# Patient Record
Sex: FEMALE | Race: WHITE | URBAN - NONMETROPOLITAN AREA
[De-identification: names, ages, dates, MRNs, and addresses within clinical notes are randomized per-mention and may not be internally consistent; named-entity substitution may affect disease eponyms.]

---

## 2017-06-14 ENCOUNTER — DOCTOR'S OFFICE (OUTPATIENT)
Dept: URBAN - NONMETROPOLITAN AREA CLINIC 1 | Facility: CLINIC | Age: 62
Setting detail: OPHTHALMOLOGY
End: 2017-06-14
Payer: COMMERCIAL

## 2017-06-14 DIAGNOSIS — H25.13: ICD-10-CM

## 2017-06-14 DIAGNOSIS — H40.033: ICD-10-CM

## 2017-06-14 DIAGNOSIS — H40.053: ICD-10-CM

## 2017-06-14 PROCEDURE — 76514 ECHO EXAM OF EYE THICKNESS: CPT | Performed by: OPHTHALMOLOGY

## 2017-06-14 PROCEDURE — 92083 EXTENDED VISUAL FIELD XM: CPT | Performed by: OPHTHALMOLOGY

## 2017-06-14 PROCEDURE — 92012 INTRM OPH EXAM EST PATIENT: CPT | Performed by: OPHTHALMOLOGY

## 2017-06-14 ASSESSMENT — REFRACTION_AUTOREFRACTION
OD_AXIS: 178
OD_CYLINDER: -1.00
OS_CYLINDER: -0.50
OS_SPHERE: -5.75
OS_AXIS: 133
OD_SPHERE: -6.25

## 2017-06-14 ASSESSMENT — SPHEQUIV_DERIVED
OS_SPHEQUIV: -6
OD_SPHEQUIV: -6.75

## 2017-06-14 ASSESSMENT — REFRACTION_CURRENTRX
OD_AXIS: 40
OD_SPHERE: -5.75
OD_OVR_VA: 20/
OS_OVR_VA: 20/
OS_OVR_VA: 20/
OS_SPHERE: -6.25
OD_CYLINDER: -0.25
OD_OVR_VA: 20/
OS_CYLINDER: -0.50
OS_OVR_VA: 20/
OD_OVR_VA: 20/
OS_AXIS: 58

## 2017-06-14 ASSESSMENT — REFRACTION_MANIFEST
OU_VA: 20/
OD_VA3: 20/
OS_VA3: 20/
OU_VA: 20/
OS_VA2: 20/
OS_VA3: 20/
OS_VA1: 20/
OD_VA1: 20/
OD_VA3: 20/
OS_VA1: 20/
OS_VA2: 20/
OD_VA2: 20/
OS_VA3: 20/
OD_VA3: 20/
OD_VA1: 20/
OD_VA2: 20/
OD_VA1: 20/
OS_VA1: 20/
OU_VA: 20/
OS_VA2: 20/
OD_VA2: 20/

## 2017-06-14 ASSESSMENT — PACHYMETRY
OD_CT_CORRECTION: 0
OS_CT_UM: 555
OD_CT_UM: 542
OS_CT_CORRECTION: -1

## 2017-06-14 ASSESSMENT — VISUAL ACUITY
OD_BCVA: 20/25-1
OS_BCVA: 20/100

## 2017-09-13 ENCOUNTER — DOCTOR'S OFFICE (OUTPATIENT)
Dept: URBAN - NONMETROPOLITAN AREA CLINIC 1 | Facility: CLINIC | Age: 62
Setting detail: OPHTHALMOLOGY
End: 2017-09-13
Payer: COMMERCIAL

## 2017-09-13 DIAGNOSIS — H40.053: ICD-10-CM

## 2017-09-13 DIAGNOSIS — H40.033: ICD-10-CM

## 2017-09-13 DIAGNOSIS — H25.13: ICD-10-CM

## 2017-09-13 PROCEDURE — 92014 COMPRE OPH EXAM EST PT 1/>: CPT | Performed by: OPHTHALMOLOGY

## 2017-09-13 PROCEDURE — 92133 CPTRZD OPH DX IMG PST SGM ON: CPT | Performed by: OPHTHALMOLOGY

## 2017-09-13 ASSESSMENT — REFRACTION_MANIFEST
OD_VA1: 20/
OS_VA2: 20/
OD_VA2: 20/
OD_VA1: 20/
OU_VA: 20/
OD_VA3: 20/
OS_VA3: 20/
OS_VA1: 20/
OU_VA: 20/
OS_VA1: 20/
OS_VA1: 20/
OS_VA2: 20/
OD_VA3: 20/
OD_VA2: 20/
OS_VA3: 20/
OS_VA2: 20/
OU_VA: 20/
OD_VA1: 20/
OD_VA2: 20/
OS_VA3: 20/
OD_VA3: 20/

## 2017-09-13 ASSESSMENT — REFRACTION_AUTOREFRACTION
OD_CYLINDER: -1.00
OD_AXIS: 178
OS_AXIS: 133
OD_SPHERE: -6.25
OS_SPHERE: -5.75
OS_CYLINDER: -0.50

## 2017-09-13 ASSESSMENT — VISUAL ACUITY
OS_BCVA: 20/100
OD_BCVA: 20/20

## 2017-09-13 ASSESSMENT — REFRACTION_CURRENTRX
OD_CYLINDER: -0.25
OS_OVR_VA: 20/
OS_CYLINDER: -0.50
OS_SPHERE: -6.25
OD_AXIS: 40
OD_OVR_VA: 20/
OD_OVR_VA: 20/
OD_SPHERE: -5.75
OS_OVR_VA: 20/
OS_OVR_VA: 20/
OS_AXIS: 58
OD_OVR_VA: 20/

## 2017-09-13 ASSESSMENT — SPHEQUIV_DERIVED
OS_SPHEQUIV: -6
OD_SPHEQUIV: -6.75

## 2017-09-13 ASSESSMENT — CONFRONTATIONAL VISUAL FIELD TEST (CVF)
OD_FINDINGS: FULL
OS_FINDINGS: FULL

## 2017-09-26 ENCOUNTER — AMBUL SURGICAL CARE (OUTPATIENT)
Dept: URBAN - NONMETROPOLITAN AREA SURGERY 1 | Facility: SURGERY | Age: 62
Setting detail: OPHTHALMOLOGY
End: 2017-09-26
Payer: COMMERCIAL

## 2017-09-26 DIAGNOSIS — H40.031: ICD-10-CM

## 2017-09-26 PROCEDURE — 66761 REVISION OF IRIS: CPT | Performed by: OPHTHALMOLOGY

## 2017-09-26 PROCEDURE — G8918 PT W/O PREOP ORDER IV AB PRO: HCPCS | Performed by: OPHTHALMOLOGY

## 2017-09-26 PROCEDURE — G8907 PT DOC NO EVENTS ON DISCHARG: HCPCS | Performed by: OPHTHALMOLOGY

## 2017-10-31 ENCOUNTER — AMBUL SURGICAL CARE (OUTPATIENT)
Dept: URBAN - NONMETROPOLITAN AREA SURGERY 1 | Facility: SURGERY | Age: 62
Setting detail: OPHTHALMOLOGY
End: 2017-10-31
Payer: COMMERCIAL

## 2017-10-31 DIAGNOSIS — H40.032: ICD-10-CM

## 2017-10-31 PROCEDURE — G8907 PT DOC NO EVENTS ON DISCHARG: HCPCS | Performed by: OPHTHALMOLOGY

## 2017-10-31 PROCEDURE — G8918 PT W/O PREOP ORDER IV AB PRO: HCPCS | Performed by: OPHTHALMOLOGY

## 2017-10-31 PROCEDURE — 66761 REVISION OF IRIS: CPT | Performed by: OPHTHALMOLOGY

## 2018-01-09 ENCOUNTER — DOCTOR'S OFFICE (OUTPATIENT)
Dept: URBAN - NONMETROPOLITAN AREA CLINIC 1 | Facility: CLINIC | Age: 63
Setting detail: OPHTHALMOLOGY
End: 2018-01-09
Payer: COMMERCIAL

## 2018-01-09 DIAGNOSIS — H25.13: ICD-10-CM

## 2018-01-09 DIAGNOSIS — H40.033: ICD-10-CM

## 2018-01-09 DIAGNOSIS — H43.813: ICD-10-CM

## 2018-01-09 DIAGNOSIS — H35.363: ICD-10-CM

## 2018-01-09 DIAGNOSIS — H40.053: ICD-10-CM

## 2018-01-09 PROCEDURE — 92133 CPTRZD OPH DX IMG PST SGM ON: CPT | Performed by: OPHTHALMOLOGY

## 2018-01-09 PROCEDURE — 92014 COMPRE OPH EXAM EST PT 1/>: CPT | Performed by: OPHTHALMOLOGY

## 2018-01-09 PROCEDURE — 92250 FUNDUS PHOTOGRAPHY W/I&R: CPT | Performed by: OPHTHALMOLOGY

## 2018-01-09 ASSESSMENT — REFRACTION_AUTOREFRACTION
OD_SPHERE: -6.50
OS_AXIS: 125
OS_SPHERE: -5.50
OD_CYLINDER: -0.25
OS_CYLINDER: -0.50
OD_AXIS: 102

## 2018-01-09 ASSESSMENT — REFRACTION_CURRENTRX
OD_CYLINDER: -0.25
OD_SPHERE: -5.75
OD_AXIS: 40
OS_CYLINDER: -0.50
OS_OVR_VA: 20/
OD_OVR_VA: 20/
OS_OVR_VA: 20/
OD_OVR_VA: 20/
OS_OVR_VA: 20/
OS_SPHERE: -6.25
OD_OVR_VA: 20/
OS_AXIS: 58

## 2018-01-09 ASSESSMENT — REFRACTION_MANIFEST
OS_VA3: 20/
OS_VA1: 20/
OS_VA1: 20/
OD_VA2: 20/
OD_VA1: 20/
OU_VA: 20/
OS_VA2: 20/
OD_VA1: 20/
OD_VA3: 20/
OD_VA2: 20/
OS_VA2: 20/
OD_VA3: 20/
OU_VA: 20/
OS_VA1: 20/
OS_VA3: 20/
OD_VA2: 20/
OS_VA3: 20/
OD_VA3: 20/
OS_VA2: 20/
OU_VA: 20/
OD_VA1: 20/

## 2018-01-09 ASSESSMENT — VISUAL ACUITY
OS_BCVA: 20/200
OD_BCVA: 20/25+1

## 2018-01-09 ASSESSMENT — CONFRONTATIONAL VISUAL FIELD TEST (CVF)
OD_FINDINGS: FULL
OS_FINDINGS: FULL

## 2018-01-09 ASSESSMENT — SPHEQUIV_DERIVED
OD_SPHEQUIV: -6.625
OS_SPHEQUIV: -5.75

## 2018-01-23 ENCOUNTER — DOCTOR'S OFFICE (OUTPATIENT)
Dept: URBAN - NONMETROPOLITAN AREA CLINIC 1 | Facility: CLINIC | Age: 63
Setting detail: OPHTHALMOLOGY
End: 2018-01-23
Payer: COMMERCIAL

## 2018-01-23 DIAGNOSIS — H40.053: ICD-10-CM

## 2018-01-23 DIAGNOSIS — H40.033: ICD-10-CM

## 2018-01-23 DIAGNOSIS — H25.13: ICD-10-CM

## 2018-01-23 PROCEDURE — 92012 INTRM OPH EXAM EST PATIENT: CPT | Performed by: OPHTHALMOLOGY

## 2018-01-23 ASSESSMENT — REFRACTION_MANIFEST
OU_VA: 20/
OD_VA3: 20/
OU_VA: 20/
OS_VA2: 20/
OS_VA3: 20/
OD_VA1: 20/
OD_VA2: 20/
OS_VA2: 20/
OS_VA3: 20/
OS_VA2: 20/
OD_VA3: 20/
OS_VA1: 20/
OD_VA2: 20/
OD_VA1: 20/
OD_VA1: 20/
OD_VA3: 20/
OS_VA3: 20/
OD_VA2: 20/
OU_VA: 20/
OS_VA1: 20/
OS_VA1: 20/

## 2018-01-23 ASSESSMENT — REFRACTION_CURRENTRX
OD_OVR_VA: 20/
OD_CYLINDER: -0.25
OS_OVR_VA: 20/
OD_OVR_VA: 20/
OS_AXIS: 58
OD_OVR_VA: 20/
OS_CYLINDER: -0.50
OS_OVR_VA: 20/
OD_SPHERE: -5.75
OD_AXIS: 40
OS_SPHERE: -6.25
OS_OVR_VA: 20/

## 2018-01-23 ASSESSMENT — REFRACTION_AUTOREFRACTION
OS_SPHERE: -5.50
OS_AXIS: 125
OS_CYLINDER: -0.50
OD_CYLINDER: -0.25
OD_SPHERE: -6.50
OD_AXIS: 102

## 2018-01-23 ASSESSMENT — CONFRONTATIONAL VISUAL FIELD TEST (CVF)
OS_FINDINGS: FULL
OD_FINDINGS: FULL

## 2018-01-23 ASSESSMENT — VISUAL ACUITY
OD_BCVA: 20/30+2
OS_BCVA: 20/150-1

## 2018-01-23 ASSESSMENT — SPHEQUIV_DERIVED
OD_SPHEQUIV: -6.625
OS_SPHEQUIV: -5.75

## 2018-04-03 ENCOUNTER — RX ONLY (RX ONLY)
Age: 63
End: 2018-04-03

## 2018-04-03 ENCOUNTER — DOCTOR'S OFFICE (OUTPATIENT)
Dept: URBAN - NONMETROPOLITAN AREA CLINIC 1 | Facility: CLINIC | Age: 63
Setting detail: OPHTHALMOLOGY
End: 2018-04-03
Payer: COMMERCIAL

## 2018-04-03 DIAGNOSIS — H25.13: ICD-10-CM

## 2018-04-03 DIAGNOSIS — H40.033: ICD-10-CM

## 2018-04-03 DIAGNOSIS — H40.053: ICD-10-CM

## 2018-04-03 PROCEDURE — 92012 INTRM OPH EXAM EST PATIENT: CPT | Performed by: OPHTHALMOLOGY

## 2018-04-03 PROCEDURE — 92083 EXTENDED VISUAL FIELD XM: CPT | Performed by: OPHTHALMOLOGY

## 2018-04-03 ASSESSMENT — REFRACTION_MANIFEST
OS_VA3: 20/
OD_VA2: 20/
OD_VA3: 20/
OS_VA2: 20/
OD_VA1: 20/
OS_VA1: 20/
OD_VA2: 20/
OS_VA2: 20/
OD_VA1: 20/
OS_VA1: 20/
OD_VA2: 20/
OD_VA3: 20/
OU_VA: 20/
OU_VA: 20/
OD_VA3: 20/
OD_VA1: 20/
OS_VA1: 20/
OU_VA: 20/
OS_VA2: 20/

## 2018-04-03 ASSESSMENT — REFRACTION_AUTOREFRACTION
OS_SPHERE: -5.50
OS_CYLINDER: -0.50
OS_AXIS: 125
OD_AXIS: 102
OD_SPHERE: -6.50
OD_CYLINDER: -0.25

## 2018-04-03 ASSESSMENT — REFRACTION_CURRENTRX
OS_OVR_VA: 20/
OS_CYLINDER: -0.50
OS_AXIS: 58
OS_OVR_VA: 20/
OD_SPHERE: -5.75
OS_SPHERE: -6.25
OS_OVR_VA: 20/
OD_AXIS: 40
OD_OVR_VA: 20/
OD_OVR_VA: 20/
OD_CYLINDER: -0.25
OD_OVR_VA: 20/

## 2018-04-03 ASSESSMENT — VISUAL ACUITY
OS_BCVA: 20/200+1
OD_BCVA: 20/30

## 2018-04-03 ASSESSMENT — SPHEQUIV_DERIVED
OS_SPHEQUIV: -5.75
OD_SPHEQUIV: -6.625

## 2018-05-08 ENCOUNTER — DOCTOR'S OFFICE (OUTPATIENT)
Dept: URBAN - NONMETROPOLITAN AREA CLINIC 1 | Facility: CLINIC | Age: 63
Setting detail: OPHTHALMOLOGY
End: 2018-05-08
Payer: COMMERCIAL

## 2018-05-08 ENCOUNTER — RX ONLY (RX ONLY)
Age: 63
End: 2018-05-08

## 2018-05-08 DIAGNOSIS — H10.13: ICD-10-CM

## 2018-05-08 DIAGNOSIS — H40.033: ICD-10-CM

## 2018-05-08 DIAGNOSIS — H25.13: ICD-10-CM

## 2018-05-08 DIAGNOSIS — H40.053: ICD-10-CM

## 2018-05-08 PROCEDURE — 92012 INTRM OPH EXAM EST PATIENT: CPT | Performed by: OPHTHALMOLOGY

## 2018-05-08 ASSESSMENT — REFRACTION_CURRENTRX
OD_SPHERE: -5.75
OD_OVR_VA: 20/
OD_OVR_VA: 20/
OD_AXIS: 40
OS_OVR_VA: 20/
OS_OVR_VA: 20/
OS_SPHERE: -6.25
OS_OVR_VA: 20/
OS_AXIS: 58
OD_OVR_VA: 20/
OS_CYLINDER: -0.50
OD_CYLINDER: -0.25

## 2018-05-08 ASSESSMENT — REFRACTION_MANIFEST
OS_VA2: 20/
OS_VA2: 20/
OD_VA2: 20/
OD_VA2: 20/
OS_VA2: 20/
OU_VA: 20/
OS_VA1: 20/
OD_VA2: 20/
OS_VA1: 20/
OD_VA1: 20/
OD_VA1: 20/
OD_VA3: 20/
OS_VA3: 20/
OD_VA1: 20/
OU_VA: 20/
OU_VA: 20/
OD_VA3: 20/
OS_VA3: 20/
OS_VA1: 20/
OD_VA3: 20/
OS_VA3: 20/

## 2018-05-08 ASSESSMENT — REFRACTION_AUTOREFRACTION
OD_SPHERE: -6.50
OS_CYLINDER: -0.50
OD_AXIS: 102
OS_SPHERE: -5.50
OD_CYLINDER: -0.25
OS_AXIS: 125

## 2018-05-08 ASSESSMENT — VISUAL ACUITY
OS_BCVA: 20/400+1
OD_BCVA: 20/30

## 2018-05-08 ASSESSMENT — SPHEQUIV_DERIVED
OS_SPHEQUIV: -5.75
OD_SPHEQUIV: -6.625

## 2018-05-08 ASSESSMENT — CONFRONTATIONAL VISUAL FIELD TEST (CVF)
OS_FINDINGS: FULL
OD_FINDINGS: FULL

## 2018-09-19 ENCOUNTER — DOCTOR'S OFFICE (OUTPATIENT)
Dept: URBAN - NONMETROPOLITAN AREA CLINIC 1 | Facility: CLINIC | Age: 63
Setting detail: OPHTHALMOLOGY
End: 2018-09-19
Payer: COMMERCIAL

## 2018-09-19 DIAGNOSIS — H25.13: ICD-10-CM

## 2018-09-19 DIAGNOSIS — H40.033: ICD-10-CM

## 2018-09-19 DIAGNOSIS — Z83.511: ICD-10-CM

## 2018-09-19 DIAGNOSIS — H40.023: ICD-10-CM

## 2018-09-19 DIAGNOSIS — H10.13: ICD-10-CM

## 2018-09-19 PROCEDURE — 76514 ECHO EXAM OF EYE THICKNESS: CPT | Performed by: OPHTHALMOLOGY

## 2018-09-19 PROCEDURE — 92133 CPTRZD OPH DX IMG PST SGM ON: CPT | Performed by: OPHTHALMOLOGY

## 2018-09-19 PROCEDURE — 92012 INTRM OPH EXAM EST PATIENT: CPT | Performed by: OPHTHALMOLOGY

## 2018-09-19 ASSESSMENT — REFRACTION_CURRENTRX
OD_OVR_VA: 20/
OD_AXIS: 40
OS_OVR_VA: 20/
OD_OVR_VA: 20/
OS_AXIS: 58
OD_CYLINDER: -0.25
OS_CYLINDER: -0.50
OD_SPHERE: -5.75
OS_SPHERE: -6.25
OD_OVR_VA: 20/
OS_OVR_VA: 20/
OS_OVR_VA: 20/

## 2018-09-19 ASSESSMENT — PACHYMETRY
OD_CT_UM: 536
OS_CT_UM: 546
OD_CT_CORRECTION: 1
OS_CT_CORRECTION: 0

## 2018-09-19 ASSESSMENT — REFRACTION_AUTOREFRACTION
OS_SPHERE: -5.50
OD_SPHERE: -6.50
OD_AXIS: 102
OS_AXIS: 125
OS_CYLINDER: -0.50
OD_CYLINDER: -0.25

## 2018-09-19 ASSESSMENT — VISUAL ACUITY
OS_BCVA: 20/200+1
OD_BCVA: 20/30-1

## 2018-09-19 ASSESSMENT — CONFRONTATIONAL VISUAL FIELD TEST (CVF)
OS_FINDINGS: FULL
OD_FINDINGS: FULL

## 2018-09-19 ASSESSMENT — REFRACTION_MANIFEST
OS_VA1: 20/
OS_VA3: 20/
OD_VA3: 20/
OS_VA2: 20/
OU_VA: 20/
OD_VA2: 20/
OD_VA3: 20/
OD_VA1: 20/
OS_VA1: 20/
OS_VA2: 20/
OD_VA2: 20/
OS_VA3: 20/
OU_VA: 20/
OD_VA1: 20/

## 2018-09-19 ASSESSMENT — SPHEQUIV_DERIVED
OS_SPHEQUIV: -5.75
OD_SPHEQUIV: -6.625

## 2018-10-18 ENCOUNTER — DOCTOR'S OFFICE (OUTPATIENT)
Dept: URBAN - NONMETROPOLITAN AREA CLINIC 1 | Facility: CLINIC | Age: 63
Setting detail: OPHTHALMOLOGY
End: 2018-10-18
Payer: COMMERCIAL

## 2018-10-18 ENCOUNTER — AMBUL SURGICAL CARE (OUTPATIENT)
Dept: URBAN - NONMETROPOLITAN AREA SURGERY 1 | Facility: SURGERY | Age: 63
Setting detail: OPHTHALMOLOGY
End: 2018-10-18
Payer: COMMERCIAL

## 2018-10-18 DIAGNOSIS — H25.13: ICD-10-CM

## 2018-10-18 DIAGNOSIS — H25.11: ICD-10-CM

## 2018-10-18 PROCEDURE — S9986 NOT MEDICALLY NECESSARY SVC: HCPCS | Performed by: OPHTHALMOLOGY

## 2018-10-18 PROCEDURE — 92136 OPHTHALMIC BIOMETRY: CPT | Performed by: OPHTHALMOLOGY

## 2018-10-18 PROCEDURE — V2787 ASTIGMATISM-CORRECT FUNCTION: HCPCS | Performed by: OPHTHALMOLOGY

## 2018-11-01 ENCOUNTER — AMBUL SURGICAL CARE (OUTPATIENT)
Dept: URBAN - NONMETROPOLITAN AREA SURGERY 1 | Facility: SURGERY | Age: 63
Setting detail: OPHTHALMOLOGY
End: 2018-11-01
Payer: COMMERCIAL

## 2018-11-01 DIAGNOSIS — H25.011: ICD-10-CM

## 2018-11-01 DIAGNOSIS — H25.11: ICD-10-CM

## 2018-11-01 DIAGNOSIS — H25.041: ICD-10-CM

## 2018-11-01 PROCEDURE — G8907 PT DOC NO EVENTS ON DISCHARG: HCPCS | Performed by: OPHTHALMOLOGY

## 2018-11-01 PROCEDURE — MONO/WF PRECISION UPGRADE: Performed by: OPHTHALMOLOGY

## 2018-11-01 PROCEDURE — G8918 PT W/O PREOP ORDER IV AB PRO: HCPCS | Performed by: OPHTHALMOLOGY

## 2018-11-01 PROCEDURE — V2787 ASTIGMATISM-CORRECT FUNCTION: HCPCS | Performed by: OPHTHALMOLOGY

## 2018-11-01 PROCEDURE — 66984 XCAPSL CTRC RMVL W/O ECP: CPT | Performed by: OPHTHALMOLOGY

## 2018-11-02 ENCOUNTER — DOCTOR'S OFFICE (OUTPATIENT)
Dept: URBAN - NONMETROPOLITAN AREA CLINIC 1 | Facility: CLINIC | Age: 63
Setting detail: OPHTHALMOLOGY
End: 2018-11-02
Payer: COMMERCIAL

## 2018-11-02 ENCOUNTER — RX ONLY (RX ONLY)
Age: 63
End: 2018-11-02

## 2018-11-02 DIAGNOSIS — H25.11: ICD-10-CM

## 2018-11-02 DIAGNOSIS — H25.12: ICD-10-CM

## 2018-11-02 DIAGNOSIS — Z96.1: ICD-10-CM

## 2018-11-02 PROCEDURE — 99024 POSTOP FOLLOW-UP VISIT: CPT | Performed by: PHYSICIAN ASSISTANT

## 2018-11-02 PROCEDURE — 92136 OPHTHALMIC BIOMETRY: CPT | Performed by: OPHTHALMOLOGY

## 2018-11-02 ASSESSMENT — CONFRONTATIONAL VISUAL FIELD TEST (CVF)
OS_FINDINGS: FULL
OD_FINDINGS: FULL

## 2018-11-05 ASSESSMENT — REFRACTION_CURRENTRX
OD_OVR_VA: 20/
OD_SPHERE: -5.75
OD_OVR_VA: 20/
OS_OVR_VA: 20/
OD_AXIS: 40
OS_OVR_VA: 20/
OS_CYLINDER: -0.50
OD_CYLINDER: -0.25
OS_AXIS: 58
OS_OVR_VA: 20/
OS_SPHERE: -6.25
OD_OVR_VA: 20/

## 2018-11-05 ASSESSMENT — REFRACTION_AUTOREFRACTION
OD_AXIS: 180
OS_CYLINDER: -0.50
OD_SPHERE: -3.00
OS_AXIS: 125
OD_CYLINDER: 0.00
OS_SPHERE: -5.50

## 2018-11-05 ASSESSMENT — REFRACTION_MANIFEST
OS_VA2: 20/
OD_VA3: 20/
OS_VA3: 20/
OS_VA1: 20/
OS_VA1: 20/
OD_VA1: 20/
OU_VA: 20/
OD_VA2: 20/
OS_VA3: 20/
OU_VA: 20/
OD_VA1: 20/
OD_VA2: 20/
OS_VA2: 20/
OD_VA3: 20/

## 2018-11-05 ASSESSMENT — SPHEQUIV_DERIVED
OD_SPHEQUIV: -3
OS_SPHEQUIV: -5.75

## 2018-11-05 ASSESSMENT — VISUAL ACUITY
OD_BCVA: 20/30-1
OS_BCVA: 20/100

## 2018-11-08 ENCOUNTER — AMBUL SURGICAL CARE (OUTPATIENT)
Dept: URBAN - NONMETROPOLITAN AREA SURGERY 1 | Facility: SURGERY | Age: 63
Setting detail: OPHTHALMOLOGY
End: 2018-11-08
Payer: COMMERCIAL

## 2018-11-08 DIAGNOSIS — H25.012: ICD-10-CM

## 2018-11-08 DIAGNOSIS — H25.042: ICD-10-CM

## 2018-11-08 DIAGNOSIS — H25.12: ICD-10-CM

## 2018-11-08 PROCEDURE — V2787 ASTIGMATISM-CORRECT FUNCTION: HCPCS | Performed by: OPHTHALMOLOGY

## 2018-11-08 PROCEDURE — 66984 XCAPSL CTRC RMVL W/O ECP: CPT | Performed by: OPHTHALMOLOGY

## 2018-11-08 PROCEDURE — G8918 PT W/O PREOP ORDER IV AB PRO: HCPCS | Performed by: OPHTHALMOLOGY

## 2018-11-08 PROCEDURE — G8907 PT DOC NO EVENTS ON DISCHARG: HCPCS | Performed by: OPHTHALMOLOGY

## 2018-11-08 PROCEDURE — S9986 NOT MEDICALLY NECESSARY SVC: HCPCS | Performed by: OPHTHALMOLOGY

## 2018-11-09 ENCOUNTER — DOCTOR'S OFFICE (OUTPATIENT)
Dept: URBAN - NONMETROPOLITAN AREA CLINIC 1 | Facility: CLINIC | Age: 63
Setting detail: OPHTHALMOLOGY
End: 2018-11-09
Payer: COMMERCIAL

## 2018-11-09 DIAGNOSIS — Z96.1: ICD-10-CM

## 2018-11-09 PROBLEM — H25.12 NUCLEAR SCLEROSIS; LEFT EYE: Status: RESOLVED | Noted: 2018-11-02 | Resolved: 2018-11-09

## 2018-11-09 PROCEDURE — 99024 POSTOP FOLLOW-UP VISIT: CPT | Performed by: OPHTHALMOLOGY

## 2018-11-09 ASSESSMENT — REFRACTION_CURRENTRX
OD_SPHERE: -5.75
OD_CYLINDER: -0.25
OD_AXIS: 40
OS_CYLINDER: -0.50
OS_OVR_VA: 20/
OS_SPHERE: -6.25
OD_OVR_VA: 20/
OD_OVR_VA: 20/
OS_OVR_VA: 20/
OS_AXIS: 58
OS_OVR_VA: 20/
OD_OVR_VA: 20/

## 2018-11-09 ASSESSMENT — SPHEQUIV_DERIVED
OS_SPHEQUIV: -0.5
OD_SPHEQUIV: -2.125

## 2018-11-09 ASSESSMENT — REFRACTION_MANIFEST
OD_VA2: 20/
OD_VA3: 20/
OD_VA3: 20/
OS_VA2: 20/
OU_VA: 20/
OS_VA1: 20/
OU_VA: 20/
OD_VA1: 20/
OD_VA2: 20/
OS_VA3: 20/
OS_VA3: 20/
OD_VA1: 20/
OS_VA1: 20/
OS_VA2: 20/

## 2018-11-09 ASSESSMENT — REFRACTION_AUTOREFRACTION
OD_SPHERE: -2.00
OS_SPHERE: -0.50
OS_CYLINDER: 0.00
OD_CYLINDER: -0.25
OD_AXIS: 142
OS_AXIS: 180

## 2018-11-09 ASSESSMENT — VISUAL ACUITY
OD_BCVA: 20/30+2
OS_BCVA: 20/80+2

## 2018-11-20 ENCOUNTER — RX ONLY (RX ONLY)
Age: 63
End: 2018-11-20

## 2018-11-20 ENCOUNTER — DOCTOR'S OFFICE (OUTPATIENT)
Dept: URBAN - NONMETROPOLITAN AREA CLINIC 1 | Facility: CLINIC | Age: 63
Setting detail: OPHTHALMOLOGY
End: 2018-11-20
Payer: COMMERCIAL

## 2018-11-20 DIAGNOSIS — Z96.1: ICD-10-CM

## 2018-11-20 DIAGNOSIS — H40.023: ICD-10-CM

## 2018-11-20 PROCEDURE — 99024 POSTOP FOLLOW-UP VISIT: CPT | Performed by: OPHTHALMOLOGY

## 2018-11-20 PROCEDURE — 92083 EXTENDED VISUAL FIELD XM: CPT | Performed by: OPHTHALMOLOGY

## 2018-11-20 ASSESSMENT — CONFRONTATIONAL VISUAL FIELD TEST (CVF)
OD_FINDINGS: FULL
OS_FINDINGS: FULL

## 2018-11-20 ASSESSMENT — REFRACTION_MANIFEST
OS_VA2: 20/
OD_VA3: 20/
OD_VA1: 20/
OU_VA: 20/
OS_VA3: 20/
OS_VA3: 20/
OD_VA2: 20/
OS_VA1: 20/
OD_VA1: 20/
OS_VA2: 20/
OD_VA3: 20/
OD_VA2: 20/
OU_VA: 20/
OS_VA1: 20/

## 2018-11-20 ASSESSMENT — REFRACTION_CURRENTRX
OS_CYLINDER: -0.50
OS_SPHERE: -6.25
OD_OVR_VA: 20/
OS_AXIS: 58
OD_AXIS: 40
OD_SPHERE: -5.75
OD_CYLINDER: -0.25
OS_OVR_VA: 20/

## 2018-11-20 ASSESSMENT — REFRACTION_AUTOREFRACTION
OD_SPHERE: -2.25
OS_CYLINDER: -0.50
OD_AXIS: 122
OS_AXIS: 076
OS_SPHERE: 0.00
OD_CYLINDER: -0.50

## 2018-11-20 ASSESSMENT — VISUAL ACUITY
OD_BCVA: 20/20-2
OS_BCVA: 20/80-1

## 2018-11-20 ASSESSMENT — SPHEQUIV_DERIVED
OD_SPHEQUIV: -2.5
OS_SPHEQUIV: -0.25

## 2019-03-06 ENCOUNTER — DOCTOR'S OFFICE (OUTPATIENT)
Dept: URBAN - NONMETROPOLITAN AREA CLINIC 1 | Facility: CLINIC | Age: 64
Setting detail: OPHTHALMOLOGY
End: 2019-03-06
Payer: COMMERCIAL

## 2019-03-06 ENCOUNTER — RX ONLY (RX ONLY)
Age: 64
End: 2019-03-06

## 2019-03-06 DIAGNOSIS — H40.023: ICD-10-CM

## 2019-03-06 DIAGNOSIS — H43.813: ICD-10-CM

## 2019-03-06 DIAGNOSIS — H35.361: ICD-10-CM

## 2019-03-06 DIAGNOSIS — H40.033: ICD-10-CM

## 2019-03-06 DIAGNOSIS — H04.122: ICD-10-CM

## 2019-03-06 DIAGNOSIS — H04.121: ICD-10-CM

## 2019-03-06 DIAGNOSIS — Z96.1: ICD-10-CM

## 2019-03-06 DIAGNOSIS — H35.362: ICD-10-CM

## 2019-03-06 PROBLEM — H10.13 ALLERGIC CONJUNCTIVITIS; BOTH EYES: Status: RESOLVED | Noted: 2018-05-08 | Resolved: 2019-03-06

## 2019-03-06 PROCEDURE — 92014 COMPRE OPH EXAM EST PT 1/>: CPT | Performed by: OPHTHALMOLOGY

## 2019-03-06 PROCEDURE — 92250 FUNDUS PHOTOGRAPHY W/I&R: CPT | Performed by: OPHTHALMOLOGY

## 2019-03-06 ASSESSMENT — REFRACTION_CURRENTRX
OS_AXIS: 58
OD_OVR_VA: 20/
OD_CYLINDER: -0.25
OS_OVR_VA: 20/
OS_CYLINDER: -0.50
OD_OVR_VA: 20/
OD_SPHERE: -5.75
OS_OVR_VA: 20/
OD_OVR_VA: 20/
OD_AXIS: 40
OS_SPHERE: -6.25
OS_OVR_VA: 20/

## 2019-03-06 ASSESSMENT — VISUAL ACUITY
OS_BCVA: 20/150
OD_BCVA: 20/25-2

## 2019-03-06 ASSESSMENT — REFRACTION_MANIFEST
OD_VA2: 20/
OD_VA1: 20/
OS_VA3: 20/
OU_VA: 20/
OD_VA3: 20/
OS_VA1: 20/
OS_VA3: 20/
OD_VA3: 20/
OD_VA2: 20/
OS_VA2: 20/
OD_VA1: 20/
OS_VA2: 20/
OS_VA1: 20/
OU_VA: 20/

## 2019-03-06 ASSESSMENT — REFRACTION_AUTOREFRACTION
OD_AXIS: 106
OD_SPHERE: -2.00
OS_CYLINDER: -0.75
OS_SPHERE: 0.00
OS_AXIS: 090
OD_CYLINDER: -0.50

## 2019-03-06 ASSESSMENT — SPHEQUIV_DERIVED
OD_SPHEQUIV: -2.25
OS_SPHEQUIV: -0.375

## 2019-03-06 ASSESSMENT — CONFRONTATIONAL VISUAL FIELD TEST (CVF)
OD_FINDINGS: FULL
OS_FINDINGS: FULL

## 2019-03-11 ENCOUNTER — RX ONLY (RX ONLY)
Age: 64
End: 2019-03-11

## 2019-09-18 ENCOUNTER — DOCTOR'S OFFICE (OUTPATIENT)
Dept: URBAN - NONMETROPOLITAN AREA CLINIC 1 | Facility: CLINIC | Age: 64
Setting detail: OPHTHALMOLOGY
End: 2019-09-18
Payer: COMMERCIAL

## 2019-09-18 DIAGNOSIS — H43.813: ICD-10-CM

## 2019-09-18 DIAGNOSIS — Z96.1: ICD-10-CM

## 2019-09-18 DIAGNOSIS — H40.033: ICD-10-CM

## 2019-09-18 DIAGNOSIS — H35.363: ICD-10-CM

## 2019-09-18 DIAGNOSIS — H04.123: ICD-10-CM

## 2019-09-18 DIAGNOSIS — H40.023: ICD-10-CM

## 2019-09-18 PROCEDURE — 76514 ECHO EXAM OF EYE THICKNESS: CPT | Performed by: OPHTHALMOLOGY

## 2019-09-18 PROCEDURE — 92133 CPTRZD OPH DX IMG PST SGM ON: CPT | Performed by: OPHTHALMOLOGY

## 2019-09-18 PROCEDURE — 92014 COMPRE OPH EXAM EST PT 1/>: CPT | Performed by: OPHTHALMOLOGY

## 2019-09-18 ASSESSMENT — CONFRONTATIONAL VISUAL FIELD TEST (CVF)
OS_FINDINGS: FULL
OD_FINDINGS: FULL

## 2019-09-18 ASSESSMENT — VISUAL ACUITY
OD_BCVA: 20/25-1
OS_BCVA: 20/200+1

## 2019-09-18 ASSESSMENT — REFRACTION_MANIFEST
OD_VA1: 20/
OD_VA2: 20/
OD_VA3: 20/
OD_VA2: 20/
OU_VA: 20/
OS_VA3: 20/
OD_VA1: 20/
OS_VA1: 20/
OS_VA2: 20/
OU_VA: 20/
OD_VA3: 20/
OS_VA3: 20/
OS_VA1: 20/
OS_VA2: 20/

## 2019-09-18 ASSESSMENT — REFRACTION_CURRENTRX
OD_SPHERE: -2.00
OS_OVR_VA: 20/
OD_OVR_VA: 20/
OS_SPHERE: PLANO
OS_AXIS: 099
OD_AXIS: 101
OD_OVR_VA: 20/
OD_VPRISM_DIRECTION: SV
OD_CYLINDER: -0.50
OD_OVR_VA: 20/
OS_OVR_VA: 20/
OS_CYLINDER: -0.50
OS_VPRISM_DIRECTION: SV
OS_OVR_VA: 20/

## 2019-09-18 ASSESSMENT — SPHEQUIV_DERIVED
OS_SPHEQUIV: -0.75
OD_SPHEQUIV: -2.25

## 2019-09-18 ASSESSMENT — PACHYMETRY
OD_CT_UM: 536
OD_CT_CORRECTION: 1
OS_CT_UM: 546
OS_CT_CORRECTION: 0

## 2019-09-18 ASSESSMENT — REFRACTION_AUTOREFRACTION
OD_SPHERE: -1.75
OS_CYLINDER: -0.50
OS_AXIS: 054
OD_CYLINDER: -1.00
OD_AXIS: 107
OS_SPHERE: -0.50

## 2020-01-02 ENCOUNTER — HOSPITAL ENCOUNTER (OUTPATIENT)
Dept: RADIOLOGY | Facility: HOSPITAL | Age: 65
Discharge: HOME | End: 2020-01-02
Payer: MEDICARE

## 2020-01-02 ENCOUNTER — TRANSCRIBE ORDERS (OUTPATIENT)
Dept: ADMINISTRATIVE | Facility: HOSPITAL | Age: 65
End: 2020-01-02

## 2020-01-02 DIAGNOSIS — M79.672 LEFT FOOT PAIN: ICD-10-CM

## 2020-01-02 DIAGNOSIS — M79.672 LEFT FOOT PAIN: Primary | ICD-10-CM

## 2020-01-02 PROCEDURE — 73630 X-RAY EXAM OF FOOT: CPT

## 2020-01-08 ENCOUNTER — DOCTOR'S OFFICE (OUTPATIENT)
Dept: URBAN - NONMETROPOLITAN AREA CLINIC 1 | Facility: CLINIC | Age: 65
Setting detail: OPHTHALMOLOGY
End: 2020-01-08
Payer: COMMERCIAL

## 2020-01-08 ENCOUNTER — RX ONLY (RX ONLY)
Age: 65
End: 2020-01-08

## 2020-01-08 VITALS — HEIGHT: 55 IN

## 2020-01-08 DIAGNOSIS — H43.813: ICD-10-CM

## 2020-01-08 DIAGNOSIS — H35.363: ICD-10-CM

## 2020-01-08 DIAGNOSIS — H40.033: ICD-10-CM

## 2020-01-08 DIAGNOSIS — Z96.1: ICD-10-CM

## 2020-01-08 DIAGNOSIS — H04.123: ICD-10-CM

## 2020-01-08 DIAGNOSIS — H40.023: ICD-10-CM

## 2020-01-08 PROCEDURE — 92133 CPTRZD OPH DX IMG PST SGM ON: CPT | Performed by: OPHTHALMOLOGY

## 2020-01-08 PROCEDURE — 99214 OFFICE O/P EST MOD 30 MIN: CPT | Performed by: OPHTHALMOLOGY

## 2020-01-08 ASSESSMENT — REFRACTION_CURRENTRX
OS_SPHERE: PLANO
OD_VPRISM_DIRECTION: SV
OD_CYLINDER: -0.50
OS_OVR_VA: 20/
OS_CYLINDER: -0.50
OS_AXIS: 099
OD_AXIS: 101
OD_SPHERE: -2.00
OS_VPRISM_DIRECTION: SV
OD_OVR_VA: 20/

## 2020-01-08 ASSESSMENT — REFRACTION_AUTOREFRACTION
OD_AXIS: 107
OD_CYLINDER: -1.00
OS_SPHERE: -0.50
OS_AXIS: 054
OD_SPHERE: -1.75
OS_CYLINDER: -0.50

## 2020-01-08 ASSESSMENT — CONFRONTATIONAL VISUAL FIELD TEST (CVF)
OS_FINDINGS: FULL
OD_FINDINGS: FULL

## 2020-01-08 ASSESSMENT — VISUAL ACUITY
OD_BCVA: 20/25-2
OS_BCVA: 20/125

## 2020-01-08 ASSESSMENT — SPHEQUIV_DERIVED
OD_SPHEQUIV: -2.25
OS_SPHEQUIV: -0.75

## 2020-02-21 ENCOUNTER — RX ONLY (RX ONLY)
Age: 65
End: 2020-02-21

## 2020-03-23 ENCOUNTER — TRANSCRIBE ORDERS (OUTPATIENT)
Dept: ADMINISTRATIVE | Facility: HOSPITAL | Age: 65
End: 2020-03-23

## 2020-03-23 DIAGNOSIS — I48.91 ATRIAL FIBRILLATION, UNSPECIFIED TYPE (HCC): ICD-10-CM

## 2020-03-23 DIAGNOSIS — R07.89 OTHER CHEST PAIN: ICD-10-CM

## 2020-03-23 DIAGNOSIS — R06.00 DYSPNEA ON EXERTION: Primary | ICD-10-CM

## 2020-03-27 ENCOUNTER — TELEPHONE (OUTPATIENT)
Dept: NON INVASIVE DIAGNOSTICS | Facility: HOSPITAL | Age: 65
End: 2020-03-27

## 2020-03-27 DIAGNOSIS — R07.2 PRECORDIAL PAIN: Primary | ICD-10-CM

## 2020-04-01 ENCOUNTER — HOSPITAL ENCOUNTER (OUTPATIENT)
Dept: NON INVASIVE DIAGNOSTICS | Facility: HOSPITAL | Age: 65
Discharge: HOME | End: 2020-04-01
Attending: INTERNAL MEDICINE
Payer: MEDICARE

## 2020-04-01 ENCOUNTER — HOSPITAL ENCOUNTER (OUTPATIENT)
Dept: NUCLEAR MEDICINE | Facility: HOSPITAL | Age: 65
Discharge: HOME | End: 2020-04-01
Attending: INTERNAL MEDICINE
Payer: MEDICARE

## 2020-04-01 DIAGNOSIS — R07.2 PRECORDIAL PAIN: ICD-10-CM

## 2020-04-01 LAB
CHEST PAIN STATEMENT: NORMAL
MAX DIASTOLIC BP: 71 MMHG
MAX HEART RATE: 101 BPM
MAX PREDICTED HEART RATE: 154 BPM
MAX. SYSTOLIC BP: 146 MMHG
PROTOCOL NAME: NORMAL
REASON FOR TERMINATION: NORMAL
TARGET HR FORMULA: NORMAL
TEST INDICATION: NORMAL
TIME IN EXERCISE PHASE: NORMAL

## 2020-04-01 PROCEDURE — 93018 CV STRESS TEST I&R ONLY: CPT | Performed by: INTERNAL MEDICINE

## 2020-04-01 PROCEDURE — 78452 HT MUSCLE IMAGE SPECT MULT: CPT

## 2020-04-01 PROCEDURE — A9502 TC99M TETROFOSMIN: HCPCS

## 2020-04-01 PROCEDURE — 93306 TTE W/DOPPLER COMPLETE: CPT | Performed by: INTERNAL MEDICINE

## 2020-04-01 PROCEDURE — 93016 CV STRESS TEST SUPVJ ONLY: CPT | Performed by: INTERNAL MEDICINE

## 2020-04-01 PROCEDURE — 78452 HT MUSCLE IMAGE SPECT MULT: CPT | Performed by: INTERNAL MEDICINE

## 2020-04-01 PROCEDURE — 93017 CV STRESS TEST TRACING ONLY: CPT

## 2020-04-01 PROCEDURE — 93306 TTE W/DOPPLER COMPLETE: CPT

## 2020-04-01 RX ORDER — HYDROCHLOROTHIAZIDE 50 MG/1
TABLET ORAL
COMMUNITY
Start: 2020-01-22 | End: 2021-07-24 | Stop reason: HOSPADM

## 2020-04-01 RX ORDER — POTASSIUM CITRATE 10 MEQ/1
TABLET, EXTENDED RELEASE ORAL
COMMUNITY
Start: 2020-01-22 | End: 2021-08-07

## 2020-04-01 RX ORDER — HYDROCODONE/ACETAMINOPHEN 5 MG-500MG
TABLET ORAL
COMMUNITY
End: 2021-08-16 | Stop reason: HOSPADM

## 2020-04-01 RX ORDER — ALPRAZOLAM 0.25 MG
TABLET ORAL
COMMUNITY
End: 2021-08-16 | Stop reason: HOSPADM

## 2020-04-01 RX ORDER — PANTOPRAZOLE SODIUM 40 MG/1
TABLET, DELAYED RELEASE ORAL
COMMUNITY
Start: 2017-03-25 | End: 2021-07-24 | Stop reason: HOSPADM

## 2020-04-01 RX ORDER — DOCUSATE SODIUM 100 MG/1
CAPSULE, LIQUID FILLED ORAL EVERY 12 HOURS
COMMUNITY
End: 2021-08-16 | Stop reason: HOSPADM

## 2020-04-01 RX ORDER — CELECOXIB 200 MG/1
200 CAPSULE ORAL DAILY
COMMUNITY
Start: 2020-02-20 | End: 2021-07-24 | Stop reason: HOSPADM

## 2020-04-01 RX ORDER — AMOXICILLIN 500 MG/1
CAPSULE ORAL
COMMUNITY
End: 2021-07-24 | Stop reason: HOSPADM

## 2020-04-01 RX ORDER — BIMATOPROST 0.01 %
DROPS OPHTHALMIC (EYE)
COMMUNITY
Start: 2020-02-21

## 2020-04-01 RX ORDER — ALLOPURINOL 100 MG/1
TABLET ORAL
COMMUNITY
Start: 2020-01-09

## 2020-04-01 RX ORDER — BENZONATATE 100 MG/1
CAPSULE ORAL
COMMUNITY
Start: 2020-03-12 | End: 2021-07-24 | Stop reason: HOSPADM

## 2020-04-01 RX ORDER — ATORVASTATIN CALCIUM 10 MG/1
TABLET, FILM COATED ORAL
COMMUNITY
Start: 2020-01-22

## 2020-04-01 RX ORDER — ERGOCALCIFEROL (VITAMIN D2) 10 MCG
TABLET ORAL
COMMUNITY
End: 2021-07-24 | Stop reason: HOSPADM

## 2020-04-01 RX ADMIN — REGADENOSON 0.4 MG: 0.08 INJECTION, SOLUTION INTRAVENOUS at 09:06

## 2020-07-29 ENCOUNTER — DOCTOR'S OFFICE (OUTPATIENT)
Dept: URBAN - NONMETROPOLITAN AREA CLINIC 1 | Facility: CLINIC | Age: 65
Setting detail: OPHTHALMOLOGY
End: 2020-07-29
Payer: COMMERCIAL

## 2020-07-29 VITALS — HEIGHT: 55 IN

## 2020-07-29 DIAGNOSIS — H40.033: ICD-10-CM

## 2020-07-29 DIAGNOSIS — Z96.1: ICD-10-CM

## 2020-07-29 DIAGNOSIS — H04.122: ICD-10-CM

## 2020-07-29 DIAGNOSIS — H35.362: ICD-10-CM

## 2020-07-29 DIAGNOSIS — H04.121: ICD-10-CM

## 2020-07-29 DIAGNOSIS — H43.813: ICD-10-CM

## 2020-07-29 DIAGNOSIS — E11.9: ICD-10-CM

## 2020-07-29 DIAGNOSIS — H40.023: ICD-10-CM

## 2020-07-29 DIAGNOSIS — H35.361: ICD-10-CM

## 2020-07-29 PROCEDURE — 92014 COMPRE OPH EXAM EST PT 1/>: CPT | Performed by: OPHTHALMOLOGY

## 2020-07-29 PROCEDURE — 92134 CPTRZ OPH DX IMG PST SGM RTA: CPT | Performed by: OPHTHALMOLOGY

## 2020-07-29 PROCEDURE — 92083 EXTENDED VISUAL FIELD XM: CPT | Performed by: OPHTHALMOLOGY

## 2020-07-29 ASSESSMENT — REFRACTION_AUTOREFRACTION
OS_CYLINDER: -0.25
OS_AXIS: 052
OD_AXIS: 098
OS_SPHERE: -0.50
OD_SPHERE: -2.50
OD_CYLINDER: -0.50

## 2020-07-29 ASSESSMENT — CONFRONTATIONAL VISUAL FIELD TEST (CVF)
OD_FINDINGS: FULL
OS_FINDINGS: FULL

## 2020-07-29 ASSESSMENT — SPHEQUIV_DERIVED
OD_SPHEQUIV: -2.75
OS_SPHEQUIV: -0.625

## 2020-07-29 ASSESSMENT — REFRACTION_CURRENTRX
OS_CYLINDER: -0.50
OD_CYLINDER: -0.50
OD_VPRISM_DIRECTION: SV
OD_AXIS: 101
OD_OVR_VA: 20/
OS_OVR_VA: 20/
OS_VPRISM_DIRECTION: SV
OS_AXIS: 099
OD_SPHERE: -2.00
OS_SPHERE: PLANO

## 2020-07-29 ASSESSMENT — VISUAL ACUITY
OS_BCVA: 20/70
OD_BCVA: 20/30-1

## 2020-08-05 ENCOUNTER — TRANSCRIBE ORDERS (OUTPATIENT)
Dept: ADMINISTRATIVE | Facility: HOSPITAL | Age: 65
End: 2020-08-05

## 2020-08-05 DIAGNOSIS — R10.10 UPPER ABDOMINAL PAIN: Primary | ICD-10-CM

## 2020-08-06 ENCOUNTER — AMBUL SURGICAL CARE (OUTPATIENT)
Dept: URBAN - NONMETROPOLITAN AREA SURGERY 1 | Facility: SURGERY | Age: 65
Setting detail: OPHTHALMOLOGY
End: 2020-08-06
Payer: COMMERCIAL

## 2020-08-06 DIAGNOSIS — H40.021: ICD-10-CM

## 2020-08-06 PROCEDURE — 65855 TRABECULOPLASTY LASER SURG: CPT | Performed by: OPHTHALMOLOGY

## 2020-08-06 PROCEDURE — G8907 PT DOC NO EVENTS ON DISCHARG: HCPCS | Performed by: CLINIC/CENTER

## 2020-08-06 PROCEDURE — G8918 PT W/O PREOP ORDER IV AB PRO: HCPCS | Performed by: OPHTHALMOLOGY

## 2020-08-06 PROCEDURE — G8918 PT W/O PREOP ORDER IV AB PRO: HCPCS | Performed by: CLINIC/CENTER

## 2020-08-06 PROCEDURE — G8907 PT DOC NO EVENTS ON DISCHARG: HCPCS | Performed by: OPHTHALMOLOGY

## 2020-08-06 PROCEDURE — 65855 TRABECULOPLASTY LASER SURG: CPT | Performed by: CLINIC/CENTER

## 2020-08-07 ENCOUNTER — TRANSCRIBE ORDERS (OUTPATIENT)
Dept: ADMINISTRATIVE | Facility: HOSPITAL | Age: 65
End: 2020-08-07

## 2020-08-07 ENCOUNTER — HOSPITAL ENCOUNTER (OUTPATIENT)
Dept: RADIOLOGY | Facility: HOSPITAL | Age: 65
Discharge: HOME | End: 2020-08-07
Payer: MEDICARE

## 2020-08-07 ENCOUNTER — HOSPITAL ENCOUNTER (OUTPATIENT)
Dept: ULTRASOUND IMAGING | Facility: HOSPITAL | Age: 65
Discharge: HOME | End: 2020-08-07
Payer: MEDICARE

## 2020-08-07 DIAGNOSIS — R06.00 DYSPNEA, UNSPECIFIED TYPE: ICD-10-CM

## 2020-08-07 DIAGNOSIS — R10.10 UPPER ABDOMINAL PAIN: ICD-10-CM

## 2020-08-07 DIAGNOSIS — R06.00 DYSPNEA, UNSPECIFIED TYPE: Primary | ICD-10-CM

## 2020-08-07 PROCEDURE — 71046 X-RAY EXAM CHEST 2 VIEWS: CPT

## 2020-08-07 PROCEDURE — 76700 US EXAM ABDOM COMPLETE: CPT

## 2020-08-18 ENCOUNTER — AMBUL SURGICAL CARE (OUTPATIENT)
Dept: URBAN - NONMETROPOLITAN AREA SURGERY 1 | Facility: SURGERY | Age: 65
Setting detail: OPHTHALMOLOGY
End: 2020-08-18
Payer: COMMERCIAL

## 2020-08-18 DIAGNOSIS — H40.022: ICD-10-CM

## 2020-08-18 PROCEDURE — G8918 PT W/O PREOP ORDER IV AB PRO: HCPCS | Performed by: CLINIC/CENTER

## 2020-08-18 PROCEDURE — G8918 PT W/O PREOP ORDER IV AB PRO: HCPCS | Performed by: OPHTHALMOLOGY

## 2020-08-18 PROCEDURE — G8907 PT DOC NO EVENTS ON DISCHARG: HCPCS | Performed by: OPHTHALMOLOGY

## 2020-08-18 PROCEDURE — 65855 TRABECULOPLASTY LASER SURG: CPT | Performed by: CLINIC/CENTER

## 2020-08-18 PROCEDURE — 65855 TRABECULOPLASTY LASER SURG: CPT | Performed by: OPHTHALMOLOGY

## 2020-08-18 PROCEDURE — G8907 PT DOC NO EVENTS ON DISCHARG: HCPCS | Performed by: CLINIC/CENTER

## 2020-09-30 ENCOUNTER — DOCTOR'S OFFICE (OUTPATIENT)
Dept: URBAN - NONMETROPOLITAN AREA CLINIC 1 | Facility: CLINIC | Age: 65
Setting detail: OPHTHALMOLOGY
End: 2020-09-30
Payer: COMMERCIAL

## 2020-09-30 VITALS — HEIGHT: 55 IN

## 2020-09-30 DIAGNOSIS — H40.023: ICD-10-CM

## 2020-09-30 DIAGNOSIS — H26.491: ICD-10-CM

## 2020-09-30 DIAGNOSIS — Z96.1: ICD-10-CM

## 2020-09-30 DIAGNOSIS — H26.492: ICD-10-CM

## 2020-09-30 PROCEDURE — 99214 OFFICE O/P EST MOD 30 MIN: CPT | Performed by: OPHTHALMOLOGY

## 2020-09-30 ASSESSMENT — VISUAL ACUITY
OD_BCVA: 20/30-2
OS_BCVA: 20/100+1

## 2020-09-30 ASSESSMENT — REFRACTION_AUTOREFRACTION
OD_AXIS: 115
OS_CYLINDER: -1.00
OS_AXIS: 097
OS_SPHERE: 0.00
OD_SPHERE: -1.75
OD_CYLINDER: -1.00

## 2020-09-30 ASSESSMENT — REFRACTION_CURRENTRX
OD_AXIS: 101
OS_SPHERE: PLANO
OS_OVR_VA: 20/
OS_AXIS: 099
OS_CYLINDER: -0.50
OD_OVR_VA: 20/
OD_CYLINDER: -0.50
OD_SPHERE: -2.00
OS_VPRISM_DIRECTION: SV
OD_VPRISM_DIRECTION: SV

## 2020-09-30 ASSESSMENT — CONFRONTATIONAL VISUAL FIELD TEST (CVF)
OD_FINDINGS: FULL
OS_FINDINGS: FULL

## 2020-09-30 ASSESSMENT — SPHEQUIV_DERIVED
OD_SPHEQUIV: -2.25
OS_SPHEQUIV: -0.5

## 2020-12-29 ENCOUNTER — TRANSCRIBE ORDERS (OUTPATIENT)
Dept: ADMINISTRATIVE | Facility: HOSPITAL | Age: 65
End: 2020-12-29

## 2020-12-29 ENCOUNTER — APPOINTMENT (OUTPATIENT)
Dept: LAB | Facility: HOSPITAL | Age: 65
End: 2020-12-29
Payer: MEDICARE

## 2020-12-29 ENCOUNTER — HOSPITAL ENCOUNTER (OUTPATIENT)
Dept: CT IMAGING | Facility: HOSPITAL | Age: 65
Discharge: HOME | End: 2020-12-29
Payer: MEDICARE

## 2020-12-29 DIAGNOSIS — R31.0 GROSS HEMATURIA: ICD-10-CM

## 2020-12-29 DIAGNOSIS — R31.0 GROSS HEMATURIA: Primary | ICD-10-CM

## 2020-12-29 DIAGNOSIS — N20.0 URIC ACID NEPHROLITHIASIS: ICD-10-CM

## 2020-12-29 DIAGNOSIS — N20.0 CALCULUS OF KIDNEY: ICD-10-CM

## 2020-12-29 DIAGNOSIS — R31.9 HEMATURIA SYNDROME: ICD-10-CM

## 2020-12-29 DIAGNOSIS — R31.9 HEMATURIA SYNDROME: Primary | ICD-10-CM

## 2020-12-29 DIAGNOSIS — M79.10 MYALGIA: ICD-10-CM

## 2020-12-29 LAB
ALBUMIN SERPL BCP-MCNC: 3.4 G/DL (ref 3.5–5)
ALP SERPL-CCNC: 80 U/L (ref 46–116)
ALT SERPL W P-5'-P-CCNC: 50 U/L (ref 12–78)
ANION GAP SERPL CALCULATED.3IONS-SCNC: 10 MMOL/L (ref 4–13)
AST SERPL W P-5'-P-CCNC: 67 U/L (ref 5–45)
BASOPHILS # BLD AUTO: 0.12 THOUSANDS/ΜL (ref 0–0.1)
BASOPHILS NFR BLD AUTO: 1 % (ref 0–1)
BILIRUB SERPL-MCNC: 1.36 MG/DL (ref 0.2–1)
BUN SERPL-MCNC: 15 MG/DL (ref 5–25)
CALCIUM ALBUM COR SERPL-MCNC: 10 MG/DL (ref 8.3–10.1)
CALCIUM SERPL-MCNC: 9.5 MG/DL (ref 8.3–10.1)
CHLORIDE SERPL-SCNC: 102 MMOL/L (ref 100–108)
CK MB SERPL-MCNC: 1 % (ref 0–2.5)
CK MB SERPL-MCNC: 2.4 NG/ML (ref 0–5)
CK SERPL-CCNC: 248 U/L (ref 26–192)
CO2 SERPL-SCNC: 26 MMOL/L (ref 21–32)
CREAT SERPL-MCNC: 1 MG/DL (ref 0.6–1.3)
EOSINOPHIL # BLD AUTO: 0.24 THOUSAND/ΜL (ref 0–0.61)
EOSINOPHIL NFR BLD AUTO: 2 % (ref 0–6)
ERYTHROCYTE [DISTWIDTH] IN BLOOD BY AUTOMATED COUNT: 15.7 % (ref 11.6–15.1)
GFR SERPL CREATININE-BSD FRML MDRD: 58 ML/MIN/1.73SQ M
GLUCOSE P FAST SERPL-MCNC: 143 MG/DL (ref 65–99)
HCT VFR BLD AUTO: 40.2 % (ref 34.8–46.1)
HGB BLD-MCNC: 13.1 G/DL (ref 11.5–15.4)
IMM GRANULOCYTES # BLD AUTO: 0.03 THOUSAND/UL (ref 0–0.2)
IMM GRANULOCYTES NFR BLD AUTO: 0 % (ref 0–2)
LYMPHOCYTES # BLD AUTO: 4.06 THOUSANDS/ΜL (ref 0.6–4.47)
LYMPHOCYTES NFR BLD AUTO: 40 % (ref 14–44)
MCH RBC QN AUTO: 31 PG (ref 26.8–34.3)
MCHC RBC AUTO-ENTMCNC: 32.6 G/DL (ref 31.4–37.4)
MCV RBC AUTO: 95 FL (ref 82–98)
MONOCYTES # BLD AUTO: 1.03 THOUSAND/ΜL (ref 0.17–1.22)
MONOCYTES NFR BLD AUTO: 10 % (ref 4–12)
NEUTROPHILS # BLD AUTO: 4.7 THOUSANDS/ΜL (ref 1.85–7.62)
NEUTS SEG NFR BLD AUTO: 47 % (ref 43–75)
NRBC BLD AUTO-RTO: 0 /100 WBCS
PLATELET # BLD AUTO: 204 THOUSANDS/UL (ref 149–390)
PMV BLD AUTO: 11.2 FL (ref 8.9–12.7)
POTASSIUM SERPL-SCNC: 3.6 MMOL/L (ref 3.5–5.3)
PROT SERPL-MCNC: 8.4 G/DL (ref 6.4–8.2)
RBC # BLD AUTO: 4.23 MILLION/UL (ref 3.81–5.12)
SODIUM SERPL-SCNC: 138 MMOL/L (ref 136–145)
URATE SERPL-MCNC: 3.7 MG/DL (ref 2–6.8)
WBC # BLD AUTO: 10.18 THOUSAND/UL (ref 4.31–10.16)

## 2020-12-29 PROCEDURE — 84550 ASSAY OF BLOOD/URIC ACID: CPT

## 2020-12-29 PROCEDURE — 74178 CT ABD&PLV WO CNTR FLWD CNTR: CPT

## 2020-12-29 PROCEDURE — G1004 CDSM NDSC: HCPCS

## 2020-12-29 PROCEDURE — 36415 COLL VENOUS BLD VENIPUNCTURE: CPT

## 2020-12-29 PROCEDURE — 80053 COMPREHEN METABOLIC PANEL: CPT

## 2020-12-29 PROCEDURE — 82550 ASSAY OF CK (CPK): CPT

## 2020-12-29 PROCEDURE — 85025 COMPLETE CBC W/AUTO DIFF WBC: CPT

## 2020-12-29 PROCEDURE — 82553 CREATINE MB FRACTION: CPT

## 2020-12-29 RX ADMIN — IOHEXOL 100 ML: 350 INJECTION, SOLUTION INTRAVENOUS at 09:35

## 2021-01-29 ENCOUNTER — HOSPITAL ENCOUNTER (EMERGENCY)
Facility: HOSPITAL | Age: 66
Discharge: HOME | End: 2021-01-29
Attending: EMERGENCY MEDICINE
Payer: MEDICARE

## 2021-01-29 ENCOUNTER — APPOINTMENT (EMERGENCY)
Dept: CT IMAGING | Facility: HOSPITAL | Age: 66
End: 2021-01-29
Payer: MEDICARE

## 2021-01-29 VITALS
TEMPERATURE: 97.9 F | SYSTOLIC BLOOD PRESSURE: 191 MMHG | WEIGHT: 260 LBS | OXYGEN SATURATION: 98 % | HEIGHT: 62 IN | DIASTOLIC BLOOD PRESSURE: 90 MMHG | RESPIRATION RATE: 18 BRPM | BODY MASS INDEX: 47.84 KG/M2 | HEART RATE: 89 BPM

## 2021-01-29 DIAGNOSIS — M54.9 BACK PAIN: ICD-10-CM

## 2021-01-29 DIAGNOSIS — R10.9 LEFT FLANK PAIN: Primary | ICD-10-CM

## 2021-01-29 LAB
ALBUMIN SERPL BCP-MCNC: 3.2 G/DL (ref 3.5–5)
ALP SERPL-CCNC: 74 U/L (ref 46–116)
ALT SERPL W P-5'-P-CCNC: 40 U/L (ref 12–78)
ANION GAP SERPL CALCULATED.3IONS-SCNC: 12 MMOL/L (ref 4–13)
AST SERPL W P-5'-P-CCNC: 61 U/L (ref 5–45)
BACTERIA UR QL AUTO: ABNORMAL /HPF
BASOPHILS # BLD AUTO: 0.11 THOUSANDS/ΜL (ref 0–0.1)
BASOPHILS NFR BLD AUTO: 1 % (ref 0–1)
BILIRUB SERPL-MCNC: 1.32 MG/DL (ref 0.2–1)
BILIRUB UR QL STRIP: NEGATIVE
BUN SERPL-MCNC: 12 MG/DL (ref 5–25)
CALCIUM ALBUM COR SERPL-MCNC: 10.2 MG/DL (ref 8.3–10.1)
CALCIUM SERPL-MCNC: 9.6 MG/DL (ref 8.3–10.1)
CHLORIDE SERPL-SCNC: 102 MMOL/L (ref 100–108)
CLARITY UR: ABNORMAL
CO2 SERPL-SCNC: 24 MMOL/L (ref 21–32)
COLOR UR: YELLOW
CREAT SERPL-MCNC: 0.89 MG/DL (ref 0.6–1.3)
EOSINOPHIL # BLD AUTO: 0.13 THOUSAND/ΜL (ref 0–0.61)
EOSINOPHIL NFR BLD AUTO: 2 % (ref 0–6)
ERYTHROCYTE [DISTWIDTH] IN BLOOD BY AUTOMATED COUNT: 15.7 % (ref 11.6–15.1)
GFR SERPL CREATININE-BSD FRML MDRD: 67 ML/MIN/1.73SQ M
GLUCOSE SERPL-MCNC: 149 MG/DL (ref 65–140)
GLUCOSE UR STRIP-MCNC: NEGATIVE MG/DL
HCT VFR BLD AUTO: 36.2 % (ref 34.8–46.1)
HGB BLD-MCNC: 12.1 G/DL (ref 11.5–15.4)
HGB UR QL STRIP.AUTO: ABNORMAL
IMM GRANULOCYTES # BLD AUTO: 0.03 THOUSAND/UL (ref 0–0.2)
IMM GRANULOCYTES NFR BLD AUTO: 0 % (ref 0–2)
KETONES UR STRIP-MCNC: NEGATIVE MG/DL
LACTATE SERPL-SCNC: 3.9 MMOL/L (ref 0.5–2)
LEUKOCYTE ESTERASE UR QL STRIP: NEGATIVE
LYMPHOCYTES # BLD AUTO: 2.61 THOUSANDS/ΜL (ref 0.6–4.47)
LYMPHOCYTES NFR BLD AUTO: 30 % (ref 14–44)
MCH RBC QN AUTO: 31.4 PG (ref 26.8–34.3)
MCHC RBC AUTO-ENTMCNC: 33.4 G/DL (ref 31.4–37.4)
MCV RBC AUTO: 94 FL (ref 82–98)
MONOCYTES # BLD AUTO: 0.89 THOUSAND/ΜL (ref 0.17–1.22)
MONOCYTES NFR BLD AUTO: 10 % (ref 4–12)
NEUTROPHILS # BLD AUTO: 4.84 THOUSANDS/ΜL (ref 1.85–7.62)
NEUTS SEG NFR BLD AUTO: 57 % (ref 43–75)
NITRITE UR QL STRIP: NEGATIVE
NON-SQ EPI CELLS URNS QL MICRO: ABNORMAL /HPF
NRBC BLD AUTO-RTO: 0 /100 WBCS
PH UR STRIP.AUTO: 6 [PH]
PLATELET # BLD AUTO: 172 THOUSANDS/UL (ref 149–390)
PMV BLD AUTO: 10.8 FL (ref 8.9–12.7)
POTASSIUM SERPL-SCNC: 4.1 MMOL/L (ref 3.5–5.3)
PROT SERPL-MCNC: 7.7 G/DL (ref 6.4–8.2)
PROT UR STRIP-MCNC: NEGATIVE MG/DL
RBC # BLD AUTO: 3.85 MILLION/UL (ref 3.81–5.12)
RBC #/AREA URNS AUTO: ABNORMAL /HPF
SODIUM SERPL-SCNC: 138 MMOL/L (ref 136–145)
SP GR UR STRIP.AUTO: 1.02 (ref 1–1.03)
UROBILINOGEN UR QL STRIP.AUTO: 0.2 E.U./DL
WBC # BLD AUTO: 8.61 THOUSAND/UL (ref 4.31–10.16)
WBC #/AREA URNS AUTO: ABNORMAL /HPF

## 2021-01-29 PROCEDURE — 96361 HYDRATE IV INFUSION ADD-ON: CPT

## 2021-01-29 PROCEDURE — 96375 TX/PRO/DX INJ NEW DRUG ADDON: CPT

## 2021-01-29 PROCEDURE — 96374 THER/PROPH/DIAG INJ IV PUSH: CPT

## 2021-01-29 PROCEDURE — 85025 COMPLETE CBC W/AUTO DIFF WBC: CPT | Performed by: EMERGENCY MEDICINE

## 2021-01-29 PROCEDURE — 80053 COMPREHEN METABOLIC PANEL: CPT | Performed by: EMERGENCY MEDICINE

## 2021-01-29 PROCEDURE — 99284 EMERGENCY DEPT VISIT MOD MDM: CPT

## 2021-01-29 PROCEDURE — 74176 CT ABD & PELVIS W/O CONTRAST: CPT

## 2021-01-29 PROCEDURE — 36415 COLL VENOUS BLD VENIPUNCTURE: CPT | Performed by: EMERGENCY MEDICINE

## 2021-01-29 PROCEDURE — 83605 ASSAY OF LACTIC ACID: CPT | Performed by: EMERGENCY MEDICINE

## 2021-01-29 PROCEDURE — 87040 BLOOD CULTURE FOR BACTERIA: CPT | Performed by: EMERGENCY MEDICINE

## 2021-01-29 PROCEDURE — 81001 URINALYSIS AUTO W/SCOPE: CPT | Performed by: EMERGENCY MEDICINE

## 2021-01-29 PROCEDURE — 99284 EMERGENCY DEPT VISIT MOD MDM: CPT | Performed by: EMERGENCY MEDICINE

## 2021-01-29 RX ORDER — SODIUM CHLORIDE 9 MG/ML
125 INJECTION, SOLUTION INTRAVENOUS CONTINUOUS
Status: DISCONTINUED | OUTPATIENT
Start: 2021-01-29 | End: 2021-01-29 | Stop reason: HOSPADM

## 2021-01-29 RX ORDER — OXYCODONE HYDROCHLORIDE AND ACETAMINOPHEN 5; 325 MG/1; MG/1
1 TABLET ORAL EVERY 8 HOURS PRN
Qty: 15 TABLET | Refills: 0 | Status: SHIPPED | OUTPATIENT
Start: 2021-01-29 | End: 2021-02-08

## 2021-01-29 RX ORDER — KETOROLAC TROMETHAMINE 30 MG/ML
15 INJECTION, SOLUTION INTRAMUSCULAR; INTRAVENOUS ONCE
Status: COMPLETED | OUTPATIENT
Start: 2021-01-29 | End: 2021-01-29

## 2021-01-29 RX ORDER — ONDANSETRON 2 MG/ML
4 INJECTION INTRAMUSCULAR; INTRAVENOUS ONCE
Status: COMPLETED | OUTPATIENT
Start: 2021-01-29 | End: 2021-01-29

## 2021-01-29 RX ADMIN — ONDANSETRON 4 MG: 2 INJECTION INTRAMUSCULAR; INTRAVENOUS at 15:32

## 2021-01-29 RX ADMIN — SODIUM CHLORIDE 125 ML/HR: 0.9 INJECTION, SOLUTION INTRAVENOUS at 15:30

## 2021-01-29 RX ADMIN — KETOROLAC TROMETHAMINE 15 MG: 30 INJECTION, SOLUTION INTRAMUSCULAR at 15:37

## 2021-01-29 NOTE — DISCHARGE INSTRUCTIONS
Take medications as needed and return with any worsening  Remember that narcotics can be addicting and constipating, so use with caution and only for severe pain  Thank you for choosing the emergency department at [facility], [place]  We appreciated the opportunity and privilege to address your healthcare needs  We remain available to you should you require additional evaluation or assistance  We value your feedback and would appreciate the opportunity to address anything you identified as an opportunity to improve or where we excelled  If there are colleagues who deserve special recognition, please let us know! We hope you are feeling better soon! Vaccination is part of comprehensive strategy to prevent the spread of COVID-19  Your doctor today strongly recommends that if the vaccine becomes available to you and you have no contraindications, receiving it would be beneficial to you, your family and the community  Even with the vaccination, it is still important to wear a mask, physically distance and have good hand hygiene  Thank you for helping to prevent the spread!

## 2021-01-30 NOTE — ED PROVIDER NOTES
History  Chief Complaint   Patient presents with    Flank Pain     Patient c/o left flank pain, blood in urine  H/O kidney stones     Very pleasant 47-year-old female presents emergency room with chief complaint of left-sided patient also noted her urine to be dark which she believed to be blood  Patient has history kidney stones  Patient has some mild nausea  No vomiting  No diarrhea  No chest pain or shortness of breath  No reported fevers  Possibly some chills  Pain is essentially sharp and spasmodic  Does not seem to radiate  Appears somewhat uncomfortable in the emergency room at this time  History provided by:  Spouse and patient  Flank Pain  Pain location:  L flank  Pain quality: aching and sharp    Pain quality: not cramping and not stabbing    Pain radiates to:  Does not radiate  Pain severity:  Moderate  Onset quality:  Gradual  Timing:  Constant  Progression:  Worsening  Chronicity:  Recurrent  Relieved by:  Nothing  Worsened by:  Nothing  Ineffective treatments:  None tried  Associated symptoms: hematuria    Associated symptoms: no chest pain, no cough, no diarrhea, no dysuria, no fever, no nausea, no shortness of breath, no sore throat, no vaginal bleeding, no vaginal discharge and no vomiting        Prior to Admission Medications   Prescriptions Last Dose Informant Patient Reported? Taking?    ALPRAZolam (Xanax) 0 25 mg tablet   Yes No   Sig: Take by mouth   Acetaminophen (TYLENOL ARTHRITIS PAIN PO)   Yes No   Sig: Tylenol Arthritis Pain 650 mg tablet,extended release   Calcium Carb-Cholecalciferol 600-500 MG-UNIT CAPS   Yes No   Coenzyme Q10 (COQ-10) 10 MG CAPS   Yes No   Sig: CoQ-10   Cyclobenzaprine HCl (FLEXERIL PO)   Yes No   LUMIGAN 0 01 % ophthalmic drops   Yes No   Sig: INSTILL 1 DROP INTO BOTH EYES AT NIGHT   Polyethylene Glycol 3350 (MIRALAX PO)   Yes No   Sig: Miralax   daily   Vitamin D, Cholecalciferol, 10 MCG (400 UNIT) TABS   Yes No   allopurinol (ZYLOPRIM) 100 mg tablet Yes No   amoxicillin (AMOXIL) 500 mg capsule   Yes No   Sig: amoxicillin 500 mg capsule   TAKE 4 CAPSULES BY MOUTH 1 HOUR BEFORE DENTAL PROCEDURES   atorvastatin (LIPITOR) 10 mg tablet   Yes No   benzonatate (TESSALON PERLES) 100 mg capsule   Yes No   Sig: ONE CAPSULE THREE TIMES A DAY AS NEEDED COUGHING   budesonide (Rhinocort Aqua) 32 MCG/ACT nasal spray   Yes No   celecoxib (CeleBREX) 200 mg capsule   Yes No   Sig: Take 200 mg by mouth daily   denosumab (Prolia) 60 mg/mL   Yes No   Sig: Prolia 60 mg/mL subcutaneous syringe   docusate sodium (Colace) 100 mg capsule   Yes No   Sig: Every 12 hours   econazole nitrate 1 % cream   Yes No   Sig: USE TWICE DAILY TO AFFECTED AREA   hydrochlorothiazide (HYDRODIURIL) 50 mg tablet   Yes No   lutein 6 mg   Yes No   metFORMIN (GLUCOPHAGE) 1000 MG tablet   Yes No   Sig: Take 1,000 mg by mouth 2 (two) times a day   pantoprazole (PROTONIX) 40 mg tablet   Yes No   Sig: pantoprazole 40 mg tablet,delayed release   prn   potassium citrate (UROCIT-K) 10 mEq   Yes No      Facility-Administered Medications: None       Past Medical History:   Diagnosis Date    Kidney stone        Past Surgical History:   Procedure Laterality Date    LITHOTRIPSY         History reviewed  No pertinent family history  I have reviewed and agree with the history as documented  E-Cigarette/Vaping    E-Cigarette Use Never User      E-Cigarette/Vaping Substances     Social History     Tobacco Use    Smoking status: Never Smoker    Smokeless tobacco: Never Used   Substance Use Topics    Alcohol use: Not Currently    Drug use: Never       Review of Systems   Constitutional: Negative for activity change, diaphoresis and fever  HENT: Negative for congestion, ear pain, rhinorrhea, sinus pressure and sore throat  Eyes: Negative  Respiratory: Negative for cough, chest tightness, shortness of breath and wheezing  Cardiovascular: Negative for chest pain, palpitations and leg swelling  Gastrointestinal: Negative for abdominal pain, diarrhea, nausea and vomiting  Endocrine: Negative  Genitourinary: Positive for flank pain and hematuria  Negative for decreased urine volume, difficulty urinating, dysuria, enuresis, frequency, urgency, vaginal bleeding and vaginal discharge  Musculoskeletal: Negative for arthralgias, back pain and myalgias  Skin: Negative for pallor and rash  Allergic/Immunologic: Negative  Neurological: Negative for dizziness, weakness and headaches  Hematological: Negative  Psychiatric/Behavioral: Negative  All other systems reviewed and are negative  Physical Exam  Physical Exam  Vitals signs and nursing note reviewed  Constitutional:       General: She is in acute distress  Appearance: She is well-developed and normal weight  She is not toxic-appearing  HENT:      Head: Normocephalic and atraumatic  Right Ear: External ear normal       Left Ear: External ear normal    Eyes:      Pupils: Pupils are equal, round, and reactive to light  Neck:      Musculoskeletal: Normal range of motion and neck supple  Cardiovascular:      Rate and Rhythm: Normal rate and regular rhythm  Heart sounds: Normal heart sounds  No murmur  Pulmonary:      Effort: Pulmonary effort is normal  No respiratory distress  Breath sounds: Normal breath sounds  No stridor  No wheezing or rales  Chest:      Chest wall: No tenderness  Abdominal:      General: Abdomen is flat  There is no distension  Palpations: Abdomen is soft  There is no mass  Tenderness: There is no abdominal tenderness  There is left CVA tenderness  There is no right CVA tenderness, guarding or rebound  Hernia: No hernia is present  Musculoskeletal: Normal range of motion  General: No deformity  Skin:     General: Skin is warm and dry  Coloration: Skin is not pale  Findings: No rash     Neurological:      Mental Status: She is alert and oriented to person, place, and time  Cranial Nerves: No cranial nerve deficit  Psychiatric:         Mood and Affect: Mood normal          Vital Signs  ED Triage Vitals [01/29/21 1448]   Temperature Pulse Respirations Blood Pressure SpO2   97 9 °F (36 6 °C) 89 18 (!) 191/90 98 %      Temp Source Heart Rate Source Patient Position - Orthostatic VS BP Location FiO2 (%)   Temporal Monitor Sitting Right arm --      Pain Score       4           Vitals:    01/29/21 1448   BP: (!) 191/90   Pulse: 89   Patient Position - Orthostatic VS: Sitting         Visual Acuity      ED Medications  Medications   ondansetron (ZOFRAN) injection 4 mg (4 mg Intravenous Given 1/29/21 1532)   ketorolac (TORADOL) injection 15 mg (15 mg Intravenous Given 1/29/21 1537)       Diagnostic Studies  Results Reviewed     Procedure Component Value Units Date/Time    Lactic acid [[id number]]  (Abnormal) Collected: 01/29/21 1522    Lab Status: Final result Specimen: Blood from Arm, Left Updated: 01/29/21 1600     LACTIC ACID 3 9 mmol/L     Narrative:      Result may be elevated if tourniquet was used during collection      Comprehensive metabolic panel [[id number]]  (Abnormal) Collected: 01/29/21 1522    Lab Status: Final result Specimen: Blood from Arm, Left Updated: 01/29/21 1552     Sodium 138 mmol/L      Potassium 4 1 mmol/L      Chloride 102 mmol/L      CO2 24 mmol/L      ANION GAP 12 mmol/L      BUN 12 mg/dL      Creatinine 0 89 mg/dL      Glucose 149 mg/dL      Calcium 9 6 mg/dL      Corrected Calcium 10 2 mg/dL      AST 61 U/L      ALT 40 U/L      Alkaline Phosphatase 74 U/L      Total Protein 7 7 g/dL      Albumin 3 2 g/dL      Total Bilirubin 1 32 mg/dL      eGFR 67 ml/min/1 73sq m     Narrative:      [place] guidelines for Chronic Kidney Disease (CKD):     Stage 1 with normal or high GFR (GFR > 90 mL/min/1 73 square meters)    Stage 2 Mild CKD (GFR = 60-89 mL/min/1 73 square meters)    Stage 3A Moderate CKD (GFR = 45-59 mL/min/1 73 square meters)    Stage 3B Moderate CKD (GFR = 30-44 mL/min/1 73 square meters)    Stage 4 Severe CKD (GFR = 15-29 mL/min/1 73 square meters)    Stage 5 End Stage CKD (GFR <15 mL/min/1 73 square meters)  Note: GFR calculation is accurate only with a steady state creatinine    Urine Microscopic [[id number]]  (Abnormal) Collected: 01/29/21 1523    Lab Status: Final result Specimen: Urine, Clean Catch Updated: 01/29/21 1542     RBC, UA 20-30 /hpf      WBC, UA 4-10 /hpf      Epithelial Cells Occasional /hpf      Bacteria, UA Occasional /hpf     UA w Reflex to Microscopic w Reflex to Culture [[id number]]  (Abnormal) Collected: 01/29/21 1523    Lab Status: Final result Specimen: Urine, Clean Catch Updated: 01/29/21 1535     Color, UA Yellow     Clarity, UA Slightly Cloudy     Specific [place], UA 1 020     pH, UA 6 0     Leukocytes, UA Negative     Nitrite, UA Negative     Protein, UA Negative mg/dl      Glucose, UA Negative mg/dl      Ketones, UA Negative mg/dl      Urobilinogen, UA 0 2 E U /dl      Bilirubin, UA Negative     Blood, UA Moderate    Blood culture #1 [[id number]] Collected: 01/29/21 1530    Lab Status:  In process Specimen: Blood from Arm, Right Updated: 01/29/21 1532    CBC and differential [[id number]]  (Abnormal) Collected: 01/29/21 1522    Lab Status: Final result Specimen: Blood from Arm, Left Updated: 01/29/21 1529     WBC 8 61 Thousand/uL      RBC 3 85 Million/uL      Hemoglobin 12 1 g/dL      Hematocrit 36 2 %      MCV 94 fL      MCH 31 4 pg      MCHC 33 4 g/dL      RDW 15 7 %      MPV 10 8 fL      Platelets 665 Thousands/uL      nRBC 0 /100 WBCs      Neutrophils Relative 57 %      Immat GRANS % 0 %      Lymphocytes Relative 30 %      Monocytes Relative 10 %      Eosinophils Relative 2 %      Basophils Relative 1 %      Neutrophils Absolute 4 84 Thousands/µL      Immature Grans Absolute 0 03 Thousand/uL      Lymphocytes Absolute 2 61 Thousands/µL      Monocytes Absolute 0 89 Thousand/µL Eosinophils Absolute 0 13 Thousand/µL      Basophils Absolute 0 11 Thousands/µL     Blood culture #2 [[id number]] Collected: 01/29/21 1522    Lab Status: In process Specimen: Blood from Arm, Left Updated: 01/29/21 1527                 CT renal stone study abdomen pelvis wo contrast   Final Result by Pricila Oneill MD (01/29 1530)      1  Bilateral nonobstructing renal calculi, similar to prior   2  Cirrhotic morphology   3  Again noted is increased stranding surrounding the MILI, see most recent prior CT for further recommendations  Workstation performed: [id number]                    Procedures  Procedures         ED Course  ED Course as of Jan 29 1956 Fri Jan 29, 2021   1541 Blood, UA(!): Moderate   1541 WBC: 8 61   1557 Glucose, Random(!): 149                                           MDM  Number of Diagnoses or Management Options  Back pain: new and requires workup  Left flank pain: new and requires workup  Diagnosis management comments: Very pleasant 24-year-old female with a history of kidney stones presents emergency room with left-sided flank pain  Patient reports that the pain has been ongoing for 1 to 2 days and getting somewhat worse  Comes on as a crampy type of spasm  Does not radiate  Mild nausea  Patient was certain that it was her kidney stone  Evaluation the emergency room did reveal bilateral renal lithiasis that were intrarenal   There was no evidence of the inter urethral calculus  Patient did have microscopic hematuria no evidence of infection  Remainder of the CT imaging was negative for other acute process  Reviewed the patient's laboratory studies her urinalysis and her imaging studies with both the patient and her   Etiology of patient's discomfort is not completely clear but likely secondary to possibility of a recently passed stone versus back spasm  I do not see any clinical or historical evidence for an alternative diagnosis at this time    I also do not feel there is any pulmonary nature to this patient's pain as she is not having any symptomatology consistent with that problem  She did have elevated blood pressure which is likely secondary to pain and white coat  Patient comfortable with following up as an outpatient with her primary care doctor and understands all reasons for return to the emergency room should she get all worse  Amount and/or Complexity of Data Reviewed  Clinical lab tests: ordered and reviewed  Tests in the radiology section of CPT®: ordered and reviewed  Obtain history from someone other than the patient: yes        Disposition  Final diagnoses:   Left flank pain   Back pain     Time reflects when diagnosis was documented in both MDM as applicable and the Disposition within this note     Time User Action Codes Description Comment    1/29/2021  3:55 PM Winda Abed Add [R10 9] Left flank pain     1/29/2021  3:58 PM Winda Abed Add [M54 9] Back pain       ED Disposition     ED Disposition Condition Date/Time Comment    Discharge Stable Fri Jan 29, 2021  3:58 PM Efesaritha Hernandez discharge to home/self care              Follow-up Information     Follow up With Specialties Details Why Contact Tj Yun, DO Family Medicine In 3 days As needed [address] [facility] [address] [facility]  [phone]            Discharge Medication List as of 1/29/2021  4:01 PM      START taking these medications    Details   oxyCODONE-acetaminophen (PERCOCET) 5-325 mg per tablet Take 1 tablet by mouth every 8 (eight) hours as needed for moderate pain or severe pain for up to 10 daysMax Daily Amount: 3 tablets, Starting Fri 1/29/2021, Until Mon 2/8/2021, Normal         CONTINUE these medications which have NOT CHANGED    Details   Acetaminophen (TYLENOL ARTHRITIS PAIN PO) Tylenol Arthritis Pain 650 mg tablet,extended release, Historical Med      allopurinol (ZYLOPRIM) 100 mg tablet Starting Thu 1/9/2020, Historical Med      ALPRAZolam (Xanax) 0 25 mg tablet Take by mouth, Historical Med      amoxicillin (AMOXIL) 500 mg capsule amoxicillin 500 mg capsule   TAKE 4 CAPSULES BY MOUTH 1 HOUR BEFORE DENTAL PROCEDURES, Historical Med      atorvastatin (LIPITOR) 10 mg tablet Starting Wed 1/22/2020, Historical Med      benzonatate (TESSALON PERLES) 100 mg capsule ONE CAPSULE THREE TIMES A DAY AS NEEDED COUGHING, Historical Med      budesonide (Rhinocort Aqua) 32 MCG/ACT nasal spray Historical Med      Calcium Carb-Cholecalciferol 600-500 MG-UNIT CAPS Historical Med      celecoxib (CeleBREX) 200 mg capsule Take 200 mg by mouth daily, Starting Thu 2/20/2020, Historical Med      Coenzyme Q10 (COQ-10) 10 MG CAPS CoQ-10, Historical Med      Cyclobenzaprine HCl (FLEXERIL PO) Historical Med      denosumab (Prolia) 60 mg/mL Prolia 60 mg/mL subcutaneous syringe, Historical Med      docusate sodium (Colace) 100 mg capsule Every 12 hours, Historical Med      econazole nitrate 1 % cream USE TWICE DAILY TO AFFECTED AREA, Historical Med      hydrochlorothiazide (HYDRODIURIL) 50 mg tablet Starting Wed 1/22/2020, Historical Med      LUMIGAN 0 01 % ophthalmic drops INSTILL 1 DROP INTO BOTH EYES AT NIGHT, Historical Med      lutein 6 mg Historical Med      metFORMIN (GLUCOPHAGE) 1000 MG tablet Take 1,000 mg by mouth 2 (two) times a day, Starting Sun 3/22/2020, Historical Med      pantoprazole (PROTONIX) 40 mg tablet pantoprazole 40 mg tablet,delayed release   prn, Historical Med      Polyethylene Glycol 3350 (MIRALAX PO) Miralax   daily, Historical Med      potassium citrate (UROCIT-K) 10 mEq Starting Wed 1/22/2020, Historical Med      Vitamin D, Cholecalciferol, 10 MCG (400 UNIT) TABS Historical Med           No discharge procedures on file      PDMP Review       Value Time User    PDMP Reviewed  Yes 1/29/2021  3:55 PM Pierre Bundy DO          ED Provider  Electronically Signed by           Pierre Bundy DO  01/29/21 1956

## 2021-02-03 LAB
BACTERIA BLD CULT: NORMAL
BACTERIA BLD CULT: NORMAL

## 2021-02-16 ENCOUNTER — TRANSCRIBE ORDERS (OUTPATIENT)
Dept: ADMINISTRATIVE | Facility: HOSPITAL | Age: 66
End: 2021-02-16

## 2021-02-16 DIAGNOSIS — M43.17 SPONDYLOLISTHESIS, LUMBOSACRAL REGION: ICD-10-CM

## 2021-02-16 DIAGNOSIS — M54.16 RADICULOPATHY, LUMBAR REGION: Primary | ICD-10-CM

## 2021-02-17 ENCOUNTER — HOSPITAL ENCOUNTER (OUTPATIENT)
Dept: MRI IMAGING | Facility: HOSPITAL | Age: 66
Discharge: HOME | End: 2021-02-17
Payer: MEDICARE

## 2021-02-17 DIAGNOSIS — M54.16 RADICULOPATHY, LUMBAR REGION: ICD-10-CM

## 2021-02-17 DIAGNOSIS — M43.17 SPONDYLOLISTHESIS, LUMBOSACRAL REGION: ICD-10-CM

## 2021-02-17 PROCEDURE — 72148 MRI LUMBAR SPINE W/O DYE: CPT

## 2021-02-17 PROCEDURE — G1004 CDSM NDSC: HCPCS

## 2021-03-22 ENCOUNTER — TRANSCRIBE ORDERS (OUTPATIENT)
Dept: ADMINISTRATIVE | Facility: HOSPITAL | Age: 66
End: 2021-03-22

## 2021-03-22 ENCOUNTER — HOSPITAL ENCOUNTER (OUTPATIENT)
Dept: CT IMAGING | Facility: HOSPITAL | Age: 66
Discharge: HOME | End: 2021-03-22
Payer: MEDICARE

## 2021-03-22 DIAGNOSIS — R06.02 SOB (SHORTNESS OF BREATH): Primary | ICD-10-CM

## 2021-03-22 DIAGNOSIS — R06.02 SOB (SHORTNESS OF BREATH): ICD-10-CM

## 2021-03-22 PROCEDURE — 71275 CT ANGIOGRAPHY CHEST: CPT

## 2021-03-22 RX ADMIN — IOHEXOL 100 ML: 350 INJECTION, SOLUTION INTRAVENOUS at 09:19

## 2021-04-06 ENCOUNTER — DOCTOR'S OFFICE (OUTPATIENT)
Dept: URBAN - NONMETROPOLITAN AREA CLINIC 1 | Facility: CLINIC | Age: 66
Setting detail: OPHTHALMOLOGY
End: 2021-04-06
Payer: COMMERCIAL

## 2021-04-06 DIAGNOSIS — H26.492: ICD-10-CM

## 2021-04-06 DIAGNOSIS — H26.491: ICD-10-CM

## 2021-04-06 DIAGNOSIS — Z96.1: ICD-10-CM

## 2021-04-06 DIAGNOSIS — H40.023: ICD-10-CM

## 2021-04-06 DIAGNOSIS — H04.121: ICD-10-CM

## 2021-04-06 DIAGNOSIS — E11.9: ICD-10-CM

## 2021-04-06 DIAGNOSIS — H04.122: ICD-10-CM

## 2021-04-06 DIAGNOSIS — H40.033: ICD-10-CM

## 2021-04-06 DIAGNOSIS — H52.4: ICD-10-CM

## 2021-04-06 PROCEDURE — 92025 CPTRIZED CORNEAL TOPOGRAPHY: CPT | Performed by: OPHTHALMOLOGY

## 2021-04-06 PROCEDURE — 92133 CPTRZD OPH DX IMG PST SGM ON: CPT | Performed by: OPHTHALMOLOGY

## 2021-04-06 PROCEDURE — 92014 COMPRE OPH EXAM EST PT 1/>: CPT | Performed by: OPHTHALMOLOGY

## 2021-04-06 PROCEDURE — 92015 DETERMINE REFRACTIVE STATE: CPT | Performed by: OPHTHALMOLOGY

## 2021-04-06 ASSESSMENT — REFRACTION_MANIFEST
OD_ADD: +2.50
OD_VA2: 20/30+2
OD_AXIS: 090
OS_SPHERE: +0.25
OS_AXIS: 110
OS_CYLINDER: -0.75
OD_SPHERE: -2.25
OS_VA2: 20/25-2
OS_ADD: +2.50
OD_CYLINDER: -1.00
OS_VA1: 20/25-2
OD_VA1: 20/30+2

## 2021-04-06 ASSESSMENT — CONFRONTATIONAL VISUAL FIELD TEST (CVF)
OS_FINDINGS: FULL
OD_FINDINGS: FULL

## 2021-04-06 ASSESSMENT — REFRACTION_CURRENTRX
OD_SPHERE: -2.00
OD_VPRISM_DIRECTION: SV
OS_OVR_VA: 20/
OS_VPRISM_DIRECTION: SV
OD_CYLINDER: -0.50
OS_AXIS: 099
OS_CYLINDER: -0.50
OS_SPHERE: PLANO
OD_OVR_VA: 20/
OD_AXIS: 101

## 2021-04-06 ASSESSMENT — TONOMETRY
OS_IOP_MMHG: 16
OD_IOP_MMHG: 15

## 2021-04-06 ASSESSMENT — PACHYMETRY
OD_CT_CORRECTION: 1
OS_CT_CORRECTION: 0
OD_CT_UM: 536
OS_CT_UM: 546

## 2021-04-06 ASSESSMENT — REFRACTION_AUTOREFRACTION
OS_SPHERE: +0.50
OD_CYLINDER: -1.00
OD_SPHERE: -2.25
OS_CYLINDER: -0.75
OS_AXIS: 110
OD_AXIS: 092

## 2021-04-06 ASSESSMENT — SPHEQUIV_DERIVED
OD_SPHEQUIV: -2.75
OS_SPHEQUIV: 0.125
OD_SPHEQUIV: -2.75
OS_SPHEQUIV: -0.125

## 2021-04-06 ASSESSMENT — VISUAL ACUITY
OS_BCVA: 20/150-1
OD_BCVA: 20/30-2

## 2021-06-11 ENCOUNTER — TRANSCRIBE ORDERS (OUTPATIENT)
Dept: ADMINISTRATIVE | Facility: HOSPITAL | Age: 66
End: 2021-06-11

## 2021-06-11 DIAGNOSIS — K74.60 HEPATIC CIRRHOSIS, UNSPECIFIED HEPATIC CIRRHOSIS TYPE, UNSPECIFIED WHETHER ASCITES PRESENT (HCC): Primary | ICD-10-CM

## 2021-06-11 DIAGNOSIS — K76.0 FATTY LIVER: ICD-10-CM

## 2021-06-16 ENCOUNTER — HOSPITAL ENCOUNTER (OUTPATIENT)
Dept: ULTRASOUND IMAGING | Facility: HOSPITAL | Age: 66
Discharge: HOME | End: 2021-06-16
Payer: MEDICARE

## 2021-06-16 DIAGNOSIS — K76.0 FATTY LIVER: ICD-10-CM

## 2021-06-16 DIAGNOSIS — K74.60 HEPATIC CIRRHOSIS, UNSPECIFIED HEPATIC CIRRHOSIS TYPE, UNSPECIFIED WHETHER ASCITES PRESENT (HCC): ICD-10-CM

## 2021-06-16 PROCEDURE — 76700 US EXAM ABDOM COMPLETE: CPT

## 2021-07-13 ENCOUNTER — HOSPITAL ENCOUNTER (INPATIENT)
Facility: HOSPITAL | Age: 66
LOS: 4 days | DRG: 872 | End: 2021-07-18
Attending: EMERGENCY MEDICINE | Admitting: FAMILY MEDICINE
Payer: MEDICARE

## 2021-07-13 ENCOUNTER — APPOINTMENT (EMERGENCY)
Dept: CT IMAGING | Facility: HOSPITAL | Age: 66
DRG: 872 | End: 2021-07-13
Payer: MEDICARE

## 2021-07-13 DIAGNOSIS — K82.8 THICKENING OF WALL OF GALLBLADDER: ICD-10-CM

## 2021-07-13 DIAGNOSIS — A41.9 SEVERE SEPSIS (HCC): ICD-10-CM

## 2021-07-13 DIAGNOSIS — E87.2 LACTIC ACIDOSIS: ICD-10-CM

## 2021-07-13 DIAGNOSIS — A41.9 SEPSIS WITHOUT ACUTE ORGAN DYSFUNCTION, DUE TO UNSPECIFIED ORGANISM (HCC): ICD-10-CM

## 2021-07-13 DIAGNOSIS — R65.20 SEVERE SEPSIS (HCC): ICD-10-CM

## 2021-07-13 DIAGNOSIS — K80.20 CHOLELITHIASIS: ICD-10-CM

## 2021-07-13 DIAGNOSIS — R50.9 FEVER: Primary | ICD-10-CM

## 2021-07-13 LAB
ALBUMIN SERPL BCP-MCNC: 3.3 G/DL (ref 3.5–5)
ALP SERPL-CCNC: 65 U/L (ref 46–116)
ALT SERPL W P-5'-P-CCNC: 48 U/L (ref 12–78)
ANION GAP SERPL CALCULATED.3IONS-SCNC: 9 MMOL/L (ref 4–13)
AST SERPL W P-5'-P-CCNC: 75 U/L (ref 5–45)
BASOPHILS # BLD MANUAL: 0 THOUSAND/UL (ref 0–0.1)
BASOPHILS NFR MAR MANUAL: 0 % (ref 0–1)
BILIRUB DIRECT SERPL-MCNC: 0.64 MG/DL (ref 0–0.2)
BILIRUB SERPL-MCNC: 1.83 MG/DL (ref 0.2–1)
BUN SERPL-MCNC: 9 MG/DL (ref 5–25)
CALCIUM ALBUM COR SERPL-MCNC: 9.3 MG/DL (ref 8.3–10.1)
CALCIUM SERPL-MCNC: 8.7 MG/DL (ref 8.3–10.1)
CHLORIDE SERPL-SCNC: 99 MMOL/L (ref 100–108)
CO2 SERPL-SCNC: 24 MMOL/L (ref 21–32)
CREAT SERPL-MCNC: 0.91 MG/DL (ref 0.6–1.3)
EOSINOPHIL # BLD MANUAL: 0 THOUSAND/UL (ref 0–0.4)
EOSINOPHIL NFR BLD MANUAL: 0 % (ref 0–6)
ERYTHROCYTE [DISTWIDTH] IN BLOOD BY AUTOMATED COUNT: 16.4 % (ref 11.6–15.1)
FLUAV RNA NPH QL NAA+PROBE: NORMAL
FLUBV RNA NPH QL NAA+PROBE: NORMAL
GFR SERPL CREATININE-BSD FRML MDRD: 65 ML/MIN/1.73SQ M
GLUCOSE SERPL-MCNC: 135 MG/DL (ref 65–140)
HCT VFR BLD AUTO: 37.5 % (ref 34.8–46.1)
HGB BLD-MCNC: 12.6 G/DL (ref 11.5–15.4)
LACTATE SERPL-SCNC: 4.1 MMOL/L (ref 0.5–2)
LIPASE SERPL-CCNC: 127 U/L (ref 73–393)
LYMPHOCYTES # BLD AUTO: 0.89 THOUSAND/UL (ref 0.6–4.47)
LYMPHOCYTES # BLD AUTO: 14 % (ref 14–44)
MCH RBC QN AUTO: 32.1 PG (ref 26.8–34.3)
MCHC RBC AUTO-ENTMCNC: 33.6 G/DL (ref 31.4–37.4)
MCV RBC AUTO: 95 FL (ref 82–98)
MONOCYTES # BLD AUTO: 0.58 THOUSAND/UL (ref 0–1.22)
MONOCYTES NFR BLD: 9 % (ref 4–12)
NEUTROPHILS # BLD MANUAL: 4.92 THOUSAND/UL (ref 1.85–7.62)
NEUTS BAND NFR BLD MANUAL: 4 % (ref 0–8)
NEUTS SEG NFR BLD AUTO: 73 % (ref 43–75)
NRBC BLD AUTO-RTO: 0 /100 WBCS
PLATELET # BLD AUTO: 105 THOUSANDS/UL (ref 149–390)
PLATELET BLD QL SMEAR: ABNORMAL
PMV BLD AUTO: 10.6 FL (ref 8.9–12.7)
POTASSIUM SERPL-SCNC: 3.6 MMOL/L (ref 3.5–5.3)
PROT SERPL-MCNC: 7.6 G/DL (ref 6.4–8.2)
RBC # BLD AUTO: 3.93 MILLION/UL (ref 3.81–5.12)
RBC MORPH BLD: NORMAL
RSV RNA NPH QL NAA+PROBE: NORMAL
SARS-COV-2 RNA RESP QL NAA+PROBE: NEGATIVE
SODIUM SERPL-SCNC: 132 MMOL/L (ref 136–145)
TOTAL CELLS COUNTED SPEC: 100
TROPONIN I SERPL-MCNC: 0.02 NG/ML
WBC # BLD AUTO: 6.39 THOUSAND/UL (ref 4.31–10.16)

## 2021-07-13 PROCEDURE — 36415 COLL VENOUS BLD VENIPUNCTURE: CPT | Performed by: EMERGENCY MEDICINE

## 2021-07-13 PROCEDURE — 85007 BL SMEAR W/DIFF WBC COUNT: CPT | Performed by: EMERGENCY MEDICINE

## 2021-07-13 PROCEDURE — 83605 ASSAY OF LACTIC ACID: CPT | Performed by: EMERGENCY MEDICINE

## 2021-07-13 PROCEDURE — 84484 ASSAY OF TROPONIN QUANT: CPT | Performed by: EMERGENCY MEDICINE

## 2021-07-13 PROCEDURE — 96374 THER/PROPH/DIAG INJ IV PUSH: CPT

## 2021-07-13 PROCEDURE — 93005 ELECTROCARDIOGRAM TRACING: CPT

## 2021-07-13 PROCEDURE — 85025 COMPLETE CBC W/AUTO DIFF WBC: CPT | Performed by: EMERGENCY MEDICINE

## 2021-07-13 PROCEDURE — 74177 CT ABD & PELVIS W/CONTRAST: CPT

## 2021-07-13 PROCEDURE — 83690 ASSAY OF LIPASE: CPT | Performed by: EMERGENCY MEDICINE

## 2021-07-13 PROCEDURE — 99285 EMERGENCY DEPT VISIT HI MDM: CPT | Performed by: EMERGENCY MEDICINE

## 2021-07-13 PROCEDURE — 87040 BLOOD CULTURE FOR BACTERIA: CPT | Performed by: EMERGENCY MEDICINE

## 2021-07-13 PROCEDURE — 96361 HYDRATE IV INFUSION ADD-ON: CPT

## 2021-07-13 PROCEDURE — U0005 INFEC AGEN DETEC AMPLI PROBE: HCPCS | Performed by: EMERGENCY MEDICINE

## 2021-07-13 PROCEDURE — 85027 COMPLETE CBC AUTOMATED: CPT | Performed by: EMERGENCY MEDICINE

## 2021-07-13 PROCEDURE — 96375 TX/PRO/DX INJ NEW DRUG ADDON: CPT

## 2021-07-13 PROCEDURE — 80053 COMPREHEN METABOLIC PANEL: CPT | Performed by: EMERGENCY MEDICINE

## 2021-07-13 PROCEDURE — 99285 EMERGENCY DEPT VISIT HI MDM: CPT

## 2021-07-13 PROCEDURE — 82248 BILIRUBIN DIRECT: CPT | Performed by: EMERGENCY MEDICINE

## 2021-07-13 PROCEDURE — 71260 CT THORAX DX C+: CPT

## 2021-07-13 PROCEDURE — U0003 INFECTIOUS AGENT DETECTION BY NUCLEIC ACID (DNA OR RNA); SEVERE ACUTE RESPIRATORY SYNDROME CORONAVIRUS 2 (SARS-COV-2) (CORONAVIRUS DISEASE [COVID-19]), AMPLIFIED PROBE TECHNIQUE, MAKING USE OF HIGH THROUGHPUT TECHNOLOGIES AS DESCRIBED BY CMS-2020-01-R: HCPCS | Performed by: EMERGENCY MEDICINE

## 2021-07-13 PROCEDURE — 87631 RESP VIRUS 3-5 TARGETS: CPT | Performed by: EMERGENCY MEDICINE

## 2021-07-13 RX ORDER — ONDANSETRON 2 MG/ML
4 INJECTION INTRAMUSCULAR; INTRAVENOUS ONCE
Status: COMPLETED | OUTPATIENT
Start: 2021-07-13 | End: 2021-07-13

## 2021-07-13 RX ORDER — ACETAMINOPHEN 325 MG/1
650 TABLET ORAL ONCE
Status: COMPLETED | OUTPATIENT
Start: 2021-07-13 | End: 2021-07-13

## 2021-07-13 RX ORDER — KETOROLAC TROMETHAMINE 30 MG/ML
15 INJECTION, SOLUTION INTRAMUSCULAR; INTRAVENOUS ONCE
Status: COMPLETED | OUTPATIENT
Start: 2021-07-13 | End: 2021-07-13

## 2021-07-13 RX ADMIN — IOHEXOL 100 ML: 350 INJECTION, SOLUTION INTRAVENOUS at 23:53

## 2021-07-13 RX ADMIN — ONDANSETRON 4 MG: 2 INJECTION INTRAMUSCULAR; INTRAVENOUS at 22:57

## 2021-07-13 RX ADMIN — SODIUM CHLORIDE 2000 ML: 0.9 INJECTION, SOLUTION INTRAVENOUS at 22:54

## 2021-07-13 RX ADMIN — KETOROLAC TROMETHAMINE 15 MG: 30 INJECTION, SOLUTION INTRAMUSCULAR at 22:58

## 2021-07-13 RX ADMIN — ACETAMINOPHEN 650 MG: 325 TABLET ORAL at 22:57

## 2021-07-14 ENCOUNTER — APPOINTMENT (INPATIENT)
Dept: ULTRASOUND IMAGING | Facility: HOSPITAL | Age: 66
DRG: 872 | End: 2021-07-14
Payer: MEDICARE

## 2021-07-14 ENCOUNTER — APPOINTMENT (INPATIENT)
Dept: MRI IMAGING | Facility: HOSPITAL | Age: 66
DRG: 872 | End: 2021-07-14
Payer: MEDICARE

## 2021-07-14 PROBLEM — R18.8 CIRRHOSIS OF LIVER WITH ASCITES (HCC): Status: ACTIVE | Noted: 2021-07-14

## 2021-07-14 PROBLEM — N20.0 KIDNEY STONES: Status: ACTIVE | Noted: 2021-07-14

## 2021-07-14 PROBLEM — E66.813 CLASS 3 SEVERE OBESITY DUE TO EXCESS CALORIES WITH SERIOUS COMORBIDITY AND BODY MASS INDEX (BMI) OF 45.0 TO 49.9 IN ADULT (HCC): Status: ACTIVE | Noted: 2021-07-14

## 2021-07-14 PROBLEM — D69.6 THROMBOCYTOPENIA (HCC): Status: ACTIVE | Noted: 2021-07-14

## 2021-07-14 PROBLEM — E87.1 HYPONATREMIA: Status: ACTIVE | Noted: 2021-07-14

## 2021-07-14 PROBLEM — G47.33 OSA ON CPAP: Status: ACTIVE | Noted: 2021-07-14

## 2021-07-14 PROBLEM — K82.8 THICKENING OF WALL OF GALLBLADDER: Status: ACTIVE | Noted: 2021-07-14

## 2021-07-14 PROBLEM — K74.60 CIRRHOSIS OF LIVER WITH ASCITES (HCC): Status: ACTIVE | Noted: 2021-07-14

## 2021-07-14 PROBLEM — I49.9 ABNORMAL HEART RHYTHM: Status: ACTIVE | Noted: 2021-07-14

## 2021-07-14 PROBLEM — E66.01 CLASS 3 SEVERE OBESITY DUE TO EXCESS CALORIES WITH SERIOUS COMORBIDITY AND BODY MASS INDEX (BMI) OF 45.0 TO 49.9 IN ADULT (HCC): Status: ACTIVE | Noted: 2021-07-14

## 2021-07-14 PROBLEM — E11.65 TYPE 2 DIABETES MELLITUS WITH HYPERGLYCEMIA, WITHOUT LONG-TERM CURRENT USE OF INSULIN (HCC): Status: ACTIVE | Noted: 2021-07-14

## 2021-07-14 PROBLEM — Z99.89 OSA ON CPAP: Status: ACTIVE | Noted: 2021-07-14

## 2021-07-14 PROBLEM — E11.9 TYPE 2 DIABETES MELLITUS WITHOUT COMPLICATION, WITHOUT LONG-TERM CURRENT USE OF INSULIN (HCC): Status: ACTIVE | Noted: 2021-07-14

## 2021-07-14 PROBLEM — R82.71 BACTERIURIA: Status: ACTIVE | Noted: 2021-07-14

## 2021-07-14 PROBLEM — R65.21 SEPTIC SHOCK (HCC): Status: ACTIVE | Noted: 2021-07-14

## 2021-07-14 PROBLEM — R65.20 SEVERE SEPSIS (HCC): Status: ACTIVE | Noted: 2021-07-14

## 2021-07-14 PROBLEM — A41.9 SEVERE SEPSIS (HCC): Status: ACTIVE | Noted: 2021-07-14

## 2021-07-14 LAB
ALBUMIN SERPL BCP-MCNC: 2.7 G/DL (ref 3.5–5)
ALP SERPL-CCNC: 54 U/L (ref 46–116)
ALT SERPL W P-5'-P-CCNC: 43 U/L (ref 12–78)
AMMONIA PLAS-SCNC: 49 UMOL/L (ref 11–35)
ANION GAP SERPL CALCULATED.3IONS-SCNC: 8 MMOL/L (ref 4–13)
AST SERPL W P-5'-P-CCNC: 71 U/L (ref 5–45)
ATRIAL RATE: 111 BPM
BACTERIA UR QL AUTO: ABNORMAL /HPF
BASOPHILS # BLD AUTO: 0.04 THOUSANDS/ΜL (ref 0–0.1)
BASOPHILS NFR BLD AUTO: 1 % (ref 0–1)
BILIRUB SERPL-MCNC: 1.82 MG/DL (ref 0.2–1)
BILIRUB UR QL STRIP: ABNORMAL
BUN SERPL-MCNC: 9 MG/DL (ref 5–25)
CALCIUM ALBUM COR SERPL-MCNC: 8.9 MG/DL (ref 8.3–10.1)
CALCIUM SERPL-MCNC: 7.9 MG/DL (ref 8.3–10.1)
CHLORIDE SERPL-SCNC: 104 MMOL/L (ref 100–108)
CLARITY UR: CLEAR
CO2 SERPL-SCNC: 24 MMOL/L (ref 21–32)
COLOR UR: YELLOW
CREAT SERPL-MCNC: 0.81 MG/DL (ref 0.6–1.3)
EOSINOPHIL # BLD AUTO: 0.01 THOUSAND/ΜL (ref 0–0.61)
EOSINOPHIL NFR BLD AUTO: 0 % (ref 0–6)
ERYTHROCYTE [DISTWIDTH] IN BLOOD BY AUTOMATED COUNT: 16.7 % (ref 11.6–15.1)
GFR SERPL CREATININE-BSD FRML MDRD: 75 ML/MIN/1.73SQ M
GLUCOSE SERPL-MCNC: 101 MG/DL (ref 65–140)
GLUCOSE SERPL-MCNC: 126 MG/DL (ref 65–140)
GLUCOSE SERPL-MCNC: 135 MG/DL (ref 65–140)
GLUCOSE SERPL-MCNC: 136 MG/DL (ref 65–140)
GLUCOSE SERPL-MCNC: 142 MG/DL (ref 65–140)
GLUCOSE UR STRIP-MCNC: ABNORMAL MG/DL
HCT VFR BLD AUTO: 35.5 % (ref 34.8–46.1)
HGB BLD-MCNC: 11.7 G/DL (ref 11.5–15.4)
HGB UR QL STRIP.AUTO: ABNORMAL
IMM GRANULOCYTES # BLD AUTO: 0.05 THOUSAND/UL (ref 0–0.2)
IMM GRANULOCYTES NFR BLD AUTO: 1 % (ref 0–2)
KETONES UR STRIP-MCNC: NEGATIVE MG/DL
LACTATE SERPL-SCNC: 1.6 MMOL/L (ref 0.5–2)
LEUKOCYTE ESTERASE UR QL STRIP: NEGATIVE
LYMPHOCYTES # BLD AUTO: 0.91 THOUSANDS/ΜL (ref 0.6–4.47)
LYMPHOCYTES NFR BLD AUTO: 16 % (ref 14–44)
MCH RBC QN AUTO: 31.7 PG (ref 26.8–34.3)
MCHC RBC AUTO-ENTMCNC: 33 G/DL (ref 31.4–37.4)
MCV RBC AUTO: 96 FL (ref 82–98)
MONOCYTES # BLD AUTO: 0.55 THOUSAND/ΜL (ref 0.17–1.22)
MONOCYTES NFR BLD AUTO: 9 % (ref 4–12)
MUCOUS THREADS UR QL AUTO: ABNORMAL
NEUTROPHILS # BLD AUTO: 4.32 THOUSANDS/ΜL (ref 1.85–7.62)
NEUTS SEG NFR BLD AUTO: 73 % (ref 43–75)
NITRITE UR QL STRIP: NEGATIVE
NON-SQ EPI CELLS URNS QL MICRO: ABNORMAL /HPF
NRBC BLD AUTO-RTO: 0 /100 WBCS
P AXIS: 83 DEGREES
PH UR STRIP.AUTO: 5.5 [PH]
PLATELET # BLD AUTO: 97 THOUSANDS/UL (ref 149–390)
PMV BLD AUTO: 10.4 FL (ref 8.9–12.7)
POTASSIUM SERPL-SCNC: 3.7 MMOL/L (ref 3.5–5.3)
PR INTERVAL: 152 MS
PROCALCITONIN SERPL-MCNC: 0.44 NG/ML
PROT SERPL-MCNC: 6.5 G/DL (ref 6.4–8.2)
PROT UR STRIP-MCNC: NEGATIVE MG/DL
QRS AXIS: 92 DEGREES
QRSD INTERVAL: 72 MS
QT INTERVAL: 340 MS
QTC INTERVAL: 462 MS
RBC # BLD AUTO: 3.69 MILLION/UL (ref 3.81–5.12)
RBC #/AREA URNS AUTO: ABNORMAL /HPF
SODIUM SERPL-SCNC: 136 MMOL/L (ref 136–145)
SP GR UR STRIP.AUTO: 1.02 (ref 1–1.03)
T WAVE AXIS: 68 DEGREES
UROBILINOGEN UR QL STRIP.AUTO: 1 E.U./DL
VENTRICULAR RATE: 111 BPM
WBC # BLD AUTO: 5.88 THOUSAND/UL (ref 4.31–10.16)
WBC #/AREA URNS AUTO: ABNORMAL /HPF

## 2021-07-14 PROCEDURE — G1004 CDSM NDSC: HCPCS

## 2021-07-14 PROCEDURE — 81001 URINALYSIS AUTO W/SCOPE: CPT | Performed by: EMERGENCY MEDICINE

## 2021-07-14 PROCEDURE — 80053 COMPREHEN METABOLIC PANEL: CPT | Performed by: NURSE PRACTITIONER

## 2021-07-14 PROCEDURE — 82948 REAGENT STRIP/BLOOD GLUCOSE: CPT

## 2021-07-14 PROCEDURE — 76705 ECHO EXAM OF ABDOMEN: CPT

## 2021-07-14 PROCEDURE — 74181 MRI ABDOMEN W/O CONTRAST: CPT

## 2021-07-14 PROCEDURE — 82140 ASSAY OF AMMONIA: CPT | Performed by: EMERGENCY MEDICINE

## 2021-07-14 PROCEDURE — 85025 COMPLETE CBC W/AUTO DIFF WBC: CPT | Performed by: NURSE PRACTITIONER

## 2021-07-14 PROCEDURE — 84145 PROCALCITONIN (PCT): CPT | Performed by: NURSE PRACTITIONER

## 2021-07-14 PROCEDURE — 83605 ASSAY OF LACTIC ACID: CPT | Performed by: EMERGENCY MEDICINE

## 2021-07-14 PROCEDURE — 94760 N-INVAS EAR/PLS OXIMETRY 1: CPT

## 2021-07-14 PROCEDURE — 93010 ELECTROCARDIOGRAM REPORT: CPT | Performed by: INTERNAL MEDICINE

## 2021-07-14 PROCEDURE — 36415 COLL VENOUS BLD VENIPUNCTURE: CPT | Performed by: EMERGENCY MEDICINE

## 2021-07-14 PROCEDURE — 87181 SC STD AGAR DILUTION PER AGT: CPT | Performed by: NURSE PRACTITIONER

## 2021-07-14 PROCEDURE — 87077 CULTURE AEROBIC IDENTIFY: CPT | Performed by: NURSE PRACTITIONER

## 2021-07-14 PROCEDURE — 99223 1ST HOSP IP/OBS HIGH 75: CPT | Performed by: FAMILY MEDICINE

## 2021-07-14 PROCEDURE — 94660 CPAP INITIATION&MGMT: CPT

## 2021-07-14 PROCEDURE — 87186 SC STD MICRODIL/AGAR DIL: CPT | Performed by: NURSE PRACTITIONER

## 2021-07-14 PROCEDURE — 87086 URINE CULTURE/COLONY COUNT: CPT | Performed by: NURSE PRACTITIONER

## 2021-07-14 RX ORDER — DEXTROSE AND SODIUM CHLORIDE 5; .9 G/100ML; G/100ML
75 INJECTION, SOLUTION INTRAVENOUS CONTINUOUS
Status: DISCONTINUED | OUTPATIENT
Start: 2021-07-14 | End: 2021-07-15

## 2021-07-14 RX ORDER — CITALOPRAM 10 MG/1
10 TABLET ORAL
COMMUNITY

## 2021-07-14 RX ORDER — SODIUM CHLORIDE 9 MG/ML
125 INJECTION, SOLUTION INTRAVENOUS CONTINUOUS
Status: DISPENSED | OUTPATIENT
Start: 2021-07-14 | End: 2021-07-14

## 2021-07-14 RX ORDER — SODIUM CHLORIDE 9 MG/ML
125 INJECTION, SOLUTION INTRAVENOUS CONTINUOUS
Status: DISCONTINUED | OUTPATIENT
Start: 2021-07-14 | End: 2021-07-14

## 2021-07-14 RX ORDER — ONDANSETRON 2 MG/ML
4 INJECTION INTRAMUSCULAR; INTRAVENOUS EVERY 6 HOURS PRN
Status: DISCONTINUED | OUTPATIENT
Start: 2021-07-14 | End: 2021-07-18 | Stop reason: HOSPADM

## 2021-07-14 RX ORDER — BRIMONIDINE TARTRATE 0.15 %
1 DROPS OPHTHALMIC (EYE) 3 TIMES DAILY
Status: DISCONTINUED | OUTPATIENT
Start: 2021-07-14 | End: 2021-07-18 | Stop reason: HOSPADM

## 2021-07-14 RX ORDER — BRIMONIDINE TARTRATE 0.15 %
1 DROPS OPHTHALMIC (EYE) 3 TIMES DAILY
COMMUNITY

## 2021-07-14 RX ORDER — ACETAMINOPHEN 325 MG/1
650 TABLET ORAL EVERY 6 HOURS PRN
Status: DISCONTINUED | OUTPATIENT
Start: 2021-07-14 | End: 2021-07-18 | Stop reason: HOSPADM

## 2021-07-14 RX ORDER — ALPRAZOLAM 0.25 MG/1
0.25 TABLET ORAL
Status: DISCONTINUED | OUTPATIENT
Start: 2021-07-14 | End: 2021-07-15

## 2021-07-14 RX ADMIN — BRIMONIDINE TARTRATE 1 DROP: 1.5 SOLUTION OPHTHALMIC at 17:26

## 2021-07-14 RX ADMIN — PIPERACILLIN AND TAZOBACTAM 4.5 G: 4; .5 INJECTION, POWDER, LYOPHILIZED, FOR SOLUTION INTRAVENOUS at 19:36

## 2021-07-14 RX ADMIN — PIPERACILLIN AND TAZOBACTAM 4.5 G: 4; .5 INJECTION, POWDER, LYOPHILIZED, FOR SOLUTION INTRAVENOUS at 08:51

## 2021-07-14 RX ADMIN — PIPERACILLIN AND TAZOBACTAM 4.5 G: 4; .5 INJECTION, POWDER, LYOPHILIZED, FOR SOLUTION INTRAVENOUS at 03:00

## 2021-07-14 RX ADMIN — ALPRAZOLAM 0.25 MG: 0.25 TABLET ORAL at 22:12

## 2021-07-14 RX ADMIN — DEXTROSE AND SODIUM CHLORIDE 75 ML/HR: 5; .9 INJECTION, SOLUTION INTRAVENOUS at 13:34

## 2021-07-14 RX ADMIN — ACETAMINOPHEN 650 MG: 325 TABLET ORAL at 15:07

## 2021-07-14 RX ADMIN — SODIUM CHLORIDE 125 ML/HR: 0.9 INJECTION, SOLUTION INTRAVENOUS at 02:40

## 2021-07-14 RX ADMIN — SODIUM CHLORIDE 125 ML/HR: 0.9 INJECTION, SOLUTION INTRAVENOUS at 11:26

## 2021-07-14 RX ADMIN — BRIMONIDINE TARTRATE 1 DROP: 1.5 SOLUTION OPHTHALMIC at 08:49

## 2021-07-14 RX ADMIN — ACETAMINOPHEN 650 MG: 325 TABLET ORAL at 21:19

## 2021-07-14 RX ADMIN — PIPERACILLIN AND TAZOBACTAM 4.5 G: 4; .5 INJECTION, POWDER, LYOPHILIZED, FOR SOLUTION INTRAVENOUS at 13:37

## 2021-07-14 RX ADMIN — ACETAMINOPHEN 650 MG: 325 TABLET ORAL at 07:06

## 2021-07-14 RX ADMIN — BRIMONIDINE TARTRATE 1 DROP: 1.5 SOLUTION OPHTHALMIC at 21:19

## 2021-07-14 NOTE — ASSESSMENT & PLAN NOTE
· Presented for fever, generalized weakness, and nausea-granddaughter was sick last weekend  · Sepsis present on arrival-fever, tachycardia, suspected infection, lactic acid 4 1  · Flu negative, COVID negative  · WBC 6  · CT abdomen pelvis: Sludge and multiple tiny stones within the gallbladder lumen  The gallbladder is moderately distended  Gallbladder wall thickening noted which could be related to edema or inflammation/cholecystitis  Correlation with the patient's symptoms and laboratory values recommended    Right upper quadrant ultrasound may be of benefit for further evaluation at the discretion of the referring caregiver   · Zosyn started in the ER-continue  · Blood cultures are pending  · Check procalcitonin

## 2021-07-14 NOTE — ED PROVIDER NOTES
History  Chief Complaint   Patient presents with    Weakness - Generalized     Patient reports vomiting, fever, weakness, fatigue since yesterday  States her grand daughter was sick over the weekened also  Patient complains of not feeling well over the last 1 day  Has been running subjective fevers  Has nausea but no vomiting  Decreased appetite  Feels weak and tired  No cold symptoms  Slight cough  No abdominal pain  No diarrhea  No rash  No dysuria frequency  Had a COVID vaccine in February  Granddaughter was sick recently  History provided by:  Patient   used: No    Fatigue  Severity:  Mild  Onset quality:  Gradual  Duration:  1 day  Timing:  Constant  Progression:  Worsening  Chronicity:  New  Context: not allergies, not drug use and not stress    Relieved by:  Nothing  Worsened by:  Nothing  Ineffective treatments:  Medication  Associated symptoms: anorexia, cough, fever, lethargy and nausea    Associated symptoms: no abdominal pain, no arthralgias, no ataxia, no chest pain, no diarrhea, no dizziness, no dysuria, no foul-smelling urine, no frequency, no headaches, no loss of consciousness, no melena, no seizures, no shortness of breath, no syncope, no urgency and no vomiting        Prior to Admission Medications   Prescriptions Last Dose Informant Patient Reported? Taking?    ALPRAZolam (Xanax) 0 25 mg tablet   Yes No   Sig: Take by mouth   Acetaminophen (TYLENOL ARTHRITIS PAIN PO)   Yes No   Sig: Tylenol Arthritis Pain 650 mg tablet,extended release   Calcium Carb-Cholecalciferol 600-500 MG-UNIT CAPS   Yes No   Coenzyme Q10 (COQ-10) 10 MG CAPS   Yes No   Sig: CoQ-10   Cyclobenzaprine HCl (FLEXERIL PO)   Yes No   LUMIGAN 0 01 % ophthalmic drops   Yes No   Sig: INSTILL 1 DROP INTO BOTH EYES AT NIGHT   Polyethylene Glycol 3350 (MIRALAX PO)   Yes No   Sig: Miralax   daily   Vitamin D, Cholecalciferol, 10 MCG (400 UNIT) TABS Not Taking at Unknown time  Yes No   Patient not taking: Reported on 2021   allopurinol (ZYLOPRIM) 100 mg tablet   Yes No   amoxicillin (AMOXIL) 500 mg capsule Not Taking at Unknown time  Yes No   Sig: amoxicillin 500 mg capsule   TAKE 4 CAPSULES BY MOUTH 1 HOUR BEFORE DENTAL PROCEDURES   Patient not taking: Reported on 2021   atorvastatin (LIPITOR) 10 mg tablet   Yes No   benzonatate (TESSALON PERLES) 100 mg capsule Not Taking at Unknown time  Yes No   Sig: ONE CAPSULE THREE TIMES A DAY AS NEEDED COUGHING   Patient not taking: Reported on 2021   brimonidine (ALPHAGAN P) 0 15 % ophthalmic solution  Self Yes Yes   Si drop 3 (three) times a day   budesonide (Rhinocort Aqua) 32 MCG/ACT nasal spray   Yes No   celecoxib (CeleBREX) 200 mg capsule   Yes No   Sig: Take 200 mg by mouth daily   citalopram (CeleXA) 10 mg tablet  Self Yes Yes   Sig: Take 10 mg by mouth daily at bedtime   denosumab (Prolia) 60 mg/mL   Yes No   Sig: Prolia 60 mg/mL subcutaneous syringe   docusate sodium (Colace) 100 mg capsule   Yes No   Sig: Every 12 hours   econazole nitrate 1 % cream   Yes No   Sig: USE TWICE DAILY TO AFFECTED AREA   hydrochlorothiazide (HYDRODIURIL) 50 mg tablet   Yes No   lutein 6 mg   Yes No   metFORMIN (GLUCOPHAGE) 1000 MG tablet   Yes No   Sig: Take 1,000 mg by mouth 2 (two) times a day   pantoprazole (PROTONIX) 40 mg tablet   Yes No   Sig: pantoprazole 40 mg tablet,delayed release   prn   potassium citrate (UROCIT-K) 10 mEq   Yes No      Facility-Administered Medications: None       Past Medical History:   Diagnosis Date    Diabetes mellitus ([address] )     Kidney stone        Past Surgical History:   Procedure Laterality Date    EYE SURGERY      cataract removal    JOINT REPLACEMENT      LITHOTRIPSY         History reviewed  No pertinent family history  I have reviewed and agree with the history as documented      E-Cigarette/Vaping    E-Cigarette Use Never User      E-Cigarette/Vaping Substances     Social History     Tobacco Use    Smoking status: Never Smoker    Smokeless tobacco: Never Used   Vaping Use    Vaping Use: Never used   Substance Use Topics    Alcohol use: Not Currently    Drug use: Never       Review of Systems   Constitutional: Positive for fatigue and fever  Negative for chills  HENT: Negative for ear pain, hearing loss, sore throat, trouble swallowing and voice change  Eyes: Negative for pain and discharge  Respiratory: Positive for cough  Negative for shortness of breath and wheezing  Cardiovascular: Negative for chest pain, palpitations and syncope  Gastrointestinal: Positive for anorexia and nausea  Negative for abdominal pain, blood in stool, constipation, diarrhea, melena and vomiting  Genitourinary: Negative for dysuria, flank pain, frequency, hematuria and urgency  Musculoskeletal: Negative for arthralgias, joint swelling, neck pain and neck stiffness  Skin: Negative for rash and wound  Neurological: Negative for dizziness, seizures, loss of consciousness, syncope, facial asymmetry and headaches  Psychiatric/Behavioral: Negative for hallucinations, self-injury and suicidal ideas  All other systems reviewed and are negative  Physical Exam  Physical Exam  Vitals and nursing note reviewed  Constitutional:       General: She is not in acute distress  Appearance: She is well-developed  HENT:      Head: Normocephalic and atraumatic  Right Ear: External ear normal       Left Ear: External ear normal       Mouth/Throat:      Mouth: Mucous membranes are dry  Eyes:      General: No scleral icterus  Right eye: No discharge  Left eye: No discharge  Extraocular Movements: Extraocular movements intact  Conjunctiva/sclera: Conjunctivae normal    Cardiovascular:      Rate and Rhythm: Normal rate and regular rhythm  Heart sounds: Normal heart sounds  No murmur heard  Pulmonary:      Effort: Pulmonary effort is normal       Breath sounds: Normal breath sounds   No wheezing or rales  Abdominal:      General: Bowel sounds are normal  There is no distension  Palpations: Abdomen is soft  Tenderness: There is no abdominal tenderness  There is no guarding or rebound  Musculoskeletal:         General: No deformity  Normal range of motion  Cervical back: Normal range of motion and neck supple  Skin:     General: Skin is warm and dry  Findings: No rash  Neurological:      General: No focal deficit present  Mental Status: She is alert and oriented to person, place, and time  Cranial Nerves: No cranial nerve deficit  Psychiatric:         Mood and Affect: Mood normal          Behavior: Behavior normal          Thought Content:  Thought content normal          Judgment: Judgment normal          Vital Signs  ED Triage Vitals   Temperature Pulse Respirations Blood Pressure SpO2   07/13/21 2242 07/13/21 2242 07/13/21 2242 07/13/21 2242 07/13/21 2242   (!) 101 8 °F (38 8 °C) (!) 117 20 (!) 184/84 97 %      Temp Source Heart Rate Source Patient Position - Orthostatic VS BP Location FiO2 (%)   07/13/21 2242 07/13/21 2242 07/13/21 2242 07/13/21 2242 --   Temporal Monitor Lying Right arm       Pain Score       07/13/21 2257       Med Not Given for Pain - for MAR use only           Vitals:    07/13/21 2330 07/14/21 0000 07/14/21 0100 07/14/21 0117   BP: 159/72 145/86 156/72 121/70   Pulse: (!) 121 (!) 123 (!) 112 97   Patient Position - Orthostatic VS: Lying Lying Lying          Visual Acuity      ED Medications  Medications   sodium chloride 0 9 % infusion (125 mL/hr Intravenous New Bag 7/14/21 0240)   ondansetron (ZOFRAN) injection 4 mg (has no administration in time range)   insulin lispro (HumaLOG) 100 units/mL subcutaneous injection 2-12 Units (2 Units Subcutaneous Not Given 7/14/21 0240)   insulin lispro (HumaLOG) 100 units/mL subcutaneous injection 2-12 Units (2 Units Subcutaneous Not Given 7/14/21 0240)   piperacillin-tazobactam (ZOSYN) 4 5 g in sodium chloride 0 9 % 100 mL IVPB (4 5 g Intravenous New Bag 7/14/21 0300)   ALPRAZolam (XANAX) tablet 0 25 mg (has no administration in time range)   brimonidine (ALPHAGAN P) 0 15 % ophthalmic solution 1 drop (has no administration in time range)   acetaminophen (TYLENOL) tablet 650 mg (has no administration in time range)   sodium chloride 0 9 % bolus 2,000 mL (0 mL Intravenous Stopped 7/14/21 0002)   acetaminophen (TYLENOL) tablet 650 mg (650 mg Oral Given 7/13/21 2257)   ondansetron (ZOFRAN) injection 4 mg (4 mg Intravenous Given 7/13/21 2257)   ketorolac (TORADOL) injection 15 mg (15 mg Intravenous Given 7/13/21 2258)   iohexol (OMNIPAQUE) 350 MG/ML injection (SINGLE-DOSE) 100 mL (100 mL Intravenous Given 7/13/21 2353)       Diagnostic Studies  Results Reviewed     Procedure Component Value Units Date/Time    Lactic acid 2 Hours [[id number]]  (Normal) Collected: 07/14/21 0127    Lab Status: Final result Specimen: Blood from Arm, Right Updated: 07/14/21 0150     LACTIC ACID 1 6 mmol/L     Narrative:      Result may be elevated if tourniquet was used during collection      Ammonia [[id number]]  (Abnormal) Collected: 07/14/21 0013    Lab Status: Final result Specimen: Blood from Arm, Right Updated: 07/14/21 0034     Ammonia 49 umol/L     Urine Microscopic [[id number]]  (Abnormal) Collected: 07/14/21 0002    Lab Status: Final result Specimen: Urine, Straight Cath Updated: 07/14/21 0020     RBC, UA 2-4 /hpf      WBC, UA 2-4 /hpf      Epithelial Cells Occasional /hpf      Bacteria, UA Innumerable /hpf      MUCUS THREADS Occasional    UA w Reflex to Microscopic w Reflex to Culture [[id number]]  (Abnormal) Collected: 07/14/21 0002    Lab Status: Final result Specimen: Urine, Straight Cath Updated: 07/14/21 0010     Color, UA Yellow     Clarity, UA Clear     Specific Gravity, UA 1 025     pH, UA 5 5     Leukocytes, UA Negative     Nitrite, UA Negative     Protein, UA Negative mg/dl      Glucose,  (1/4%) mg/dl Ketones, UA Negative mg/dl      Urobilinogen, UA 1 0 E U /dl      Bilirubin, UA Small     Blood, UA Small    Influenza A/B and RSV PCR - 2 Hour Stat [[id number]]  (Normal) Collected: 07/13/21 2250    Lab Status: Final result Specimen: Nares from Nasopharyngeal Swab Updated: 07/13/21 2359     INFLUENZA A PCR None Detected     INFLUENZA B PCR None Detected     RSV PCR None Detected    Bilirubin, direct [[id number]]  (Abnormal) Collected: 07/13/21 2248    Lab Status: Final result Specimen: Blood from Arm, Left Updated: 07/13/21 2352     Bilirubin, Direct 0 64 mg/dL     Novel Coronavirus (Covid-19),PCR [facility] - 2 Hour Stat [[id number]]  (Normal) Collected: 07/13/21 2250    Lab Status: Final result Specimen: Nares from Nose Updated: 07/13/21 2349     SARS-CoV-2 Negative    Narrative: The specimen collection materials, transport medium, and/or testing methodology utilized in the production of these test results have been proven to be reliable in a limited validation with an abbreviated program under the Emergency Utilization Authorization provided by the FDA  Testing reported as "Presumptive positive" will be confirmed with secondary testing to ensure result accuracy  Clinical caution and judgement should be used with the interpretation of these results with consideration of the clinical impression and other laboratory testing  Testing reported as "Positive" or "Negative" has been proven to be accurate according to standard laboratory validation requirements  All testing is performed with control materials showing appropriate reactivity at standard intervals        Manual Differential(PHLEBS Do Not Order) [[id number]]  (Abnormal) Collected: 07/13/21 2248    Lab Status: Final result Specimen: Blood from Arm, Left Updated: 07/13/21 2342     Segmented % 73 %      Bands % 4 %      Lymphocytes % 14 %      Monocytes % 9 %      Eosinophils, % 0 %      Basophils % 0 %      Absolute Neutrophils 4 92 Thousand/uL      Lymphocytes Absolute 0 89 Thousand/uL      Monocytes Absolute 0 58 Thousand/uL      Eosinophils Absolute 0 00 Thousand/uL      Basophils Absolute 0 00 Thousand/uL      Total Counted 100     RBC Morphology Normal     Platelet Estimate Decreased    Lactic acid [[id number]]  (Abnormal) Collected: 07/13/21 2248    Lab Status: Final result Specimen: Blood from Arm, Left Updated: 07/13/21 2320     LACTIC ACID 4 1 mmol/L     Narrative:      Result may be elevated if tourniquet was used during collection  CBC and differential [[id number]]  (Abnormal) Collected: 07/13/21 2248    Lab Status: Final result Specimen: Blood from Arm, Left Updated: 07/13/21 2319     WBC 6 39 Thousand/uL      RBC 3 93 Million/uL      Hemoglobin 12 6 g/dL      Hematocrit 37 5 %      MCV 95 fL      MCH 32 1 pg      MCHC 33 6 g/dL      RDW 16 4 %      MPV 10 6 fL      Platelets 680 Thousands/uL      nRBC 0 /100 WBCs     Narrative: This is an appended report  These results have been appended to a previously verified report      Troponin I [[id number]]  (Normal) Collected: 07/13/21 2248    Lab Status: Final result Specimen: Blood from Arm, Left Updated: 07/13/21 2315     Troponin I 0 02 ng/mL     Lipase [[id number]]  (Normal) Collected: 07/13/21 2248    Lab Status: Final result Specimen: Blood from Arm, Left Updated: 07/13/21 2310     Lipase 127 u/L     Comprehensive metabolic panel [[id number]]  (Abnormal) Collected: 07/13/21 2248    Lab Status: Final result Specimen: Blood from Arm, Left Updated: 07/13/21 2310     Sodium 132 mmol/L      Potassium 3 6 mmol/L      Chloride 99 mmol/L      CO2 24 mmol/L      ANION GAP 9 mmol/L      BUN 9 mg/dL      Creatinine 0 91 mg/dL      Glucose 135 mg/dL      Calcium 8 7 mg/dL      Corrected Calcium 9 3 mg/dL      AST 75 U/L      ALT 48 U/L      Alkaline Phosphatase 65 U/L      Total Protein 7 6 g/dL      Albumin 3 3 g/dL      Total Bilirubin 1 83 mg/dL      eGFR 65 ml/min/1 73sq m     Narrative:      National Kidney Disease Foundation guidelines for Chronic Kidney Disease (CKD):     Stage 1 with normal or high GFR (GFR > 90 mL/min/1 73 square meters)    Stage 2 Mild CKD (GFR = 60-89 mL/min/1 73 square meters)    Stage 3A Moderate CKD (GFR = 45-59 mL/min/1 73 square meters)    Stage 3B Moderate CKD (GFR = 30-44 mL/min/1 73 square meters)    Stage 4 Severe CKD (GFR = 15-29 mL/min/1 73 square meters)    Stage 5 End Stage CKD (GFR <15 mL/min/1 73 square meters)  Note: GFR calculation is accurate only with a steady state creatinine    Blood culture #2 [[id number]] Collected: 07/13/21 2248    Lab Status: In process Specimen: Blood from Hand, Right Updated: 07/13/21 2254    Blood culture #1 [[id number]] Collected: 07/13/21 2248    Lab Status: In process Specimen: Blood from Arm, Left Updated: 07/13/21 2253                 CT chest abdomen pelvis w contrast   Final Result by Melissa Campos DO (07/14 0032)      Sludge and multiple tiny stones within the gallbladder lumen  The gallbladder is moderately distended  Gallbladder wall thickening noted which could be related to edema or inflammation/cholecystitis  Correlation with the patient's symptoms and    laboratory values recommended  Right upper quadrant ultrasound may be of benefit for further evaluation at the discretion of the referring caregiver  Hepatic cirrhosis and evidence of portal hypertension with prominent mesenteric, perisplenic, and perigastric collateral vessels/varices  Small-volume ascites  Bilateral nephrolithiasis, no hydronephrosis  Left renal cortical scarring, left renal cysts, and other findings as above              Workstation performed: [id number]         US right upper quadrant    (Results Pending)              Procedures  ECG 12 Lead Documentation Only    Date/Time: 7/13/2021 10:49 PM  Performed by: Benjamin Armendariz MD  Authorized by: Benjamin Armendariz MD     ECG reviewed by me, the ED Provider: yes    Patient location:  ED  Previous ECG: Previous ECG:  Unavailable  Rate:     ECG rate:  110  Rhythm:     Rhythm: sinus rhythm    Ectopy:     Ectopy: PAC    QRS:     QRS axis:  Normal             ED Course  ED Course as of Jul 14 0549   Tue Jul 13, 2021   2246 Patient is obese with BMI greater than 30  We used the ideal body weight for the IV fluid bolus  2335 Patient again denies any abdominal pain  On exam the abdomen is benign  No tenderness on right upper quadrant  Wed Jul 14, 2021   0041 Discussed with surgeon on-call, Dr Clare Braxton  Asks to admit the medicine service  Aware of CT scan results  0055 Ammonia level slightly elevated  However, the patient is awake alert and oriented  Answering questions appropriately  Ammonia(!): 49                             SBIRT 20yo+      Most Recent Value   SBIRT (23 yo +)   In order to provide better care to our patients, we are screening all of our patients for alcohol and drug use  Would it be okay to ask you these screening questions? Yes Filed at: 07/13/2021 2301   Initial Alcohol Screen: US AUDIT-C    1  How often do you have a drink containing alcohol?  0 Filed at: 07/13/2021 2301   2  How many drinks containing alcohol do you have on a typical day you are drinking? 0 Filed at: 07/13/2021 2301   3a  Male UNDER 65: How often do you have five or more drinks on one occasion? 0 Filed at: 07/13/2021 2301   3b  FEMALE Any Age, or MALE 65+: How often do you have 4 or more drinks on one occassion? 0 Filed at: 07/13/2021 2301   Audit-C Score  0 Filed at: 07/13/2021 2301   ERUM: How many times in the past year have you    Used an illegal drug or used a prescription medication for non-medical reasons?   Never Filed at: 07/13/2021 2301                    MDM  Number of Diagnoses or Management Options  Cholelithiasis  Fever  Lactic acidosis  Sepsis without acute organ dysfunction, due to unspecified organism [facility])  Diagnosis management comments: Patient with fever and increasing weakness over the last 1 day  Patient has no abdominal tenderness however her gallbladder does appear to be distended  There is sludge and stones  There is mild gallbladder wall thickening  The urine may be contaminant versus early infection  This case was discussed with General surgery who thought the patient could be managed under the hospitalist service  They will be on consult  Patient was given Zosyn to cover both any kind of gallbladder infection and UTI         Amount and/or Complexity of Data Reviewed  Clinical lab tests: reviewed  Review and summarize past medical records: yes        Disposition  Final diagnoses:   Fever   Lactic acidosis   Cholelithiasis   Sepsis without acute organ dysfunction, due to unspecified organism [facility])     Time reflects when diagnosis was documented in both MDM as applicable and the Disposition within this note     Time User Action Codes Description Comment    7/13/2021 11:21 PM Visperas, Karyle Jewel Add [M53,  R50 9] Cough with fever     7/13/2021 11:21 PM Visperas, Karyle Jewel Remove [J61,  R50 9] Cough with fever     7/13/2021 11:21 PM Visperas, Karyle Jewel Add [R50 9] Fever     7/13/2021 11:22 PM Visperas, Karyle Jewel Add [E87 2] Lactic acidosis     7/14/2021 12:38 AM Visperas, Karyle Jewel Add [K80 20] Cholelithiasis     7/14/2021 12:42 AM Visperas, Karyle Jewel Add [A41 9] Sepsis without acute organ dysfunction, due to unspecified organism [facility])       ED Disposition     ED Disposition Condition Date/Time Comment    Admit Stable Wed Jul 14, 2021 12:42 AM Case was discussed with Severiano Babinski and the patient's admission status was agreed to be to the service of Dr Alyssa Peters         Follow-up Information    None         Current Discharge Medication List      CONTINUE these medications which have NOT CHANGED    Details   brimonidine (ALPHAGAN P) 0 15 % ophthalmic solution 1 drop 3 (three) times a day      citalopram (CeleXA) 10 mg tablet Take 10 mg by mouth daily at bedtime      Acetaminophen (TYLENOL ARTHRITIS PAIN PO) Tylenol Arthritis Pain 650 mg tablet,extended release      allopurinol (ZYLOPRIM) 100 mg tablet       ALPRAZolam (Xanax) 0 25 mg tablet Take by mouth      amoxicillin (AMOXIL) 500 mg capsule amoxicillin 500 mg capsule   TAKE 4 CAPSULES BY MOUTH 1 HOUR BEFORE DENTAL PROCEDURES      atorvastatin (LIPITOR) 10 mg tablet       benzonatate (TESSALON PERLES) 100 mg capsule ONE CAPSULE THREE TIMES A DAY AS NEEDED COUGHING      budesonide (Rhinocort Aqua) 32 MCG/ACT nasal spray       Calcium Carb-Cholecalciferol 600-500 MG-UNIT CAPS       celecoxib (CeleBREX) 200 mg capsule Take 200 mg by mouth daily      Coenzyme Q10 (COQ-10) 10 MG CAPS CoQ-10      Cyclobenzaprine HCl (FLEXERIL PO)       denosumab (Prolia) 60 mg/mL Prolia 60 mg/mL subcutaneous syringe      docusate sodium (Colace) 100 mg capsule Every 12 hours      econazole nitrate 1 % cream USE TWICE DAILY TO AFFECTED AREA      hydrochlorothiazide (HYDRODIURIL) 50 mg tablet       LUMIGAN 0 01 % ophthalmic drops INSTILL 1 DROP INTO BOTH EYES AT NIGHT      lutein 6 mg       metFORMIN (GLUCOPHAGE) 1000 MG tablet Take 1,000 mg by mouth 2 (two) times a day      pantoprazole (PROTONIX) 40 mg tablet pantoprazole 40 mg tablet,delayed release   prn      Polyethylene Glycol 3350 (MIRALAX PO) Miralax   daily      potassium citrate (UROCIT-K) 10 mEq       Vitamin D, Cholecalciferol, 10 MCG (400 UNIT) TABS            No discharge procedures on file      PDMP Review       Value Time User    PDMP Reviewed  Yes 1/29/2021  3:55 PM Kari Stewart DO          ED Provider  Electronically Signed by           Reese Crowder MD  07/14/21 315 [facility] Royal Argueta MD  07/14/21 7710 [facility] Royal Argueta MD  07/14/21 8303

## 2021-07-14 NOTE — H&P
[address]  H&P- Caity Sous 1955, 74 y o  female MRN: [id number]  Unit/Bed#: -[facility] Encounter: [id number]  Primary Care Provider: Tati Dobson DO   Date and time admitted to hospital: 7/13/2021 10:43 PM    * Septic shock ([address] )  Assessment & Plan  · Presented for fever, generalized weakness, and nausea-granddaughter was sick last weekend  · Sepsis present on arrival-fever, tachycardia, suspected infection, lactic acid 4 1  · /72  · Flu negative, COVID negative  · WBC 6  · CT abdomen pelvis: Sludge and multiple tiny stones within the gallbladder lumen  The gallbladder is moderately distended  Gallbladder wall thickening noted which could be related to edema or inflammation/cholecystitis  Correlation with the patient's symptoms and laboratory values recommended  Right upper quadrant ultrasound may be of benefit for further evaluation at the discretion of the referring caregiver   · Zosyn started in the ER-continue  · Blood cultures are pending  · Check procalcitonin    Thickening of wall of gallbladder  Assessment & Plan  · CT abdomen pelvis: Sludge and multiple tiny stones within the gallbladder lumen  The gallbladder is moderately distended  Gallbladder wall thickening noted which could be related to edema or inflammation/cholecystitis  Correlation with the patient's symptoms and laboratory values recommended  Right upper quadrant ultrasound may be of benefit for further evaluation at the discretion of the referring caregiver   · Abdominal ultrasound June 2021: Partially layering gallbladder sludge  Anterior gallbladder wall adherent sludge versus 1 7 cm polyp  According to current literature guidelines (Claria Aliza 2013;10:953-956) for polyps this size surgical consultation recommended  · Will obtain RUQ ultrasound to assess for interval change  · NPO  · Surgery consult    Abnormal heart rhythm  Assessment & Plan  · Had Zio patch at [facility] April 2021: 1   Overall sinus rhythm with average rate of 82 bpm, minimum of 66 bpm and maximum of 122 bpm    2   Frequent supraventricular ectopy-93,993 beats- 5 7% of total beats  3   130 brief supraventricular tachycardia runs-longest 22 1 seconds with an average rate of 140 bpm    4   Rare ventricular ectopy with no evidence of ventricular tachycardia  5   No evidence of significant pauses or high degree AV block  6   Patient symptoms corresponded to sinus rhythm, ventricular trigeminy, supraventricular ectopy and ventricular ectopy  · Monitor heart rate  · Continue outpatient follow-up    Kidney stones  Assessment & Plan  · History of nephrolithiasis  · Continue Microzide, Urocit and allopurinol when no longer NPO  · Continue outpatient follow-up    MELVIN on CPAP  Assessment & Plan  · Continue CPAP at bedtime    Class 3 severe obesity due to excess calories with serious comorbidity and body mass index (BMI) of 45 0 to 49 9 in adult [facility])  Assessment & Plan  · BMI 48 36  · Recommend intensive therapeutic lifestyle modification    Type 2 diabetes mellitus without complication, without long-term current use of insulin (HCC)  Assessment & Plan  Lab Results   Component Value Date    HGBA1C 7 7 (H) 01/25/2021       No results for input(s): POCGLU in the last 72 hours      Blood Sugar Average: Last 72 hrs:     · Glucose 135  · Fingerstick blood sugar with sliding scale coverage  · Patient currently NPO  · Hold metformin while in hospital    Bacteriuria  Assessment & Plan  · Urinalysis:  Innumerable bacteria  · Zosyn started in the ER for sepsis      Thrombocytopenia (HCC)  Assessment & Plan  · Platelets 782-[id number] be due to liver disease  · Daily CBC and trend platelets  · Monitor for bleeding    Cirrhosis of liver with ascites [facility])  Assessment & Plan  · Diagnosed with cirrhosis in November of 2020-cell Dr Kuldip Espinal, gastroenterology at [facility] at that time  · CT abdomen pelvis: Hepatic cirrhosis and evidence of portal hypertension with prominent mesenteric, perisplenic, and perigastric collateral vessels/varices  Small-volume ascites   · Abdominal ultrasound 06/20/2021Hepatosplenomegaly  Hepatic cirrhosis and mild steatosis  · Total bilirubin:  1 63  · Ammonia:  45  · Continue outpatient GI follow-up    Hyponatremia  Assessment & Plan  · Sodium 132  · Received 2 L normal saline in the ER  · Recheck metabolic panel and trend sodium    VTE Prophylaxis: Pharmacologic VTE Prophylaxis contraindicated due to Will hold pending surgical eval  / sequential compression device   Code Status:  Full  POLST: POLST form is not discussed and not completed at this time  Discussion with family:  Patient    Anticipated Length of Stay:  Patient will be admitted on an Inpatient basis with an anticipated length of stay of  greater than 2 midnights  Justification for Hospital Stay:  Per plan above    Total Time for Visit, including Counseling / Coordination of Care: 30 minutes  Greater than 50% of this total time spent on direct patient counseling and coordination of care  Chief Complaint:   Fever, generalized weakness    History of Present Illness:    Gala Levine is a 74 y o  female with history of nephrolithiasis, liver disease, non-insulin-dependent type 2 diabetes, fibrocystic breast disease, lumbar radiculopathy, spinal stenosis of lumbar region, glaucoma, MELVIN on CPAP, and CAD who presents with fever and generalized weakness  She says her symptoms started gradually yesterday, she describes them as mild, constant, and worsening  She denies congestion, chest pain, shortness of breath, abdominal pain, vomiting, hematemesis, diarrhea, hematochezia, dysuria, dizziness, syncope, or focal weakness  Of note, her  is Dr Lorain Cooks  Her daughter and son-in-law, are also physicians, the Liza Canas, as is her son her daughter-in-law, also Ace Avendano  Her other children are a  and a teacher    The patient is a retired pharmacist     Review of Systems:    Review of Systems   Constitutional: Positive for fatigue and fever  Negative for chills  HENT: Negative for congestion  Eyes: Negative for visual disturbance  Respiratory: Negative for cough and shortness of breath  Cardiovascular: Negative for chest pain, palpitations and leg swelling  Gastrointestinal: Negative for abdominal distention, abdominal pain, constipation, diarrhea, nausea and vomiting  Genitourinary: Negative for dysuria and frequency  Musculoskeletal: Negative for arthralgias and myalgias  Skin: Negative for color change, pallor and rash  Neurological: Negative for dizziness, syncope, weakness and headaches  Psychiatric/Behavioral: Negative for dysphoric mood  All other systems reviewed and are negative  Past Medical and Surgical History:     Past Medical History:   Diagnosis Date    Diabetes mellitus ([facility] [address] )     Kidney stone        Past Surgical History:   Procedure Laterality Date    EYE SURGERY      cataract removal    JOINT REPLACEMENT      LITHOTRIPSY         Meds/Allergies:    Prior to Admission medications    Medication Sig Start Date End Date Taking?  Authorizing Provider   Acetaminophen (TYLENOL ARTHRITIS PAIN PO) Tylenol Arthritis Pain 650 mg tablet,extended release 2/5/15   Historical Provider, MD   allopurinol (ZYLOPRIM) 100 mg tablet  1/9/20   Historical Provider, MD   ALPRAZolam (Xanax) 0 25 mg tablet Take by mouth    Historical Provider, MD   amoxicillin (AMOXIL) 500 mg capsule amoxicillin 500 mg capsule   TAKE 4 CAPSULES BY MOUTH 1 HOUR BEFORE DENTAL PROCEDURES    Historical Provider, MD   atorvastatin (LIPITOR) 10 mg tablet  1/22/20   Historical Provider, MD   benzonatate (TESSALON PERLES) 100 mg capsule ONE CAPSULE THREE TIMES A DAY AS NEEDED COUGHING 3/12/20   Historical Provider, MD   budesonide (Rhinocort Aqua) 32 MCG/ACT nasal spray     Historical Provider, MD   Calcium Carb-Cholecalciferol 600-500 MG-UNIT CAPS Historical Provider, MD   celecoxib (CeleBREX) 200 mg capsule Take 200 mg by mouth daily 2/20/20   Historical Provider, MD   Coenzyme Q10 (COQ-10) 10 MG CAPS CoQ-10    Historical Provider, MD   Cyclobenzaprine HCl (FLEXERIL PO)     Historical Provider, MD   denosumab (Prolia) 60 mg/mL Prolia 60 mg/mL subcutaneous syringe 10/22/15   Historical Provider, MD   docusate sodium (Colace) 100 mg capsule Every 12 hours    Historical Provider, MD   econazole nitrate 1 % cream USE TWICE DAILY TO AFFECTED AREA 3/22/20   Historical Provider, MD   hydrochlorothiazide (HYDRODIURIL) 50 mg tablet  1/22/20   Historical Provider, MD   LUMIGAN 0 01 % ophthalmic drops INSTILL 1 DROP INTO BOTH EYES AT NIGHT 2/21/20   Historical Provider, MD   lutein 6 mg     Historical Provider, MD   metFORMIN (GLUCOPHAGE) 1000 MG tablet Take 1,000 mg by mouth 2 (two) times a day 3/22/20   Historical Provider, MD   pantoprazole (PROTONIX) 40 mg tablet pantoprazole 40 mg tablet,delayed release   prn 3/25/17   Historical Provider, MD   Polyethylene Glycol 3350 (MIRALAX PO) Miralax   daily    Historical Provider, MD   potassium citrate (UROCIT-K) 10 mEq  1/22/20   Historical Provider, MD   Vitamin D, Cholecalciferol, 10 MCG (400 UNIT) TABS     Historical Provider, MD     I have reviewed home medications with patient personally  Allergies:    Allergies   Allergen Reactions    Nitrofurantoin        Social History:     Marital Status: /Civil Union   Occupation:  Retired pharmacist  Patient Pre-hospital Living Situation:  Home  Patient Pre-hospital Level of Mobility:  Independent  Patient Pre-hospital Diet Restrictions:  Diabetic  Substance Use History:   Social History     Substance and Sexual Activity   Alcohol Use Not Currently     Social History     Tobacco Use   Smoking Status Never Smoker   Smokeless Tobacco Never Used     Social History     Substance and Sexual Activity   Drug Use Never       Family History:    non-contributory    Physical Exam:     Vitals:   Blood Pressure: 121/70 (07/14/21 0117)  Pulse: 97 (07/14/21 0117)  Temperature: 100 1 °F (37 8 °C) (07/14/21 0117)  Temp Source: Temporal (07/13/21 2242)  Respirations: 14 (07/14/21 0117)  Height: 5' 2" (157 5 cm) (07/14/21 0127)  Weight - Scale: 121 kg (265 lb 10 5 oz) (07/14/21 0127)  SpO2: 96 % (07/14/21 0232)    Physical Exam  Vitals and nursing note reviewed  Constitutional:       Appearance: She is obese  HENT:      Head: Normocephalic and atraumatic  Mouth/Throat:      Mouth: Mucous membranes are moist       Pharynx: Oropharynx is clear  Eyes:      Extraocular Movements: Extraocular movements intact  Pupils: Pupils are equal, round, and reactive to light  Cardiovascular:      Rate and Rhythm: Normal rate  Rhythm irregular  Pulses: Normal pulses  Heart sounds: Normal heart sounds  Pulmonary:      Effort: Pulmonary effort is normal       Breath sounds: Normal breath sounds  Abdominal:      General: Abdomen is flat  Bowel sounds are normal       Palpations: Abdomen is soft  Musculoskeletal:         General: Normal range of motion  Cervical back: Normal range of motion and neck supple  Right lower leg: No edema  Left lower leg: No edema  Skin:     General: Skin is warm and dry  Capillary Refill: Capillary refill takes less than 2 seconds  Neurological:      General: No focal deficit present  Mental Status: She is alert and oriented to person, place, and time  Additional Data:     Lab Results: I have personally reviewed pertinent reports        Results from last 7 days   Lab Units 07/13/21 2248   WBC Thousand/uL 6 39   HEMOGLOBIN g/dL 12 6   HEMATOCRIT % 37 5   PLATELETS Thousands/uL 105*   BANDS PCT % 4   LYMPHO PCT % 14   MONO PCT % 9   EOS PCT % 0     Results from last 7 days   Lab Units 07/13/21 2248   SODIUM mmol/L 132*   POTASSIUM mmol/L 3 6   CHLORIDE mmol/L 99*   CO2 mmol/L 24   BUN mg/dL 9   CREATININE mg/dL 0  91   ANION GAP mmol/L 9   CALCIUM mg/dL 8 7   ALBUMIN g/dL 3 3*   TOTAL BILIRUBIN mg/dL 1 83*   ALK PHOS U/L 65   ALT U/L 48   AST U/L 75*   GLUCOSE RANDOM mg/dL 135                 Results from last 7 days   Lab Units 07/14/21  0127 07/13/21  2248   LACTIC ACID mmol/L 1 6 4 1*       Imaging: I have personally reviewed pertinent reports  CT chest abdomen pelvis w contrast   Final Result by Angela Carroll DO (07/14 0032)      Sludge and multiple tiny stones within the gallbladder lumen  The gallbladder is moderately distended  Gallbladder wall thickening noted which could be related to edema or inflammation/cholecystitis  Correlation with the patient's symptoms and    laboratory values recommended  Right upper quadrant ultrasound may be of benefit for further evaluation at the discretion of the referring caregiver  Hepatic cirrhosis and evidence of portal hypertension with prominent mesenteric, perisplenic, and perigastric collateral vessels/varices  Small-volume ascites  Bilateral nephrolithiasis, no hydronephrosis  Left renal cortical scarring, left renal cysts, and other findings as above  Workstation performed: [id number]         US right upper quadrant    (Results Pending)       EKG, Pathology, and Other Studies Reviewed on Admission:   · EKG:  Sinus tachycardia rate of 110    Allscripts / Epic Records Reviewed: Yes     ** Please Note: This note has been constructed using a voice recognition system   **

## 2021-07-14 NOTE — PLAN OF CARE
Problem: Potential for Falls  Goal: Patient will remain free of falls  Description: INTERVENTIONS:  - Educate patient/family on patient safety including physical limitations  - Instruct patient to call for assistance with activity   - Consult OT/PT to assist with strengthening/mobility   - Keep Call bell within reach  - Keep bed low and locked with side rails adjusted as appropriate  - Keep care items and personal belongings within reach  - Initiate and maintain comfort rounds  - Make Fall Risk Sign visible to staff  - Offer Toileting every   Hours, in advance of need  - Initiate/Maintain   alarm  - Obtain necessary fall risk management equipment:   - Apply yellow socks and bracelet for high fall risk patients  - Consider moving patient to room near nurses station  Outcome: Progressing     Problem: Nutrition/Hydration-ADULT  Goal: Nutrient/Hydration intake appropriate for improving, restoring or maintaining nutritional needs  Description: Monitor and assess patient's nutrition/hydration status for malnutrition  Collaborate with interdisciplinary team and initiate plan and interventions as ordered  Monitor patient's weight and dietary intake as ordered or per policy  Utilize nutrition screening tool and intervene as necessary  Determine patient's food preferences and provide high-protein, high-caloric foods as appropriate       INTERVENTIONS:  - Monitor oral intake, urinary output, labs, and treatment plans  - Assess nutrition and hydration status and recommend course of action  - Evaluate amount of meals eaten  - Assist patient with eating if necessary   - Allow adequate time for meals  - Recommend/ encourage appropriate diets, oral nutritional supplements, and vitamin/mineral supplements  - Order, calculate, and assess calorie counts as needed  - Recommend, monitor, and adjust tube feedings and TPN/PPN based on assessed needs  - Assess need for intravenous fluids  - Provide specific nutrition/hydration education as appropriate  - Include patient/family/caregiver in decisions related to nutrition  Outcome: Progressing     Problem: CARDIOVASCULAR - ADULT  Goal: Maintains optimal cardiac output and hemodynamic stability  Description: INTERVENTIONS:  - Monitor I/O, vital signs and rhythm  - Monitor for S/S and trends of decreased cardiac output  - Administer and titrate ordered vasoactive medications to optimize hemodynamic stability  - Assess quality of pulses, skin color and temperature  - Assess for signs of decreased coronary artery perfusion  - Instruct patient to report change in severity of symptoms  Outcome: Progressing  Goal: Absence of cardiac dysrhythmias or at baseline rhythm  Description: INTERVENTIONS:  - Continuous cardiac monitoring, vital signs, obtain 12 lead EKG if ordered  - Administer antiarrhythmic and heart rate control medications as ordered  - Monitor electrolytes and administer replacement therapy as ordered  Outcome: Progressing     Problem: METABOLIC, FLUID AND ELECTROLYTES - ADULT  Goal: Electrolytes maintained within normal limits  Description: INTERVENTIONS:  - Monitor labs and assess patient for signs and symptoms of electrolyte imbalances  - Administer electrolyte replacement as ordered  - Monitor response to electrolyte replacements, including repeat lab results as appropriate  - Instruct patient on fluid and nutrition as appropriate  Outcome: Progressing  Goal: Fluid balance maintained  Description: INTERVENTIONS:  - Monitor labs   - Monitor I/O and WT  - Instruct patient on fluid and nutrition as appropriate  - Assess for signs & symptoms of volume excess or deficit  Outcome: Progressing  Goal: Glucose maintained within target range  Description: INTERVENTIONS:  - Monitor Blood Glucose as ordered  - Assess for signs and symptoms of hyperglycemia and hypoglycemia  - Administer ordered medications to maintain glucose within target range  - Assess nutritional intake and initiate nutrition service referral as needed  Outcome: Progressing     Problem: PAIN - ADULT  Goal: Verbalizes/displays adequate comfort level or baseline comfort level  Description: Interventions:  - Encourage patient to monitor pain and request assistance  - Assess pain using appropriate pain scale  - Administer analgesics based on type and severity of pain and evaluate response  - Implement non-pharmacological measures as appropriate and evaluate response  - Consider cultural and social influences on pain and pain management  - Notify physician/advanced practitioner if interventions unsuccessful or patient reports new pain  Outcome: Progressing     Problem: INFECTION - ADULT  Goal: Absence or prevention of progression during hospitalization  Description: INTERVENTIONS:  - Assess and monitor for signs and symptoms of infection  - Monitor lab/diagnostic results  - Monitor all insertion sites, i e  indwelling lines, tubes, and drains  - Monitor endotracheal if appropriate and nasal secretions for changes in amount and color  - [place] appropriate cooling/warming therapies per order  - Administer medications as ordered  - Instruct and encourage patient and family to use good hand hygiene technique  - Identify and instruct in appropriate isolation precautions for identified infection/condition  Outcome: Progressing  Goal: Absence of fever/infection during neutropenic period  Description: INTERVENTIONS:  - Monitor WBC    Outcome: Progressing     Problem: SAFETY ADULT  Goal: Patient will remain free of falls  Description: INTERVENTIONS:  - Educate patient/family on patient safety including physical limitations  - Instruct patient to call for assistance with activity   - Consult OT/PT to assist with strengthening/mobility   - Keep Call bell within reach  - Keep bed low and locked with side rails adjusted as appropriate  - Keep care items and personal belongings within reach  - Initiate and maintain comfort rounds  - Make Fall Risk Sign visible to staff  - Offer Toileting every   Hours, in advance of need  - Initiate/Maintain   alarm  - Obtain necessary fall risk management equipment:   - Apply yellow socks and bracelet for high fall risk patients  - Consider moving patient to room near nurses station  Outcome: Progressing  Goal: Maintain or return to baseline ADL function  Description: INTERVENTIONS:  -  Assess patient's ability to carry out ADLs; assess patient's baseline for ADL function and identify physical deficits which impact ability to perform ADLs (bathing, care of mouth/teeth, toileting, grooming, dressing, etc )  - Assess/evaluate cause of self-care deficits   - Assess range of motion  - Assess patient's mobility; develop plan if impaired  - Assess patient's need for assistive devices and provide as appropriate  - Encourage maximum independence but intervene and supervise when necessary  - Involve family in performance of ADLs  - Assess for home care needs following discharge   - Consider OT consult to assist with ADL evaluation and planning for discharge  - Provide patient education as appropriate  Outcome: Progressing  Goal: Maintains/Returns to pre admission functional level  Description: INTERVENTIONS:  - Perform BMAT or MOVE assessment daily    - Set and communicate daily mobility goal to care team and patient/family/caregiver  - Collaborate with rehabilitation services on mobility goals if consulted  - Perform Range of Motion   times a day  - Reposition patient every   hours  - Dangle patient   times a day  - Stand patient   times a day  - Ambulate patient   times a day  - Out of bed to chair   times a day   - Out of bed for meals    times a day  - Out of bed for toileting  - Record patient progress and toleration of activity level   Outcome: Progressing     Problem: DISCHARGE PLANNING  Goal: Discharge to home or other facility with appropriate resources  Description: INTERVENTIONS:  - Identify barriers to discharge w/patient and caregiver  - Arrange for needed discharge resources and transportation as appropriate  - Identify discharge learning needs (meds, wound care, etc )  - Arrange for interpretive services to assist at discharge as needed  - Refer to Case Management Department for coordinating discharge planning if the patient needs post-hospital services based on physician/advanced practitioner order or complex needs related to functional status, cognitive ability, or social support system  Outcome: Progressing     Problem: MOBILITY - ADULT  Goal: Maintain or return to baseline ADL function  Description: INTERVENTIONS:  -  Assess patient's ability to carry out ADLs; assess patient's baseline for ADL function and identify physical deficits which impact ability to perform ADLs (bathing, care of mouth/teeth, toileting, grooming, dressing, etc )  - Assess/evaluate cause of self-care deficits   - Assess range of motion  - Assess patient's mobility; develop plan if impaired  - Assess patient's need for assistive devices and provide as appropriate  - Encourage maximum independence but intervene and supervise when necessary  - Involve family in performance of ADLs  - Assess for home care needs following discharge   - Consider OT consult to assist with ADL evaluation and planning for discharge  - Provide patient education as appropriate  Outcome: Progressing  Goal: Maintains/Returns to pre admission functional level  Description: INTERVENTIONS:  - Perform BMAT or MOVE assessment daily    - Set and communicate daily mobility goal to care team and patient/family/caregiver  - Collaborate with rehabilitation services on mobility goals if consulted  - Perform Range of Motion   times a day  - Reposition patient every   hours  - Dangle patient   times a day  - Stand patient   times a day  - Ambulate patient   times a day  - Out of bed to chair   times a day   - Out of bed for meals     times a day  - Out of bed for toileting  - Record patient progress and toleration of activity level   Outcome: Progressing

## 2021-07-14 NOTE — ASSESSMENT & PLAN NOTE
· Had Zio patch at [facility] April 2021: 1   Overall sinus rhythm with average rate of 82 bpm, minimum of 66 bpm and maximum of 122 bpm    2   Frequent supraventricular ectopy-93,993 beats- 5 7% of total beats  3   130 brief supraventricular tachycardia runs-longest 22 1 seconds with an average rate of 140 bpm    4   Rare ventricular ectopy with no evidence of ventricular tachycardia  5   No evidence of significant pauses or high degree AV block  6   Patient symptoms corresponded to sinus rhythm, ventricular trigeminy, supraventricular ectopy and ventricular ectopy    · Monitor heart rate  · Continue outpatient follow-up

## 2021-07-14 NOTE — ASSESSMENT & PLAN NOTE
· Platelets [id number] be due to liver disease  · Daily CBC and trend platelets  · Monitor for bleeding

## 2021-07-14 NOTE — ASSESSMENT & PLAN NOTE
Lab Results   Component Value Date    HGBA1C 7 7 (H) 01/25/2021       No results for input(s): POCGLU in the last 72 hours      Blood Sugar Average: Last 72 hrs:     · Glucose 135  · Fingerstick blood sugar with sliding scale coverage  · Patient currently NPO  · Hold metformin while in hospital

## 2021-07-14 NOTE — ASSESSMENT & PLAN NOTE
· Presented for fever, generalized weakness, and nausea-granddaughter was sick last weekend  · Severe Sepsis present on arrival-fever, tachycardia, suspected infection, lactic acid 4 1  · Flu negative, COVID negative  · Unclear source of infection  May be acute cholecystitis versus acute cystitis without hematuria  · Urine culture positive for Enterococcus faecalis and E coli  · Blood culture pending  · CT abdomen pelvis: Sludge and multiple tiny stones within the gallbladder lumen  The gallbladder is moderately distended  Gallbladder wall thickening noted which could be related to edema or inflammation/cholecystitis  Correlation with the patient's symptoms and laboratory values recommended  Right upper quadrant ultrasound may be of benefit for further evaluation at the discretion of the referring caregiver   · Continue IV Zosyn and also added IV vancomycin for now

## 2021-07-14 NOTE — ASSESSMENT & PLAN NOTE
· History of nephrolithiasis  · Continue Microzide, Urocit and allopurinol when no longer NPO  · Continue outpatient follow-up

## 2021-07-14 NOTE — ASSESSMENT & PLAN NOTE
· CT abdomen pelvis: Sludge and multiple tiny stones within the gallbladder lumen  The gallbladder is moderately distended  Gallbladder wall thickening noted which could be related to edema or inflammation/cholecystitis  Correlation with the patient's symptoms and laboratory values recommended  Right upper quadrant ultrasound may be of benefit for further evaluation at the discretion of the referring caregiver   · Abdominal ultrasound June 2021: Partially layering gallbladder sludge  Anterior gallbladder wall adherent sludge versus 1 7 cm polyp  According to current literature guidelines (Fredrick Hernandez 2013;10:953-956) for polyps this size surgical consultation recommended    · Will obtain RUQ ultrasound to assess for interval change  · NPO  · Surgery consult

## 2021-07-14 NOTE — ASSESSMENT & PLAN NOTE
· Platelets 482-SGLAS be due to liver disease  However will need to rule out a rickettsial parasitic infection    Will order Anaplasma, Ehrlichia his panel and parasite smear  · Daily CBC and trend platelets  · Monitor for bleeding

## 2021-07-14 NOTE — CONSULTS
Consultation - General Surgery   Pedro Keane 74 y o  female MRN: [id number]  Unit/Bed#: -01 Encounter: [id number]    Assessment/Plan     Assessment:  - 78-year-old female with acute cholecystitis on imaging  - Cirrhosis  - Pt has fever, chills, fatigue, weakness, nausea, decreased appetite, and RUQ abdominal tenderness on exam  - U/S (7/14): Small stones/sludge in the gallbladder with associated findings suggestive of acute cholecystitis  - CT (7/14): Sludge and multiple tiny stones within the gallbladder lumen   The gallbladder is moderately distended   Gallbladder wall thickening noted which could be related to edema or inflammation/cholecystitis  Hepatic cirrhosis and evidence of portal hypertension  Small-volume ascites  Bilateral nephrolithiasis, no hydronephrosis   Left renal cortical scarring, left renal cysts  - Fever up to 102 9 this AM  - WBC 5 88  - Total bili 1 82, AST 71, ALT 43, Alk Phos 54  - UA shows bacteria present    Plan:  - NPO  - IV abx  - IVF hydration  - Pain/nausea control prn  - Urine culture pending  - Blood culture pending  - CPAP  - I/O  - Medical management as per hospitalst  - Pt likely to require laparoscopic cholecystectomy, will discuss with Dr Noel Riley    History of Present Illness   HPI:  Pedro Keane is a 74 y o  female who presents with fever, chills, nausea, decreased appetite and weakness  Pt is accompanied by her daughter, Dr Dontae Bello, who helps provide history  On Monday patient started to feel sick with symptoms worsening into Tuesday  Reports headache and fevers off and on  Last full meal was Monday night  Since then, appetite has been severely decreased  Yesterday she had 1 small meal and sips of water and Pepsi  She reports she did have constipation on Monday and took 2 Senokot, which precipitated several episodes of diarrhea  Last bowel movement was yesterday and was mushy  Has not passed flatus or stool since then    Patient had a fall yesterday evening around 9:00 p m with mental status change and feeling weak  Pt then came to [facility] ED last night  Denies bloating, vomiting, abdominal pain, pain with urination, frequency of urination difficulty urinating, acid reflux, chest pain, or shortness of breath at rest    History of chronic back pain  History of exertional dyspnea  Reports granddaughter was sick recently  Inpatient consult to Acute Care Surgery  Consult performed by: Jamal Nur PA-C  Consult ordered by: Hakan Martin MD        Review of Systems   Constitutional: Positive for appetite change, chills, fatigue and fever  HENT: Negative  Eyes: Negative  Respiratory: Negative  Cardiovascular: Negative  Gastrointestinal: Positive for abdominal distention, constipation, diarrhea and nausea  Negative for abdominal pain and vomiting  Endocrine: Negative  Genitourinary: Negative  Musculoskeletal: Positive for back pain  Skin: Negative  Allergic/Immunologic: Negative  Neurological: Positive for weakness and headaches  Negative for syncope  Psychiatric/Behavioral: Positive for confusion          Mental status change last night as per family       Historical Information   Past Medical History:   Diagnosis Date    Diabetes mellitus ([facility] [address] )     Kidney stone      Past Surgical History:   Procedure Laterality Date    EYE SURGERY      cataract removal    JOINT REPLACEMENT      LITHOTRIPSY       Social History   Social History     Substance and Sexual Activity   Alcohol Use Not Currently     Social History     Substance and Sexual Activity   Drug Use Never     E-Cigarette/Vaping    E-Cigarette Use Never User      E-Cigarette/Vaping Substances     Social History     Tobacco Use   Smoking Status Never Smoker   Smokeless Tobacco Never Used     Family History: non-contributory    Meds/Allergies   all current active meds have been reviewed, current meds:   Current Facility-Administered Medications   Medication Dose Route Frequency  acetaminophen (TYLENOL) tablet 650 mg  650 mg Oral Q6H PRN    ALPRAZolam (XANAX) tablet 0 25 mg  0 25 mg Oral HS PRN    brimonidine (ALPHAGAN P) 0 15 % ophthalmic solution 1 drop  1 drop Both Eyes TID    insulin lispro (HumaLOG) 100 units/mL subcutaneous injection 2-12 Units  2-12 Units Subcutaneous Q6H [address]    insulin lispro (HumaLOG) 100 units/mL subcutaneous injection 2-12 Units  2-12 Units Subcutaneous HS    ondansetron (ZOFRAN) injection 4 mg  4 mg Intravenous Q6H PRN    piperacillin-tazobactam (ZOSYN) 4 5 g in sodium chloride 0 9 % 100 mL IVPB  4 5 g Intravenous Q6H    and PTA meds:   Prior to Admission Medications   Prescriptions Last Dose Informant Patient Reported? Taking?    ALPRAZolam (Xanax) 0 25 mg tablet   Yes No   Sig: Take by mouth   Acetaminophen (TYLENOL ARTHRITIS PAIN PO)   Yes No   Sig: Tylenol Arthritis Pain 650 mg tablet,extended release   Calcium Carb-Cholecalciferol 600-500 MG-UNIT CAPS   Yes No   Coenzyme Q10 (COQ-10) 10 MG CAPS   Yes No   Sig: CoQ-10   Cyclobenzaprine HCl (FLEXERIL PO)   Yes No   LUMIGAN 0 01 % ophthalmic drops   Yes No   Sig: INSTILL 1 DROP INTO BOTH EYES AT NIGHT   Polyethylene Glycol 3350 (MIRALAX PO)   Yes No   Sig: Miralax   daily   Vitamin D, Cholecalciferol, 10 MCG (400 UNIT) TABS Not Taking at Unknown time  Yes No   Patient not taking: Reported on 2021   allopurinol (ZYLOPRIM) 100 mg tablet   Yes No   amoxicillin (AMOXIL) 500 mg capsule Not Taking at Unknown time  Yes No   Sig: amoxicillin 500 mg capsule   TAKE 4 CAPSULES BY MOUTH 1 HOUR BEFORE DENTAL PROCEDURES   Patient not taking: Reported on 2021   atorvastatin (LIPITOR) 10 mg tablet   Yes No   benzonatate (TESSALON PERLES) 100 mg capsule Not Taking at Unknown time  Yes No   Sig: ONE CAPSULE THREE TIMES A DAY AS NEEDED COUGHING   Patient not taking: Reported on 2021   brimonidine (ALPHAGAN P) 0 15 % ophthalmic solution  Self Yes Yes   Si drop 3 (three) times a day   budesonide (Rhinocort Aqua) 32 MCG/ACT nasal spray   Yes No   celecoxib (CeleBREX) 200 mg capsule   Yes No   Sig: Take 200 mg by mouth daily   citalopram (CeleXA) 10 mg tablet  Self Yes Yes   Sig: Take 10 mg by mouth daily at bedtime   denosumab (Prolia) 60 mg/mL   Yes No   Sig: Prolia 60 mg/mL subcutaneous syringe   docusate sodium (Colace) 100 mg capsule   Yes No   Sig: Every 12 hours   econazole nitrate 1 % cream   Yes No   Sig: USE TWICE DAILY TO AFFECTED AREA   hydrochlorothiazide (HYDRODIURIL) 50 mg tablet   Yes No   lutein 6 mg   Yes No   metFORMIN (GLUCOPHAGE) 1000 MG tablet   Yes No   Sig: Take 1,000 mg by mouth 2 (two) times a day   pantoprazole (PROTONIX) 40 mg tablet   Yes No   Sig: pantoprazole 40 mg tablet,delayed release   prn   potassium citrate (UROCIT-K) 10 mEq   Yes No      Facility-Administered Medications: None     Allergies   Allergen Reactions    Nitrofurantoin        Objective   First Vitals:   Blood Pressure: (!) 184/84 (07/13/21 2242)  Pulse: (!) 117 (07/13/21 2242)  Temperature: (!) 101 8 °F (38 8 °C) (07/13/21 2242)  Temp Source: Temporal (07/13/21 2242)  Respirations: 20 (07/13/21 2242)  Height: 5' 2" (157 5 cm) (07/13/21 2242)  Weight - Scale: 120 kg (264 lb 6 4 oz) (07/13/21 2242)  SpO2: 97 % (07/13/21 2242)    Current Vitals:   Blood Pressure: 134/74 (07/14/21 0746)  Pulse: 92 (07/14/21 0805)  Temperature: (!) 102 8 °F (39 3 °C) (07/14/21 0746)  Temp Source: Oral (07/14/21 0500)  Respirations: 16 (07/14/21 0805)  Height: 5' 2" (157 5 cm) (07/14/21 0127)  Weight - Scale: 121 kg (265 lb 10 5 oz) (07/14/21 0127)  SpO2: 95 % (07/14/21 0805)      Intake/Output Summary (Last 24 hours) at 7/14/2021 1029  Last data filed at 7/14/2021 0500  Gross per 24 hour   Intake 2100 ml   Output 300 ml   Net 1800 ml       Invasive Devices     Peripheral Intravenous Line            Peripheral IV 07/13/21 Left Antecubital <1 day                Physical Exam  Constitutional:       Appearance: She is obese   She is ill-appearing  Comments: Appears very fatigued   HENT:      Head: Normocephalic and atraumatic  Nose: Nose normal       Mouth/Throat:      Mouth: Mucous membranes are moist    Eyes:      General: No scleral icterus  Extraocular Movements: Extraocular movements intact  Conjunctiva/sclera: Conjunctivae normal       Pupils: Pupils are equal, round, and reactive to light  Cardiovascular:      Rate and Rhythm: Normal rate  Rhythm irregular  Heart sounds: No murmur heard  Pulmonary:      Effort: Pulmonary effort is normal  No respiratory distress  Breath sounds: Normal breath sounds  No stridor  No wheezing, rhonchi or rales  Abdominal:      General: Bowel sounds are normal  There is distension  Palpations: Abdomen is soft  Tenderness: There is abdominal tenderness in the right upper quadrant and epigastric area  There is no guarding or rebound  Positive signs include Sanon's sign  Musculoskeletal:         General: Normal range of motion  Cervical back: Normal range of motion and neck supple  Right lower leg: No edema  Left lower leg: No edema  Skin:     General: Skin is warm  Capillary Refill: Capillary refill takes less than 2 seconds  Coloration: Skin is not jaundiced  Neurological:      Mental Status: She is alert and oriented to person, place, and time  Motor: No weakness  Psychiatric:         Mood and Affect: Mood normal          Behavior: Behavior normal          Thought Content: Thought content normal          Judgment: Judgment normal       Comments: Slowed mentation at times         Lab Results:   I have personally reviewed pertinent lab results      CBC:   Lab Results   Component Value Date    WBC 5 88 07/14/2021    HGB 11 7 07/14/2021    HCT 35 5 07/14/2021    MCV 96 07/14/2021    PLT 97 (L) 07/14/2021    MCH 31 7 07/14/2021    MCHC 33 0 07/14/2021    RDW 16 7 (H) 07/14/2021    MPV 10 4 07/14/2021    NRBC 0 07/14/2021   CMP: Lab Results   Component Value Date    SODIUM 136 07/14/2021    K 3 7 07/14/2021     07/14/2021    CO2 24 07/14/2021    BUN 9 07/14/2021    CREATININE 0 81 07/14/2021    CALCIUM 7 9 (L) 07/14/2021    AST 71 (H) 07/14/2021    ALT 43 07/14/2021    ALKPHOS 54 07/14/2021    EGFR 75 07/14/2021   Total bilirubin 1 82  Urinalysis:   Lab Results   Component Value Date    COLORU Yellow 07/14/2021    CLARITYU Clear 07/14/2021    SPECGRAV 1 025 07/14/2021    PHUR 5 5 07/14/2021    LEUKOCYTESUR Negative 07/14/2021    NITRITE Negative 07/14/2021    GLUCOSEU 250 (1/4%) (A) 07/14/2021    KETONESU Negative 07/14/2021    BILIRUBINUR Small (A) 07/14/2021    BLOODU Small (A) 07/14/2021    Lipase:   Lab Results   Component Value Date    LIPASE 127 07/13/2021     Imaging: I have personally reviewed pertinent reports  US right upper quadrant [[id number]] Collected: 07/14/21 0839   Order Status: Completed Updated: 07/14/21 0847   Narrative:     RIGHT UPPER QUADRANT ULTRASOUND     INDICATION:     gb wall thickening, cholelithiasis  COMPARISON:  CT chest/abdomen/pelvis dated 7/13/2021   Abdominal ultrasound dated 6/16/2021  TECHNIQUE:   Real-time ultrasound of the right upper quadrant was performed with a curvilinear transducer with both volumetric sweeps and still imaging techniques  FINDINGS:     PANCREAS:  Portions of the pancreas are obscured by bowel gas  Visualized portions of the pancreas are unremarkable  AORTA AND IVC:  Visualized portions are normal for patient age  LIVER:   Size:  Within normal range   The liver measures 19 0 cm in the midclavicular line  Contour:  Surface contour is nodular  Parenchyma: [place] Lemhi is diffuse coarsened heterogeneous echotexture suggesting underlying cirrhotic changes  No evidence of suspicious mass     Limited imaging of the main portal vein shows it to be patent and hepatopetal        BILIARY:   Small stones/sludge seen in the gallbladder  Quentin Gresham is gallbladder wall thickening and pericholecystic fluid demonstrated   A positive sonographic Sanon's sign is elicited  No intrahepatic biliary dilatation  CBD measures 4 mm  No choledocholithiasis  KIDNEY:   Right kidney measures 14 1 cm  Within normal limits  ASCITES:   Small perihepatic ascites  Impression:       Small stones/sludge in the gallbladder with associated findings suggestive of acute cholecystitis  Cirrhosis   Small perihepatic ascites  The study was marked in [facility] for immediate notification  Workstation performed: [id number]      CT chest abdomen pelvis w contrast [[id number]] Collected: 07/14/21 0019   Order Status: Completed Updated: 07/14/21 0034   Narrative:     CT CHEST, ABDOMEN AND PELVIS WITH IV CONTRAST     INDICATION:   Fever  COMPARISON:  None  TECHNIQUE: CT examination of the chest, abdomen and pelvis was performed  Axial, sagittal, and coronal 2D reformatted images were created from the source data and submitted for interpretation  Radiation dose length product (DLP) for this visit:  3759 mGy-cm    This examination, like all CT scans performed in the [facility], was performed utilizing techniques to minimize radiation dose exposure, including the use of iterative   reconstruction and automated exposure control  IV Contrast:  100 mL of iohexol (OMNIPAQUE)   Enteric Contrast: Enteric contrast was administered  FINDINGS:     CHEST     LUNGS:  Lungs appear grossly clear  PLEURA:  Unremarkable  HEART/GREAT VESSELS:  Mild aortic and coronary atherosclerosis; no aortic aneurysm     MEDIASTINUM AND JODY:  Unremarkable  CHEST WALL AND LOWER NECK:   Unremarkable  ABDOMEN     LIVER/BILIARY TREE:  Nodular contour of the liver consistent with hepatic cirrhosis   Liver otherwise appears grossly unremarkable  GALLBLADDER:  Sludge and multiple tiny stones within the gallbladder lumen   The gallbladder is moderately distended   Gallbladder wall thickening noted which could be related to edema or inflammation   Correlation with the patient's symptoms and   laboratory values recommended  SPLEEN:  Unremarkable  PANCREAS:  Unremarkable  ADRENAL GLANDS:  Unremarkable  KIDNEYS/URETERS:  Subcentimeter nonobstructing stone in the lower pole of the right kidney   Several tiny low-density lesions in the right kidney, too small to definitively characterize but of unlikely clinical significance   Right kidney otherwise   appears grossly unremarkable; no hydronephrosis   Renal cortical scarring on the left   Subcentimeter nonobstructing stones in the lower pole of the left kidney   Several renal cysts are noted, largest is a 5 3 cm exophytic cyst in the upper pole of the   left kidney   Left kidney otherwise appears grossly unremarkable; no hydronephrosis  STOMACH AND BOWEL:  Grossly unremarkable; no evidence of bowel obstruction  APPENDIX:  No findings to suggest appendicitis  ABDOMINOPELVIC CAVITY:  Small-volume ascites, most prominent in the right upper quadrant   No intraperitoneal free air   No bulky adenopathy  VESSELS:  Mild atherosclerosis; no aortic aneurysm   Prominent mesenteric, perisplenic, and perigastric collateral vessels/varices suggest a component of portal hypertension  PELVIS     REPRODUCTIVE ORGANS:  Unremarkable for patient's age  URINARY BLADDER:  Unremarkable  ABDOMINAL WALL/INGUINAL REGIONS:  Midline incisional scarring   Bilateral edema  OSSEOUS STRUCTURES: Multilevel degenerative changes of the spine   No acute fracture or destructive osseous lesion        Impression:       Sludge and multiple tiny stones within the gallbladder lumen   The gallbladder is moderately distended   Gallbladder wall thickening noted which could be related to edema or inflammation/cholecystitis   Correlation with the patient's symptoms and   laboratory values recommended   Right upper quadrant ultrasound may be of benefit for further evaluation at the discretion of the referring caregiver  Hepatic cirrhosis and evidence of portal hypertension with prominent mesenteric, perisplenic, and perigastric collateral vessels/varices  Small-volume ascites  Bilateral nephrolithiasis, no hydronephrosis   Left renal cortical scarring, left renal cysts, and other findings as above  EKG, Pathology, and Other Studies: I have personally reviewed pertinent reports  Counseling / Coordination of Care  Total floor / unit time spent today 40 minutes  Greater than 50% of total time was spent with the patient and / or family counseling and / or coordination of care  A description of the counseling / coordination of care: obtain H&P, discussion of patient's health history with family member, reviewing imaging findings with patient and family      Unknown LISSA Gonzalse

## 2021-07-14 NOTE — ASSESSMENT & PLAN NOTE
· Urinalysis:  Innumerable bacteria  Prelim urine culture shows Enterococcus faecalis and E coli  · Zosyn started in the ER for sepsis  Will also add vancomycin in case it is  VRE  · Patient has prior history of urosepsis along with kidney stones and obstruction resulting in percutaneous nephrostomy tube to be placed few years ago

## 2021-07-14 NOTE — ASSESSMENT & PLAN NOTE
· Diagnosed with cirrhosis in November of 2020-cell Dr Arelis Bosch, gastroenterology at [facility] at that time  · CT abdomen pelvis: Hepatic cirrhosis and evidence of portal hypertension with prominent mesenteric, perisplenic, and perigastric collateral vessels/varices  Small-volume ascites   · Abdominal ultrasound 06/20/2021Hepatosplenomegaly  Hepatic cirrhosis and mild steatosis  · Total bilirubin:  1 63  · Ammonia:  45  · Continue outpatient GI follow-up

## 2021-07-14 NOTE — PLAN OF CARE
Problem: Potential for Falls  Goal: Patient will remain free of falls  Description: INTERVENTIONS:  - Educate patient/family on patient safety including physical limitations  - Instruct patient to call for assistance with activity   - Consult OT/PT to assist with strengthening/mobility   - Keep Call bell within reach  - Keep bed low and locked with side rails adjusted as appropriate  - Keep care items and personal belongings within reach  - Initiate and maintain comfort rounds  - Make Fall Risk Sign visible to staff  - Apply yellow socks and bracelet for high fall risk patients  - Consider moving patient to room near nurses station  Outcome: Progressing     Problem: Nutrition/Hydration-ADULT  Goal: Nutrient/Hydration intake appropriate for improving, restoring or maintaining nutritional needs  Description: Monitor and assess patient's nutrition/hydration status for malnutrition  Collaborate with interdisciplinary team and initiate plan and interventions as ordered  Monitor patient's weight and dietary intake as ordered or per policy  Utilize nutrition screening tool and intervene as necessary  Determine patient's food preferences and provide high-protein, high-caloric foods as appropriate       INTERVENTIONS:  - Monitor oral intake, urinary output, labs, and treatment plans  - Assess nutrition and hydration status and recommend course of action  - Evaluate amount of meals eaten  - Assist patient with eating if necessary   - Allow adequate time for meals  - Recommend/ encourage appropriate diets, oral nutritional supplements, and vitamin/mineral supplements  - Order, calculate, and assess calorie counts as needed  - Recommend, monitor, and adjust tube feedings and TPN/PPN based on assessed needs  - Assess need for intravenous fluids  - Provide specific nutrition/hydration education as appropriate  - Include patient/family/caregiver in decisions related to nutrition  Outcome: Progressing     Problem: CARDIOVASCULAR - ADULT  Goal: Maintains optimal cardiac output and hemodynamic stability  Description: INTERVENTIONS:  - Monitor I/O, vital signs and rhythm  - Monitor for S/S and trends of decreased cardiac output  - Administer and titrate ordered vasoactive medications to optimize hemodynamic stability  - Assess quality of pulses, skin color and temperature  - Assess for signs of decreased coronary artery perfusion  - Instruct patient to report change in severity of symptoms  Outcome: Progressing  Goal: Absence of cardiac dysrhythmias or at baseline rhythm  Description: INTERVENTIONS:  - Continuous cardiac monitoring, vital signs, obtain 12 lead EKG if ordered  - Administer antiarrhythmic and heart rate control medications as ordered  - Monitor electrolytes and administer replacement therapy as ordered  Outcome: Progressing     Problem: METABOLIC, FLUID AND ELECTROLYTES - ADULT  Goal: Electrolytes maintained within normal limits  Description: INTERVENTIONS:  - Monitor labs and assess patient for signs and symptoms of electrolyte imbalances  - Administer electrolyte replacement as ordered  - Monitor response to electrolyte replacements, including repeat lab results as appropriate  - Instruct patient on fluid and nutrition as appropriate  Outcome: Progressing  Goal: Fluid balance maintained  Description: INTERVENTIONS:  - Monitor labs   - Monitor I/O and WT  - Instruct patient on fluid and nutrition as appropriate  - Assess for signs & symptoms of volume excess or deficit  Outcome: Progressing  Goal: Glucose maintained within target range  Description: INTERVENTIONS:  - Monitor Blood Glucose as ordered  - Assess for signs and symptoms of hyperglycemia and hypoglycemia  - Administer ordered medications to maintain glucose within target range  - Assess nutritional intake and initiate nutrition service referral as needed  Outcome: Progressing     Problem: PAIN - ADULT  Goal: Verbalizes/displays adequate comfort level or baseline comfort level  Description: Interventions:  - Encourage patient to monitor pain and request assistance  - Assess pain using appropriate pain scale  - Administer analgesics based on type and severity of pain and evaluate response  - Implement non-pharmacological measures as appropriate and evaluate response  - Consider cultural and social influences on pain and pain management  - Notify physician/advanced practitioner if interventions unsuccessful or patient reports new pain  Outcome: Progressing     Problem: INFECTION - ADULT  Goal: Absence or prevention of progression during hospitalization  Description: INTERVENTIONS:  - Assess and monitor for signs and symptoms of infection  - Monitor lab/diagnostic results  - Monitor all insertion sites, i e  indwelling lines, tubes, and drains  - Monitor endotracheal if appropriate and nasal secretions for changes in amount and color  - [place] appropriate cooling/warming therapies per order  - Administer medications as ordered  - Instruct and encourage patient and family to use good hand hygiene technique  - Identify and instruct in appropriate isolation precautions for identified infection/condition  Outcome: Progressing  Goal: Absence of fever/infection during neutropenic period  Description: INTERVENTIONS:  - Monitor WBC    Outcome: Progressing     Problem: SAFETY ADULT  Goal: Patient will remain free of falls  Description: INTERVENTIONS:  - Educate patient/family on patient safety including physical limitations  - Instruct patient to call for assistance with activity   - Consult OT/PT to assist with strengthening/mobility   - Keep Call bell within reach  - Keep bed low and locked with side rails adjusted as appropriate  - Keep care items and personal belongings within reach  - Initiate and maintain comfort rounds  - Make Fall Risk Sign visible to staff  - Apply yellow socks and bracelet for high fall risk patients  - Consider moving patient to room near nurses station  Outcome: Progressing  Goal: Maintain or return to baseline ADL function  Description: INTERVENTIONS:  -  Assess patient's ability to carry out ADLs; assess patient's baseline for ADL function and identify physical deficits which impact ability to perform ADLs (bathing, care of mouth/teeth, toileting, grooming, dressing, etc )  - Assess/evaluate cause of self-care deficits   - Assess range of motion  - Assess patient's mobility; develop plan if impaired  - Assess patient's need for assistive devices and provide as appropriate  - Encourage maximum independence but intervene and supervise when necessary  - Involve family in performance of ADLs  - Assess for home care needs following discharge   - Consider OT consult to assist with ADL evaluation and planning for discharge  - Provide patient education as appropriate  Outcome: Progressing  Goal: Maintains/Returns to pre admission functional level  Description: INTERVENTIONS:  - Perform BMAT or MOVE assessment daily    - Set and communicate daily mobility goal to care team and patient/family/caregiver     - Collaborate with rehabilitation services on mobility goals if consulted  - Out of bed for toileting  - Record patient progress and toleration of activity level   Outcome: Progressing     Problem: DISCHARGE PLANNING  Goal: Discharge to home or other facility with appropriate resources  Description: INTERVENTIONS:  - Identify barriers to discharge w/patient and caregiver  - Arrange for needed discharge resources and transportation as appropriate  - Identify discharge learning needs (meds, wound care, etc )  - Arrange for interpretive services to assist at discharge as needed  - Refer to Case Management Department for coordinating discharge planning if the patient needs post-hospital services based on physician/advanced practitioner order or complex needs related to functional status, cognitive ability, or social support system  Outcome: Progressing     Problem: MOBILITY - ADULT  Goal: Maintain or return to baseline ADL function  Description: INTERVENTIONS:  -  Assess patient's ability to carry out ADLs; assess patient's baseline for ADL function and identify physical deficits which impact ability to perform ADLs (bathing, care of mouth/teeth, toileting, grooming, dressing, etc )  - Assess/evaluate cause of self-care deficits   - Assess range of motion  - Assess patient's mobility; develop plan if impaired  - Assess patient's need for assistive devices and provide as appropriate  - Encourage maximum independence but intervene and supervise when necessary  - Involve family in performance of ADLs  - Assess for home care needs following discharge   - Consider OT consult to assist with ADL evaluation and planning for discharge  - Provide patient education as appropriate  Outcome: Progressing  Goal: Maintains/Returns to pre admission functional level  Description: INTERVENTIONS:  - Perform BMAT or MOVE assessment daily    - Set and communicate daily mobility goal to care team and patient/family/caregiver     - Collaborate with rehabilitation services on mobility goals if consulted  - Out of bed for toileting  - Record patient progress and toleration of activity level   Outcome: Progressing

## 2021-07-14 NOTE — PLAN OF CARE
Problem: Potential for Falls  Goal: Patient will remain free of falls  Description: INTERVENTIONS:  - Educate patient/family on patient safety including physical limitations  - Instruct patient to call for assistance with activity   - Consult OT/PT to assist with strengthening/mobility   - Keep Call bell within reach  - Keep bed low and locked with side rails adjusted as appropriate  - Keep care items and personal belongings within reach  - Initiate and maintain comfort rounds  - Make Fall Risk Sign visible to staff  - Offer Toileting every 2 Hours, in advance of need  - Initiate/Maintain bed alarm  - Obtain necessary fall risk management equipment:   - Apply yellow socks and bracelet for high fall risk patients  - Consider moving patient to room near nurses station  Outcome: Progressing     Problem: Nutrition/Hydration-ADULT  Goal: Nutrient/Hydration intake appropriate for improving, restoring or maintaining nutritional needs  Description: Monitor and assess patient's nutrition/hydration status for malnutrition  Collaborate with interdisciplinary team and initiate plan and interventions as ordered  Monitor patient's weight and dietary intake as ordered or per policy  Utilize nutrition screening tool and intervene as necessary  Determine patient's food preferences and provide high-protein, high-caloric foods as appropriate       INTERVENTIONS:  - Monitor oral intake, urinary output, labs, and treatment plans  - Assess nutrition and hydration status and recommend course of action  - Evaluate amount of meals eaten  - Assist patient with eating if necessary   - Allow adequate time for meals  - Recommend/ encourage appropriate diets, oral nutritional supplements, and vitamin/mineral supplements  - Order, calculate, and assess calorie counts as needed  - Recommend, monitor, and adjust tube feedings and TPN/PPN based on assessed needs  - Assess need for intravenous fluids  - Provide specific nutrition/hydration education as appropriate  - Include patient/family/caregiver in decisions related to nutrition  Outcome: Progressing     Problem: CARDIOVASCULAR - ADULT  Goal: Maintains optimal cardiac output and hemodynamic stability  Description: INTERVENTIONS:  - Monitor I/O, vital signs and rhythm  - Monitor for S/S and trends of decreased cardiac output  - Administer and titrate ordered vasoactive medications to optimize hemodynamic stability  - Assess quality of pulses, skin color and temperature  - Assess for signs of decreased coronary artery perfusion  - Instruct patient to report change in severity of symptoms  Outcome: Progressing  Goal: Absence of cardiac dysrhythmias or at baseline rhythm  Description: INTERVENTIONS:  - Continuous cardiac monitoring, vital signs, obtain 12 lead EKG if ordered  - Administer antiarrhythmic and heart rate control medications as ordered  - Monitor electrolytes and administer replacement therapy as ordered  Outcome: Progressing     Problem: METABOLIC, FLUID AND ELECTROLYTES - ADULT  Goal: Electrolytes maintained within normal limits  Description: INTERVENTIONS:  - Monitor labs and assess patient for signs and symptoms of electrolyte imbalances  - Administer electrolyte replacement as ordered  - Monitor response to electrolyte replacements, including repeat lab results as appropriate  - Instruct patient on fluid and nutrition as appropriate  Outcome: Progressing  Goal: Fluid balance maintained  Description: INTERVENTIONS:  - Monitor labs   - Monitor I/O and WT  - Instruct patient on fluid and nutrition as appropriate  - Assess for signs & symptoms of volume excess or deficit  Outcome: Progressing  Goal: Glucose maintained within target range  Description: INTERVENTIONS:  - Monitor Blood Glucose as ordered  - Assess for signs and symptoms of hyperglycemia and hypoglycemia  - Administer ordered medications to maintain glucose within target range  - Assess nutritional intake and initiate nutrition service referral as needed  Outcome: Progressing     Problem: PAIN - ADULT  Goal: Verbalizes/displays adequate comfort level or baseline comfort level  Description: Interventions:  - Encourage patient to monitor pain and request assistance  - Assess pain using appropriate pain scale  - Administer analgesics based on type and severity of pain and evaluate response  - Implement non-pharmacological measures as appropriate and evaluate response  - Consider cultural and social influences on pain and pain management  - Notify physician/advanced practitioner if interventions unsuccessful or patient reports new pain  Outcome: Progressing     Problem: INFECTION - ADULT  Goal: Absence or prevention of progression during hospitalization  Description: INTERVENTIONS:  - Assess and monitor for signs and symptoms of infection  - Monitor lab/diagnostic results  - Monitor all insertion sites, i e  indwelling lines, tubes, and drains  - Monitor endotracheal if appropriate and nasal secretions for changes in amount and color  - [place] appropriate cooling/warming therapies per order  - Administer medications as ordered  - Instruct and encourage patient and family to use good hand hygiene technique  - Identify and instruct in appropriate isolation precautions for identified infection/condition  Outcome: Progressing  Goal: Absence of fever/infection during neutropenic period  Description: INTERVENTIONS:  - Monitor WBC    Outcome: Progressing     Problem: SAFETY ADULT  Goal: Patient will remain free of falls  Description: INTERVENTIONS:  - Educate patient/family on patient safety including physical limitations  - Instruct patient to call for assistance with activity   - Consult OT/PT to assist with strengthening/mobility   - Keep Call bell within reach  - Keep bed low and locked with side rails adjusted as appropriate  - Keep care items and personal belongings within reach  - Initiate and maintain comfort rounds  - Make Fall Risk Sign visible to staff  - Offer Toileting every 2 Hours, in advance of need  - Initiate/Maintain bed alarm  - Obtain necessary fall risk management equipment:    - Apply yellow socks and bracelet for high fall risk patients  - Consider moving patient to room near nurses station  Outcome: Progressing  Goal: Maintain or return to baseline ADL function  Description: INTERVENTIONS:  -  Assess patient's ability to carry out ADLs; assess patient's baseline for ADL function and identify physical deficits which impact ability to perform ADLs (bathing, care of mouth/teeth, toileting, grooming, dressing, etc )  - Assess/evaluate cause of self-care deficits   - Assess range of motion  - Assess patient's mobility; develop plan if impaired  - Assess patient's need for assistive devices and provide as appropriate  - Encourage maximum independence but intervene and supervise when necessary  - Involve family in performance of ADLs  - Assess for home care needs following discharge   - Consider OT consult to assist with ADL evaluation and planning for discharge  - Provide patient education as appropriate  Outcome: Progressing  Goal: Maintains/Returns to pre admission functional level  Description: INTERVENTIONS:  - Perform BMAT or MOVE assessment daily    - Set and communicate daily mobility goal to care team and patient/family/caregiver  - Collaborate with rehabilitation services on mobility goals if consulted  - Perform Range of Motion 3 times a day    - Reposition patient every 2 hours (Pt repositions self)  - Dangle patient 2 times a day  - Stand patient 2 times a day  - Ambulate patient 2 times a day  - Out of bed to chair 3 times a day   - Out of bed for meals 3 times a day  - Out of bed for toileting  - Record patient progress and toleration of activity level   Outcome: Progressing     Problem: DISCHARGE PLANNING  Goal: Discharge to home or other facility with appropriate resources  Description: INTERVENTIONS:  - Identify barriers to discharge w/patient and caregiver  - Arrange for needed discharge resources and transportation as appropriate  - Identify discharge learning needs (meds, wound care, etc )  - Arrange for interpretive services to assist at discharge as needed  - Refer to Case Management Department for coordinating discharge planning if the patient needs post-hospital services based on physician/advanced practitioner order or complex needs related to functional status, cognitive ability, or social support system  Outcome: Progressing

## 2021-07-14 NOTE — ASSESSMENT & PLAN NOTE
· Diagnosed with cirrhosis in November of 2020-cell Dr Arelis Bosch, gastroenterology at [facility] [place] at that time  · CT abdomen pelvis: Hepatic cirrhosis and evidence of portal hypertension with prominent mesenteric, perisplenic, and perigastric collateral vessels/varices  Small-volume ascites   · Abdominal ultrasound 06/20/2021Hepatosplenomegaly  Hepatic cirrhosis and mild steatosis  · Total bilirubin:  1 63  · Ammonia:  45   Repeat ammonia level ordered for tomorrow and will place on lactulose and Xifaxan  · Continue outpatient GI follow-up

## 2021-07-14 NOTE — ASSESSMENT & PLAN NOTE
· CT abdomen pelvis: Sludge and multiple tiny stones within the gallbladder lumen  The gallbladder is moderately distended  Gallbladder wall thickening noted which could be related to edema or inflammation/cholecystitis  Correlation with the patient's symptoms and laboratory values recommended  Right upper quadrant ultrasound may be of benefit for further evaluation at the discretion of the referring caregiver   · Abdominal ultrasound June 2021: Partially layering gallbladder sludge  Anterior gallbladder wall adherent sludge versus 1 7 cm polyp  According to current literature guidelines (Jearld Natalie 2013;[id number]) for polyps this size surgical consultation recommended  · Will obtain HIDA scan  · Discussed with surgery  Eventually the gallbladder will need to come out but will need to make sure that there is no other source of sepsis at this time before proceeding with any surgical intervention  Await blood culture results    · Currently on clear liquid diet

## 2021-07-14 NOTE — ASSESSMENT & PLAN NOTE
· Sodium 132 on admission  · Received 2 L normal saline in the ER  · Repeat sodium 138 and hence will discontinue fluids

## 2021-07-15 ENCOUNTER — APPOINTMENT (INPATIENT)
Dept: NUCLEAR MEDICINE | Facility: HOSPITAL | Age: 66
DRG: 872 | End: 2021-07-15
Payer: MEDICARE

## 2021-07-15 PROBLEM — N30.00 ACUTE CYSTITIS WITHOUT HEMATURIA: Status: ACTIVE | Noted: 2021-07-14

## 2021-07-15 PROBLEM — K81.0 ACUTE CHOLECYSTITIS: Status: ACTIVE | Noted: 2021-07-14

## 2021-07-15 LAB
ALBUMIN SERPL BCP-MCNC: 2.4 G/DL (ref 3.5–5)
ALP SERPL-CCNC: 53 U/L (ref 46–116)
ALT SERPL W P-5'-P-CCNC: 40 U/L (ref 12–78)
ANION GAP SERPL CALCULATED.3IONS-SCNC: 11 MMOL/L (ref 4–13)
AST SERPL W P-5'-P-CCNC: 66 U/L (ref 5–45)
BILIRUB SERPL-MCNC: 1.74 MG/DL (ref 0.2–1)
BUN SERPL-MCNC: 15 MG/DL (ref 5–25)
CALCIUM ALBUM COR SERPL-MCNC: 8.8 MG/DL (ref 8.3–10.1)
CALCIUM SERPL-MCNC: 7.5 MG/DL (ref 8.3–10.1)
CHLORIDE SERPL-SCNC: 105 MMOL/L (ref 100–108)
CO2 SERPL-SCNC: 22 MMOL/L (ref 21–32)
CREAT SERPL-MCNC: 0.76 MG/DL (ref 0.6–1.3)
ERYTHROCYTE [DISTWIDTH] IN BLOOD BY AUTOMATED COUNT: 17.2 % (ref 11.6–15.1)
GFR SERPL CREATININE-BSD FRML MDRD: 81 ML/MIN/1.73SQ M
GLUCOSE SERPL-MCNC: 128 MG/DL (ref 65–140)
GLUCOSE SERPL-MCNC: 129 MG/DL (ref 65–140)
GLUCOSE SERPL-MCNC: 141 MG/DL (ref 65–140)
GLUCOSE SERPL-MCNC: 143 MG/DL (ref 65–140)
GLUCOSE SERPL-MCNC: 153 MG/DL (ref 65–140)
GLUCOSE SERPL-MCNC: 172 MG/DL (ref 65–140)
HCT VFR BLD AUTO: 32.9 % (ref 34.8–46.1)
HGB BLD-MCNC: 11 G/DL (ref 11.5–15.4)
MCH RBC QN AUTO: 32.2 PG (ref 26.8–34.3)
MCHC RBC AUTO-ENTMCNC: 33.4 G/DL (ref 31.4–37.4)
MCV RBC AUTO: 96 FL (ref 82–98)
PLATELET # BLD AUTO: 90 THOUSANDS/UL (ref 149–390)
PMV BLD AUTO: 10.9 FL (ref 8.9–12.7)
POTASSIUM SERPL-SCNC: 3.3 MMOL/L (ref 3.5–5.3)
PROCALCITONIN SERPL-MCNC: 0.79 NG/ML
PROT SERPL-MCNC: 6.1 G/DL (ref 6.4–8.2)
RBC # BLD AUTO: 3.42 MILLION/UL (ref 3.81–5.12)
SODIUM SERPL-SCNC: 138 MMOL/L (ref 136–145)
WBC # BLD AUTO: 6.34 THOUSAND/UL (ref 4.31–10.16)

## 2021-07-15 PROCEDURE — 80053 COMPREHEN METABOLIC PANEL: CPT | Performed by: FAMILY MEDICINE

## 2021-07-15 PROCEDURE — 87207 SMEAR SPECIAL STAIN: CPT | Performed by: FAMILY MEDICINE

## 2021-07-15 PROCEDURE — A9537 TC99M MEBROFENIN: HCPCS

## 2021-07-15 PROCEDURE — 94760 N-INVAS EAR/PLS OXIMETRY 1: CPT

## 2021-07-15 PROCEDURE — 87798 DETECT AGENT NOS DNA AMP: CPT | Performed by: FAMILY MEDICINE

## 2021-07-15 PROCEDURE — 82948 REAGENT STRIP/BLOOD GLUCOSE: CPT

## 2021-07-15 PROCEDURE — G1004 CDSM NDSC: HCPCS

## 2021-07-15 PROCEDURE — 86666 EHRLICHIA ANTIBODY: CPT | Performed by: FAMILY MEDICINE

## 2021-07-15 PROCEDURE — 78226 HEPATOBILIARY SYSTEM IMAGING: CPT

## 2021-07-15 PROCEDURE — 86753 PROTOZOA ANTIBODY NOS: CPT | Performed by: FAMILY MEDICINE

## 2021-07-15 PROCEDURE — NC001 PR NO CHARGE: Performed by: INTERNAL MEDICINE

## 2021-07-15 PROCEDURE — 99233 SBSQ HOSP IP/OBS HIGH 50: CPT | Performed by: FAMILY MEDICINE

## 2021-07-15 PROCEDURE — 94660 CPAP INITIATION&MGMT: CPT

## 2021-07-15 PROCEDURE — 84145 PROCALCITONIN (PCT): CPT | Performed by: NURSE PRACTITIONER

## 2021-07-15 PROCEDURE — 85027 COMPLETE CBC AUTOMATED: CPT | Performed by: FAMILY MEDICINE

## 2021-07-15 RX ORDER — LACTULOSE 20 G/30ML
20 SOLUTION ORAL 2 TIMES DAILY
Status: DISCONTINUED | OUTPATIENT
Start: 2021-07-15 | End: 2021-07-16

## 2021-07-15 RX ORDER — CITALOPRAM 10 MG/1
10 TABLET ORAL
Status: DISCONTINUED | OUTPATIENT
Start: 2021-07-15 | End: 2021-07-18 | Stop reason: HOSPADM

## 2021-07-15 RX ADMIN — VANCOMYCIN HYDROCHLORIDE 1250 MG: 1 INJECTION, POWDER, LYOPHILIZED, FOR SOLUTION INTRAVENOUS at 15:23

## 2021-07-15 RX ADMIN — PIPERACILLIN AND TAZOBACTAM 4.5 G: 4; .5 INJECTION, POWDER, LYOPHILIZED, FOR SOLUTION INTRAVENOUS at 12:31

## 2021-07-15 RX ADMIN — PIPERACILLIN AND TAZOBACTAM 4.5 G: 4; .5 INJECTION, POWDER, LYOPHILIZED, FOR SOLUTION INTRAVENOUS at 19:45

## 2021-07-15 RX ADMIN — DEXTROSE AND SODIUM CHLORIDE 75 ML/HR: 5; .9 INJECTION, SOLUTION INTRAVENOUS at 05:49

## 2021-07-15 RX ADMIN — ACETAMINOPHEN 650 MG: 325 TABLET ORAL at 15:24

## 2021-07-15 RX ADMIN — PIPERACILLIN AND TAZOBACTAM 4.5 G: 4; .5 INJECTION, POWDER, LYOPHILIZED, FOR SOLUTION INTRAVENOUS at 08:03

## 2021-07-15 RX ADMIN — BRIMONIDINE TARTRATE 1 DROP: 1.5 SOLUTION OPHTHALMIC at 15:28

## 2021-07-15 RX ADMIN — ONDANSETRON 4 MG: 2 INJECTION INTRAMUSCULAR; INTRAVENOUS at 22:08

## 2021-07-15 RX ADMIN — ACETAMINOPHEN 650 MG: 325 TABLET ORAL at 22:27

## 2021-07-15 RX ADMIN — RIFAXIMIN 550 MG: 550 TABLET ORAL at 22:27

## 2021-07-15 RX ADMIN — LACTULOSE 20 G: 10 SOLUTION ORAL at 17:49

## 2021-07-15 RX ADMIN — LACTULOSE 20 G: 10 SOLUTION ORAL at 12:31

## 2021-07-15 RX ADMIN — SINCALIDE 2.4 MCG: 5 INJECTION, POWDER, LYOPHILIZED, FOR SOLUTION INTRAVENOUS at 12:15

## 2021-07-15 RX ADMIN — INSULIN LISPRO 2 UNITS: 100 INJECTION, SOLUTION INTRAVENOUS; SUBCUTANEOUS at 17:49

## 2021-07-15 RX ADMIN — PIPERACILLIN AND TAZOBACTAM 4.5 G: 4; .5 INJECTION, POWDER, LYOPHILIZED, FOR SOLUTION INTRAVENOUS at 01:23

## 2021-07-15 RX ADMIN — RIFAXIMIN 550 MG: 550 TABLET ORAL at 12:40

## 2021-07-15 RX ADMIN — BRIMONIDINE TARTRATE 1 DROP: 1.5 SOLUTION OPHTHALMIC at 20:31

## 2021-07-15 RX ADMIN — BRIMONIDINE TARTRATE 1 DROP: 1.5 SOLUTION OPHTHALMIC at 08:04

## 2021-07-15 RX ADMIN — ACETAMINOPHEN 650 MG: 325 TABLET ORAL at 08:03

## 2021-07-15 RX ADMIN — CITALOPRAM HYDROBROMIDE 10 MG: 10 TABLET ORAL at 20:49

## 2021-07-15 NOTE — CONSULTS
REQUIRED DOCUMENTATION:   1  This service was provided via Telemedicine  2  Provider located at [address] [place] [address]  3  TeleMed provider: Shen Harrell DO   4  Identify all parties in room with patient during tele consult: TSERING Snow  5  After connecting through [facility], patient was identified by name and date of birth and assistant checked wristband  Patient was then informed that this was a Telemedicine visit and that the exam was being conducted confidentially over secure lines  My office door was closed  No one else was in the room  Patient acknowledged consent and understanding of privacy and security of the Telemedicine visit, and gave us permission to have the assistant stay in the room in order to assist with the history and to conduct the exam   I informed the patient that I have reviewed their record in Epic and presented the opportunity for them to ask any questions regarding the visit today  The patient agreed to participate  TeleConsultation - Infectious Disease   Wiley Frazier 74 y o  female MRN: [id number]  Unit/Bed#: -01 Encounter: [id number]    IMPRESSION/RECOMMENDATIONS:  1  Sepsis, POA: with manifestations of fever, tachycardia, tachypnea, elevated lactate  Possible source of sepsis is acute cholecystitis  Anemia and thrombocytopenia are noted- r/o tick borne illness such as Babesiosis  Persistent fevers noted despite pip-tazo iv  Lactate has normalized  o Follow blood cultures  o Antibiotic therapy as below  o CBC in am  o Monitor clinical response, temperature curve  2  Consider acute cholecystitis:  Given fever, right upper quadrant abdominal pain, mild hyperbilirubinemia, and abnormal abdominal imaging  including MRCP  However,  patient has no biliary ductal dilatation and bilirubin level remains slightly elevated but stable    Patient currently does not appear septic or toxic   o Consider GI evaluation for ERCP  o Repeat LFTs in am  o continue Zosyn 4 5 grams IV q 6 hours  3  Fever, anemia, thrombocytopenia: Rule out the possibility of tick-borne infection  Patient reports that Lyme serology was negative within the past 2 weeks which was done by her  who is a physician   o Check parasite smear to assess for babesiosis  o Ehrlichia IgM/IgG serology, anaplasma PCR  4  Urine cx with growth of Enterococcus: Other than fever, pt has no sxs of UTI  Enterococcus could be representative of colonization rather than infection  o Discontinue vancomycin as I do not feel pt has UTI  Moreover, E fecalis is unlikely to be vancomycin resistant  5  Diabetes mellitus type 2 with hyperglycemia: noted HbA1C of 7 7 on 1/25/21  This is a risk factor for infection  o Glycemic control per primary team  6  Morbid obesity with BMI of 48    Extensive review of the medical records in Epic including review of the notes, radiographs, and laboratory results  My recommendations were discussed with the patient and her  in detail who verbalized understanding  Their questions were answered by me  My recommendations were discussed with Dr Marques Sloan, from the primary service  Thank you for allowing me to participate in the care of this patient  The ID service will follow  HISTORY OF PRESENT ILLNESS:  Reason for Consult: Fever  CC: Fever  HPI: Paula Borges is a 74y o  year old woman admitted on   Pt had multiple falls at home and could not get up  Her children and  helped her to get up  She has had fevers the past three days  She reports chills and sweats with her fevers  She also reports soreness of her back from being in bed  She reports right upper abdominal pain  She is unsure of how long her abd pain has been going on  She also reports left sided abdominal soreness, but thinks it may be due to constipation  Pt has been tolerating Zosyn IV  Pt is tolerating clear liquid diet       REVIEW OF SYSTEMS:  Constitutional: Positive for fever, chills, fatigue  HENT: Negative for congestion, runny nose, sore throat  Occasional seasonal allergies from pollen  Eyes: +Blurry vision which has been worsening, she is unsure if it's related to cataracts  Respiratory: Negative for cough, shortness of breath  Cardiovascular: Negative for chest pain, palpitations   Gastrointestinal: +nausea, abdominal pain, constipation  Negative for diarrhea, vomiting  Genitourinary: +dark urine which is darker than iced tea  Negative for dysuria, hematuria,   Musculoskeletal: +falls, LBP (chronic)  She received an epidural injection into her lumbar spine a few weeks ago  Negative for myalgias  Neurological: Negative for speech difficulty, seizures, syncope  Skin: Negative for rash, itching  Psychiatric/Behavioral: +confusion, mental fogging  Negative for hallucination    PAST MEDICAL HISTORY:  Past Medical History:   Diagnosis Date    Diabetes mellitus ([address] )     Kidney stone    COVID-19 (Nov 2020)  Morbid obesity  Fatty liver    Past Surgical History:   Procedure Laterality Date    EYE SURGERY      cataract removal    JOINT REPLACEMENT      LITHOTRIPSY       FAMILY HISTORY: Negative for recurrent infection    SOCIAL HISTORY:  Social History   Social History     Substance and Sexual Activity   Alcohol Use Not Currently     Social History     Substance and Sexual Activity   Drug Use Never     Social History     Tobacco Use   Smoking Status Never Smoker   Smokeless Tobacco Never Used   Retired pharmacist  Lives at home with her  who is a physician  Pets: 1 dog  Recent travel: [place] [facility] [place]  ALLERGIES:  Allergies   Allergen Reactions    Nitrofurantoin      MEDICATIONS:  All current active medications have been reviewed    Scheduled Meds:  Current Facility-Administered Medications   Medication Dose Route Frequency Provider Last Rate    acetaminophen  650 mg Oral Q6H PRN TANISHA Lo      ALPRAZolam  0 25 mg Oral HS PRN TANISHA Lo      brimonidine  1 drop Both Eyes TID TANISHA Murillo      dextrose 5 % and sodium chloride 0 9 %  75 mL/hr Intravenous Continuous Yu Molina MD 75 mL/hr (07/15/21 0549)    insulin lispro  2-12 Units Subcutaneous Q6H TAMIKO TANISHA Murillo      morphine injection  2 mg Intravenous Q4H PRN Yu Molina MD      ondansetron  4 mg Intravenous Q6H PRN TANISHA Murillo      piperacillin-tazobactam  4 5 g Intravenous Q6H TANISHA Murillo 4 5 g (07/15/21 0803)     Continuous Infusions:dextrose 5 % and sodium chloride 0 9 %, 75 mL/hr, Last Rate: 75 mL/hr (07/15/21 0549)      PRN Meds:   acetaminophen    ALPRAZolam    morphine injection    ondansetron    PHYSICAL EXAM:  Temp:  [98 3 °F (36 8 °C)-102 8 °F (39 3 °C)] 100 3 °F (37 9 °C)  HR:  [] 91  Resp:  [13-24] 18  BP: (133-142)/(72-75) 133/72  SpO2:  [90 %-96 %] 94 %  Temp (24hrs), Av 7 °F (38 2 °C), Min:98 3 °F (36 8 °C), Max:102 8 °F (39 3 °C)  Current: Temperature: 100 3 °F (37 9 °C)    Intake/Output Summary (Last 24 hours) at 7/15/2021 0905  Last data filed at 7/15/2021 0549  Gross per 24 hour   Intake 2579 16 ml   Output 925 ml   Net 1654 16 ml     Limited exam due to telehealth visit which was mostly done by the patient's nurse  General Appearance:  Nontoxic, in no acute distress   Head:  Normocephalic, without obvious abnormality, atraumatic   Eyes:  EOMI   Nose: Nares normal, mucosa normal   Throat: Oropharynx moist   Neck: Supple   Lungs:   Respirations unlabored, CTA b/l per RN   Heart:  S1, S2, regular rate/rhythm per RN   Abdomen:   Soft, RUQ abd pain, no rebound/guarding per RN   : No Ott catheter present per RN   Extremities: No distal leg edema bilateral per RN   Skin: No rash, no jaundice per RN   Neurologic: Alert and oriented times 3, conversant, fluent speech      LABS, IMAGING, & OTHER STUDIES:  Lab Results:  I have personally reviewed pertinent labs    Results from last 7 days   Lab Units 07/15/21  0506 21  4695 07/13/21  2248   WBC Thousand/uL 6 34 5 88 6 39   HEMOGLOBIN g/dL 11 0* 11 7 12 6   PLATELETS Thousands/uL 90* 97* 105*     Results from last 7 days   Lab Units 07/15/21  0506 07/14/21  0517 07/13/21  2248   POTASSIUM mmol/L 3 3* 3 7 3 6   CHLORIDE mmol/L 105 104 99*   CO2 mmol/L 22 24 24   BUN mg/dL 15 9 9   CREATININE mg/dL 0 76 0 81 0 91   EGFR ml/min/1 73sq m 81 75 65   CALCIUM mg/dL 7 5* 7 9* 8 7   AST U/L 66* 71* 75*   ALT U/L 40 43 48   ALK PHOS U/L 53 54 65     Results from last 7 days   Lab Units 07/14/21  0537 07/13/21 2248   BLOOD CULTURE   --  Received in Microbiology Lab  Culture in Progress  Received in Microbiology Lab  Culture in Progress  URINE CULTURE  Culture results to follow  --      Results from last 7 days   Lab Units 07/14/21  0517   PROCALCITONIN ng/ml 0 44*     Imaging Studies:   CT abd/pelv, Abd US reports reviewed    7/14/21 MRCP: Grossly, there is no intra or extrahepatic ductal dilatation  Distended gallbladder with stones and sludge along with wall thickening and pericholecystic fluid in keeping with acute cholecystitis  Cirrhosis with evidence of portal hypertension and splenomegaly  Abdominopelvic ascites  I have personally reviewed pertinent imaging study reports  VIRTUAL VISIT DISCLAIMER  The patient has consented to an online visit or consultation  The patient understands that the online visit is based solely on information provided by them, and that, in the absence of a face-to-face physical evaluation by the physician, the diagnosis they receive are both limited and provisional in terms of accuracy and completeness  This is not intended to replace a full medical face-to-face evaluation by the physician

## 2021-07-15 NOTE — PROGRESS NOTES
Progress Note - General Surgery   Glenn Estevez 74 y o  female MRN: [id number]  Unit/Bed#: -01 Encounter: [id number]    Assessment:  Change in mental status  Constipation  Gallbladder sludge  No evidence of choledocholithiasis  Evidence of cirrhosis  Ascites  Varices secondary to portal hypertension    Plan:  The MRCP was reviewed and I do not feel that any urgent cholecystectomy is needed at this time  The pericholecystic fluid is most likely secondary to ascites  The patient has no abdominal pain and no white blood cell count elevation  Will try to advance to clear liquid diet as tolerated and see how the patient does  Her slight bilirubin elevation is most likely secondary to primary liver disease  No choledocholithiasis is noted  Subjective/Objective       Subjective: The patient states that she feels like she has to have a bowel movement  She denies any nausea vomiting abdominal pain  She did have a slight elevated temperature of 100 3°  Objective:     Blood pressure 133/72, pulse 91, temperature 100 3 °F (37 9 °C), resp  rate 18, height 5' 2" (1 575 m), weight 121 kg (265 lb 10 5 oz), SpO2 94 %  ,Body mass index is 48 59 kg/m²  Intake/Output Summary (Last 24 hours) at 7/15/2021 0810  Last data filed at 7/15/2021 0549  Gross per 24 hour   Intake 2579 16 ml   Output 925 ml   Net 1654 16 ml       Invasive Devices     Peripheral Intravenous Line            Peripheral IV 07/13/21 Left Antecubital 1 day                Physical Exam:  Abdomen is soft with some mild tenderness in the lower mid abdomen and upper mid abdomen  No rigidity guarding or rebound is noted  The patient is slightly somnolent however I did wake her from sleep  She does appear oriented      Lab, Imaging and other studies:  CBC:   Lab Results   Component Value Date    WBC 6 34 07/15/2021    HGB 11 0 (L) 07/15/2021    HCT 32 9 (L) 07/15/2021    MCV 96 07/15/2021    PLT 90 (L) 07/15/2021    MCH 32 2 07/15/2021    MCHC 33 4 07/15/2021    RDW 17 2 (H) 07/15/2021    MPV 10 9 07/15/2021   , CMP:   Lab Results   Component Value Date    SODIUM 138 07/15/2021    K 3 3 (L) 07/15/2021     07/15/2021    CO2 22 07/15/2021    BUN 15 07/15/2021    CREATININE 0 76 07/15/2021    CALCIUM 7 5 (L) 07/15/2021    AST 66 (H) 07/15/2021    ALT 40 07/15/2021    ALKPHOS 53 07/15/2021    EGFR 81 07/15/2021

## 2021-07-15 NOTE — CASE MANAGEMENT
Case Management Assessment & Discharge Planning Note    Patient name Wiley Frazier  Location /-34 MRN [id number]  : 1955 Date 7/15/2021       Current Admission Date: 2021  Current Admission Diagnosis:  Patient Active Problem List   Diagnosis    Severe sepsis ([facility] )    Hyponatremia    Cirrhosis of liver with ascites ([facility] )    Acute cholecystitis    Thrombocytopenia ([place] [address] )    Acute cystitis without hematuria    Type 2 diabetes mellitus without complication, without long-term current use of insulin (HCC)    Class 3 severe obesity due to excess calories with serious comorbidity and body mass index (BMI) of 45 0 to 49 9 in adult ([facility] )    MELVIN on CPAP    Kidney stones    Abnormal heart rhythm    Previous Admission - Discharge Date:21   LOS (days): 1  Geometric Mean LOS (GMLOS) (days): 4 80  Days to GMLOS:3 2 Previous Discharge Diagnosis:  There are no discharge diagnoses documented for the most recent discharge       PATIENT READMITTED TO HOSPITAL  Risk of Unplanned Readmission Score  Predictive Model Details          11 (Low)  Factor Value    Calculated 7/15/2021 12:06 21% Number of active Rx orders 32    Risk of Unplanned Readmission Model 19% Active NSAID Rx order present     11% Number of ED visits in last six months 2     9% ECG/EKG order present in last 6 months     9% Latest calcium low (7 5 mg/dL)     7% Diagnosis of electrolyte disorder present     6% Imaging order present in last 6 months     6% Latest hemoglobin low (11 0 g/dL)     5% Age 74     3% Charlson Comorbidity Index 3     2% Future appointment scheduled     1% Current length of stay 1 475 days     1% Active ulcer medication Rx order present         BUNDLE:      OBJECTIVE:  Pt is a 74y o  year old /[employer], white or  [1], female with Evangel[employer] preference of [address] admitted on 4835 10:43 PM  Pt is admitted to [place] [address] at [address] with complaints of Severe sepsis ([facility] [address] )  Current admission status: Inpatient  Preferred Pharmacy:   [address] [place] [address]  Phone: [phone] Fax: [phone]    Primary Care Provider: Jorge Fung DO    ASSESSMENT:    Healthcare Proxy  Health Care Agent:   Name: Shyann Sood MD,  Lyndsay Alas  Relationship: Spouse [[id number]]  Home Phone:   Mobile Phone:   If more than one Healthcare Proxy exists, details will need to be added manually in this note  Readmission Root Cause  30 Day Readmission: No    Patient Information  Mental Status: Alert  During Assessment patient was accompanied by: Not accompanied during assessment  Assessment information provided by[de-identified] Patient  Primary Caregiver: Self  Support Systems: Spouse/significant other, Son, Daughter  Type of Current Residence: [address] [place] home  Home entry access options   Select all that apply : Stairs  Number of steps to enter home :  (1 to deck, 1 to enter)  Living Arrangements: Lives w/ Spouse/significant other    Activities of Daily Living Prior to Admission  Functional Status: Independent  Completes ADLs independently?: Yes  Ambulates independently?: Yes  Does patient use assisted devices?: Yes (Recently started using cane)  Assisted Devices (DME) used: Straight Cane  Does patient currently own DME?: Yes  What DME does the patient currently own?: Straight [employer]  Does the patient have a history of Short-Term Rehab?: No  [facility]: [facility] ([facility] ([facility] recommended))      Patient Information Continued  Income Source: Pension/custodial  Does patient receive dialysis treatments?: No  Does patient have a history of substance abuse?: No  Does patient have a history of Mental Health Diagnosis?: No (Pt w/ anxiety, Xanax and recent switch from Zoloft to [address] [employer] of Transport to Appts[de-identified] Drives Self    DISCHARGE DETAILS:    Discharge planning discussed with[de-identified] Pt      Pt will return home w/ sps at d/c, no needs identified at this time

## 2021-07-15 NOTE — PLAN OF CARE
Problem: Potential for Falls  Goal: Patient will remain free of falls  Description: INTERVENTIONS:  - Educate patient/family on patient safety including physical limitations  - Instruct patient to call for assistance with activity   - Consult OT/PT to assist with strengthening/mobility   - Keep Call bell within reach  - Keep bed low and locked with side rails adjusted as appropriate  - Keep care items and personal belongings within reach  - Initiate and maintain comfort rounds  - Make Fall Risk Sign visible to staff  - Offer Toileting every  Hours, in advance of need  - Initiate/Maintain   alarm  - Obtain necessary fall risk management equipment:   - Apply yellow socks and bracelet for high fall risk patients  - Consider moving patient to room near nurses station  Outcome: Progressing     Problem: Nutrition/Hydration-ADULT  Goal: Nutrient/Hydration intake appropriate for improving, restoring or maintaining nutritional needs  Description: Monitor and assess patient's nutrition/hydration status for malnutrition  Collaborate with interdisciplinary team and initiate plan and interventions as ordered  Monitor patient's weight and dietary intake as ordered or per policy  Utilize nutrition screening tool and intervene as necessary  Determine patient's food preferences and provide high-protein, high-caloric foods as appropriate       INTERVENTIONS:  - Monitor oral intake, urinary output, labs, and treatment plans  - Assess nutrition and hydration status and recommend course of action  - Evaluate amount of meals eaten  - Assist patient with eating if necessary   - Allow adequate time for meals  - Recommend/ encourage appropriate diets, oral nutritional supplements, and vitamin/mineral supplements  - Order, calculate, and assess calorie counts as needed  - Recommend, monitor, and adjust tube feedings and TPN/PPN based on assessed needs  - Assess need for intravenous fluids  - Provide specific nutrition/hydration education as appropriate  - Include patient/family/caregiver in decisions related to nutrition  Outcome: Progressing     Problem: CARDIOVASCULAR - ADULT  Goal: Maintains optimal cardiac output and hemodynamic stability  Description: INTERVENTIONS:  - Monitor I/O, vital signs and rhythm  - Monitor for S/S and trends of decreased cardiac output  - Administer and titrate ordered vasoactive medications to optimize hemodynamic stability  - Assess quality of pulses, skin color and temperature  - Assess for signs of decreased coronary artery perfusion  - Instruct patient to report change in severity of symptoms  Outcome: Progressing  Goal: Absence of cardiac dysrhythmias or at baseline rhythm  Description: INTERVENTIONS:  - Continuous cardiac monitoring, vital signs, obtain 12 lead EKG if ordered  - Administer antiarrhythmic and heart rate control medications as ordered  - Monitor electrolytes and administer replacement therapy as ordered  Outcome: Progressing     Problem: METABOLIC, FLUID AND ELECTROLYTES - ADULT  Goal: Electrolytes maintained within normal limits  Description: INTERVENTIONS:  - Monitor labs and assess patient for signs and symptoms of electrolyte imbalances  - Administer electrolyte replacement as ordered  - Monitor response to electrolyte replacements, including repeat lab results as appropriate  - Instruct patient on fluid and nutrition as appropriate  Outcome: Progressing  Goal: Fluid balance maintained  Description: INTERVENTIONS:  - Monitor labs   - Monitor I/O and WT  - Instruct patient on fluid and nutrition as appropriate  - Assess for signs & symptoms of volume excess or deficit  Outcome: Progressing  Goal: Glucose maintained within target range  Description: INTERVENTIONS:  - Monitor Blood Glucose as ordered  - Assess for signs and symptoms of hyperglycemia and hypoglycemia  - Administer ordered medications to maintain glucose within target range  - Assess nutritional intake and initiate nutrition service referral as needed  Outcome: Progressing     Problem: PAIN - ADULT  Goal: Verbalizes/displays adequate comfort level or baseline comfort level  Description: Interventions:  - Encourage patient to monitor pain and request assistance  - Assess pain using appropriate pain scale  - Administer analgesics based on type and severity of pain and evaluate response  - Implement non-pharmacological measures as appropriate and evaluate response  - Consider cultural and social influences on pain and pain management  - Notify physician/advanced practitioner if interventions unsuccessful or patient reports new pain  Outcome: Progressing     Problem: INFECTION - ADULT  Goal: Absence or prevention of progression during hospitalization  Description: INTERVENTIONS:  - Assess and monitor for signs and symptoms of infection  - Monitor lab/diagnostic results  - Monitor all insertion sites, i e  indwelling lines, tubes, and drains  - Monitor endotracheal if appropriate and nasal secretions for changes in amount and color  - [place] appropriate cooling/warming therapies per order  - Administer medications as ordered  - Instruct and encourage patient and family to use good hand hygiene technique  - Identify and instruct in appropriate isolation precautions for identified infection/condition  Outcome: Progressing  Goal: Absence of fever/infection during neutropenic period  Description: INTERVENTIONS:  - Monitor WBC    Outcome: Progressing     Problem: SAFETY ADULT  Goal: Patient will remain free of falls  Description: INTERVENTIONS:  - Educate patient/family on patient safety including physical limitations  - Instruct patient to call for assistance with activity   - Consult OT/PT to assist with strengthening/mobility   - Keep Call bell within reach  - Keep bed low and locked with side rails adjusted as appropriate  - Keep care items and personal belongings within reach  - Initiate and maintain comfort rounds  - Make Fall Risk Sign visible to staff  - Offer Toileting every   Hours, in advance of need  - Initiate/Maintain   alarm  - Obtain necessary fall risk management equipment:   - Apply yellow socks and bracelet for high fall risk patients  - Consider moving patient to room near nurses station  Outcome: Progressing  Goal: Maintain or return to baseline ADL function  Description: INTERVENTIONS:  -  Assess patient's ability to carry out ADLs; assess patient's baseline for ADL function and identify physical deficits which impact ability to perform ADLs (bathing, care of mouth/teeth, toileting, grooming, dressing, etc )  - Assess/evaluate cause of self-care deficits   - Assess range of motion  - Assess patient's mobility; develop plan if impaired  - Assess patient's need for assistive devices and provide as appropriate  - Encourage maximum independence but intervene and supervise when necessary  - Involve family in performance of ADLs  - Assess for home care needs following discharge   - Consider OT consult to assist with ADL evaluation and planning for discharge  - Provide patient education as appropriate  Outcome: Progressing  Goal: Maintains/Returns to pre admission functional level  Description: INTERVENTIONS:  - Perform BMAT or MOVE assessment daily    - Set and communicate daily mobility goal to care team and patient/family/caregiver  - Collaborate with rehabilitation services on mobility goals if consulted  - Perform Range of Motion   times a day  - Reposition patient every   hours  - Dangle patient   times a day  - Stand patient   times a day  - Ambulate patient   times a day  - Out of bed to chair   times a day   - Out of bed for meals    times a day  - Out of bed for toileting  - Record patient progress and toleration of activity level   Outcome: Progressing     Problem: DISCHARGE PLANNING  Goal: Discharge to home or other facility with appropriate resources  Description: INTERVENTIONS:  - Identify barriers to discharge w/patient and caregiver  - Arrange for needed discharge resources and transportation as appropriate  - Identify discharge learning needs (meds, wound care, etc )  - Arrange for interpretive services to assist at discharge as needed  - Refer to Case Management Department for coordinating discharge planning if the patient needs post-hospital services based on physician/advanced practitioner order or complex needs related to functional status, cognitive ability, or social support system  Outcome: Progressing     Problem: MOBILITY - ADULT  Goal: Maintain or return to baseline ADL function  Description: INTERVENTIONS:  -  Assess patient's ability to carry out ADLs; assess patient's baseline for ADL function and identify physical deficits which impact ability to perform ADLs (bathing, care of mouth/teeth, toileting, grooming, dressing, etc )  - Assess/evaluate cause of self-care deficits   - Assess range of motion  - Assess patient's mobility; develop plan if impaired  - Assess patient's need for assistive devices and provide as appropriate  - Encourage maximum independence but intervene and supervise when necessary  - Involve family in performance of ADLs  - Assess for home care needs following discharge   - Consider OT consult to assist with ADL evaluation and planning for discharge  - Provide patient education as appropriate  Outcome: Progressing  Goal: Maintains/Returns to pre admission functional level  Description: INTERVENTIONS:  - Perform BMAT or MOVE assessment daily    - Set and communicate daily mobility goal to care team and patient/family/caregiver  - Collaborate with rehabilitation services on mobility goals if consulted  - Perform Range of Motion   times a day  - Reposition patient every   hours  - Dangle patient   times a day  - Stand patient     times a day  - Ambulate patient   times a day  - Out of bed to chair   times a day   - Out of bed for meals    times a day  - Out of bed for toileting  - Record patient progress and toleration of activity level   Outcome: Progressing

## 2021-07-15 NOTE — PROGRESS NOTES
[address]  Progress Note - Sulema Chacon 1955, 74 y o  female MRN: [id number]  Unit/Bed#: -01 Encounter: [id number]  Primary Care Provider: Angie Bowers DO   Date and time admitted to hospital: 7/13/2021 10:43 PM    * Severe sepsis [facility])  Assessment & Plan  · Presented for fever, generalized weakness, and nausea-granddaughter was sick last weekend  · Severe Sepsis present on arrival-fever, tachycardia, suspected infection, lactic acid 4 1  · Flu negative, COVID negative  · Unclear source of infection  May be acute cholecystitis versus acute cystitis without hematuria  · Urine culture positive for Enterococcus faecalis and E coli  · Blood culture pending  · CT abdomen pelvis: Sludge and multiple tiny stones within the gallbladder lumen  The gallbladder is moderately distended  Gallbladder wall thickening noted which could be related to edema or inflammation/cholecystitis  Correlation with the patient's symptoms and laboratory values recommended  Right upper quadrant ultrasound may be of benefit for further evaluation at the discretion of the referring caregiver   · Continue IV Zosyn and also added IV vancomycin for now  Acute cholecystitis  Assessment & Plan  · CT abdomen pelvis: Sludge and multiple tiny stones within the gallbladder lumen  The gallbladder is moderately distended  Gallbladder wall thickening noted which could be related to edema or inflammation/cholecystitis  Correlation with the patient's symptoms and laboratory values recommended  Right upper quadrant ultrasound may be of benefit for further evaluation at the discretion of the referring caregiver   · Abdominal ultrasound June 2021: Partially layering gallbladder sludge  Anterior gallbladder wall adherent sludge versus 1 7 cm polyp  According to current literature guidelines (Cherry Corrigan 2013;10:[id number]) for polyps this size surgical consultation recommended  · Will obtain HIDA scan    · Discussed with surgery  Eventually the gallbladder will need to come out but will need to make sure that there is no other source of sepsis at this time before proceeding with any surgical intervention  Await blood culture results  · Currently on clear liquid diet    Acute cystitis without hematuria  Assessment & Plan  · Urinalysis:  Innumerable bacteria  Prelim urine culture shows Enterococcus faecalis and E coli  · Zosyn started in the ER for sepsis  Will also add vancomycin in case it is  VRE  · Patient has prior history of urosepsis along with kidney stones and obstruction resulting in percutaneous nephrostomy tube to be placed few years ago  Abnormal heart rhythm  Assessment & Plan  · Had Zio patch at [facility] April 2021: 1   Overall sinus rhythm with average rate of 82 bpm, minimum of 66 bpm and maximum of 122 bpm    2   Frequent supraventricular ectopy-93,993 beats- 5 7% of total beats  3   130 brief supraventricular tachycardia runs-longest 22 1 seconds with an average rate of 140 bpm    4   Rare ventricular ectopy with no evidence of ventricular tachycardia  5   No evidence of significant pauses or high degree AV block  6   Patient symptoms corresponded to sinus rhythm, ventricular trigeminy, supraventricular ectopy and ventricular ectopy    · Monitor heart rate  · Continue outpatient follow-up    Kidney stones  Assessment & Plan  · History of nephrolithiasis  · Continue Microzide, Urocit and allopurinol when no longer NPO  · Continue outpatient follow-up    MELVIN on CPAP  Assessment & Plan  · Continue CPAP at bedtime    Class 3 severe obesity due to excess calories with serious comorbidity and body mass index (BMI) of 45 0 to 49 9 in adult [facility])  Assessment & Plan  · BMI 48 36  · Recommend intensive therapeutic lifestyle modification    Type 2 diabetes mellitus without complication, without long-term current use of insulin (HCC)  Assessment & Plan  Lab Results   Component Value Date    HGBA1C 7 7 (H) 01/25/2021       No results for input(s): POCGLU in the last 72 hours  Blood Sugar Average: Last 72 hrs:     · Glucose 135  · Fingerstick blood sugar with sliding scale coverage  · Patient currently NPO  · Hold metformin while in hospital    Thrombocytopenia ([facility] [address] )  Assessment & Plan  · Platelets 293-XKTFG be due to liver disease  However will need to rule out a rickettsial parasitic infection  Will order Anaplasma, Ehrlichia his panel and parasite smear  · Daily CBC and trend platelets  · Monitor for bleeding    Cirrhosis of liver with ascites [facility])  Assessment & Plan  · Diagnosed with cirrhosis in November of 2020-cell Dr Chuckie Man, gastroenterology [place] [facility] [place]  · CT abdomen pelvis: Hepatic cirrhosis and evidence of portal hypertension with prominent mesenteric, perisplenic, and perigastric collateral vessels/varices  Small-volume ascites   · Abdominal ultrasound 06/20/2021Hepatosplenomegaly  Hepatic cirrhosis and mild steatosis  · Total bilirubin:  1 63  · Ammonia:  45  Repeat ammonia level ordered for tomorrow and will place on lactulose and Xifaxan  · Continue outpatient GI follow-up    Hyponatremia  Assessment & Plan  · Sodium 132 on admission  · Received 2 L normal saline in the ER  · Repeat sodium 138 and hence will discontinue fluids      VTE Pharmacologic Prophylaxis:   Pharmacologic: Pharmacologic VTE Prophylaxis contraindicated due to Thrombocytopenia  Mechanical VTE Prophylaxis in Place: Yes    Patient Centered Rounds: I have performed bedside rounds with nursing staff today  Discussions with Specialists or Other Care Team Provider:  Discussed with Infectious Disease and GI and General surgery    Education and Discussions with Family / Patient:  Discussed with patient at bedside and updated daughter    Time Spent for Care: 45 minutes  More than 50% of total time spent on counseling and coordination of care as described above      Current Length of Stay: 1 day(s)    Current Patient Status: Inpatient   Certification Statement: The patient will continue to require additional inpatient hospital stay due to Severe sepsis    Discharge Plan:  Pending progress    Code Status: Level 1 - Full Code      Subjective:   Patient denies any chest pain or shortness of breath  No fevers so far this morning  Did have recurrent fevers all day yesterday  Still has mild right upper quadrant abdominal pain and back pain  No headaches reported today  Objective:     Vitals:   Temp (24hrs), Av 7 °F (38 2 °C), Min:98 1 °F (36 7 °C), Max:102 8 °F (39 3 °C)    Temp:  [98 1 °F (36 7 °C)-102 8 °F (39 3 °C)] 98 1 °F (36 7 °C)  HR:  [] 83  Resp:  [13-24] 18  BP: (133-142)/(72-75) 133/72  SpO2:  [92 %-96 %] 95 %  Body mass index is 48 59 kg/m²  Input and Output Summary (last 24 hours): Intake/Output Summary (Last 24 hours) at 7/15/2021 1226  Last data filed at 7/15/2021 0549  Gross per 24 hour   Intake 1483 33 ml   Output 575 ml   Net 908 33 ml       Physical Exam:     Physical Exam  Vitals and nursing note reviewed  Constitutional:       Appearance: She is ill-appearing  HENT:      Head: Normocephalic and atraumatic  Right Ear: External ear normal       Left Ear: External ear normal       Nose: Nose normal       Mouth/Throat:      Pharynx: Oropharynx is clear  Eyes:      Pupils: Pupils are equal, round, and reactive to light  Cardiovascular:      Rate and Rhythm: Normal rate and regular rhythm  Heart sounds: Normal heart sounds  Pulmonary:      Effort: Pulmonary effort is normal       Breath sounds: Normal breath sounds  Abdominal:      General: Bowel sounds are normal  There is distension  Palpations: Abdomen is soft  Tenderness: There is abdominal tenderness  Comments: Mild tenderness noted in the right upper quadrant with no guarding or rebound tenderness   Musculoskeletal:         General: Normal range of motion        Cervical back: Normal range of motion and neck supple  Skin:     General: Skin is warm and dry  Capillary Refill: Capillary refill takes less than 2 seconds  Neurological:      General: No focal deficit present  Mental Status: She is alert and oriented to person, place, and time  Psychiatric:         Mood and Affect: Mood normal            Additional Data:     Labs:    Results from last 7 days   Lab Units 07/15/21  0506 07/14/21  0517 07/13/21  2248   WBC Thousand/uL 6 34 5 88 6 39   HEMOGLOBIN g/dL 11 0* 11 7 12 6   HEMATOCRIT % 32 9* 35 5 37 5   PLATELETS Thousands/uL 90* 97* 105*   BANDS PCT %  --   --  4   NEUTROS PCT %  --  73  --    LYMPHS PCT %  --  16  --    LYMPHO PCT %  --   --  14   MONOS PCT %  --  9  --    MONO PCT %  --   --  9   EOS PCT %  --  0 0     Results from last 7 days   Lab Units 07/15/21  0506   SODIUM mmol/L 138   POTASSIUM mmol/L 3 3*   CHLORIDE mmol/L 105   CO2 mmol/L 22   BUN mg/dL 15   CREATININE mg/dL 0 76   ANION GAP mmol/L 11   CALCIUM mg/dL 7 5*   ALBUMIN g/dL 2 4*   TOTAL BILIRUBIN mg/dL 1 74*   ALK PHOS U/L 53   ALT U/L 40   AST U/L 66*   GLUCOSE RANDOM mg/dL 141*         Results from last 7 days   Lab Units 07/15/21  1152 07/15/21  0556 07/15/21  0029 07/14/21  1825 07/14/21  1215 07/14/21  0617 07/14/21  0239   POC GLUCOSE mg/dl 172* 128 143* 142* 101 126 136         Results from last 7 days   Lab Units 07/15/21  0506 07/14/21  0517 07/14/21  0127 07/13/21  2248   LACTIC ACID mmol/L  --   --  1 6 4 1*   PROCALCITONIN ng/ml 0 79* 0 44*  --   --            * I Have Reviewed All Lab Data Listed Above  * Additional Pertinent Lab Tests Reviewed: Jeff 64 Admission Reviewed    Imaging:    Imaging Reports Reviewed Today Include:  MRI abdomen  Imaging Personally Reviewed by Myself Includes:  MRI abdomen and CT abdomen pelvis    Recent Cultures (last 7 days):     Results from last 7 days   Lab Units 07/14/21  0537 07/13/21  2248   BLOOD CULTURE   --  No Growth at 24 hrs    No Growth at 24 hrs    URINE CULTURE  >100,000 cfu/ml Enterococcus faecalis*  10,000-19,000 cfu/ml Escherichia coli*  --        Last 24 Hours Medication List:   Current Facility-Administered Medications   Medication Dose Route Frequency Provider Last Rate    acetaminophen  650 mg Oral Q6H PRN TANISHA Bruce      brimonidine  1 drop Both Eyes TID TANISHA Bruce      insulin lispro  2-12 Units Subcutaneous [facility] TANISHA Bruce      lactulose  20 g Oral BID Bo Olvera MD      morphine injection  2 mg Intravenous Q4H PRN Bo Olvera MD      ondansetron  4 mg Intravenous Q6H PRN TANISHA Bruce      piperacillin-tazobactam  4 5 g Intravenous Q6H TANISHA Bruce 4 5 g (07/15/21 0803)    rifaximin  550 mg Oral Q12H [facility] Bo Olvera MD      vancomycin  15 mg/kg Intravenous Q12H Bo Olvera MD          Today, Patient Was Seen By: Bo Olvera MD    ** Please Note: Dictation voice to text software may have been used in the creation of this document   **

## 2021-07-15 NOTE — PROGRESS NOTES
Vancomycin Assessment    Brian Loaiza is a 74 y o  female who was currently prescribed vancomycin 1750 mg iv q 12 hrs for urinary tract infection     Relevant clinical data and objective history reviewed:  Creatinine   Date Value Ref Range Status   07/15/2021 0 76 0 60 - 1 30 mg/dL Final     Comment:     Standardized to IDMS reference method   07/14/2021 0 81 0 60 - 1 30 mg/dL Final     Comment:     Standardized to IDMS reference method   07/13/2021 0 91 0 60 - 1 30 mg/dL Final     Comment:     Standardized to IDMS reference method     /72   Pulse 83   Temp 98 1 °F (36 7 °C)   Resp 18   Ht 5' 2" (1 575 m)   Wt 121 kg (265 lb 10 5 oz)   SpO2 95%   BMI 48 59 kg/m²   I/O last 3 completed shifts: In: 4679 2 [I V :2579 2; IV [id number]:1182]  Out: 0017 [Urine:1225]  Lab Results   Component Value Date/Time    BUN 15 07/15/2021 05:06 AM    WBC 6 34 07/15/2021 05:06 AM    HGB 11 0 (L) 07/15/2021 05:06 AM    HCT 32 9 (L) 07/15/2021 05:06 AM    MCV 96 07/15/2021 05:06 AM    PLT 90 (L) 07/15/2021 05:06 AM     Temp Readings from Last 3 Encounters:   07/15/21 98 1 °F (36 7 °C)   01/29/21 97 9 °F (36 6 °C) (Temporal)     Vancomycin Days of Therapy: 1    Assessment/Plan  The patient is currently on vancomycin utilizing scheduled dosing based on adjusted body weight (due to obesity)  Baseline risks associated with therapy include: concomitant nephrotoxic medications, advanced age, and dehydration  The patient was currently prescribed 1750 mg iv q 12 hrs and after clinical evaluation will be changed to 1250 mg iv q 12 hrs  Pharmacy will also follow closely for s/sx of nephrotoxicity, infusion reactions, and appropriateness of therapy  BMP and CBC will be ordered per protocol  Plan for trough as patient approaches steady state, prior to the 5th  dose at approximately 1230 on 7/17/21  Due to infection severity, will target a trough of 15-20 (appropriate for most indications)     Pharmacy will continue to follow the patients culture results and clinical progress daily      Niels Kathleen, Pharmacist

## 2021-07-16 ENCOUNTER — APPOINTMENT (INPATIENT)
Dept: CT IMAGING | Facility: HOSPITAL | Age: 66
DRG: 872 | End: 2021-07-16
Payer: MEDICARE

## 2021-07-16 ENCOUNTER — APPOINTMENT (INPATIENT)
Dept: NON INVASIVE DIAGNOSTICS | Facility: HOSPITAL | Age: 66
DRG: 872 | End: 2021-07-16
Payer: MEDICARE

## 2021-07-16 PROBLEM — E87.6 HYPOKALEMIA: Status: ACTIVE | Noted: 2021-07-16

## 2021-07-16 LAB
% PARASITEMIA: 0
ALBUMIN SERPL BCP-MCNC: 2.8 G/DL (ref 3.5–5)
ALP SERPL-CCNC: 62 U/L (ref 46–116)
ALT SERPL W P-5'-P-CCNC: 41 U/L (ref 12–78)
AMMONIA PLAS-SCNC: 24 UMOL/L (ref 11–35)
ANION GAP SERPL CALCULATED.3IONS-SCNC: 11 MMOL/L (ref 4–13)
ANION GAP SERPL CALCULATED.3IONS-SCNC: 12 MMOL/L (ref 4–13)
AST SERPL W P-5'-P-CCNC: 61 U/L (ref 5–45)
BILIRUB SERPL-MCNC: 1.98 MG/DL (ref 0.2–1)
BUN SERPL-MCNC: 14 MG/DL (ref 5–25)
BUN SERPL-MCNC: 17 MG/DL (ref 5–25)
CALCIUM ALBUM COR SERPL-MCNC: 9.1 MG/DL (ref 8.3–10.1)
CALCIUM SERPL-MCNC: 7.8 MG/DL (ref 8.3–10.1)
CALCIUM SERPL-MCNC: 8.1 MG/DL (ref 8.3–10.1)
CHLORIDE SERPL-SCNC: 104 MMOL/L (ref 100–108)
CHLORIDE SERPL-SCNC: 104 MMOL/L (ref 100–108)
CO2 SERPL-SCNC: 21 MMOL/L (ref 21–32)
CO2 SERPL-SCNC: 21 MMOL/L (ref 21–32)
CREAT SERPL-MCNC: 0.81 MG/DL (ref 0.6–1.3)
CREAT SERPL-MCNC: 0.83 MG/DL (ref 0.6–1.3)
CRP SERPL QL: 90.1 MG/L
D DIMER PPP FEU-MCNC: 7.96 UG/ML FEU
ERYTHROCYTE [DISTWIDTH] IN BLOOD BY AUTOMATED COUNT: 17.3 % (ref 11.6–15.1)
ERYTHROCYTE [SEDIMENTATION RATE] IN BLOOD: 35 MM/HOUR (ref 0–29)
FERRITIN SERPL-MCNC: 149 NG/ML (ref 8–388)
GFR SERPL CREATININE-BSD FRML MDRD: 73 ML/MIN/1.73SQ M
GFR SERPL CREATININE-BSD FRML MDRD: 75 ML/MIN/1.73SQ M
GLUCOSE SERPL-MCNC: 112 MG/DL (ref 65–140)
GLUCOSE SERPL-MCNC: 114 MG/DL (ref 65–140)
GLUCOSE SERPL-MCNC: 116 MG/DL (ref 65–140)
GLUCOSE SERPL-MCNC: 146 MG/DL (ref 65–140)
GLUCOSE SERPL-MCNC: 153 MG/DL (ref 65–140)
GLUCOSE SERPL-MCNC: 192 MG/DL (ref 65–140)
HCT VFR BLD AUTO: 35.3 % (ref 34.8–46.1)
HGB BLD-MCNC: 11.9 G/DL (ref 11.5–15.4)
MCH RBC QN AUTO: 32.2 PG (ref 26.8–34.3)
MCHC RBC AUTO-ENTMCNC: 33.7 G/DL (ref 31.4–37.4)
MCV RBC AUTO: 95 FL (ref 82–98)
PARASITE BLD: NO
PATHOLOGIST INTERPRETATION: NORMAL
PLATELET # BLD AUTO: 103 THOUSANDS/UL (ref 149–390)
PMV BLD AUTO: 11 FL (ref 8.9–12.7)
POTASSIUM SERPL-SCNC: 2.9 MMOL/L (ref 3.5–5.3)
POTASSIUM SERPL-SCNC: 3.3 MMOL/L (ref 3.5–5.3)
PROCALCITONIN SERPL-MCNC: 0.74 NG/ML
PROT SERPL-MCNC: 6.8 G/DL (ref 6.4–8.2)
RBC # BLD AUTO: 3.7 MILLION/UL (ref 3.81–5.12)
SODIUM SERPL-SCNC: 136 MMOL/L (ref 136–145)
SODIUM SERPL-SCNC: 137 MMOL/L (ref 136–145)
TSH SERPL DL<=0.05 MIU/L-ACNC: 2.46 UIU/ML (ref 0.36–3.74)
WBC # BLD AUTO: 7.67 THOUSAND/UL (ref 4.31–10.16)

## 2021-07-16 PROCEDURE — 86663 EPSTEIN-BARR ANTIBODY: CPT | Performed by: INTERNAL MEDICINE

## 2021-07-16 PROCEDURE — 85652 RBC SED RATE AUTOMATED: CPT | Performed by: FAMILY MEDICINE

## 2021-07-16 PROCEDURE — 86038 ANTINUCLEAR ANTIBODIES: CPT | Performed by: INTERNAL MEDICINE

## 2021-07-16 PROCEDURE — 93306 TTE W/DOPPLER COMPLETE: CPT | Performed by: INTERNAL MEDICINE

## 2021-07-16 PROCEDURE — 86665 EPSTEIN-BARR CAPSID VCA: CPT | Performed by: INTERNAL MEDICINE

## 2021-07-16 PROCEDURE — 86664 EPSTEIN-BARR NUCLEAR ANTIGEN: CPT | Performed by: INTERNAL MEDICINE

## 2021-07-16 PROCEDURE — G1004 CDSM NDSC: HCPCS

## 2021-07-16 PROCEDURE — 94660 CPAP INITIATION&MGMT: CPT

## 2021-07-16 PROCEDURE — 86645 CMV ANTIBODY IGM: CPT | Performed by: INTERNAL MEDICINE

## 2021-07-16 PROCEDURE — 84443 ASSAY THYROID STIM HORMONE: CPT | Performed by: INTERNAL MEDICINE

## 2021-07-16 PROCEDURE — 85027 COMPLETE CBC AUTOMATED: CPT | Performed by: FAMILY MEDICINE

## 2021-07-16 PROCEDURE — 93306 TTE W/DOPPLER COMPLETE: CPT

## 2021-07-16 PROCEDURE — 71275 CT ANGIOGRAPHY CHEST: CPT

## 2021-07-16 PROCEDURE — 84145 PROCALCITONIN (PCT): CPT | Performed by: FAMILY MEDICINE

## 2021-07-16 PROCEDURE — 70450 CT HEAD/BRAIN W/O DYE: CPT

## 2021-07-16 PROCEDURE — 86480 TB TEST CELL IMMUN MEASURE: CPT | Performed by: INTERNAL MEDICINE

## 2021-07-16 PROCEDURE — 86140 C-REACTIVE PROTEIN: CPT | Performed by: FAMILY MEDICINE

## 2021-07-16 PROCEDURE — 80053 COMPREHEN METABOLIC PANEL: CPT | Performed by: FAMILY MEDICINE

## 2021-07-16 PROCEDURE — 99233 SBSQ HOSP IP/OBS HIGH 50: CPT | Performed by: FAMILY MEDICINE

## 2021-07-16 PROCEDURE — 86644 CMV ANTIBODY: CPT | Performed by: INTERNAL MEDICINE

## 2021-07-16 PROCEDURE — 94760 N-INVAS EAR/PLS OXIMETRY 1: CPT

## 2021-07-16 PROCEDURE — 85379 FIBRIN DEGRADATION QUANT: CPT | Performed by: INTERNAL MEDICINE

## 2021-07-16 PROCEDURE — 82140 ASSAY OF AMMONIA: CPT | Performed by: FAMILY MEDICINE

## 2021-07-16 PROCEDURE — 82728 ASSAY OF FERRITIN: CPT | Performed by: INTERNAL MEDICINE

## 2021-07-16 PROCEDURE — 80048 BASIC METABOLIC PNL TOTAL CA: CPT | Performed by: FAMILY MEDICINE

## 2021-07-16 PROCEDURE — 82948 REAGENT STRIP/BLOOD GLUCOSE: CPT

## 2021-07-16 RX ORDER — BUTALBITAL, ACETAMINOPHEN AND CAFFEINE 50; 325; 40 MG/1; MG/1; MG/1
1 TABLET ORAL ONCE
Status: COMPLETED | OUTPATIENT
Start: 2021-07-16 | End: 2021-07-16

## 2021-07-16 RX ORDER — BUTALBITAL, ACETAMINOPHEN AND CAFFEINE 50; 325; 40 MG/1; MG/1; MG/1
1 TABLET ORAL EVERY 6 HOURS PRN
Status: DISCONTINUED | OUTPATIENT
Start: 2021-07-16 | End: 2021-07-18 | Stop reason: HOSPADM

## 2021-07-16 RX ORDER — POTASSIUM CHLORIDE 20 MEQ/1
40 TABLET, EXTENDED RELEASE ORAL ONCE
Status: COMPLETED | OUTPATIENT
Start: 2021-07-16 | End: 2021-07-16

## 2021-07-16 RX ADMIN — INSULIN LISPRO 2 UNITS: 100 INJECTION, SOLUTION INTRAVENOUS; SUBCUTANEOUS at 11:46

## 2021-07-16 RX ADMIN — INSULIN LISPRO 2 UNITS: 100 INJECTION, SOLUTION INTRAVENOUS; SUBCUTANEOUS at 21:05

## 2021-07-16 RX ADMIN — CITALOPRAM HYDROBROMIDE 10 MG: 10 TABLET ORAL at 21:04

## 2021-07-16 RX ADMIN — PIPERACILLIN AND TAZOBACTAM 4.5 G: 4; .5 INJECTION, POWDER, LYOPHILIZED, FOR SOLUTION INTRAVENOUS at 19:57

## 2021-07-16 RX ADMIN — PIPERACILLIN AND TAZOBACTAM 4.5 G: 4; .5 INJECTION, POWDER, LYOPHILIZED, FOR SOLUTION INTRAVENOUS at 01:41

## 2021-07-16 RX ADMIN — POTASSIUM CHLORIDE 40 MEQ: 1500 TABLET, EXTENDED RELEASE ORAL at 10:35

## 2021-07-16 RX ADMIN — RIFAXIMIN 550 MG: 550 TABLET ORAL at 20:29

## 2021-07-16 RX ADMIN — BUTALBITAL, ACETAMINOPHEN AND CAFFEINE 1 TABLET: 50; 325; 40 TABLET ORAL at 18:10

## 2021-07-16 RX ADMIN — ACETAMINOPHEN 650 MG: 325 TABLET ORAL at 07:33

## 2021-07-16 RX ADMIN — ONDANSETRON 4 MG: 2 INJECTION INTRAMUSCULAR; INTRAVENOUS at 09:09

## 2021-07-16 RX ADMIN — RIFAXIMIN 550 MG: 550 TABLET ORAL at 08:50

## 2021-07-16 RX ADMIN — BUTALBITAL, ACETAMINOPHEN AND CAFFEINE 1 TABLET: 50; 325; 40 TABLET ORAL at 10:35

## 2021-07-16 RX ADMIN — BRIMONIDINE TARTRATE 1 DROP: 1.5 SOLUTION OPHTHALMIC at 20:29

## 2021-07-16 RX ADMIN — BRIMONIDINE TARTRATE 1 DROP: 1.5 SOLUTION OPHTHALMIC at 17:13

## 2021-07-16 RX ADMIN — IOHEXOL 100 ML: 350 INJECTION, SOLUTION INTRAVENOUS at 17:45

## 2021-07-16 RX ADMIN — BRIMONIDINE TARTRATE 1 DROP: 1.5 SOLUTION OPHTHALMIC at 08:51

## 2021-07-16 RX ADMIN — PIPERACILLIN AND TAZOBACTAM 4.5 G: 4; .5 INJECTION, POWDER, LYOPHILIZED, FOR SOLUTION INTRAVENOUS at 14:08

## 2021-07-16 RX ADMIN — PIPERACILLIN AND TAZOBACTAM 4.5 G: 4; .5 INJECTION, POWDER, LYOPHILIZED, FOR SOLUTION INTRAVENOUS at 07:33

## 2021-07-16 RX ADMIN — POTASSIUM CHLORIDE 40 MEQ: 1500 TABLET, EXTENDED RELEASE ORAL at 11:46

## 2021-07-16 NOTE — PLAN OF CARE
Problem: Potential for Falls  Goal: Patient will remain free of falls  Description: INTERVENTIONS:  - Educate patient/family on patient safety including physical limitations  - Instruct patient to call for assistance with activity   - Consult OT/PT to assist with strengthening/mobility   - Keep Call bell within reach  - Keep bed low and locked with side rails adjusted as appropriate  - Keep care items and personal belongings within reach  - Initiate and maintain comfort rounds  - Make Fall Risk Sign visible to staff  - Offer Toileting every 2 Hours, in advance of need  - Initiate/Maintain bed alarm  - Obtain necessary fall risk management equipment: yellow socks and bracelet  - Apply yellow socks and bracelet for high fall risk patients  - Consider moving patient to room near nurses station  Outcome: Progressing     Problem: Nutrition/Hydration-ADULT  Goal: Nutrient/Hydration intake appropriate for improving, restoring or maintaining nutritional needs  Description: Monitor and assess patient's nutrition/hydration status for malnutrition  Collaborate with interdisciplinary team and initiate plan and interventions as ordered  Monitor patient's weight and dietary intake as ordered or per policy  Utilize nutrition screening tool and intervene as necessary  Determine patient's food preferences and provide high-protein, high-caloric foods as appropriate       INTERVENTIONS:  - Monitor oral intake, urinary output, labs, and treatment plans  - Assess nutrition and hydration status and recommend course of action  - Evaluate amount of meals eaten  - Assist patient with eating if necessary   - Allow adequate time for meals  - Recommend/ encourage appropriate diets, oral nutritional supplements, and vitamin/mineral supplements  - Order, calculate, and assess calorie counts as needed  - Recommend, monitor, and adjust tube feedings and TPN/PPN based on assessed needs  - Assess need for intravenous fluids  - Provide specific nutrition/hydration education as appropriate  - Include patient/family/caregiver in decisions related to nutrition  Outcome: Progressing     Problem: CARDIOVASCULAR - ADULT  Goal: Maintains optimal cardiac output and hemodynamic stability  Description: INTERVENTIONS:  - Monitor I/O, vital signs and rhythm  - Monitor for S/S and trends of decreased cardiac output  - Administer and titrate ordered vasoactive medications to optimize hemodynamic stability  - Assess quality of pulses, skin color and temperature  - Assess for signs of decreased coronary artery perfusion  - Instruct patient to report change in severity of symptoms  Outcome: Progressing  Goal: Absence of cardiac dysrhythmias or at baseline rhythm  Description: INTERVENTIONS:  - Continuous cardiac monitoring, vital signs, obtain 12 lead EKG if ordered  - Administer antiarrhythmic and heart rate control medications as ordered  - Monitor electrolytes and administer replacement therapy as ordered  Outcome: Progressing     Problem: METABOLIC, FLUID AND ELECTROLYTES - ADULT  Goal: Electrolytes maintained within normal limits  Description: INTERVENTIONS:  - Monitor labs and assess patient for signs and symptoms of electrolyte imbalances  - Administer electrolyte replacement as ordered  - Monitor response to electrolyte replacements, including repeat lab results as appropriate  - Instruct patient on fluid and nutrition as appropriate  Outcome: Progressing  Goal: Fluid balance maintained  Description: INTERVENTIONS:  - Monitor labs   - Monitor I/O and WT  - Instruct patient on fluid and nutrition as appropriate  - Assess for signs & symptoms of volume excess or deficit  Outcome: Progressing  Goal: Glucose maintained within target range  Description: INTERVENTIONS:  - Monitor Blood Glucose as ordered  - Assess for signs and symptoms of hyperglycemia and hypoglycemia  - Administer ordered medications to maintain glucose within target range  - Assess nutritional intake and initiate nutrition service referral as needed  Outcome: Progressing     Problem: PAIN - ADULT  Goal: Verbalizes/displays adequate comfort level or baseline comfort level  Description: Interventions:  - Encourage patient to monitor pain and request assistance  - Assess pain using appropriate pain scale  - Administer analgesics based on type and severity of pain and evaluate response  - Implement non-pharmacological measures as appropriate and evaluate response  - Consider cultural and social influences on pain and pain management  - Notify physician/advanced practitioner if interventions unsuccessful or patient reports new pain  Outcome: Progressing     Problem: INFECTION - ADULT  Goal: Absence or prevention of progression during hospitalization  Description: INTERVENTIONS:  - Assess and monitor for signs and symptoms of infection  - Monitor lab/diagnostic results  - Monitor all insertion sites, i e  indwelling lines, tubes, and drains  - Monitor endotracheal if appropriate and nasal secretions for changes in amount and color  - [place] appropriate cooling/warming therapies per order  - Administer medications as ordered  - Instruct and encourage patient and family to use good hand hygiene technique  - Identify and instruct in appropriate isolation precautions for identified infection/condition  Outcome: Progressing     Problem: SAFETY ADULT  Goal: Patient will remain free of falls  Description: INTERVENTIONS:  - Educate patient/family on patient safety including physical limitations  - Instruct patient to call for assistance with activity   - Consult OT/PT to assist with strengthening/mobility   - Keep Call bell within reach  - Keep bed low and locked with side rails adjusted as appropriate  - Keep care items and personal belongings within reach  - Initiate and maintain comfort rounds  - Make Fall Risk Sign visible to staff  - Offer Toileting every 2 Hours, in advance of need  - Initiate/Maintain bed alarm  - Obtain necessary fall risk management equipment: non skid socks  - Apply yellow socks and bracelet for high fall risk patients  - Consider moving patient to room near nurses station  Outcome: Progressing  Goal: Maintain or return to baseline ADL function  Description: INTERVENTIONS:  -  Assess patient's ability to carry out ADLs; assess patient's baseline for ADL function and identify physical deficits which impact ability to perform ADLs (bathing, care of mouth/teeth, toileting, grooming, dressing, etc )  - Assess/evaluate cause of self-care deficits   - Assess range of motion  - Assess patient's mobility; develop plan if impaired  - Assess patient's need for assistive devices and provide as appropriate  - Encourage maximum independence but intervene and supervise when necessary  - Involve family in performance of ADLs  - Assess for home care needs following discharge   - Consider OT consult to assist with ADL evaluation and planning for discharge  - Provide patient education as appropriate  Outcome: Progressing  Goal: Maintains/Returns to pre admission functional level  Description: INTERVENTIONS:  - Perform BMAT or MOVE assessment daily    - Set and communicate daily mobility goal to care team and patient/family/caregiver  - Collaborate with rehabilitation services on mobility goals if consulted  - Perform Range of Motion 4 times a day  - Reposition patient every 2 hours    - Dangle patient 4 times a day  - Stand patient 4 times a day  - Ambulate patient 4 times a day  - Out of bed to chair 3 times a day   - Out of bed for meals 3 times a day  - Out of bed for toileting  - Record patient progress and toleration of activity level   Outcome: Progressing     Problem: DISCHARGE PLANNING  Goal: Discharge to home or other facility with appropriate resources  Description: INTERVENTIONS:  - Identify barriers to discharge w/patient and caregiver  - Arrange for needed discharge resources and transportation as appropriate  - Identify discharge learning needs (meds, wound care, etc )  - Arrange for interpretive services to assist at discharge as needed  - Refer to Case Management Department for coordinating discharge planning if the patient needs post-hospital services based on physician/advanced practitioner order or complex needs related to functional status, cognitive ability, or social support system  Outcome: Progressing     Problem: MOBILITY - ADULT  Goal: Maintain or return to baseline ADL function  Description: INTERVENTIONS:  -  Assess patient's ability to carry out ADLs; assess patient's baseline for ADL function and identify physical deficits which impact ability to perform ADLs (bathing, care of mouth/teeth, toileting, grooming, dressing, etc )  - Assess/evaluate cause of self-care deficits   - Assess range of motion  - Assess patient's mobility; develop plan if impaired  - Assess patient's need for assistive devices and provide as appropriate  - Encourage maximum independence but intervene and supervise when necessary  - Involve family in performance of ADLs  - Assess for home care needs following discharge   - Consider OT consult to assist with ADL evaluation and planning for discharge  - Provide patient education as appropriate  Outcome: Progressing  Goal: Maintains/Returns to pre admission functional level  Description: INTERVENTIONS:  - Perform BMAT or MOVE assessment daily    - Set and communicate daily mobility goal to care team and patient/family/caregiver  - Collaborate with rehabilitation services on mobility goals if consulted  - Perform Range of Motion 3 times a day  - Reposition patient every 2 hours    - Dangle patient 4 times a day  - Stand patient 4 times a day  - Ambulate patient 4 times a day  - Out of bed to chair 4 times a day   - Out of bed for meals 3 times a day  - Out of bed for toileting  - Record patient progress and toleration of activity level   Outcome: Progressing

## 2021-07-16 NOTE — CASE MANAGEMENT
Case Management Progress Note    Patient name Eli Guerrero  Location /-94 MRN [id number]  : 1955 Date 2021       LOS (days): 2  Geometric Mean LOS (GMLOS) (days): 4 80  Days to GMLOS:2 1        BUNDLE:      OBJECTIVE:  Pt is a 74y o  year old /[employer], white or  [1], female with Sh[employer] preference of [address] admitted on  10:43 PM  Pt is admitted to [id number] at [address] with complaints of Severe sepsis ([facility] [address] )  Current admission status: Inpatient  [facility], [address] [address]  Phone: [phone] Fax: [phone]    Primary Care Provider: Carmelo Manzanares DO    PROGRESS NOTE:  - not medically ready for dc  - per rounds FUO  - IV antibiotics    DC plan is home, CM will continue to follow

## 2021-07-16 NOTE — ASSESSMENT & PLAN NOTE
Lab Results   Component Value Date    HGBA1C 7 7 (H) 01/25/2021       Recent Labs     07/15/21  1712 07/15/21  2100 07/16/21  0711 07/16/21  1051   POCGLU 153* 129 112 192*       Blood Sugar Average: Last 72 hrs:     · Glucose 135  · Fingerstick blood sugar with sliding scale coverage  · Hold metformin while in hospital

## 2021-07-16 NOTE — ASSESSMENT & PLAN NOTE
· Platelets 240-[facility] be due to liver disease  However will need to rule out a rickettsial parasitic infection  Will order Anaplasma, Ehrlichia     Parasitic smear is negative  · Daily CBC and trend platelets  · Monitor for bleeding

## 2021-07-16 NOTE — PLAN OF CARE
Problem: Potential for Falls  Goal: Patient will remain free of falls  Description: INTERVENTIONS:  - Educate patient/family on patient safety including physical limitations  - Instruct patient to call for assistance with activity   - Consult OT/PT to assist with strengthening/mobility   - Keep Call bell within reach  - Keep bed low and locked with side rails adjusted as appropriate  - Keep care items and personal belongings within reach  - Initiate and maintain comfort rounds  - Make Fall Risk Sign visible to staff  - Offer Toileting every   Hours, in advance of need  - Initiate/Maintain   alarm  - Obtain necessary fall risk management equipment:   - Apply yellow socks and bracelet for high fall risk patients  - Consider moving patient to room near nurses station  Outcome: Progressing     Problem: Nutrition/Hydration-ADULT  Goal: Nutrient/Hydration intake appropriate for improving, restoring or maintaining nutritional needs  Description: Monitor and assess patient's nutrition/hydration status for malnutrition  Collaborate with interdisciplinary team and initiate plan and interventions as ordered  Monitor patient's weight and dietary intake as ordered or per policy  Utilize nutrition screening tool and intervene as necessary  Determine patient's food preferences and provide high-protein, high-caloric foods as appropriate       INTERVENTIONS:  - Monitor oral intake, urinary output, labs, and treatment plans  - Assess nutrition and hydration status and recommend course of action  - Evaluate amount of meals eaten  - Assist patient with eating if necessary   - Allow adequate time for meals  - Recommend/ encourage appropriate diets, oral nutritional supplements, and vitamin/mineral supplements  - Order, calculate, and assess calorie counts as needed  - Recommend, monitor, and adjust tube feedings and TPN/PPN based on assessed needs  - Assess need for intravenous fluids  - Provide specific nutrition/hydration education as appropriate  - Include patient/family/caregiver in decisions related to nutrition  Outcome: Progressing     Problem: CARDIOVASCULAR - ADULT  Goal: Maintains optimal cardiac output and hemodynamic stability  Description: INTERVENTIONS:  - Monitor I/O, vital signs and rhythm  - Monitor for S/S and trends of decreased cardiac output  - Administer and titrate ordered vasoactive medications to optimize hemodynamic stability  - Assess quality of pulses, skin color and temperature  - Assess for signs of decreased coronary artery perfusion  - Instruct patient to report change in severity of symptoms  Outcome: Progressing  Goal: Absence of cardiac dysrhythmias or at baseline rhythm  Description: INTERVENTIONS:  - Continuous cardiac monitoring, vital signs, obtain 12 lead EKG if ordered  - Administer antiarrhythmic and heart rate control medications as ordered  - Monitor electrolytes and administer replacement therapy as ordered  Outcome: Progressing     Problem: METABOLIC, FLUID AND ELECTROLYTES - ADULT  Goal: Electrolytes maintained within normal limits  Description: INTERVENTIONS:  - Monitor labs and assess patient for signs and symptoms of electrolyte imbalances  - Administer electrolyte replacement as ordered  - Monitor response to electrolyte replacements, including repeat lab results as appropriate  - Instruct patient on fluid and nutrition as appropriate  Outcome: Progressing  Goal: Fluid balance maintained  Description: INTERVENTIONS:  - Monitor labs   - Monitor I/O and WT  - Instruct patient on fluid and nutrition as appropriate  - Assess for signs & symptoms of volume excess or deficit  Outcome: Progressing  Goal: Glucose maintained within target range  Description: INTERVENTIONS:  - Monitor Blood Glucose as ordered  - Assess for signs and symptoms of hyperglycemia and hypoglycemia  - Administer ordered medications to maintain glucose within target range  - Assess nutritional intake and initiate nutrition service referral as needed  Outcome: Progressing     Problem: PAIN - ADULT  Goal: Verbalizes/displays adequate comfort level or baseline comfort level  Description: Interventions:  - Encourage patient to monitor pain and request assistance  - Assess pain using appropriate pain scale  - Administer analgesics based on type and severity of pain and evaluate response  - Implement non-pharmacological measures as appropriate and evaluate response  - Consider cultural and social influences on pain and pain management  - Notify physician/advanced practitioner if interventions unsuccessful or patient reports new pain  Outcome: Progressing     Problem: INFECTION - ADULT  Goal: Absence or prevention of progression during hospitalization  Description: INTERVENTIONS:  - Assess and monitor for signs and symptoms of infection  - Monitor lab/diagnostic results  - Monitor all insertion sites, i e  indwelling lines, tubes, and drains  - Monitor endotracheal if appropriate and nasal secretions for changes in amount and color  - [place] appropriate cooling/warming therapies per order  - Administer medications as ordered  - Instruct and encourage patient and family to use good hand hygiene technique  - Identify and instruct in appropriate isolation precautions for identified infection/condition  Outcome: Progressing     Problem: SAFETY ADULT  Goal: Patient will remain free of falls  Description: INTERVENTIONS:  - Educate patient/family on patient safety including physical limitations  - Instruct patient to call for assistance with activity   - Consult OT/PT to assist with strengthening/mobility   - Keep Call bell within reach  - Keep bed low and locked with side rails adjusted as appropriate  - Keep care items and personal belongings within reach  - Initiate and maintain comfort rounds  - Make Fall Risk Sign visible to staff  - Offer Toileting every   Hours, in advance of need  - Initiate/Maintain   alarm  - Obtain necessary fall risk management equipment:   - Apply yellow socks and bracelet for high fall risk patients  - Consider moving patient to room near nurses station  Outcome: Progressing  Goal: Maintain or return to baseline ADL function  Description: INTERVENTIONS:  -  Assess patient's ability to carry out ADLs; assess patient's baseline for ADL function and identify physical deficits which impact ability to perform ADLs (bathing, care of mouth/teeth, toileting, grooming, dressing, etc )  - Assess/evaluate cause of self-care deficits   - Assess range of motion  - Assess patient's mobility; develop plan if impaired  - Assess patient's need for assistive devices and provide as appropriate  - Encourage maximum independence but intervene and supervise when necessary  - Involve family in performance of ADLs  - Assess for home care needs following discharge   - Consider OT consult to assist with ADL evaluation and planning for discharge  - Provide patient education as appropriate  Outcome: Progressing  Goal: Maintains/Returns to pre admission functional level  Description: INTERVENTIONS:  - Perform BMAT or MOVE assessment daily    - Set and communicate daily mobility goal to care team and patient/family/caregiver  - Collaborate with rehabilitation services on mobility goals if consulted  - Perform Range of Motion   times a day  - Reposition patient every   hours  - Dangle patient   times a day  - Stand patient   times a day  - Ambulate patient   times a day  - Out of bed to chair    times a day   - Out of bed for meals  times a day  - Out of bed for toileting  - Record patient progress and toleration of activity level   Outcome: Progressing     Problem: DISCHARGE PLANNING  Goal: Discharge to home or other facility with appropriate resources  Description: INTERVENTIONS:  - Identify barriers to discharge w/patient and caregiver  - Arrange for needed discharge resources and transportation as appropriate  - Identify discharge learning needs (meds, wound care, etc )  - Arrange for interpretive services to assist at discharge as needed  - Refer to Case Management Department for coordinating discharge planning if the patient needs post-hospital services based on physician/advanced practitioner order or complex needs related to functional status, cognitive ability, or social support system  Outcome: Progressing     Problem: MOBILITY - ADULT  Goal: Maintain or return to baseline ADL function  Description: INTERVENTIONS:  -  Assess patient's ability to carry out ADLs; assess patient's baseline for ADL function and identify physical deficits which impact ability to perform ADLs (bathing, care of mouth/teeth, toileting, grooming, dressing, etc )  - Assess/evaluate cause of self-care deficits   - Assess range of motion  - Assess patient's mobility; develop plan if impaired  - Assess patient's need for assistive devices and provide as appropriate  - Encourage maximum independence but intervene and supervise when necessary  - Involve family in performance of ADLs  - Assess for home care needs following discharge   - Consider OT consult to assist with ADL evaluation and planning for discharge  - Provide patient education as appropriate  Outcome: Progressing  Goal: Maintains/Returns to pre admission functional level  Description: INTERVENTIONS:  - Perform BMAT or MOVE assessment daily    - Set and communicate daily mobility goal to care team and patient/family/caregiver  - Collaborate with rehabilitation services on mobility goals if consulted  - Perform Range of Motion   times a day  - Reposition patient every   hours  - Dangle patient   times a day  - Stand patient   times a day  - Ambulate patient   times a day  - Out of bed to chair   times a day   - Out of bed for meals    times a day  - Out of bed for toileting  - Record patient progress and toleration of activity level   Outcome: Progressing

## 2021-07-16 NOTE — ASSESSMENT & PLAN NOTE
· Diagnosed with cirrhosis in November of 2020-cell Dr Brandy Rodriguez, gastroenterology at [facility] at that time  · CT abdomen pelvis: Hepatic cirrhosis and evidence of portal hypertension with prominent mesenteric, perisplenic, and perigastric collateral vessels/varices  Small-volume ascites   · Abdominal ultrasound 06/20/2021Hepatosplenomegaly  Hepatic cirrhosis and mild steatosis  · Total bilirubin:  1 63-1 98  · Ammonia:  49  placed on lactulose and Xifaxan    Patient had several bowel movements following which ammonia is now down to 29 with improvement in mentation noted  · Continue outpatient GI follow-up

## 2021-07-16 NOTE — QUICK NOTE
After reviewing the patient's MRI Abdomen I do not see enough fluid to remove for a diagnostic paracentesis  In this patient I believe that the risks outweigh the benefit of the procedure  There is concern for infection due to persistent fever  The patient is being treated for a UTI but conts to have fevers  Surgery has been consulted regarding her gallbladder, however they do not believe that she needs any surgical intervention  She also blanco snot have signs consistent with SBP  The above discussed with hospitalist who agrees with above  May need lumbar puncture in the future which I will due if no contraindications

## 2021-07-16 NOTE — PROGRESS NOTES
Diet advanced to clear liquids, currently with nausea, was provided zofran  NPO/clear liquids since 7/14 (x 2 days)  Recommend addition of ensure clear TID, relayed to MD over tiger text  Advance diet as medically appropriate to low fat diet

## 2021-07-16 NOTE — ASSESSMENT & PLAN NOTE
· CT abdomen pelvis: Sludge and multiple tiny stones within the gallbladder lumen  The gallbladder is moderately distended  Gallbladder wall thickening noted which could be related to edema or inflammation/cholecystitis  · Abdominal ultrasound June 2021: Partially layering gallbladder sludge  Anterior gallbladder wall adherent sludge versus 1 7 cm polyp  · Negative HIDA scan  · Discussed with surgery  Eventually the gallbladder will need to come out electively  · Currently on clear liquid diet    Will advance to full liquid diet and observe  · Continue IV Zosyn

## 2021-07-16 NOTE — ASSESSMENT & PLAN NOTE
· Urinalysis:  Innumerable bacteria  Prelim urine culture shows Enterococcus faecalis and E coli  · Zosyn started in the ER for sepsis  · Patient has prior history of urosepsis along with kidney stones and obstruction resulting in percutaneous nephrostomy tube to be placed few years ago

## 2021-07-16 NOTE — ASSESSMENT & PLAN NOTE
· Presented for fever, generalized weakness, and nausea-  · Severe Sepsis present on arrival-fever, tachycardia, suspected infection, lactic acid 4 1  · Flu negative, COVID negative  · Unclear source of infection  May be acute cholecystitis versus acute cystitis without hematuria  · Urine culture positive for Enterococcus faecalis and E coli  · Blood culture negative  · CT abdomen pelvis: Sludge and multiple tiny stones within the gallbladder lumen  The gallbladder is moderately distended  Gallbladder wall thickening noted which could be related to edema or inflammation/cholecystitis  · HIDA scan negative for acute cholecystitis  · CRP elevated at 90   Rickettsial panel pending   · Continue IV Zosyn

## 2021-07-16 NOTE — PROGRESS NOTES
[address]  Progress Note - Cesar Chiang 1955, 74 y o  female MRN: [id number]  Unit/Bed#: -01 Encounter: [id number]  Primary Care Provider: lAfred Ordoñez DO   Date and time admitted to hospital: 7/13/2021 10:43 PM    * Severe sepsis [facility])  Assessment & Plan  · Presented for fever, generalized weakness, and nausea-  · Severe Sepsis present on arrival-fever, tachycardia, suspected infection, lactic acid 4 1  · Flu negative, COVID negative  · Unclear source of infection  May be acute cholecystitis versus acute cystitis without hematuria  · Urine culture positive for Enterococcus faecalis and E coli  · Blood culture negative  · CT abdomen pelvis: Sludge and multiple tiny stones within the gallbladder lumen  The gallbladder is moderately distended  Gallbladder wall thickening noted which could be related to edema or inflammation/cholecystitis  · HIDA scan negative for acute cholecystitis  · CRP elevated at 90  Rickettsial panel pending   · Continue IV Zosyn     Acute cholecystitis  Assessment & Plan  · CT abdomen pelvis: Sludge and multiple tiny stones within the gallbladder lumen  The gallbladder is moderately distended  Gallbladder wall thickening noted which could be related to edema or inflammation/cholecystitis  · Abdominal ultrasound June 2021: Partially layering gallbladder sludge  Anterior gallbladder wall adherent sludge versus 1 7 cm polyp  · Negative HIDA scan  · Discussed with surgery  Eventually the gallbladder will need to come out electively  · Currently on clear liquid diet  Will advance to full liquid diet and observe  · Continue IV Zosyn    Acute cystitis without hematuria  Assessment & Plan  · Urinalysis:  Innumerable bacteria  Prelim urine culture shows Enterococcus faecalis and E coli  · Zosyn started in the ER for sepsis     · Patient has prior history of urosepsis along with kidney stones and obstruction resulting in percutaneous nephrostomy tube to be placed few years ago  Hypokalemia  Assessment & Plan  Secondary to lactulose induced diarrhea  Replace potassium and recheck BMP tomorrow    MELVIN on CPAP  Assessment & Plan  · Continue CPAP at bedtime    Class 3 severe obesity due to excess calories with serious comorbidity and body mass index (BMI) of 45 0 to 49 9 in adult [facility])  Assessment & Plan  · BMI 48 36  · Recommend intensive therapeutic lifestyle modification    Type 2 diabetes mellitus without complication, without long-term current use of insulin [facility])  Assessment & Plan  Lab Results   Component Value Date    HGBA1C 7 7 (H) 01/25/2021       Recent Labs     07/15/21  1712 07/15/21  2100 07/16/21  0711 07/16/21  1051   POCGLU 153* 129 112 192*       Blood Sugar Average: Last 72 hrs:     · Glucose 135  · Fingerstick blood sugar with sliding scale coverage  · Hold metformin while in hospital    Thrombocytopenia (HCC)  Assessment & Plan  · Platelets 430-PJWDT be due to liver disease  However will need to rule out a rickettsial parasitic infection  Will order Anaplasma, Ehrlichia   Parasitic smear is negative  · Daily CBC and trend platelets  · Monitor for bleeding    Cirrhosis of liver with ascites [facility])  Assessment & Plan  · Diagnosed with cirrhosis in November of 2020-cell Dr Farida Loera, gastroenterology at [facility] [place] at that time  · CT abdomen pelvis: Hepatic cirrhosis and evidence of portal hypertension with prominent mesenteric, perisplenic, and perigastric collateral vessels/varices  Small-volume ascites   · Abdominal ultrasound 06/20/2021Hepatosplenomegaly  Hepatic cirrhosis and mild steatosis  · Total bilirubin:  1 63-1 98  · Ammonia:  49  placed on lactulose and Xifaxan    Patient had several bowel movements following which ammonia is now down to 29 with improvement in mentation noted  · Continue outpatient GI follow-up    Hyponatremia  Assessment & Plan  · Sodium 132 on admission  · Received 2 L normal saline in the ER  · Repeat sodium 138 and hence will discontinue fluids      VTE Pharmacologic Prophylaxis:   Pharmacologic:  No anticoagulation due to thrombocytopenia  Mechanical VTE Prophylaxis in Place: Yes    Patient Centered Rounds: I have performed bedside rounds with nursing staff today  Discussions with Specialists or Other Care Team Provider:  Discussed with Infectious Disease General surgery and GI    Education and Discussions with Family / Patient:  Discussed with patient at bedside will update family    Time Spent for Care: 45 minutes  More than 50% of total time spent on counseling and coordination of care as described above  Current Length of Stay: 2 day(s)    Current Patient Status: Inpatient   Certification Statement: The patient will continue to require additional inpatient hospital stay due to Severe sepsis    Discharge Plan:  Unclear source of fever  Continue hospital stay    Code Status: Level 1 - Full Code      Subjective:   Patient denies any chest pain or shortness of breath today  Mild abdominal discomfort noted  Mild headache reported  Mild back pain reported  Spiked fever of 102 2 again today    Objective:     Vitals:   Temp (24hrs), Av 2 °F (37 9 °C), Min:99 1 °F (37 3 °C), Max:102 2 °F (39 °C)    Temp:  [99 1 °F (37 3 °C)-102 2 °F (39 °C)] 99 1 °F (37 3 °C)  HR:  [79-82] 79  Resp:  [12-18] 16  BP: (103-131)/(48-72) 119/63  SpO2:  [94 %-95 %] 94 %  Body mass index is 48 59 kg/m²  Input and Output Summary (last 24 hours): Intake/Output Summary (Last 24 hours) at 2021 1227  Last data filed at 2021 0733  Gross per 24 hour   Intake 1163 75 ml   Output 1400 ml   Net -236 25 ml       Physical Exam:     Physical Exam  Vitals and nursing note reviewed  Constitutional:       Appearance: Normal appearance  HENT:      Head: Normocephalic and atraumatic        Right Ear: External ear normal       Left Ear: External ear normal       Nose: Nose normal       Mouth/Throat:      Pharynx: Oropharynx is clear    Eyes:      Pupils: Pupils are equal, round, and reactive to light  Cardiovascular:      Rate and Rhythm: Normal rate and regular rhythm  Heart sounds: Normal heart sounds  Pulmonary:      Effort: Pulmonary effort is normal       Breath sounds: Normal breath sounds  Abdominal:      General: Bowel sounds are normal       Palpations: Abdomen is soft  Tenderness: There is abdominal tenderness  Musculoskeletal:         General: Normal range of motion  Cervical back: Normal range of motion and neck supple  Skin:     General: Skin is warm and dry  Capillary Refill: Capillary refill takes less than 2 seconds  Neurological:      General: No focal deficit present  Mental Status: She is alert and oriented to person, place, and time     Psychiatric:         Mood and Affect: Mood normal            Additional Data:     Labs:    Results from last 7 days   Lab Units 07/16/21  0541 07/14/21  0517 07/13/21  2248   WBC Thousand/uL 7 67 5 88 6 39   HEMOGLOBIN g/dL 11 9 11 7 12 6   HEMATOCRIT % 35 3 35 5 37 5   PLATELETS Thousands/uL 103* 97* 105*   BANDS PCT %  --   --  4   NEUTROS PCT %  --  73  --    LYMPHS PCT %  --  16  --    LYMPHO PCT %  --   --  14   MONOS PCT %  --  9  --    MONO PCT %  --   --  9   EOS PCT %  --  0 0     Results from last 7 days   Lab Units 07/16/21  0541   SODIUM mmol/L 137   POTASSIUM mmol/L 2 9*   CHLORIDE mmol/L 104   CO2 mmol/L 21   BUN mg/dL 14   CREATININE mg/dL 0 81   ANION GAP mmol/L 12   CALCIUM mg/dL 8 1*   ALBUMIN g/dL 2 8*   TOTAL BILIRUBIN mg/dL 1 98*   ALK PHOS U/L 62   ALT U/L 41   AST U/L 61*   GLUCOSE RANDOM mg/dL 116         Results from last 7 days   Lab Units 07/16/21  1051 07/16/21  0711 07/15/21  2100 07/15/21  1712 07/15/21  1152 07/15/21  0556 07/15/21  0029 07/14/21  1825 07/14/21  1215 07/14/21  0617 07/14/21  0239   POC GLUCOSE mg/dl 192* 112 129 153* 172* 128 143* 142* 101 126 136         Results from last 7 days   Lab Units 07/16/21  0541 07/15/21  0506 07/14/21  0517 07/14/21  0127 07/13/21  2248   LACTIC ACID mmol/L  --   --   --  1 6 4 1*   PROCALCITONIN ng/ml 0 74* 0 79* 0 44*  --   --            * I Have Reviewed All Lab Data Listed Above  * Additional Pertinent Lab Tests Reviewed: Jeff 64 Admission Reviewed    Imaging:    Imaging Reports Reviewed Today Include:  None  Imaging Personally Reviewed by Myself Includes:  None    Recent Cultures (last 7 days):     Results from last 7 days   Lab Units 07/14/21  0537 07/13/21  2248   BLOOD CULTURE   --  No Growth at 48 hrs  No Growth at 48 hrs  URINE CULTURE  >100,000 cfu/ml Enterococcus faecalis*  10,000-19,000 cfu/ml Escherichia coli*  --        Last 24 Hours Medication List:   Current Facility-Administered Medications   Medication Dose Route Frequency Provider Last Rate    acetaminophen  650 mg Oral Q6H PRN TANISHA Boucher      brimonidine  1 drop Both Eyes TID TANISHA Boucher      citalopram  10 mg Oral HS Charli Higgins MD      insulin lispro  2-12 Units Subcutaneous 4x Daily (AC & HS) Vale Alicia MD      morphine injection  2 mg Intravenous Q4H PRN Brian Cody MD      ondansetron  4 mg Intravenous Q6H PRN TANISHA Boucher      piperacillin-tazobactam  4 5 g Intravenous Q6H TANISHA Boucher 4 5 g (07/16/21 0733)    rifaximin  550 mg Oral Q12H [address] Brian Cody MD          Today, Patient Was Seen By: Brian Cody MD    ** Please Note: Dictation voice to text software may have been used in the creation of this document   **

## 2021-07-16 NOTE — CONSULTS
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  -at this time I feel that she may have crossed over to decompensated cirrhosis and she may have had underlying subclinical hepatic encephalopathy for the past few months given her increased fatigue, disruption in her day/night cycles and memory issues; there is also some evidence of small perihepatic ascites  -would recommend lactulose now and upon discharge and titrate to 2-3 soft bowel movements per day  -recommend low-sodium diet  -MELD labs not available, check MLED labs  -mildly elevated bilirubin may be secondary to her underlying cirrhosis versus sepsis-MRCP negative for choledocholithiasis  -chronic cirrhosis management deferred to Dr Brandi Mcguire ie-variceal screening, [facility] [address]  screening  -no indication for any endoscopic intervention  -will continue to monitor peripherally    2  Sepsis  -does not meet criteria for cholangitis, unlikely cholecystitis given negative HIDA scan  -while SBP is a possibility patient is on Zosyn so should have adequate coverage, attempted evaluation for paracentesis by Critical Care however there was no fluid accessible  -possibly UTI  -appreciate surgery input  -antibiotics as per Infectious Disease    3  Cholelithiasis  -management as per surgery          Reason for Consult / Principal Problem:  Cirrhosis, cholelithiasis, sepsis, abdominal pain    HPI: Yamil Teixeira is a 74y o  year old female seen in consultation for comanagement of cirrhosis in the setting of his sepsis initially thought to be gallbladder origin however now likely secondary to urinary tract infection  Patient presents after fall with associated confusion without any prodromal symptoms such as lightheadedness, dizziness, chest pain, shortness of breath    She denies excessive fluid losses or source of dehydration such as diarrhea or excessive urination or vomiting  She claims that her legs just buckled from under her, however this was her 3rd fall in the previous 2 weeks  She has had COVID in November 2020 and states that since then she has not been 100%  She has chronic fatigue and increasing memory issues  She was diagnosed with MOORE cirrhosis from EUS guided liver biopsy however she does not report any history of decompensation such as variceal bleeding, ascites or hepatic encephalopathy  She is followed by Dr Colbert(gastroenterology)  She has undergone CT imaging which was concerning for acute cholecystitis, however HIDA scan was negative and MRCP did not reveal any choledocholithiasis  Her urine cultures are growing Enterococcus however patient denies any urinary symptoms and this could demonstrate colonization  She was evaluated by surgery and at this they do not feel like gallbladder is the source of her sepsis  She has also been evaluated by Infectious Disease and antibiotics are being managed by them  She was evaluated for possible ascites drainage by Critical Care however there was no pockets to evaluate however her current antibiotic choices should cover most tammy  She had a mildly elevated ammonia level for which she was started on lactulose and she states she has had 15 bowel movements per day  Past Medical History:   Diagnosis Date    Diabetes mellitus ([address] )     Kidney stone      Past Surgical History:   Procedure Laterality Date    EYE SURGERY      cataract removal    JOINT REPLACEMENT      LITHOTRIPSY       Social History   Social History     Substance and Sexual Activity   Alcohol Use Not Currently     Social History     Substance and Sexual Activity   Drug Use Never     Social History     Tobacco Use   Smoking Status Never Smoker   Smokeless Tobacco Never Used       History reviewed  No pertinent family history      Medications Prior to Admission   Medication    brimonidine (ALPHAGAN P) 0 15 % ophthalmic solution    citalopram (CeleXA) 10 mg tablet    Acetaminophen (TYLENOL ARTHRITIS PAIN PO)    allopurinol (ZYLOPRIM) 100 mg tablet    ALPRAZolam (Xanax) 0 25 mg tablet    amoxicillin (AMOXIL) 500 mg capsule    atorvastatin (LIPITOR) 10 mg tablet    benzonatate (TESSALON PERLES) 100 mg capsule    budesonide (Rhinocort Aqua) 32 MCG/ACT nasal spray    Calcium Carb-Cholecalciferol 600-500 MG-UNIT CAPS    celecoxib (CeleBREX) 200 mg capsule    Coenzyme Q10 (COQ-10) 10 MG CAPS    Cyclobenzaprine HCl (FLEXERIL PO)    denosumab (Prolia) 60 mg/mL    docusate sodium (Colace) 100 mg capsule    econazole nitrate 1 % cream    hydrochlorothiazide (HYDRODIURIL) 50 mg tablet    LUMIGAN 0 01 % ophthalmic drops    lutein 6 mg    metFORMIN (GLUCOPHAGE) 1000 MG tablet    pantoprazole (PROTONIX) 40 mg tablet    Polyethylene Glycol 3350 (MIRALAX PO)    potassium citrate (UROCIT-K) 10 mEq    Vitamin D, Cholecalciferol, 10 MCG (400 UNIT) TABS     Current Facility-Administered Medications   Medication Dose Route Frequency    acetaminophen (TYLENOL) tablet 650 mg  650 mg Oral Q6H PRN    brimonidine (ALPHAGAN P) 0 15 % ophthalmic solution 1 drop  1 drop Both Eyes TID    citalopram (CeleXA) tablet 10 mg  10 mg Oral HS    insulin lispro (HumaLOG) 100 units/mL subcutaneous injection 2-12 Units  2-12 Units Subcutaneous 4x Daily (AC & HS)    morphine injection 2 mg  2 mg Intravenous Q4H PRN    ondansetron (ZOFRAN) injection 4 mg  4 mg Intravenous Q6H PRN    piperacillin-tazobactam (ZOSYN) 4 5 g in sodium chloride 0 9 % 100 mL IVPB  4 5 g Intravenous Q6H    rifaximin (XIFAXAN) tablet 550 mg  550 mg Oral Q12H [facility] long term     Allergies   Allergen Reactions    Nitrofurantoin Other (See Comments)     Paresthesias in hands         REVIEW OF SYSTEMS  General ROS: positive for  - fever  Ophthalmic ROS: negative for - decreased vision  ENT ROS: negative for - sore throat  Hematological and Lymphatic ROS: negative for - bleeding problems  Endocrine ROS: negative for - unexpected weight changes  Respiratory ROS: no cough, shortness of breath, or wheezing  Cardiovascular ROS: no chest pain or dyspnea on exertion  Gastrointestinal ROS: positive for - abdominal pain and gas/bloating  Genito-Urinary ROS: no dysuria, trouble voiding, or hematuria  Musculoskeletal ROS: negative  Neurological ROS: positive for - confusion, memory loss and weakness  Dermatological ROS: negative for rash      PHYSICAL EXAM   Most Recent Vital Signs:  Vitals:    07/15/21 2239 07/16/21 0021 07/16/21 0710 07/16/21 1035   BP:  (!) 103/48 119/63    BP Location:       Pulse:    79   Resp:  12 16    Temp:  100 1 °F (37 8 °C) (!) 102 2 °F (39 °C) 99 1 °F (37 3 °C)   TempSrc:       SpO2: 94%      Weight:       Height:           Intake/Output Summary (Last 24 hours) at 7/16/2021 1153  Last data filed at 7/16/2021 2974  Gross per 24 hour   Intake 1163 75 ml   Output 1400 ml   Net -236 25 ml     /63   Pulse 79   Temp 99 1 °F (37 3 °C)   Resp 16   Ht 5' 2" (1 575 m)   Wt 121 kg (265 lb 10 5 oz)   SpO2 94%   BMI 48 59 kg/m²   General appearance: alert and morbidly obese  Eyes: negative findings: conjunctivae and sclerae normal  Neck: supple, symmetrical, trachea midline  Lungs: normal respiratory effort  Abdomen: normal findings: absence of hernia and abnormal findings:  obese and moderate tenderness in the entire abdomen  Extremities: no edema, redness or tenderness in the calves or thighs  Skin: Skin color, texture, turgor normal  No rashes or lesions  Neurologic: Alert and oriented X 3, normal strength and tone  Normal symmetric reflexes   Normal coordination and gait, no asterixis      LABS/IMAGING  Lab Results:   Admission on 07/13/2021   Component Date Value    WBC 07/13/2021 6 39     RBC 07/13/2021 3 93     Hemoglobin 07/13/2021 12 6     Hematocrit 07/13/2021 37 5     MCV 07/13/2021 95     MCH 07/13/2021 32 1     MCHC 07/13/2021 33 6     RDW 07/13/2021 16 4*    MPV 07/13/2021 10 6     Platelets 71/19/6724 105*    nRBC 07/13/2021 0     SARS-CoV-2 07/13/2021 Negative     INFLUENZA A PCR 07/13/2021 None Detected     INFLUENZA B PCR 07/13/2021 None Detected     RSV PCR 07/13/2021 None Detected     Blood Culture 07/13/2021 No Growth at 48 hrs   Blood Culture 07/13/2021 No Growth at 48 hrs       Color, UA 07/14/2021 Yellow     Clarity, UA 07/14/2021 Clear     Specific Gravity, UA 07/14/2021 1 025     pH, UA 07/14/2021 5 5     Leukocytes, UA 07/14/2021 Negative     Nitrite, UA 07/14/2021 Negative     Protein, UA 07/14/2021 Negative     Glucose, UA 07/14/2021 250 (1/4%)*    Ketones, UA 07/14/2021 Negative     Urobilinogen, UA 07/14/2021 1 0     Bilirubin, UA 07/14/2021 Small*    Blood, UA 07/14/2021 Small*    Sodium 07/13/2021 132*    Potassium 07/13/2021 3 6     Chloride 07/13/2021 99*    CO2 07/13/2021 24     ANION GAP 07/13/2021 9     BUN 07/13/2021 9     Creatinine 07/13/2021 0 91     Glucose 07/13/2021 135     Calcium 07/13/2021 8 7     Corrected Calcium 07/13/2021 9 3     AST 07/13/2021 75*    ALT 07/13/2021 48     Alkaline Phosphatase 07/13/2021 65     Total Protein 07/13/2021 7 6     Albumin 07/13/2021 3 3*    Total Bilirubin 07/13/2021 1 83*    eGFR 07/13/2021 65     Lipase 07/13/2021 127     LACTIC ACID 07/13/2021 4 1*    Troponin I 07/13/2021 0 02     Segmented % 07/13/2021 73     Bands % 07/13/2021 4     Lymphocytes % 07/13/2021 14     Monocytes % 07/13/2021 9     Eosinophils, % 07/13/2021 0     Basophils % 07/13/2021 0     Absolute Neutrophils 07/13/2021 4 92     Lymphocytes Absolute 07/13/2021 0 89     Monocytes Absolute 07/13/2021 0 58     Eosinophils Absolute 07/13/2021 0 00     Basophils Absolute 07/13/2021 0 00     Total Counted 07/13/2021 100     RBC Morphology 07/13/2021 Normal     Platelet Estimate 17/59/9969 Decreased*    LACTIC ACID 07/14/2021 1 6     Bilirubin, Direct 07/13/2021 0 64*    Ammonia 07/14/2021 49*    RBC, UA 07/14/2021 2-4     WBC, UA 07/14/2021 2-4     Epithelial Cells 07/14/2021 Occasional     Bacteria, UA 07/14/2021 Innumerable*    MUCUS THREADS 07/14/2021 Occasional*    Urine Culture 07/14/2021 >100,000 cfu/ml Enterococcus faecalis*    Urine Culture 07/14/2021 10,000-19,000 cfu/ml Escherichia coli*    Sodium 07/14/2021 136     Potassium 07/14/2021 3 7     Chloride 07/14/2021 104     CO2 07/14/2021 24     ANION GAP 07/14/2021 8     BUN 07/14/2021 9     Creatinine 07/14/2021 0 81     Glucose 07/14/2021 135     Calcium 07/14/2021 7 9*    Corrected Calcium 07/14/2021 8 9     AST 07/14/2021 71*    ALT 07/14/2021 43     Alkaline Phosphatase 07/14/2021 54     Total Protein 07/14/2021 6 5     Albumin 07/14/2021 2 7*    Total Bilirubin 07/14/2021 1 82*    eGFR 07/14/2021 75     WBC 07/14/2021 5 88     RBC 07/14/2021 3 69*    Hemoglobin 07/14/2021 11 7     Hematocrit 07/14/2021 35 5     MCV 07/14/2021 96     MCH 07/14/2021 31 7     MCHC 07/14/2021 33 0     RDW 07/14/2021 16 7*    MPV 07/14/2021 10 4     Platelets 65/88/3343 97*    nRBC 07/14/2021 0     Neutrophils Relative 07/14/2021 73     Immat GRANS % 07/14/2021 1     Lymphocytes Relative 07/14/2021 16     Monocytes Relative 07/14/2021 9     Eosinophils Relative 07/14/2021 0     Basophils Relative 07/14/2021 1     Neutrophils Absolute 07/14/2021 4 32     Immature Grans Absolute 07/14/2021 0 05     Lymphocytes Absolute 07/14/2021 0 91     Monocytes Absolute 07/14/2021 0 55     Eosinophils Absolute 07/14/2021 0 01     Basophils Absolute 07/14/2021 0 04     Procalcitonin 07/14/2021 0 44*    POC Glucose 07/14/2021 136     POC Glucose 07/14/2021 126     Ventricular Rate 07/13/2021 111     Atrial Rate 07/13/2021 111     NV Interval 07/13/2021 152     QRSD Interval 07/13/2021 72     QT Interval 07/13/2021 340     QTC Interval 07/13/2021 462     P Axis 07/13/2021 83     QRS Axis 07/13/2021 92     T Wave Axis 07/13/2021 68     Procalcitonin 07/15/2021 0 79*    POC Glucose 07/14/2021 101     POC Glucose 07/14/2021 142*    WBC 07/15/2021 6 34     RBC 07/15/2021 3 42*    Hemoglobin 07/15/2021 11 0*    Hematocrit 07/15/2021 32 9*    MCV 07/15/2021 96     MCH 07/15/2021 32 2     MCHC 07/15/2021 33 4     RDW 07/15/2021 17 2*    Platelets 07/39/2178 90*    MPV 07/15/2021 10 9     Sodium 07/15/2021 138     Potassium 07/15/2021 3 3*    Chloride 07/15/2021 105     CO2 07/15/2021 22     ANION GAP 07/15/2021 11     BUN 07/15/2021 15     Creatinine 07/15/2021 0 76     Glucose 07/15/2021 141*    Calcium 07/15/2021 7 5*    Corrected Calcium 07/15/2021 8 8     AST 07/15/2021 66*    ALT 07/15/2021 40     Alkaline Phosphatase 07/15/2021 53     Total Protein 07/15/2021 6 1*    Albumin 07/15/2021 2 4*    Total Bilirubin 07/15/2021 1 74*    eGFR 07/15/2021 81     POC Glucose 07/15/2021 143*    POC Glucose 07/15/2021 128     % Parasitemia 07/15/2021 0     Is Blood Parasite Present 07/15/2021 No     POC Glucose 07/15/2021 172*    POC Glucose 07/15/2021 153*    POC Glucose 07/15/2021 129     Procalcitonin 07/16/2021 0 74*    Ammonia 07/16/2021 24     WBC 07/16/2021 7 67     RBC 07/16/2021 3 70*    Hemoglobin 07/16/2021 11 9     Hematocrit 07/16/2021 35 3     MCV 07/16/2021 95     MCH 07/16/2021 32 2     MCHC 07/16/2021 33 7     RDW 07/16/2021 17 3*    Platelets 36/92/6064 103*    MPV 07/16/2021 11 0     Sodium 07/16/2021 137     Potassium 07/16/2021 2 9*    Chloride 07/16/2021 104     CO2 07/16/2021 21     ANION GAP 07/16/2021 12     BUN 07/16/2021 14     Creatinine 07/16/2021 0 81     Glucose 07/16/2021 116     Calcium 07/16/2021 8 1*    Corrected Calcium 07/16/2021 9 1     AST 07/16/2021 61*    ALT 07/16/2021 41     Alkaline Phosphatase 07/16/2021 62     Total Protein 07/16/2021 6 8     Albumin 07/16/2021 2 8*    Total Bilirubin 07/16/2021 1 98*  eGFR 07/16/2021 75     Path Review 07/15/2021 No Blood Parasites Seen  Reviewed by Renee GOMEZ     [employer] POC Glucose 07/16/2021 112     Sed Rate 07/16/2021 35*    CRP 07/16/2021 90 1*    POC Glucose 07/16/2021 192*       Imaging Studies: I have personally reviewed pertinent reports  Counseling / Coordination of Care  Total time spent today 45 minutes  Greater than 50% of total time was spent with the patient and / or family counseling and / or coordination of care        Dean Thompson MD

## 2021-07-16 NOTE — PROGRESS NOTES
Progress Note - General Surgery   [facility] 74 y o  female MRN: [id number]  Unit/Bed#: -01 Encounter: [id number]    Assessment:  Cholelithiasis     I do not feel that this is the source of the patient's symptomatology  She had some mental status changes and weakness  I feel that this is most likely secondary to worsening liver disease  Discussed with GI  Will continue the current course of treatment for her liver disease and continue with lactulose  Hypokalemia  Mental status change    Plan:  Continue with current therapy  Advance diet  No surgical intervention needed at this time  Subjective/Objective     Subjective: The patient states that she is feeling somewhat better  She has less pain and is not quite as week  Objective:     Blood pressure 119/63, pulse 79, temperature 99 1 °F (37 3 °C), resp  rate 16, height 5' 2" (1 575 m), weight 121 kg (265 lb 10 5 oz), SpO2 94 %  ,Body mass index is 48 59 kg/m²  Intake/Output Summary (Last 24 hours) at 7/16/2021 1224  Last data filed at 7/16/2021 0733  Gross per 24 hour   Intake 1163 75 ml   Output 1400 ml   Net -236 25 ml       Invasive Devices     Peripheral Intravenous Line            Peripheral IV 07/16/21 Dorsal (posterior); Left Hand <1 day                Physical Exam:  Patient appears jaundice  Abdomen is soft with some tenderness across the entire upper abdomen but decreasing  She has no rigidity guarding rebound or masses noted      Lab, Imaging and other studies:  CBC:   Lab Results   Component Value Date    WBC 7 67 07/16/2021    HGB 11 9 07/16/2021    HCT 35 3 07/16/2021    MCV 95 07/16/2021     (L) 07/16/2021    MCH 32 2 07/16/2021    MCHC 33 7 07/16/2021    RDW 17 3 (H) 07/16/2021    MPV 11 0 07/16/2021   , CMP:   Lab Results   Component Value Date    SODIUM 137 07/16/2021    K 2 9 (L) 07/16/2021     07/16/2021    CO2 21 07/16/2021    BUN 14 07/16/2021    CREATININE 0 81 07/16/2021    CALCIUM 8 1 (L) 07/16/2021 AST 61 (H) 07/16/2021    ALT 41 07/16/2021    ALKPHOS 62 07/16/2021    EGFR 75 07/16/2021

## 2021-07-17 PROBLEM — K92.2 ACUTE GI BLEEDING: Status: ACTIVE | Noted: 2021-07-17

## 2021-07-17 LAB
ALBUMIN SERPL BCP-MCNC: 2.3 G/DL (ref 3.5–5)
ALP SERPL-CCNC: 59 U/L (ref 46–116)
ALT SERPL W P-5'-P-CCNC: 36 U/L (ref 12–78)
AMMONIA PLAS-SCNC: 39 UMOL/L (ref 11–35)
ANION GAP SERPL CALCULATED.3IONS-SCNC: 8 MMOL/L (ref 4–13)
AST SERPL W P-5'-P-CCNC: 45 U/L (ref 5–45)
BACTERIA UR CULT: ABNORMAL
BACTERIA UR CULT: ABNORMAL
BILIRUB SERPL-MCNC: 1.9 MG/DL (ref 0.2–1)
BUN SERPL-MCNC: 16 MG/DL (ref 5–25)
CALCIUM ALBUM COR SERPL-MCNC: 9.6 MG/DL (ref 8.3–10.1)
CALCIUM SERPL-MCNC: 8.2 MG/DL (ref 8.3–10.1)
CHLORIDE SERPL-SCNC: 104 MMOL/L (ref 100–108)
CMV IGG SERPL IA-ACNC: >10 U/ML (ref 0–0.59)
CMV IGM SERPL IA-ACNC: <30 AU/ML (ref 0–29.9)
CO2 SERPL-SCNC: 22 MMOL/L (ref 21–32)
CREAT SERPL-MCNC: 0.72 MG/DL (ref 0.6–1.3)
EBV NA IGG SER IA-ACNC: 283 U/ML (ref 0–17.9)
EBV VCA IGG SER IA-ACNC: >600 U/ML (ref 0–17.9)
EBV VCA IGM SER IA-ACNC: <36 U/ML (ref 0–35.9)
ERYTHROCYTE [DISTWIDTH] IN BLOOD BY AUTOMATED COUNT: 17.2 % (ref 11.6–15.1)
GFR SERPL CREATININE-BSD FRML MDRD: 86 ML/MIN/1.73SQ M
GLUCOSE SERPL-MCNC: 122 MG/DL (ref 65–140)
GLUCOSE SERPL-MCNC: 122 MG/DL (ref 65–140)
GLUCOSE SERPL-MCNC: 137 MG/DL (ref 65–140)
GLUCOSE SERPL-MCNC: 137 MG/DL (ref 65–140)
GLUCOSE SERPL-MCNC: 153 MG/DL (ref 65–140)
HCT VFR BLD AUTO: 28.1 % (ref 34.8–46.1)
HCT VFR BLD AUTO: 31.4 % (ref 34.8–46.1)
HGB BLD-MCNC: 10.6 G/DL (ref 11.5–15.4)
HGB BLD-MCNC: 9.6 G/DL (ref 11.5–15.4)
INTERPRETATION: ABNORMAL
MCH RBC QN AUTO: 32 PG (ref 26.8–34.3)
MCHC RBC AUTO-ENTMCNC: 33.8 G/DL (ref 31.4–37.4)
MCV RBC AUTO: 95 FL (ref 82–98)
PLATELET # BLD AUTO: 128 THOUSANDS/UL (ref 149–390)
PMV BLD AUTO: 11 FL (ref 8.9–12.7)
POTASSIUM SERPL-SCNC: 3.6 MMOL/L (ref 3.5–5.3)
PROCALCITONIN SERPL-MCNC: 0.49 NG/ML
PROT SERPL-MCNC: 6 G/DL (ref 6.4–8.2)
RBC # BLD AUTO: 3.31 MILLION/UL (ref 3.81–5.12)
SODIUM SERPL-SCNC: 134 MMOL/L (ref 136–145)
WBC # BLD AUTO: 9.16 THOUSAND/UL (ref 4.31–10.16)

## 2021-07-17 PROCEDURE — 85018 HEMOGLOBIN: CPT | Performed by: FAMILY MEDICINE

## 2021-07-17 PROCEDURE — 99233 SBSQ HOSP IP/OBS HIGH 50: CPT | Performed by: FAMILY MEDICINE

## 2021-07-17 PROCEDURE — 87493 C DIFF AMPLIFIED PROBE: CPT | Performed by: FAMILY MEDICINE

## 2021-07-17 PROCEDURE — 94660 CPAP INITIATION&MGMT: CPT

## 2021-07-17 PROCEDURE — 94760 N-INVAS EAR/PLS OXIMETRY 1: CPT

## 2021-07-17 PROCEDURE — C9113 INJ PANTOPRAZOLE SODIUM, VIA: HCPCS | Performed by: FAMILY MEDICINE

## 2021-07-17 PROCEDURE — 82948 REAGENT STRIP/BLOOD GLUCOSE: CPT

## 2021-07-17 PROCEDURE — 85027 COMPLETE CBC AUTOMATED: CPT | Performed by: FAMILY MEDICINE

## 2021-07-17 PROCEDURE — 82140 ASSAY OF AMMONIA: CPT | Performed by: FAMILY MEDICINE

## 2021-07-17 PROCEDURE — 80053 COMPREHEN METABOLIC PANEL: CPT | Performed by: FAMILY MEDICINE

## 2021-07-17 PROCEDURE — 84145 PROCALCITONIN (PCT): CPT | Performed by: FAMILY MEDICINE

## 2021-07-17 PROCEDURE — 85014 HEMATOCRIT: CPT | Performed by: FAMILY MEDICINE

## 2021-07-17 RX ORDER — SODIUM CHLORIDE 9 MG/ML
50 INJECTION, SOLUTION INTRAVENOUS CONTINUOUS
Status: DISCONTINUED | OUTPATIENT
Start: 2021-07-17 | End: 2021-07-18 | Stop reason: HOSPADM

## 2021-07-17 RX ORDER — PANTOPRAZOLE SODIUM 40 MG/1
40 INJECTION, POWDER, FOR SOLUTION INTRAVENOUS EVERY 12 HOURS SCHEDULED
Status: DISCONTINUED | OUTPATIENT
Start: 2021-07-17 | End: 2021-07-18

## 2021-07-17 RX ADMIN — ACETAMINOPHEN 650 MG: 325 TABLET ORAL at 18:40

## 2021-07-17 RX ADMIN — CITALOPRAM HYDROBROMIDE 10 MG: 10 TABLET ORAL at 21:09

## 2021-07-17 RX ADMIN — PANTOPRAZOLE SODIUM 40 MG: 40 INJECTION, POWDER, FOR SOLUTION INTRAVENOUS at 14:24

## 2021-07-17 RX ADMIN — PIPERACILLIN AND TAZOBACTAM 3.38 G: 36; 4.5 INJECTION, POWDER, FOR SOLUTION INTRAVENOUS at 18:41

## 2021-07-17 RX ADMIN — RIFAXIMIN 550 MG: 550 TABLET ORAL at 08:52

## 2021-07-17 RX ADMIN — PIPERACILLIN AND TAZOBACTAM 4.5 G: 4; .5 INJECTION, POWDER, LYOPHILIZED, FOR SOLUTION INTRAVENOUS at 06:30

## 2021-07-17 RX ADMIN — BRIMONIDINE TARTRATE 1 DROP: 1.5 SOLUTION OPHTHALMIC at 08:53

## 2021-07-17 RX ADMIN — SODIUM CHLORIDE 50 ML/HR: 0.9 INJECTION, SOLUTION INTRAVENOUS at 14:24

## 2021-07-17 RX ADMIN — Medication 125 MG: at 14:23

## 2021-07-17 RX ADMIN — BRIMONIDINE TARTRATE 1 DROP: 1.5 SOLUTION OPHTHALMIC at 21:10

## 2021-07-17 RX ADMIN — PIPERACILLIN AND TAZOBACTAM 4.5 G: 4; .5 INJECTION, POWDER, LYOPHILIZED, FOR SOLUTION INTRAVENOUS at 00:41

## 2021-07-17 RX ADMIN — Medication 125 MG: at 21:10

## 2021-07-17 RX ADMIN — BRIMONIDINE TARTRATE 1 DROP: 1.5 SOLUTION OPHTHALMIC at 16:13

## 2021-07-17 RX ADMIN — PIPERACILLIN AND TAZOBACTAM 4.5 G: 4; .5 INJECTION, POWDER, LYOPHILIZED, FOR SOLUTION INTRAVENOUS at 12:44

## 2021-07-17 RX ADMIN — PANTOPRAZOLE SODIUM 40 MG: 40 INJECTION, POWDER, FOR SOLUTION INTRAVENOUS at 21:10

## 2021-07-17 RX ADMIN — INSULIN LISPRO 2 UNITS: 100 INJECTION, SOLUTION INTRAVENOUS; SUBCUTANEOUS at 12:12

## 2021-07-17 RX ADMIN — RIFAXIMIN 550 MG: 550 TABLET ORAL at 21:09

## 2021-07-17 NOTE — ASSESSMENT & PLAN NOTE
· Diagnosed with cirrhosis in November of 2020-cell Dr Brian Prieto, gastroenterology at [facility] at that time  · CT abdomen pelvis: Hepatic cirrhosis and evidence of portal hypertension with prominent mesenteric, perisplenic, and perigastric collateral vessels/varices  Small-volume ascites   · Abdominal ultrasound 06/20/2021Hepatosplenomegaly  Hepatic cirrhosis and mild steatosis  · Total bilirubin:  1 63-1 98  · Ammonia:  49  placed on lactulose and Xifaxan    Patient had several bowel movements following which ammonia is now down to 29 with improvement in mentation noted  · Continue outpatient GI follow-up

## 2021-07-17 NOTE — PLAN OF CARE
Problem: Potential for Falls  Goal: Patient will remain free of falls  Description: INTERVENTIONS:  - Educate patient/family on patient safety including physical limitations  - Instruct patient to call for assistance with activity   - Consult OT/PT to assist with strengthening/mobility   - Keep Call bell within reach  - Keep bed low and locked with side rails adjusted as appropriate  - Keep care items and personal belongings within reach  - Initiate and maintain comfort rounds  - Make Fall Risk Sign visible to staff  - Offer Toileting every 2 Hours, in advance of need  - Initiate/Maintain bed alarm  - Obtain necessary fall risk management equipment: non skid socks  - Apply yellow socks and bracelet for high fall risk patients  - Consider moving patient to room near nurses station  Outcome: Progressing

## 2021-07-17 NOTE — PROGRESS NOTES
Progress Note - General Surgery   Caity Bedolla 74 y o  female MRN: [id number]  Unit/Bed#: -[place] Encounter: [id number]    Assessment:  Cholelithiasis  Cirrhosis with elevated ammonia level  Varices  Portal hypertension   CMV  IGG elevated   Plan:  Continue with current therapy  No surgery at this time  Pt may ultimately require cholecystectomy however may require tertiary care facility  Subjective/Objective     Subjective: the pt seen and has less abdominal pain and poor appetite and no vomiting   Positive bowel movements  She gets some cramping in lower abdomen area  Objective:     Blood pressure 117/71, pulse 99, temperature 99 7 °F (37 6 °C), resp  rate 20, height 5' 2" (1 575 m), weight 121 kg (265 lb 10 5 oz), SpO2 91 %  ,Body mass index is 48 59 kg/m²  Intake/Output Summary (Last 24 hours) at 7/17/2021 0811  Last data filed at 7/17/2021 0800  Gross per 24 hour   Intake --   Output 1300 ml   Net -1300 ml       Invasive Devices     Peripheral Intravenous Line            Peripheral IV 07/16/21 Dorsal (posterior); Left Hand <1 day    Peripheral IV 07/16/21 Right Antecubital <1 day                Physical Exam: pt is awake an more alert today  Abd:  Soft and minimally tender in the upper and lower abdomen areas  No guarding noted  No masses  Lab, Imaging and other studies:  I have personally reviewed pertinent lab results    , CBC:   Lab Results   Component Value Date    WBC 9 16 07/17/2021    HGB 10 6 (L) 07/17/2021    HCT 31 4 (L) 07/17/2021    MCV 95 07/17/2021     (L) 07/17/2021    MCH 32 0 07/17/2021    MCHC 33 8 07/17/2021    RDW 17 2 (H) 07/17/2021    MPV 11 0 07/17/2021   , CMP:   Lab Results   Component Value Date    SODIUM 134 (L) 07/17/2021    K 3 6 07/17/2021     07/17/2021    CO2 22 07/17/2021    BUN 16 07/17/2021    CREATININE 0 72 07/17/2021    CALCIUM 8 2 (L) 07/17/2021    AST 45 07/17/2021    ALT 36 07/17/2021    ALKPHOS 59 07/17/2021    EGFR 86 07/17/2021

## 2021-07-17 NOTE — ASSESSMENT & PLAN NOTE
Lab Results   Component Value Date    HGBA1C 7 7 (H) 01/25/2021       Recent Labs     07/16/21  1607 07/16/21 2055 07/17/21  0702 07/17/21  1108   POCGLU 146* 153* 122 153*       Blood Sugar Average: Last 72 hrs:     · Glucose 135  · Fingerstick blood sugar with sliding scale coverage  · Hold metformin while in hospital

## 2021-07-17 NOTE — ASSESSMENT & PLAN NOTE
Patient noted to have dark/melanotic stools that started on 07/16/2021 in the evening  So far noted to have 3-4 episodes  Also noted to have some loose stools  Will check stool culture for C diff  Stool Hemoccult is positive  Check hemoglobin q 12 transfuse if hemoglobin drops to less than 8  Currently hemoglobin is stable at 10 6  Placed on Protonix 40 mg IV b i d  And keep NPO except for meds for now  Patient most likely is having a variceal bleed and will require endoscopy to be done on Monday    If brisk bleeding occurs may need to have it done sooner

## 2021-07-17 NOTE — ASSESSMENT & PLAN NOTE
· CT abdomen pelvis: Sludge and multiple tiny stones within the gallbladder lumen  The gallbladder is moderately distended  Gallbladder wall thickening noted which could be related to edema or inflammation/cholecystitis  · Abdominal ultrasound June 2021: Partially layering gallbladder sludge  Anterior gallbladder wall adherent sludge versus 1 7 cm polyp  · Negative HIDA scan  · Discussed with surgery    Eventually the gallbladder will need to come out electively  · Keep NPO due to GI bleed  · Continue IV Zosyn

## 2021-07-17 NOTE — PROGRESS NOTES
[address]  Progress Note - Ruby Damico 1955, 74 y o  female MRN: [id number]  Unit/Bed#: -01 Encounter: [id number]  Primary Care Provider: Regine Servin DO   Date and time admitted to hospital: 7/13/2021 10:43 PM    * Severe sepsis [facility])  Assessment & Plan  · Presented for fever, generalized weakness, and nausea-  · Severe Sepsis present on arrival-fever, tachycardia, suspected infection, lactic acid 4 1  · Flu negative, COVID negative  · Unclear source of infection  May be acute cholecystitis versus acute cystitis without hematuria  · Urine culture positive for Enterococcus faecalis and E coli  · Blood culture negative  · CT abdomen pelvis: Sludge and multiple tiny stones within the gallbladder lumen  The gallbladder is moderately distended  Gallbladder wall thickening noted which could be related to edema or inflammation/cholecystitis  · HIDA scan negative for acute cholecystitis  · CRP elevated at 90  Rickettsial panel pending   · Continue IV Zosyn     Acute cholecystitis  Assessment & Plan  · CT abdomen pelvis: Sludge and multiple tiny stones within the gallbladder lumen  The gallbladder is moderately distended  Gallbladder wall thickening noted which could be related to edema or inflammation/cholecystitis  · Abdominal ultrasound June 2021: Partially layering gallbladder sludge  Anterior gallbladder wall adherent sludge versus 1 7 cm polyp  · Negative HIDA scan  · Discussed with surgery  Eventually the gallbladder will need to come out electively  · Keep NPO due to GI bleed  · Continue IV Zosyn    Acute cystitis without hematuria  Assessment & Plan  · Urinalysis:  Innumerable bacteria  Prelim urine culture shows Enterococcus faecalis and E coli ESBL  · Continue on Zosyn  · Patient has prior history of urosepsis along with kidney stones and obstruction resulting in percutaneous nephrostomy tube to be placed few years ago        Acute GI bleeding  Assessment & Plan  Patient noted to have dark/melanotic stools that started on 07/16/2021 in the evening  So far noted to have 3-4 episodes  Also noted to have some loose stools  Will check stool culture for C diff  Stool Hemoccult is positive  Check hemoglobin q 12 transfuse if hemoglobin drops to less than 8  Currently hemoglobin is stable at 10 6  Placed on Protonix 40 mg IV b i d  And keep NPO except for meds for now  Patient most likely is having a variceal bleed and will require endoscopy to be done on Monday  If brisk bleeding occurs may need to have it done sooner     Type 2 diabetes mellitus without complication, without long-term current use of insulin [facility])  Assessment & Plan  Lab Results   Component Value Date    HGBA1C 7 7 (H) 01/25/2021       Recent Labs     07/16/21  1607 07/16/21  2055 07/17/21  0702 07/17/21  1108   POCGLU 146* 153* 122 153*       Blood Sugar Average: Last 72 hrs:     · Glucose 135  · Fingerstick blood sugar with sliding scale coverage  · Hold metformin while in hospital    Cirrhosis of liver with ascites ([facility] [address] )  Assessment & Plan  · Diagnosed with cirrhosis in November of 2020-cell Dr Brandy Rodriguez, gastroenterology at [facility] at that time  · CT abdomen pelvis: Hepatic cirrhosis and evidence of portal hypertension with prominent mesenteric, perisplenic, and perigastric collateral vessels/varices  Small-volume ascites   · Abdominal ultrasound 06/20/2021Hepatosplenomegaly  Hepatic cirrhosis and mild steatosis  · Total bilirubin:  1 63-1 98  · Ammonia:  49  placed on lactulose and Xifaxan    Patient had several bowel movements following which ammonia is now down to 29 with improvement in mentation noted  · Continue outpatient GI follow-up    Hyponatremia  Assessment & Plan  · Sodium 132 on admission  · Received 2 L normal saline in the ER  · Repeat sodium 138 and hence will discontinue fluids      VTE Pharmacologic Prophylaxis:   Pharmacologic: Pharmacologic VTE Prophylaxis contraindicated due to Acute GI bleed  Mechanical VTE Prophylaxis in Place: Yes    Patient Centered Rounds: I have performed bedside rounds with nursing staff today  Discussions with Specialists or Other Care Team Provider:  None    Education and Discussions with Family / Patient:  Discussed with patient bedside    Time Spent for Care: 45 minutes  More than 50% of total time spent on counseling and coordination of care as described above  Current Length of Stay: 3 day(s)    Current Patient Status: Inpatient   Certification Statement: The patient will continue to require additional inpatient hospital stay due to Acute GI bleed    Discharge Plan:  Pending progress  Code Status: Level 1 - Full Code      Subjective:   No fever noted today however overnight patient started having some dark melanotic stools at least 3-4 episodes have occurred since last night  Denies any headache    Objective:     Vitals:   Temp (24hrs), Av 9 °F (37 7 °C), Min:99 1 °F (37 3 °C), Max:100 9 °F (38 3 °C)    Temp:  [99 1 °F (37 3 °C)-100 9 °F (38 3 °C)] 99 7 °F (37 6 °C)  HR:  [] 99  Resp:  [17-20] 20  BP: (117-134)/(71-78) 117/71  SpO2:  [91 %-97 %] 94 %  Body mass index is 48 59 kg/m²  Input and Output Summary (last 24 hours): Intake/Output Summary (Last 24 hours) at 2021 1343  Last data filed at 2021 1201  Gross per 24 hour   Intake 120 ml   Output 1450 ml   Net -1330 ml       Physical Exam:     Physical Exam  Vitals and nursing note reviewed  Constitutional:       Appearance: Normal appearance  HENT:      Head: Normocephalic and atraumatic  Right Ear: External ear normal       Left Ear: External ear normal       Nose: Nose normal       Mouth/Throat:      Pharynx: Oropharynx is clear  Eyes:      Pupils: Pupils are equal, round, and reactive to light  Cardiovascular:      Rate and Rhythm: Normal rate and regular rhythm  Heart sounds: Normal heart sounds     Pulmonary:      Effort: Pulmonary effort is normal       Breath sounds: Normal breath sounds  Abdominal:      General: Bowel sounds are normal  There is distension  Palpations: Abdomen is soft  Tenderness: There is no abdominal tenderness  Genitourinary:     Rectum: Guaiac result positive  Musculoskeletal:         General: Normal range of motion  Cervical back: Normal range of motion and neck supple  Skin:     General: Skin is warm and dry  Capillary Refill: Capillary refill takes less than 2 seconds  Neurological:      General: No focal deficit present  Mental Status: She is alert and oriented to person, place, and time     Psychiatric:         Mood and Affect: Mood normal          Additional Data:     Labs:    Results from last 7 days   Lab Units 07/17/21  0443 07/14/21  0517 07/13/21  2248   WBC Thousand/uL 9 16 5 88 6 39   HEMOGLOBIN g/dL 10 6* 11 7 12 6   HEMATOCRIT % 31 4* 35 5 37 5   PLATELETS Thousands/uL 128* 97* 105*   BANDS PCT %  --   --  4   NEUTROS PCT %  --  73  --    LYMPHS PCT %  --  16  --    LYMPHO PCT %  --   --  14   MONOS PCT %  --  9  --    MONO PCT %  --   --  9   EOS PCT %  --  0 0     Results from last 7 days   Lab Units 07/17/21  0443   SODIUM mmol/L 134*   POTASSIUM mmol/L 3 6   CHLORIDE mmol/L 104   CO2 mmol/L 22   BUN mg/dL 16   CREATININE mg/dL 0 72   ANION GAP mmol/L 8   CALCIUM mg/dL 8 2*   ALBUMIN g/dL 2 3*   TOTAL BILIRUBIN mg/dL 1 90*   ALK PHOS U/L 59   ALT U/L 36   AST U/L 45   GLUCOSE RANDOM mg/dL 122         Results from last 7 days   Lab Units 07/17/21  1108 07/17/21  0702 07/16/21  2055 07/16/21  1607 07/16/21  1051 07/16/21  0711 07/15/21  2100 07/15/21  1712 07/15/21  1152 07/15/21  0556 07/15/21  0029 07/14/21  1825   POC GLUCOSE mg/dl 153* 122 153* 146* 192* 112 129 153* 172* 128 143* 142*         Results from last 7 days   Lab Units 07/16/21  0541 07/15/21  0506 07/14/21  0517 07/14/21  0127 07/13/21  2248   LACTIC ACID mmol/L  --   --   --  1 6 4 1*   PROCALCITONIN ng/ml 0 74* 0 79* 0 44*  --   --            * I Have Reviewed All Lab Data Listed Above  * Additional Pertinent Lab Tests Reviewed: Jeff 66 Admission Reviewed    Imaging:    Imaging Reports Reviewed Today Include:  CT a chest PE study and CT brain  Imaging Personally Reviewed by Myself Includes:  None    Recent Cultures (last 7 days):     Results from last 7 days   Lab Units 07/14/21  0537 07/13/21  2248   BLOOD CULTURE   --  No Growth at 72 hrs  No Growth at 72 hrs  URINE CULTURE  >100,000 cfu/ml Enterococcus faecalis*  10,000-19,000 cfu/ml Escherichia coli ESBL*  --        Last 24 Hours Medication List:   Current Facility-Administered Medications   Medication Dose Route Frequency Provider Last Rate    acetaminophen  650 mg Oral Q6H PRN TANISHA Bruce      brimonidine  1 drop Both Eyes TID TAINSHA Bruce      butalbital-acetaminophen-caffeine  1 tablet Oral Q6H PRN Bo Olvera MD      citalopram  10 mg Oral HS Mary Torres MD      insulin lispro  2-12 Units Subcutaneous 4x Daily (AC & HS) Orma Phoenix, MD      morphine injection  2 mg Intravenous Q4H PRN Bo Olvera MD      ondansetron  4 mg Intravenous Q6H PRN TANISHA Burce      pantoprazole  40 mg Intravenous Q12H [facility] Bo Olvera MD      piperacillin-tazobactam  3 375 g Intravenous Q6H Bo Olvera MD      rifaximin  550 mg Oral Q12H [facility] Bo Olvera MD      sodium chloride  50 mL/hr Intravenous Continuous Bo Olvera MD      vancomycin  125 mg Oral Q12H [facility] Bo Olvera MD          Today, Patient Was Seen By: Bo Olvera MD    ** Please Note: Dictation voice to text software may have been used in the creation of this document   **

## 2021-07-17 NOTE — PLAN OF CARE
Problem: Potential for Falls  Goal: Patient will remain free of falls  Description: INTERVENTIONS:  - Educate patient/family on patient safety including physical limitations  - Instruct patient to call for assistance with activity   - Consult OT/PT to assist with strengthening/mobility   - Keep Call bell within reach  - Keep bed low and locked with side rails adjusted as appropriate  - Keep care items and personal belongings within reach  - Initiate and maintain comfort rounds  - Make Fall Risk Sign visible to staff  - Offer Toileting every 2 Hours, in advance of need  - Initiate/Maintain bed/chair alarm  - Apply yellow socks and bracelet for high fall risk patients  - Consider moving patient to room near nurses station  Outcome: Progressing    Problem: Nutrition/Hydration-ADULT  Goal: Nutrient/Hydration intake appropriate for improving, restoring or maintaining nutritional needs  Description: Monitor and assess patient's nutrition/hydration status for malnutrition  Collaborate with interdisciplinary team and initiate plan and interventions as ordered  Monitor patient's weight and dietary intake as ordered or per policy  Utilize nutrition screening tool and intervene as necessary  Determine patient's food preferences and provide high-protein, high-caloric foods as appropriate       INTERVENTIONS:  - Monitor oral intake, urinary output, labs, and treatment plans  - Assess nutrition and hydration status and recommend course of action  - Evaluate amount of meals eaten  - Assist patient with eating if necessary   - Allow adequate time for meals  - Recommend/ encourage appropriate diets, oral nutritional supplements, and vitamin/mineral supplements  - Order, calculate, and assess calorie counts as needed  - Recommend, monitor, and adjust tube feedings and TPN/PPN based on assessed needs  - Assess need for intravenous fluids  - Provide specific nutrition/hydration education as appropriate  - Include patient/family/caregiver in decisions related to nutrition  Outcome: Progressing     Problem: METABOLIC, FLUID AND ELECTROLYTES - ADULT  Goal: Electrolytes maintained within normal limits  Description: INTERVENTIONS:  - Monitor labs and assess patient for signs and symptoms of electrolyte imbalances  - Administer electrolyte replacement as ordered  - Monitor response to electrolyte replacements, including repeat lab results as appropriate  - Instruct patient on fluid and nutrition as appropriate  Outcome: Progressing  Goal: Fluid balance maintained  Description: INTERVENTIONS:  - Monitor labs   - Monitor I/O and WT  - Instruct patient on fluid and nutrition as appropriate  - Assess for signs & symptoms of volume excess or deficit  Outcome: Progressing  Goal: Glucose maintained within target range  Description: INTERVENTIONS:  - Monitor Blood Glucose as ordered  - Assess for signs and symptoms of hyperglycemia and hypoglycemia  - Administer ordered medications to maintain glucose within target range  - Assess nutritional intake and initiate nutrition service referral as needed  Outcome: Progressing     Problem: PAIN - ADULT  Goal: Verbalizes/displays adequate comfort level or baseline comfort level  Description: Interventions:  - Encourage patient to monitor pain and request assistance  - Assess pain using appropriate pain scale  - Administer analgesics based on type and severity of pain and evaluate response  - Implement non-pharmacological measures as appropriate and evaluate response  - Consider cultural and social influences on pain and pain management  - Notify physician/advanced practitioner if interventions unsuccessful or patient reports new pain  Outcome: Progressing     Problem: INFECTION - ADULT  Goal: Absence or prevention of progression during hospitalization  Description: INTERVENTIONS:  - Assess and monitor for signs and symptoms of infection  - Monitor lab/diagnostic results  - Monitor all insertion sites, i e  indwelling lines, tubes, and drains  - Monitor endotracheal if appropriate and nasal secretions for changes in amount and color  - [place] appropriate cooling/warming therapies per order  - Administer medications as ordered  - Instruct and encourage patient and family to use good hand hygiene technique  - Identify and instruct in appropriate isolation precautions for identified infection/condition  Outcome: Progressing     Problem: DISCHARGE PLANNING  Goal: Discharge to home or other facility with appropriate resources  Description: INTERVENTIONS:  - Identify barriers to discharge w/patient and caregiver  - Arrange for needed discharge resources and transportation as appropriate  - Identify discharge learning needs (meds, wound care, etc )  - Arrange for interpretive services to assist at discharge as needed  - Refer to Case Management Department for coordinating discharge planning if the patient needs post-hospital services based on physician/advanced practitioner order or complex needs related to functional status, cognitive ability, or social support system  Outcome: Progressing

## 2021-07-17 NOTE — ASSESSMENT & PLAN NOTE
· Urinalysis:  Innumerable bacteria  Prelim urine culture shows Enterococcus faecalis and E coli ESBL  · Continue on Zosyn  · Patient has prior history of urosepsis along with kidney stones and obstruction resulting in percutaneous nephrostomy tube to be placed few years ago

## 2021-07-18 ENCOUNTER — HOSPITAL ENCOUNTER (INPATIENT)
Facility: HOSPITAL | Age: 66
LOS: 6 days | Discharge: NURSING, REHAB OR LONG-TERM CARE | DRG: 377 | End: 2021-07-24
Attending: HOSPITALIST | Admitting: GENERAL PRACTICE
Payer: MEDICARE

## 2021-07-18 ENCOUNTER — TELEPHONE (OUTPATIENT)
Dept: GASTROENTEROLOGY | Facility: CLINIC | Age: 66
End: 2021-07-18

## 2021-07-18 VITALS
RESPIRATION RATE: 20 BRPM | BODY MASS INDEX: 41.61 KG/M2 | HEART RATE: 100 BPM | SYSTOLIC BLOOD PRESSURE: 112 MMHG | WEIGHT: 220.4 LBS | TEMPERATURE: 98.2 F | DIASTOLIC BLOOD PRESSURE: 61 MMHG | HEIGHT: 61 IN | OXYGEN SATURATION: 100 %

## 2021-07-18 DIAGNOSIS — A41.9 SEVERE SEPSIS (HCC): ICD-10-CM

## 2021-07-18 DIAGNOSIS — R18.8 CIRRHOSIS OF LIVER WITH ASCITES, UNSPECIFIED HEPATIC CIRRHOSIS TYPE (HCC): ICD-10-CM

## 2021-07-18 DIAGNOSIS — K74.60 CIRRHOSIS OF LIVER WITH ASCITES, UNSPECIFIED HEPATIC CIRRHOSIS TYPE (HCC): ICD-10-CM

## 2021-07-18 DIAGNOSIS — I47.9 PAROXYSMAL TACHYCARDIA (HCC): ICD-10-CM

## 2021-07-18 DIAGNOSIS — K92.2 ACUTE GI BLEEDING: Primary | ICD-10-CM

## 2021-07-18 DIAGNOSIS — R65.20 SEVERE SEPSIS (HCC): ICD-10-CM

## 2021-07-18 DIAGNOSIS — D62 ACUTE BLOOD LOSS ANEMIA: ICD-10-CM

## 2021-07-18 DIAGNOSIS — R00.0 TACHYCARDIA: ICD-10-CM

## 2021-07-18 PROBLEM — E66.01 MORBID OBESITY (HCC): Status: ACTIVE | Noted: 2021-07-18

## 2021-07-18 LAB
ABO GROUP BLD BPU: NORMAL
ABO GROUP BLD: NORMAL
ALBUMIN SERPL BCP-MCNC: 2 G/DL (ref 3.5–5)
ALP SERPL-CCNC: 54 U/L (ref 46–116)
ALT SERPL W P-5'-P-CCNC: 29 U/L (ref 12–78)
ANION GAP SERPL CALCULATED.3IONS-SCNC: 9 MMOL/L (ref 4–13)
ANION GAP SERPL CALCULATED.3IONS-SCNC: 9 MMOL/L (ref 4–13)
AST SERPL W P-5'-P-CCNC: 32 U/L (ref 5–45)
ATRIAL RATE: 103 BPM
BASOPHILS # BLD AUTO: 0.07 THOUSANDS/ΜL (ref 0–0.1)
BASOPHILS NFR BLD AUTO: 1 % (ref 0–1)
BILIRUB SERPL-MCNC: 1.81 MG/DL (ref 0.2–1)
BLD GP AB SCN SERPL QL: NEGATIVE
BLD GP AB SCN SERPL QL: NEGATIVE
BPU ID: NORMAL
BUN SERPL-MCNC: 19 MG/DL (ref 5–25)
BUN SERPL-MCNC: 21 MG/DL (ref 5–25)
C DIFF TOX GENS STL QL NAA+PROBE: NEGATIVE
CALCIUM ALBUM COR SERPL-MCNC: 9.2 MG/DL (ref 8.3–10.1)
CALCIUM SERPL-MCNC: 7.6 MG/DL (ref 8.3–10.1)
CALCIUM SERPL-MCNC: 7.8 MG/DL (ref 8.3–10.1)
CHLORIDE SERPL-SCNC: 104 MMOL/L (ref 100–108)
CHLORIDE SERPL-SCNC: 107 MMOL/L (ref 100–108)
CO2 SERPL-SCNC: 21 MMOL/L (ref 21–32)
CO2 SERPL-SCNC: 22 MMOL/L (ref 21–32)
CREAT SERPL-MCNC: 0.71 MG/DL (ref 0.6–1.3)
CREAT SERPL-MCNC: 0.9 MG/DL (ref 0.6–1.3)
CROSSMATCH: NORMAL
EOSINOPHIL # BLD AUTO: 0.76 THOUSAND/ΜL (ref 0–0.61)
EOSINOPHIL NFR BLD AUTO: 6 % (ref 0–6)
ERYTHROCYTE [DISTWIDTH] IN BLOOD BY AUTOMATED COUNT: 17.4 % (ref 11.6–15.1)
GFR SERPL CREATININE-BSD FRML MDRD: 66 ML/MIN/1.73SQ M
GFR SERPL CREATININE-BSD FRML MDRD: 88 ML/MIN/1.73SQ M
GLUCOSE SERPL-MCNC: 129 MG/DL (ref 65–140)
GLUCOSE SERPL-MCNC: 131 MG/DL (ref 65–140)
GLUCOSE SERPL-MCNC: 138 MG/DL (ref 65–140)
GLUCOSE SERPL-MCNC: 139 MG/DL (ref 65–140)
GLUCOSE SERPL-MCNC: 150 MG/DL (ref 65–140)
GLUCOSE SERPL-MCNC: 165 MG/DL (ref 65–140)
HCT VFR BLD AUTO: 22.3 % (ref 34.8–46.1)
HCT VFR BLD AUTO: 23.4 % (ref 34.8–46.1)
HCT VFR BLD AUTO: 23.6 % (ref 34.8–46.1)
HCT VFR BLD AUTO: 24.1 % (ref 34.8–46.1)
HGB BLD-MCNC: 7.4 G/DL (ref 11.5–15.4)
HGB BLD-MCNC: 7.9 G/DL (ref 11.5–15.4)
HGB BLD-MCNC: 8.1 G/DL (ref 11.5–15.4)
HGB BLD-MCNC: 8.2 G/DL (ref 11.5–15.4)
IMM GRANULOCYTES # BLD AUTO: 0.26 THOUSAND/UL (ref 0–0.2)
IMM GRANULOCYTES NFR BLD AUTO: 2 % (ref 0–2)
INR PPP: 1.53 (ref 0.84–1.19)
LYMPHOCYTES # BLD AUTO: 2.85 THOUSANDS/ΜL (ref 0.6–4.47)
LYMPHOCYTES NFR BLD AUTO: 21 % (ref 14–44)
MAGNESIUM SERPL-MCNC: 1.8 MG/DL (ref 1.6–2.6)
MCH RBC QN AUTO: 32.2 PG (ref 26.8–34.3)
MCHC RBC AUTO-ENTMCNC: 33.8 G/DL (ref 31.4–37.4)
MCV RBC AUTO: 96 FL (ref 82–98)
MONOCYTES # BLD AUTO: 1.11 THOUSAND/ΜL (ref 0.17–1.22)
MONOCYTES NFR BLD AUTO: 8 % (ref 4–12)
NEUTROPHILS # BLD AUTO: 8.48 THOUSANDS/ΜL (ref 1.85–7.62)
NEUTS SEG NFR BLD AUTO: 62 % (ref 43–75)
NRBC BLD AUTO-RTO: 0 /100 WBCS
P AXIS: 61 DEGREES
PLATELET # BLD AUTO: 175 THOUSANDS/UL (ref 149–390)
PMV BLD AUTO: 11.6 FL (ref 8.9–12.7)
POTASSIUM SERPL-SCNC: 3.6 MMOL/L (ref 3.5–5.3)
POTASSIUM SERPL-SCNC: 3.8 MMOL/L (ref 3.5–5.3)
PR INTERVAL: 150 MS
PROT SERPL-MCNC: 5.2 G/DL (ref 6.4–8.2)
PROTHROMBIN TIME: 18.1 SECONDS (ref 11.6–14.5)
QRS AXIS: 43 DEGREES
QRSD INTERVAL: 70 MS
QT INTERVAL: 374 MS
QTC INTERVAL: 489 MS
RBC # BLD AUTO: 2.45 MILLION/UL (ref 3.81–5.12)
RH BLD: NEGATIVE
SODIUM SERPL-SCNC: 135 MMOL/L (ref 136–145)
SODIUM SERPL-SCNC: 137 MMOL/L (ref 136–145)
SPECIMEN EXPIRATION DATE: NORMAL
SPECIMEN EXPIRATION DATE: NORMAL
T WAVE AXIS: 14 DEGREES
UNIT DISPENSE STATUS: NORMAL
UNIT PRODUCT CODE: NORMAL
UNIT RH: NORMAL
VENTRICULAR RATE: 103 BPM
WBC # BLD AUTO: 13.53 THOUSAND/UL (ref 4.31–10.16)

## 2021-07-18 PROCEDURE — 86850 RBC ANTIBODY SCREEN: CPT | Performed by: NURSE PRACTITIONER

## 2021-07-18 PROCEDURE — 85018 HEMOGLOBIN: CPT | Performed by: GENERAL PRACTICE

## 2021-07-18 PROCEDURE — 99239 HOSP IP/OBS DSCHRG MGMT >30: CPT | Performed by: NURSE PRACTITIONER

## 2021-07-18 PROCEDURE — 85014 HEMATOCRIT: CPT | Performed by: FAMILY MEDICINE

## 2021-07-18 PROCEDURE — 99223 1ST HOSP IP/OBS HIGH 75: CPT | Performed by: GENERAL PRACTICE

## 2021-07-18 PROCEDURE — 99223 1ST HOSP IP/OBS HIGH 75: CPT | Performed by: ANESTHESIOLOGY

## 2021-07-18 PROCEDURE — 1124F ACP DISCUSS-NO DSCNMKR DOCD: CPT | Performed by: INTERNAL MEDICINE

## 2021-07-18 PROCEDURE — 85025 COMPLETE CBC W/AUTO DIFF WBC: CPT | Performed by: FAMILY MEDICINE

## 2021-07-18 PROCEDURE — 85014 HEMATOCRIT: CPT | Performed by: NURSE PRACTITIONER

## 2021-07-18 PROCEDURE — 86900 BLOOD TYPING SEROLOGIC ABO: CPT | Performed by: GENERAL PRACTICE

## 2021-07-18 PROCEDURE — P9016 RBC LEUKOCYTES REDUCED: HCPCS

## 2021-07-18 PROCEDURE — C9113 INJ PANTOPRAZOLE SODIUM, VIA: HCPCS | Performed by: GENERAL PRACTICE

## 2021-07-18 PROCEDURE — 86618 LYME DISEASE ANTIBODY: CPT | Performed by: NURSE PRACTITIONER

## 2021-07-18 PROCEDURE — 86920 COMPATIBILITY TEST SPIN: CPT

## 2021-07-18 PROCEDURE — 80053 COMPREHEN METABOLIC PANEL: CPT | Performed by: NURSE PRACTITIONER

## 2021-07-18 PROCEDURE — 86901 BLOOD TYPING SEROLOGIC RH(D): CPT | Performed by: GENERAL PRACTICE

## 2021-07-18 PROCEDURE — 85610 PROTHROMBIN TIME: CPT | Performed by: NURSE PRACTITIONER

## 2021-07-18 PROCEDURE — 86900 BLOOD TYPING SEROLOGIC ABO: CPT | Performed by: NURSE PRACTITIONER

## 2021-07-18 PROCEDURE — 85018 HEMOGLOBIN: CPT | Performed by: NURSE PRACTITIONER

## 2021-07-18 PROCEDURE — 82948 REAGENT STRIP/BLOOD GLUCOSE: CPT

## 2021-07-18 PROCEDURE — 85014 HEMATOCRIT: CPT | Performed by: GENERAL PRACTICE

## 2021-07-18 PROCEDURE — 93010 ELECTROCARDIOGRAM REPORT: CPT | Performed by: INTERNAL MEDICINE

## 2021-07-18 PROCEDURE — 86880 COOMBS TEST DIRECT: CPT | Performed by: NURSE PRACTITIONER

## 2021-07-18 PROCEDURE — 99223 1ST HOSP IP/OBS HIGH 75: CPT | Performed by: INTERNAL MEDICINE

## 2021-07-18 PROCEDURE — 30233N1 TRANSFUSION OF NONAUTOLOGOUS RED BLOOD CELLS INTO PERIPHERAL VEIN, PERCUTANEOUS APPROACH: ICD-10-PCS | Performed by: FAMILY MEDICINE

## 2021-07-18 PROCEDURE — C9113 INJ PANTOPRAZOLE SODIUM, VIA: HCPCS | Performed by: NURSE PRACTITIONER

## 2021-07-18 PROCEDURE — 83735 ASSAY OF MAGNESIUM: CPT | Performed by: PHYSICIAN ASSISTANT

## 2021-07-18 PROCEDURE — 93005 ELECTROCARDIOGRAM TRACING: CPT

## 2021-07-18 PROCEDURE — 99222 1ST HOSP IP/OBS MODERATE 55: CPT | Performed by: INTERNAL MEDICINE

## 2021-07-18 PROCEDURE — 86901 BLOOD TYPING SEROLOGIC RH(D): CPT | Performed by: NURSE PRACTITIONER

## 2021-07-18 PROCEDURE — 86850 RBC ANTIBODY SCREEN: CPT | Performed by: GENERAL PRACTICE

## 2021-07-18 PROCEDURE — 85018 HEMOGLOBIN: CPT | Performed by: FAMILY MEDICINE

## 2021-07-18 PROCEDURE — 80048 BASIC METABOLIC PNL TOTAL CA: CPT | Performed by: PHYSICIAN ASSISTANT

## 2021-07-18 RX ORDER — ACETAMINOPHEN 325 MG/1
650 TABLET ORAL EVERY 8 HOURS PRN
Status: DISCONTINUED | OUTPATIENT
Start: 2021-07-18 | End: 2021-07-24 | Stop reason: HOSPADM

## 2021-07-18 RX ORDER — BRIMONIDINE TARTRATE 0.15 %
1 DROPS OPHTHALMIC (EYE) 3 TIMES DAILY
Status: DISCONTINUED | OUTPATIENT
Start: 2021-07-18 | End: 2021-07-24 | Stop reason: HOSPADM

## 2021-07-18 RX ORDER — SODIUM CHLORIDE 9 MG/ML
50 INJECTION, SOLUTION INTRAVENOUS CONTINUOUS
Status: DISCONTINUED | OUTPATIENT
Start: 2021-07-18 | End: 2021-07-19

## 2021-07-18 RX ORDER — OCTREOTIDE ACETATE 100 UG/ML
50 INJECTION, SOLUTION INTRAVENOUS; SUBCUTANEOUS ONCE
Status: COMPLETED | OUTPATIENT
Start: 2021-07-18 | End: 2021-07-18

## 2021-07-18 RX ORDER — OCTREOTIDE ACETATE 50 UG/ML
50 INJECTION, SOLUTION INTRAVENOUS; SUBCUTANEOUS ONCE
Status: COMPLETED | OUTPATIENT
Start: 2021-07-18 | End: 2021-07-18

## 2021-07-18 RX ORDER — METOPROLOL TARTRATE 5 MG/5ML
2.5 INJECTION INTRAVENOUS ONCE
Status: COMPLETED | OUTPATIENT
Start: 2021-07-18 | End: 2021-07-18

## 2021-07-18 RX ORDER — POTASSIUM CHLORIDE 20 MEQ/1
40 TABLET, EXTENDED RELEASE ORAL ONCE
Status: COMPLETED | OUTPATIENT
Start: 2021-07-18 | End: 2021-07-18

## 2021-07-18 RX ORDER — ONDANSETRON 2 MG/ML
4 INJECTION INTRAMUSCULAR; INTRAVENOUS EVERY 6 HOURS PRN
Status: CANCELLED | OUTPATIENT
Start: 2021-07-18

## 2021-07-18 RX ORDER — CITALOPRAM 10 MG/1
10 TABLET ORAL
Status: CANCELLED | OUTPATIENT
Start: 2021-07-18

## 2021-07-18 RX ORDER — ACETAMINOPHEN 325 MG/1
650 TABLET ORAL ONCE
Status: COMPLETED | OUTPATIENT
Start: 2021-07-18 | End: 2021-07-18

## 2021-07-18 RX ORDER — OCTREOTIDE ACETATE 100 UG/ML
50 INJECTION, SOLUTION INTRAVENOUS; SUBCUTANEOUS ONCE
Status: CANCELLED | OUTPATIENT
Start: 2021-07-18 | End: 2021-07-18

## 2021-07-18 RX ORDER — BRIMONIDINE TARTRATE 0.15 %
1 DROPS OPHTHALMIC (EYE) 3 TIMES DAILY
Status: CANCELLED | OUTPATIENT
Start: 2021-07-18

## 2021-07-18 RX ORDER — CITALOPRAM 10 MG/1
10 TABLET ORAL
Status: DISCONTINUED | OUTPATIENT
Start: 2021-07-18 | End: 2021-07-24 | Stop reason: HOSPADM

## 2021-07-18 RX ORDER — BUTALBITAL, ACETAMINOPHEN AND CAFFEINE 50; 325; 40 MG/1; MG/1; MG/1
1 TABLET ORAL EVERY 6 HOURS PRN
Status: CANCELLED | OUTPATIENT
Start: 2021-07-18

## 2021-07-18 RX ORDER — BUTALBITAL, ACETAMINOPHEN AND CAFFEINE 50; 325; 40 MG/1; MG/1; MG/1
1 TABLET ORAL EVERY 6 HOURS PRN
Status: DISCONTINUED | OUTPATIENT
Start: 2021-07-18 | End: 2021-07-24 | Stop reason: HOSPADM

## 2021-07-18 RX ORDER — ACETAMINOPHEN 325 MG/1
650 TABLET ORAL EVERY 6 HOURS PRN
Status: CANCELLED | OUTPATIENT
Start: 2021-07-18

## 2021-07-18 RX ORDER — MAGNESIUM SULFATE HEPTAHYDRATE 40 MG/ML
2 INJECTION, SOLUTION INTRAVENOUS ONCE
Status: COMPLETED | OUTPATIENT
Start: 2021-07-18 | End: 2021-07-19

## 2021-07-18 RX ORDER — ONDANSETRON 2 MG/ML
4 INJECTION INTRAMUSCULAR; INTRAVENOUS EVERY 6 HOURS PRN
Status: DISCONTINUED | OUTPATIENT
Start: 2021-07-18 | End: 2021-07-24 | Stop reason: HOSPADM

## 2021-07-18 RX ORDER — SODIUM CHLORIDE 9 MG/ML
50 INJECTION, SOLUTION INTRAVENOUS CONTINUOUS
Status: CANCELLED | OUTPATIENT
Start: 2021-07-18

## 2021-07-18 RX ADMIN — PIPERACILLIN AND TAZOBACTAM 3.38 G: 36; 4.5 INJECTION, POWDER, FOR SOLUTION INTRAVENOUS at 00:33

## 2021-07-18 RX ADMIN — SODIUM CHLORIDE 50 ML/HR: 0.9 INJECTION, SOLUTION INTRAVENOUS at 06:16

## 2021-07-18 RX ADMIN — MAGNESIUM SULFATE HEPTAHYDRATE 2 G: 40 INJECTION, SOLUTION INTRAVENOUS at 22:49

## 2021-07-18 RX ADMIN — ACETAMINOPHEN 650 MG: 325 TABLET ORAL at 04:45

## 2021-07-18 RX ADMIN — PIPERACILLIN SODIUM, TAZOBACTAM SODIUM 3.38 G: 36; 4.5 INJECTION, POWDER, LYOPHILIZED, FOR SOLUTION INTRAVENOUS at 15:21

## 2021-07-18 RX ADMIN — METOROPROLOL TARTRATE 2.5 MG: 5 INJECTION, SOLUTION INTRAVENOUS at 21:25

## 2021-07-18 RX ADMIN — SODIUM CHLORIDE 8 MG/HR: 9 INJECTION, SOLUTION INTRAVENOUS at 06:19

## 2021-07-18 RX ADMIN — BRIMONIDINE TARTRATE 1 DROP: 1.5 SOLUTION OPHTHALMIC at 21:24

## 2021-07-18 RX ADMIN — OCTREOTIDE ACETATE 50 MCG/HR: 500 INJECTION, SOLUTION INTRAVENOUS; SUBCUTANEOUS at 06:14

## 2021-07-18 RX ADMIN — POTASSIUM CHLORIDE 40 MEQ: 1500 TABLET, EXTENDED RELEASE ORAL at 22:49

## 2021-07-18 RX ADMIN — OCTREOTIDE ACETATE 50 MCG/HR: 500 INJECTION, SOLUTION INTRAVENOUS; SUBCUTANEOUS at 17:20

## 2021-07-18 RX ADMIN — PIPERACILLIN SODIUM, TAZOBACTAM SODIUM 3.38 G: 36; 4.5 INJECTION, POWDER, LYOPHILIZED, FOR SOLUTION INTRAVENOUS at 21:25

## 2021-07-18 RX ADMIN — PIPERACILLIN AND TAZOBACTAM 3.38 G: 36; 4.5 INJECTION, POWDER, FOR SOLUTION INTRAVENOUS at 08:13

## 2021-07-18 RX ADMIN — SODIUM CHLORIDE 50 ML/HR: 0.9 INJECTION, SOLUTION INTRAVENOUS at 14:44

## 2021-07-18 RX ADMIN — SODIUM CHLORIDE 8 MG/HR: 9 INJECTION, SOLUTION INTRAVENOUS at 17:19

## 2021-07-18 RX ADMIN — BRIMONIDINE TARTRATE 1 DROP: 1.5 SOLUTION OPHTHALMIC at 09:36

## 2021-07-18 RX ADMIN — RIFAXIMIN 550 MG: 550 TABLET ORAL at 21:25

## 2021-07-18 RX ADMIN — ACETAMINOPHEN 650 MG: 325 TABLET, FILM COATED ORAL at 23:47

## 2021-07-18 RX ADMIN — METOROPROLOL TARTRATE 2.5 MG: 5 INJECTION, SOLUTION INTRAVENOUS at 23:20

## 2021-07-18 RX ADMIN — INSULIN LISPRO 2 UNITS: 100 INJECTION, SOLUTION INTRAVENOUS; SUBCUTANEOUS at 21:24

## 2021-07-18 RX ADMIN — OCTREOTIDE ACETATE 50 MCG: 100 INJECTION, SOLUTION INTRAVENOUS; SUBCUTANEOUS at 06:11

## 2021-07-18 RX ADMIN — CITALOPRAM HYDROBROMIDE 10 MG: 10 TABLET ORAL at 21:25

## 2021-07-18 RX ADMIN — OCTREOTIDE ACETATE 50 MCG: 50 INJECTION, SOLUTION INTRAVENOUS; SUBCUTANEOUS at 18:43

## 2021-07-18 NOTE — EMTALA/ACUTE CARE TRANSFER
[address] [facility] [address]  [address] [facility]  [place] [address]  Dept: [phone]      ACUTE CARE TRANSFER CONSENT    NAME Eri Soni                                         1955                              MRN [id number]    I have been informed of my rights regarding examination, treatment, and transfer   by Dr Km Sorensen MD/TANISHA Cutler    Benefits:   transfer to higher level of care, availability of endoscopy    Risks:   deterioration of condition, traffic accident, weather delay      Consent for Transfer:  I acknowledge that my medical condition has been evaluated and explained to me by the treating physician or other qualified medical person and/or my attending physician, who has recommended that I be transferred to the service of   [address] [place] [address]  at  [facility] AND [facility]  The above potential benefits of such transfer, the potential risks associated with such transfer, and the probable risks of not being transferred have been explained to me, and I fully understand them  The doctor has explained that, in my case, the benefits of transfer outweigh the risks  I agree to be transferred  I authorize the performance of emergency medical procedures and treatments upon me in both transit and upon arrival at the receiving facility  Additionally, I authorize the release of any and all medical records to the receiving facility and request they be transported with me, if possible  I understand that the safest mode of transportation during a medical emergency is an ambulance and that the Hospital advocates the use of this mode of transport  Risks of traveling to the receiving facility by car, including absence of medical control, life sustaining equipment, such as oxygen, and medical personnel has been explained to me and I fully understand them  (MARLENE CORRECT BOX BELOW)  [ x ]  I consent to the stated transfer and to be transported by ambulance/helicopter    [  ]  I consent to the stated transfer, but refuse transportation by ambulance and accept full responsibility for my transportation by car  I understand the risks of non-ambulance transfers and I exonerate the Hospital and its staff from any deterioration in my condition that results from this refusal     X___________________________________________    DATE  21  TIME________  Signature of patient or legally responsible individual signing on patient behalf           RELATIONSHIP TO PATIENT_________________________          Provider Certification    NAME Shraddha Tenorio                                         1955                              MRN [id number]    A medical screening exam was performed on the above named patient  Based on the examination:    Condition Necessitating Transfer acute GI bleeding    Patient Condition:   serious    Reason for Transfer:   availability of endoscopy    Transfer Requirements: Facility   SL  · Space available and qualified personnel available for treatment as acknowledged by  GIANCARLO Craig  · Agreed to accept transfer and to provide appropriate medical treatment as acknowledged by        Simona Mcgrath  · Appropriate medical records of the examination and treatment of the patient are provided at the time of transfer   [address], [address] _______  · Transfer will be performed by qualified personnel from  [facility]  and appropriate transfer equipment as required, including the use of necessary and appropriate life support measures      Provider Certification: I have examined the patient and explained the following risks and benefits of being transferred/refusing transfer to the patient/family:         Based on these reasonable risks and benefits to the patient and/or the unborn child(ignacio), and based upon the information available at the time of the patients examination, I certify that the medical benefits reasonably to be expected from the provision of appropriate medical treatments at another [facility] facility outweigh the increasing risks, if any, to the individuals medical condition, and in the case of labor to the unborn child, from effecting the transfer      X____________________________________________ DATE 07/18/21        TIME_______      ORIGINAL - SEND TO MEDICAL RECORDS   COPY - SEND WITH PATIENT DURING TRANSFER

## 2021-07-18 NOTE — DISCHARGE SUMMARY
[address]  Discharge- Leighton Holiday 1955, 74 y o  female MRN: [id number]  Unit/Bed#: -01 Encounter: [id number]  Primary Care Provider: Berna Howard DO   Date and time admitted to hospital: 7/13/2021 10:43 PM    * Acute GI bleeding  Assessment & Plan  Patient noted to have dark/melanotic stools that started on 07/16/2021 in the evening  So far noted to have 3-4 episodes  Also noted to have some loose stools  Will check stool culture for C diff  Stool Hemoccult is positive  Check hemoglobin q 12 transfuse if hemoglobin drops to less than 8  Currently hemoglobin is stable at 10 6  Placed on Protonix 40 mg IV b i d  And keep NPO except for meds for now  Patient most likely is having a variceal bleed and will require endoscopy to be done on Monday  If brisk bleeding occurs may need to have it done sooner   At 3:00 a m  on 07/18/2021 patient had large maroon bowel movement estimated at over 1000 mL  Hemoglobin has dropped 2 g in less than 24 hours  An hour later she had another equally large bowel movement  Patient with dizziness, increasing heart rate  Consulted GI-started on octreotide and Protonix infusions, transfer initiated for higher level of care/possible endoscopy    Severe sepsis ([facility] [address] )  Assessment & Plan  · Presented for fever, generalized weakness, and nausea-  · Severe Sepsis present on arrival-fever, tachycardia, suspected infection, lactic acid 4 1  · Flu negative, COVID negative  · Unclear source of infection  May be acute cholecystitis versus acute cystitis without hematuria  · Urine culture positive for Enterococcus faecalis and E coli  · Blood culture negative  · CT abdomen pelvis: Sludge and multiple tiny stones within the gallbladder lumen  The gallbladder is moderately distended  Gallbladder wall thickening noted which could be related to edema or inflammation/cholecystitis  · HIDA scan negative for acute cholecystitis  · CRP elevated at 90  Rickettsial panel pending   · Continue IV Zosyn     Acute cholecystitis  Assessment & Plan  · CT abdomen pelvis: Sludge and multiple tiny stones within the gallbladder lumen  The gallbladder is moderately distended  Gallbladder wall thickening noted which could be related to edema or inflammation/cholecystitis  · Abdominal ultrasound June 2021: Partially layering gallbladder sludge  Anterior gallbladder wall adherent sludge versus 1 7 cm polyp  · Negative HIDA scan  · Discussed with surgery  Eventually the gallbladder will need to come out electively  · Keep NPO due to GI bleed  · Continue IV Zosyn    Cirrhosis of liver with ascites ([address] )  Assessment & Plan  · Diagnosed with cirrhosis in November of 2020-cell Dr Mary Kay Boston, gastroenterology at [facility] at that time  · CT abdomen pelvis: Hepatic cirrhosis and evidence of portal hypertension with prominent mesenteric, perisplenic, and perigastric collateral vessels/varices  Small-volume ascites   · Abdominal ultrasound 06/20/2021Hepatosplenomegaly  Hepatic cirrhosis and mild steatosis  · Total bilirubin:  1 63-1 98  · Ammonia:  49  placed on lactulose and Xifaxan  Patient had several bowel movements following which ammonia is now down to 29 with improvement in mentation noted  · Continue outpatient GI follow-up    Hypokalemia  Assessment & Plan  Secondary to lactulose induced diarrhea  Replace potassium and recheck BMP tomorrow    Abnormal heart rhythm  Assessment & Plan  · Had Zio patch at [facility] April 2021: 1   Overall sinus rhythm with average rate of 82 bpm, minimum of 66 bpm and maximum of 122 bpm    2   Frequent supraventricular ectopy-93,993 beats- 5 7% of total beats  3   130 brief supraventricular tachycardia runs-longest 22 1 seconds with an average rate of 140 bpm    4   Rare ventricular ectopy with no evidence of ventricular tachycardia  5   No evidence of significant pauses or high degree AV block     6   Patient symptoms corresponded to sinus rhythm, ventricular trigeminy, supraventricular ectopy and ventricular ectopy  · Monitor heart rate  · Continue outpatient follow-up    Kidney stones  Assessment & Plan  · History of nephrolithiasis  · Continue Microzide, Urocit and allopurinol when no longer NPO  · Continue outpatient follow-up    MELVIN on CPAP  Assessment & Plan  · Continue CPAP at bedtime    Class 3 severe obesity due to excess calories with serious comorbidity and body mass index (BMI) of 45 0 to 49 9 in adult [facility])  Assessment & Plan  · BMI 48 36  · Recommend intensive therapeutic lifestyle modification    Type 2 diabetes mellitus without complication, without long-term current use of insulin [facility])  Assessment & Plan  Lab Results   Component Value Date    HGBA1C 7 7 (H) 01/25/2021       Recent Labs     07/17/21  0702 07/17/21  1108 07/17/21  1602 07/17/21  2101   POCGLU 122 153* 137 137       Blood Sugar Average: Last 72 hrs:     · Glucose 135  · Fingerstick blood sugar with sliding scale coverage  · Hold metformin while in hospital    Acute cystitis without hematuria  Assessment & Plan  · Urinalysis:  Innumerable bacteria  Prelim urine culture shows Enterococcus faecalis and E coli ESBL  · Continue on Zosyn  · Patient has prior history of urosepsis along with kidney stones and obstruction resulting in percutaneous nephrostomy tube to be placed few years ago  Thrombocytopenia (HCC)  Assessment & Plan  · Platelets [id number] be due to liver disease  However will need to rule out a rickettsial parasitic infection  Will order Anaplasma, Ehrlichia     Parasitic smear is negative  · Daily CBC and trend platelets  · Monitor for bleeding    Hyponatremia  Assessment & Plan  · Sodium 132 on admission  · Received 2 L normal saline in the ER  · Repeat sodium 138 and hence will discontinue fluids            Medical Problems     Resolved Problems  Date Reviewed: 7/17/2021    None                Admission Date:   Admission Orders (From admission, onward)     Ordered        07/14/21 0042  INPATIENT ADMISSION  Once                     Admitting Diagnosis: Cholelithiasis [K80 20]  Lactic acidosis [E87 2]  Fever [R50 9]  Sepsis without acute organ dysfunction, due to unspecified organism [facility]) [A41 9]    HPI:  Patient presented to ED at [address] [place] [address] for generalized weakness on 07/13/2021  She reported fevers and decreased appetite  She was found to have gallbladder wall thickening and acute cystitis  She was admitted for severe sepsis  Procedures Performed:   Orders Placed This Encounter   Procedures    ED ECG Documentation Only       Summary of Hospital Course: The patient had an MRCP was seen by  Dr Jaylene Crigler, surgery  Per consultant note, she did not feel that this was acute cholecystitis or the source of the patient's symptomatology  She was seen by Dr Ferreira, gastroenterology, per consult note he feels that patient may have crossed over into decompensated cirrhosis  She received lactulose  Critical care medicine saw her for possible paracentesis-however there was no fluid accessible  Patient continued to spike fevers  On the evening of 07/16/2021, patient started with dark melanotic stools  Hemoccult stool was positive  Stool for C diff was sent, patient on contact precautions  Patient was kept NPO anticipating endoscopy on Monday  At 3:00 a m  07/18/2021, patient had extremely large melanotic stool in bed while sleeping  Hemoglobin has dropped from 10 6 7/17 0443 2 7 9 7/18 0308  Patient dizzy with minimal activity  BP stable at this time  Patient tachycardic in 110's from baseline around 80-90  She is on Protonix and octreotide infusion, and has received 1 unit of PRBCs thus far      Significant Findings, Care, Treatment and Services Provided:  Fever workup ongoing    Complications:  Gastrointestinal bleeding    Condition at Discharge: serious         Discharge instructions/Information to patient and family:   See after visit summary for information provided to patient and family  Provisions for Follow-Up Care:  See after visit summary for information related to follow-up care and any pertinent home health orders  PCP: David Matt DO    Disposition: [facility]    Planned Readmission: yes    Discharge Statement   I spent 45 minutes discharging the patient  This time was spent on the day of discharge  I had direct contact with the patient on the day of discharge  Additional documentation is required if more than 30 minutes were spent on discharge  Discharge Medications:  See after visit summary for reconciled discharge medications provided to patient and family

## 2021-07-18 NOTE — NURSING NOTE
Verbal report called to [facility] of receiving unit at [address]  All questions answered  Patient and family awaiting [employer] transport at [phone]

## 2021-07-18 NOTE — ASSESSMENT & PLAN NOTE
· Had Zio patch at [facility] April 2021: 1   Overall sinus rhythm with average rate of 82 bpm, minimum of 66 bpm and maximum of 122 bpm    2   Frequent supraventricular ectopy-93,993 beats- 5 7% of total beats  3   130 brief supraventricular tachycardia runs-longest 22 1 seconds with an average rate of 140 bpm    4   Rare ventricular ectopy with no evidence of ventricular tachycardia  5   No evidence of significant pauses or high degree AV block  6   Patient symptoms corresponded to sinus rhythm, ventricular trigeminy, supraventricular ectopy and ventricular ectopy    · Monitor heart rate  · Continue outpatient follow-up

## 2021-07-18 NOTE — ASSESSMENT & PLAN NOTE
· Likely 2/2 below  · S/p 1u PRBC today, inappropriate response to blood but hgb has remained stable since  · Trend HH Q6hrs, maintain active type and cross, 2 large bore PIV in place

## 2021-07-18 NOTE — H&P
[address] [facility]  H&P- Ruby Damico 1955, 74 y o  female MRN: [id number]  Unit/Bed#: [facility] 531-01 Encounter: [id number]  Primary Care Provider: Regine Sevrin DO   Date and time admitted to hospital: 7/18/2021  1:46 PM    * Acute GI bleeding  Assessment & Plan  · PPI gtt  · Octreotide gtt  · NPO  · EGD today    Severe sepsis ([facility] )  Assessment & Plan  · POA to [facility]  · C/w Zosyn and PO Vanco  · ID consult  · Exam not c/f cholecystitis and HIDA neg  · ?UTI  · Asked ID to follow here    Acute blood loss anemia  Assessment & Plan  · 2/2 GIB  · H/H q6h  · Blood consent signed  · Transfused prior to transfer    Morbid obesity ([facility] )  Assessment & Plan  · RD consult    Type 2 diabetes mellitus without complication, without long-term current use of insulin [facility])  Assessment & Plan  Lab Results   Component Value Date    HGBA1C 7 7 (H) 01/25/2021       Recent Labs     07/17/21  1602 07/17/21  2101 07/18/21  0719 07/18/21  1047   POCGLU 137 137 138 129       Blood Sugar Average: Last 72 hrs:    ISS      Cirrhosis of liver with ascites ([place] [address] )  Assessment & Plan  · GI consult      VTE Pharmacologic Prophylaxis: VTE Score: 5 High Risk (Score >/= 5) - Pharmacological DVT Prophylaxis Contraindicated  Sequential Compression Devices Ordered  Code Status: Level 1 - Full Code   Discussion with family: not yet  Anticipated Length of Stay: Patient will be admitted on an inpatient basis with an anticipated length of stay of greater than 2 midnights secondary to need for EGD and IV abx  Total Time for Visit, including Counseling / Coordination of Care: 60 minutes Greater than 50% of this total time spent on direct patient counseling and coordination of care  Chief Complaint: bleeding    History of Present Illness:  Ruby Damico is a 74 y o  female with a PMH of cirrhosis who presents with melena  Admitted to [address] [place] [address] for sevedre sepsis of unknown origin  Depiste Zosyn, still spiking fevers    UA ESBL sens to Zosyn  C/f cholecystitis but HIDA negative  Pt hungry  Denies CP ro SOB or n/v     Review of Systems:  Review of Systems   Constitutional: Negative  HENT: Negative  Eyes: Negative  Respiratory: Negative  Cardiovascular: Negative  Gastrointestinal: Positive for blood in stool  Endocrine: Negative  Genitourinary: Negative  Musculoskeletal: Negative  Skin: Negative  Allergic/Immunologic: Negative  Neurological: Negative  Hematological: Negative  Psychiatric/Behavioral: Negative  Past Medical and Surgical History:   Past Medical History:   Diagnosis Date    Diabetes mellitus ([facility] [address] )     Kidney stone        Past Surgical History:   Procedure Laterality Date    EYE SURGERY      cataract removal    JOINT REPLACEMENT      LITHOTRIPSY         Meds/Allergies:  Prior to Admission medications    Medication Sig Start Date End Date Taking?  Authorizing Provider   Acetaminophen (TYLENOL ARTHRITIS PAIN PO) Tylenol Arthritis Pain 650 mg tablet,extended release 2/5/15   Historical Provider, MD   allopurinol (ZYLOPRIM) 100 mg tablet  1/9/20   Historical Provider, MD   ALPRAZolam (Xanax) 0 25 mg tablet Take by mouth    Historical Provider, MD   amoxicillin (AMOXIL) 500 mg capsule amoxicillin 500 mg capsule   TAKE 4 CAPSULES BY MOUTH 1 HOUR BEFORE DENTAL PROCEDURES  Patient not taking: Reported on 7/14/2021    Historical Provider, MD   atorvastatin (LIPITOR) 10 mg tablet  1/22/20   Historical Provider, MD   benzonatate (TESSALON PERLES) 100 mg capsule ONE CAPSULE THREE TIMES A DAY AS NEEDED COUGHING  Patient not taking: Reported on 7/14/2021 3/12/20   Historical Provider, MD   brimonidine (ALPHAGAN P) 0 15 % ophthalmic solution 1 drop 3 (three) times a day    Historical Provider, MD   budesonide (Rhinocort Aqua) 32 MCG/ACT nasal spray     Historical Provider, MD   Calcium Carb-Cholecalciferol 600-500 MG-UNIT CAPS     Historical Provider, MD   celecoxib (CeleBREX) 200 mg capsule Take 200 mg by mouth daily 2/20/20   Historical Provider, MD   citalopram (CeleXA) 10 mg tablet Take 10 mg by mouth daily at bedtime    Historical Provider, MD   Coenzyme Q10 (COQ-10) 10 MG CAPS CoQ-10    Historical Provider, MD   Cyclobenzaprine HCl (FLEXERIL PO)     Historical Provider, MD   denosumab (Prolia) 60 mg/mL Prolia 60 mg/mL subcutaneous syringe 10/22/15   Historical Provider, MD   docusate sodium (Colace) 100 mg capsule Every 12 hours    Historical Provider, MD   econazole nitrate 1 % cream USE TWICE DAILY TO AFFECTED AREA 3/22/20   Historical Provider, MD   hydrochlorothiazide (HYDRODIURIL) 50 mg tablet  1/22/20   Historical Provider, MD   LUMIGAN 0 01 % ophthalmic drops INSTILL 1 DROP INTO BOTH EYES AT NIGHT 2/21/20   Historical Provider, MD   lutein 6 mg     Historical Provider, MD   metFORMIN (GLUCOPHAGE) 1000 MG tablet Take 1,000 mg by mouth 2 (two) times a day 3/22/20   Historical Provider, MD   pantoprazole (PROTONIX) 40 mg tablet pantoprazole 40 mg tablet,delayed release   prn 3/25/17   Historical Provider, MD   Polyethylene Glycol 3350 (MIRALAX PO) Miralax   daily    Historical Provider, MD   potassium citrate (UROCIT-K) 10 mEq  1/22/20   Historical Provider, MD   Vitamin D, Cholecalciferol, 10 MCG (400 UNIT) TABS     Historical Provider, MD     I have reviewed home medications using recent Epic encounter  Allergies:    Allergies   Allergen Reactions    Nitrofurantoin Other (See Comments)     Paresthesias in hands       Social History:  Marital Status: /Civil Union     Patient Pre-hospital Living Situation: Home  Patient Pre-hospital Level of Mobility: unable to be assessed at time of evaluation    Substance Use History:   Social History     Substance and Sexual Activity   Alcohol Use Not Currently     Social History     Tobacco Use   Smoking Status Never Smoker   Smokeless Tobacco Never Used     Social History     Substance and Sexual Activity   Drug Use Never       Family History:  No family history on file  Physical Exam:     Vitals:   Blood Pressure: 114/63 (07/18/21 1415)  Pulse: 92 (07/18/21 1415)  Respirations: 18 (07/18/21 1415)  SpO2: 100 % (07/18/21 1415)    Physical Exam  HENT:      Head: Normocephalic and atraumatic  Nose: Nose normal       Mouth/Throat:      Mouth: Mucous membranes are moist    Eyes:      Extraocular Movements: Extraocular movements intact  Conjunctiva/sclera: Conjunctivae normal    Cardiovascular:      Rate and Rhythm: Normal rate and regular rhythm  Pulmonary:      Effort: Pulmonary effort is normal       Breath sounds: Normal breath sounds  No wheezing or rales  Abdominal:      General: Bowel sounds are normal  There is distension  Palpations: Abdomen is soft  Tenderness: There is no abdominal tenderness  Musculoskeletal:         General: Normal range of motion  Cervical back: Normal range of motion and neck supple  Right lower leg: No edema  Left lower leg: No edema  Skin:     General: Skin is warm and dry  Neurological:      Mental Status: She is alert and oriented to person, place, and time           Additional Data:     Lab Results:  Results from last 7 days   Lab Units 07/18/21  1152 07/18/21  0308 07/13/21  2248   WBC Thousand/uL  --  13 53* 6 39   HEMOGLOBIN g/dL 8 1* 7 9* 12 6   HEMATOCRIT % 23 6* 23 4* 37 5   PLATELETS Thousands/uL  --  175 105*   BANDS PCT %  --   --  4   NEUTROS PCT %  --  62  --    LYMPHS PCT %  --  21  --    LYMPHO PCT %  --   --  14   MONOS PCT %  --  8  --    MONO PCT %  --   --  9   EOS PCT %  --  6 0     Results from last 7 days   Lab Units 07/18/21  0308   SODIUM mmol/L 135*   POTASSIUM mmol/L 3 6   CHLORIDE mmol/L 104   CO2 mmol/L 22   BUN mg/dL 19   CREATININE mg/dL 0 90   ANION GAP mmol/L 9   CALCIUM mg/dL 7 6*   ALBUMIN g/dL 2 0*   TOTAL BILIRUBIN mg/dL 1 81*   ALK PHOS U/L 54   ALT U/L 29   AST U/L 32   GLUCOSE RANDOM mg/dL 131     Results from last 7 days   Lab Units 07/18/21  0623   INR  1 53*     Results from last 7 days   Lab Units 07/18/21  1047 07/18/21  0719 07/17/21  2101 07/17/21  1602 07/17/21  1108 07/17/21  0702 07/16/21  2055 07/16/21  1607 07/16/21  1051 07/16/21  0711 07/15/21  2100 07/15/21  1712   POC GLUCOSE mg/dl 129 138 137 137 153* 122 153* 146* 192* 112 129 153*         Results from last 7 days   Lab Units 07/17/21  0443 07/16/21  0541 07/15/21  0506 07/14/21  0517 07/14/21  0127 07/13/21  2248   LACTIC ACID mmol/L  --   --   --   --  1 6 4 1*   PROCALCITONIN ng/ml 0 49* 0 74* 0 79* 0 44*  --   --        Imaging: No pertinent imaging reviewed  No orders to display       EKG and Other Studies Reviewed on Admission:   · EKG: No EKG obtained  ** Please Note: This note has been constructed using a voice recognition system   **

## 2021-07-18 NOTE — CONSULTS
[address] [facility]  1955, 74 y o  female MRN: [id number]  Unit/Bed#: [facility] 531-01 Encounter: [id number]  Primary Care Provider: Thalia Etienne DO   Date and time admitted to hospital: 7/18/2021  1:46 PM    Inpatient consult to Zojeremiahmikael Quachlien Parkinson performed by: Caroline Siu PA-C  Consult ordered by: Shelley Ta DO          Acute blood loss anemia  Assessment & Plan  · Likely 2/2 below  · S/p 1u PRBC today, inappropriate response to blood but hgb has remained stable since  · Trend HH Q6hrs, maintain active type and cross, 2 large bore PIV in place    Cirrhosis of liver with ascites ([facility] )  Assessment & Plan  · MOORE cirrhosis  · Trend LFT  · Appreciate GI    Severe sepsis ([facility] [address] )  Assessment & Plan  · Unclear source, UTI vs cholecystitis  · Continue broad spectrum abx per ID, follow culture data  * Acute GI bleeding  Assessment & Plan  · Unclear etiology  Per report patient had 2 large red bowel movements, last one at 1000 AM today  · Has hx MOORE cirrhosis but no varices on recent screening EGD  · Continue octreotide/protonix infusion   · Clear liquid diet, NPO @ midnight for EGD in AM      -------------------------------------------------------------------------------------------------------------  Chief Complaint:  Malaise, GI bleeding    History of Present Illness   HX and PE limited by:  Emma Bravo is a 74 y o  female with past medical history of MOORE cirrhosis, diabetes, and MELVIN on CPAP who now presents with likely lower GI bleeding and acute blood loss anemia  Patient initially presented to [address] [place] [address] with complaints of generalized malaise  Was found to have severe sepsis, source concerning for UTI versus cholecystitis  UA did and growing out ESBL for which patient is being treated with Zosyn  HIDA scan with questionable cholecystitis    Last night patient suffered a maroon-colored bowel movement, was hemodynamically stable but had drop in hemoglobin  As a result patient has been transferred to [facility] for GI evaluation  Patient states that she has not had a bloody colored bowel movement since 10:00 a m  Today  She denies nausea, abdominal pain, lightheaded or dizziness  History obtained from chart review and the patient   -------------------------------------------------------------------------------------------------------------  Dispo: Admit to Stepdown Level 1    Code Status: Level 1 - Full Code  --------------------------------------------------------------------------------------------------------------  Review of Systems   Constitutional: Positive for fatigue  Respiratory: Negative  Cardiovascular: Negative  Genitourinary: Positive for difficulty urinating  A 12-point, complete review of systems was reviewed and negative except as stated above     Physical Exam  Constitutional:       Appearance: She is obese  HENT:      Head: Normocephalic  Mouth/Throat:      Mouth: Mucous membranes are moist    Eyes:      Pupils: Pupils are equal, round, and reactive to light  Cardiovascular:      Rate and Rhythm: Normal rate and regular rhythm  Pulmonary:      Effort: Pulmonary effort is normal       Breath sounds: Normal breath sounds  Abdominal:      General: Abdomen is flat  Palpations: Abdomen is soft  Musculoskeletal:      Cervical back: Normal range of motion and neck supple  Skin:     General: Skin is warm  Capillary Refill: Capillary refill takes less than 2 seconds  Neurological:      General: No focal deficit present        Mental Status: She is alert          --------------------------------------------------------------------------------------------------------------  Vitals:   Vitals:    07/18/21 1415 07/18/21 1558   BP: 114/63 116/64   BP Location:  Left arm   Pulse: 92 91   Resp: 18 18   Temp:  98 8 °F (37 1 °C)   TempSrc:  Oral   SpO2: 100% 99% Temp  Min: 98 1 °F (36 7 °C)  Max: 102 9 °F (39 4 °C)        There is no height or weight on file to calculate BMI      Laboratory and Diagnostics:  Results from last 7 days   Lab Units 07/18/21  1152 07/18/21  3386 07/18/21  0308 07/17/21  1611 07/17/21  0443 07/16/21  0541 07/15/21  0506 07/14/21  0517 07/13/21  2248   WBC Thousand/uL  --   --  13 53*  --  9 16 7 67 6 34 5 88 6 39   HEMOGLOBIN g/dL 8 1* 8 2* 7 9* 9 6* 10 6* 11 9 11 0* 11 7 12 6   HEMATOCRIT % 23 6* 24 1* 23 4* 28 1* 31 4* 35 3 32 9* 35 5 37 5   PLATELETS Thousands/uL  --   --  175  --  128* 103* 90* 97* 105*   NEUTROS PCT %  --   --  62  --   --   --   --  73  --    BANDS PCT %  --   --   --   --   --   --   --   --  4   MONOS PCT %  --   --  8  --   --   --   --  9  --    MONO PCT %  --   --   --   --   --   --   --   --  9     Results from last 7 days   Lab Units 07/18/21  0308 07/17/21  0443 07/16/21  1335 07/16/21  0541 07/15/21  0506 07/14/21  0517 07/13/21  2248   SODIUM mmol/L 135* 134* 136 137 138 136 132*   POTASSIUM mmol/L 3 6 3 6 3 3* 2 9* 3 3* 3 7 3 6   CHLORIDE mmol/L 104 104 104 104 105 104 99*   CO2 mmol/L 22 22 21 21 22 24 24   ANION GAP mmol/L 9 8 11 12 11 8 9   BUN mg/dL 19 16 17 14 15 9 9   CREATININE mg/dL 0 90 0 72 0 83 0 81 0 76 0 81 0 91   CALCIUM mg/dL 7 6* 8 2* 7 8* 8 1* 7 5* 7 9* 8 7   GLUCOSE RANDOM mg/dL 131 122 114 116 141* 135 135   ALT U/L 29 36  --  41 40 43 48   AST U/L 32 45  --  61* 66* 71* 75*   ALK PHOS U/L 54 59  --  62 53 54 65   ALBUMIN g/dL 2 0* 2 3*  --  2 8* 2 4* 2 7* 3 3*   TOTAL BILIRUBIN mg/dL 1 81* 1 90*  --  1 98* 1 74* 1 82* 1 83*          Results from last 7 days   Lab Units 07/18/21  0623   INR  1 53*      Results from last 7 days   Lab Units 07/13/21  2248   TROPONIN I ng/mL 0 02     Results from last 7 days   Lab Units 07/14/21  0127 07/13/21  2248   LACTIC ACID mmol/L 1 6 4 1*     ABG:    VBG:    Results from last 7 days   Lab Units 07/17/21  0443 07/16/21  0541 07/15/21  0506 07/14/21  3633 PROCALCITONIN ng/ml 0 49* 0 74* 0 79* 0 44*       Micro:  Results from last 7 days   Lab Units 07/17/21  1437 07/14/21  0537 07/13/21  2248   BLOOD CULTURE   --   --  No Growth After 4 Days  No Growth After 4 Days  URINE CULTURE   --  >100,000 cfu/ml Enterococcus faecalis*  10,000-19,000 cfu/ml Escherichia coli ESBL*  --    C DIFF TOXIN B BY PCR  Negative  --   --        EKG:  Normal sinus rhythm        Historical Information   Past Medical History:   Diagnosis Date    Diabetes mellitus ([address] )     Kidney stone      Past Surgical History:   Procedure Laterality Date    EYE SURGERY      cataract removal    JOINT REPLACEMENT      LITHOTRIPSY       Social History   Social History     Substance and Sexual Activity   Alcohol Use Not Currently     Social History     Substance and Sexual Activity   Drug Use Never     Social History     Tobacco Use   Smoking Status Never Smoker   Smokeless Tobacco Never Used     Exercise History:  Unknown  Family History:   No family history on file    I have reviewed this patient's family history and commented on sigificant items within the HPI      Medications:  Current Facility-Administered Medications   Medication Dose Route Frequency    acetaminophen (TYLENOL) tablet 650 mg  650 mg Oral Q8H PRN    bisacodyl (DULCOLAX) EC tablet 20 mg  20 mg Oral Once    brimonidine (ALPHAGAN P) 0 15 % ophthalmic solution 1 drop  1 drop Both Eyes TID    butalbital-acetaminophen-caffeine (FIORICET,ESGIC) -40 mg per tablet 1 tablet  1 tablet Oral Q6H PRN    citalopram (CeleXA) tablet 10 mg  10 mg Oral HS    insulin lispro (HumaLOG) 100 units/mL subcutaneous injection 2-12 Units  2-12 Units Subcutaneous 4x Daily (AC & HS)    morphine injection 2 mg  2 mg Intravenous Q4H PRN    octreotide (SandoSTATIN) 500 mcg in sodium chloride 0 9 % 250 mL infusion  50 mcg/hr Intravenous Continuous    octreotide (SandoSTATIN) injection 50 mcg  50 mcg Intravenous Once    ondansetron (ZOFRAN) injection 4 mg  4 mg Intravenous Q6H PRN    pantoprazole (PROTONIX) 80 mg in sodium chloride 0 9 % 100 mL infusion  8 mg/hr Intravenous Continuous    piperacillin-tazobactam (ZOSYN) 3 375 g in sodium chloride 0 9 % 100 mL IVPB  3 375 g Intravenous Q6H    rifaximin (XIFAXAN) tablet 550 mg  550 mg Oral Q12H [address]    sodium chloride 0 9 % infusion  50 mL/hr Intravenous Continuous     Home medications:  Prior to Admission Medications   Prescriptions Last Dose Informant Patient Reported? Taking?    ALPRAZolam (Xanax) 0 25 mg tablet   Yes No   Sig: Take by mouth   Acetaminophen (TYLENOL ARTHRITIS PAIN PO)   Yes No   Sig: Tylenol Arthritis Pain 650 mg tablet,extended release   Calcium Carb-Cholecalciferol 600-500 MG-UNIT CAPS   Yes No   Coenzyme Q10 (COQ-10) 10 MG CAPS   Yes No   Sig: CoQ-10   Cyclobenzaprine HCl (FLEXERIL PO)   Yes No   LUMIGAN 0 01 % ophthalmic drops   Yes No   Sig: INSTILL 1 DROP INTO BOTH EYES AT NIGHT   Polyethylene Glycol 3350 (MIRALAX PO)   Yes No   Sig: Miralax   daily   Vitamin D, Cholecalciferol, 10 MCG (400 UNIT) TABS   Yes No   Patient not taking: Reported on 2021   allopurinol (ZYLOPRIM) 100 mg tablet   Yes No   amoxicillin (AMOXIL) 500 mg capsule   Yes No   Sig: amoxicillin 500 mg capsule   TAKE 4 CAPSULES BY MOUTH 1 HOUR BEFORE DENTAL PROCEDURES   Patient not taking: Reported on 2021   atorvastatin (LIPITOR) 10 mg tablet   Yes No   benzonatate (TESSALON PERLES) 100 mg capsule   Yes No   Sig: ONE CAPSULE THREE TIMES A DAY AS NEEDED COUGHING   Patient not taking: Reported on 2021   brimonidine (ALPHAGAN P) 0 15 % ophthalmic solution  Self Yes No   Si drop 3 (three) times a day   budesonide (Rhinocort Aqua) 32 MCG/ACT nasal spray   Yes No   celecoxib (CeleBREX) 200 mg capsule   Yes No   Sig: Take 200 mg by mouth daily   citalopram (CeleXA) 10 mg tablet  Self Yes No   Sig: Take 10 mg by mouth daily at bedtime   denosumab (Prolia) 60 mg/mL   Yes No   Sig: Prolia 60 mg/mL subcutaneous syringe   docusate sodium (Colace) 100 mg capsule   Yes No   Sig: Every 12 hours   econazole nitrate 1 % cream   Yes No   Sig: USE TWICE DAILY TO AFFECTED AREA   hydrochlorothiazide (HYDRODIURIL) 50 mg tablet   Yes No   lutein 6 mg   Yes No   metFORMIN (GLUCOPHAGE) 1000 MG tablet   Yes No   Sig: Take 1,000 mg by mouth 2 (two) times a day   pantoprazole (PROTONIX) 40 mg tablet   Yes No   Sig: pantoprazole 40 mg tablet,delayed release   prn   potassium citrate (UROCIT-K) 10 mEq   Yes No      Facility-Administered Medications: None     Allergies: Allergies   Allergen Reactions    Nitrofurantoin Other (See Comments)     Paresthesias in hands     ------------------------------------------------------------------------------------------------------------  Advance Directive and Living Will:      Power of :    POLST:    ------------------------------------------------------------------------------------------------------------  Care Time Delivered:   No Critical Care time spent       Jennifer OfficerLISSA        Portions of the record may have been created with voice recognition software  Occasional wrong word or "sound a like" substitutions may have occurred due to the inherent limitations of voice recognition software    Read the chart carefully and recognize, using context, where substitutions have occurred

## 2021-07-18 NOTE — ASSESSMENT & PLAN NOTE
· Diagnosed with cirrhosis in November of 2020-cell Dr Brandy Rodriguez, gastroenterology at [facility] at that time  · CT abdomen pelvis: Hepatic cirrhosis and evidence of portal hypertension with prominent mesenteric, perisplenic, and perigastric collateral vessels/varices  Small-volume ascites   · Abdominal ultrasound 06/20/2021Hepatosplenomegaly  Hepatic cirrhosis and mild steatosis  · Total bilirubin:  1 63-1 98  · Ammonia:  49  placed on lactulose and Xifaxan    Patient had several bowel movements following which ammonia is now down to 29 with improvement in mentation noted  · Continue outpatient GI follow-up

## 2021-07-18 NOTE — ASSESSMENT & PLAN NOTE
Lab Results   Component Value Date    HGBA1C 7 7 (H) 01/25/2021       Recent Labs     07/17/21  1602 07/17/21  2101 07/18/21  0719 07/18/21  1047   POCGLU 137 137 138 129       Blood Sugar Average: Last 72 hrs:    ISS

## 2021-07-18 NOTE — NURSING NOTE
Patient transported [address] [facility] via ambulance to [place]  All IV's intact  Family taking all belongings with them    Updated report called to facility

## 2021-07-18 NOTE — ASSESSMENT & PLAN NOTE
· Unclear etiology   Per report patient had 2 large red bowel movements, last one at 1000 AM today  · Has hx MOORE cirrhosis but no varices on recent screening EGD  · Continue octreotide/protonix infusion   · Clear liquid diet, NPO @ midnight for EGD in AM

## 2021-07-18 NOTE — PLAN OF CARE
Problem: CARDIOVASCULAR - ADULT  Goal: Maintains optimal cardiac output and hemodynamic stability  Description: INTERVENTIONS:  - Monitor I/O, vital signs and rhythm  - Monitor for S/S and trends of decreased cardiac output  - Administer and titrate ordered vasoactive medications to optimize hemodynamic stability  - Assess quality of pulses, skin color and temperature  - Assess for signs of decreased coronary artery perfusion  - Instruct patient to report change in severity of symptoms  7/18/2021 1255 by Sue Bland RN  Outcome: Completed  7/18/2021 1255 by Sue Bland RN  Outcome: Not Progressing     Problem: METABOLIC, FLUID AND ELECTROLYTES - ADULT  Goal: Electrolytes maintained within normal limits  Description: INTERVENTIONS:  - Monitor labs and assess patient for signs and symptoms of electrolyte imbalances  - Administer electrolyte replacement as ordered  - Monitor response to electrolyte replacements, including repeat lab results as appropriate  - Instruct patient on fluid and nutrition as appropriate  7/18/2021 1255 by Sue Bland RN  Outcome: Completed  7/18/2021 1255 by Sue Bland RN  Outcome: Not Progressing

## 2021-07-18 NOTE — TELEPHONE ENCOUNTER
Paged about patient  She had melena/hematochezia since yesterday  Last bowel movement was maroon about 7 hours ago  Hemoglobin yesterday was 11 7 decreased to 7 9  Patient received 1 unit blood transfusion repeat hemoglobin was 8 2 at 6:00 a m  This morning  No further overt GI bleed  Blood pressure is normal   I was connected to physician at [facility]  There was plan to do 2nd blood transfusion  Requested Dr Shanae Rodriguez to get hemoglobin checked stat and let us know the results and hold off on blood transfusion right now as the goal of hemoglobin is between 7-8 given suspicion for variceal bleed and currently BP normal as well as no further overt GI bleed for the last 7 hours  If repeat hemoglobin below 7 then can give 1 unit blood transfusion  Continue PPI and octreotide drips and antibiotic Zosyn which is also providing SBP prophylaxis  Expedite transfer to [facility] as soon as possible  I will be communicating with Dr Shanae Rodriguez, Dr Chloe Graham through [place] text so that we are all aware and on board with plan of care after hemoglobin result comes back      Justin Waldron MD  Gastroenterology Fellow  [address]  Date: July 18, 2021

## 2021-07-18 NOTE — NURSING NOTE
Pt's bed was alarming; pt in bed but hanging on the side rail  She was upset and crying that she "messed the bed"  Pt had had her CPAP on and was sleeping soundly and didn't realize that she had a massive, very loose, congealed-looking,  brown/reddish BM in the bed  Approximately 6544-4871 mLs  /61, 92, 18, 98% room air  Charge nurse, nursing supervisor, and Sergio Ott made aware  TANISHA Pierson here to assess the pt

## 2021-07-18 NOTE — ASSESSMENT & PLAN NOTE
· POA to [facility]  · C/w Zosyn and PO Vanco  · ID consult  · Exam not c/f cholecystitis and HIDA neg  · ?UTI  · Asked ID to follow here

## 2021-07-18 NOTE — ASSESSMENT & PLAN NOTE
Patient noted to have dark/melanotic stools that started on 07/16/2021 in the evening  So far noted to have 3-4 episodes  Also noted to have some loose stools  Will check stool culture for C diff  Stool Hemoccult is positive  Check hemoglobin q 12 transfuse if hemoglobin drops to less than 8  Currently hemoglobin is stable at 10 6  Placed on Protonix 40 mg IV b i d  And keep NPO except for meds for now  Patient most likely is having a variceal bleed and will require endoscopy to be done on Monday  If brisk bleeding occurs may need to have it done sooner   At 3:00 a m  on 07/18/2021 patient had large maroon bowel movement estimated at over 1000 mL  Hemoglobin has dropped 2 g in less than 24 hours  An hour later she had another equally large bowel movement   Patient with dizziness, increasing heart rate  Consulted GI-started on octreotide and Protonix infusions, transfer initiated for higher level of care/possible endoscopy

## 2021-07-18 NOTE — QUICK NOTE
Prolonged Care Note    This note summarizes time spent with Gamaliel Cope and time spent on the unit/floor in direct care of Gamaliel Cope which is in addition to the original billed E/M service for this date  Additional care provided: E/M increasing GI bleed    Start Time: 0300      Stop Time: 1114  Start Time: 0444       Stop Time: 0610  Total Cumulative Time: 120  minutes    TANISHA Ponce    0300 Was contacted by RN that patient had a large brownish red bowel movement in bed, estimating that it was over 1000 mL and had a somewhat "congealed" appearance  Assessed patient  Vital signs stable  Skin pink warm and dry  Respirations unlabored  Alert and oriented x 4  Will obtain morning labs now including H&H and type and screen  Blood consent obtained and given to RN      0333 hemoglobin 7 9  Will order 1 unit PRBCs    0416 transfusion initiated by RN    3947 patient with asymptomatic temperature elevation-will give Tylenol and continue transfusion due to drop in hemoglobin and 2nd large maroon stool per RN  No chills, rigors, rash  Patient has been spiking temps daily since admission    0450 call placed to patient's daughter, Ariela Holloway, after no answer to  Danna Holcomb phone  Updated on situation    2406 call placed to PACS-spoke with Dr Justice Aguirre, GI on-call for [facility], recommends transfer, start octreotide and Protonix infusion, DC Protonix IV BID  Spoke with Dr Reginald Valentin at [facility], await connection with critical care medicine  0510 no beds at [facility]  Spoke with Dr Janette Jessica  He requests checking in to [facility], but will accept if no beds there    0523 per Dr Rosalina Corona, [facility], patient can be step-down 1  Await call back from [facility] spoke with Dr Huber Estrada, [facility] [place]  Attempted contact to GI by PACS went to voicemail  Dr Huber Estrada accepts patient    Await notification of transport arrangements, will be ground due to weather    9491 patient resting in bed, skin pink warm and dry  Heart rate 96  /62   and daughter are at bedside and were updated    0701 received call from PACS, [facility], he is arranging ground transport, will call with pickup time  LIZY paperwork completed and given to RN    1538 received call from Jeancarlos Wen, PACS, [address] [facility] [address] will arrive between 9 and 930 to  patient  RN notified  Repeat hemoglobin 8 2

## 2021-07-18 NOTE — ASSESSMENT & PLAN NOTE
· Platelets 836-VNYLY be due to liver disease  However will need to rule out a rickettsial parasitic infection  Will order Anaplasma, Ehrlichia     Parasitic smear is negative  · Daily CBC and trend platelets  · Monitor for bleeding

## 2021-07-18 NOTE — PROGRESS NOTES
Progress Note - General Surgery   Katherine Bethea 74 y o  female MRN: [id number]  Unit/Bed#: -01 Encounter: [id number]    Assessment:  Acute GI bleed, history of varices  Acute blood loss anemia, 8 2 (7 9, 9 6, 10 6, 11 9)  S/P transfusion of 1u PRBC  Cholelithiasis  Cirrhosis with elevated ammonia level  Varices  Portal hypertension  CMV  IGG elevated    Plan:  NPO  Transfer to [facility] this AM, possible colonoscopy later today  Gallbladder can be addressed as an outpatient    Subjective/Objective     Subjective: Noted to have two large dark red/moise BMs overnight, Hgb found to be 7 9, received 1u PRBC  GI consulted and patient will be transferred to [facility] this morning  Objective:     Blood pressure 111/62, pulse 101, temperature 99 4 °F (37 4 °C), resp  rate 20, height 5' 2" (1 575 m), weight 121 kg (265 lb 10 5 oz), SpO2 97 %  ,Body mass index is 48 59 kg/m²  Intake/Output Summary (Last 24 hours) at 7/18/2021 0801  Last data filed at 7/18/2021 2019  Gross per 24 hour   Intake 1144 17 ml   Output 752 ml   Net 392 17 ml       Invasive Devices     Peripheral Intravenous Line            Peripheral IV 07/16/21 Right Antecubital 1 day    Peripheral IV 07/18/21 Left;Proximal;Ventral (anterior) Forearm <1 day    Peripheral IV 07/18/21 Left;Proximal;Ventral (anterior) Forearm <1 day                Physical Exam:   Gen: patient confused this AM  Abd:  Soft and minimally tender in the upper and lower abdomen areas  No guarding or rebound  Lab, Imaging and other studies:  I have personally reviewed pertinent lab results      CBC:   Lab Results   Component Value Date    WBC 13 53 (H) 07/18/2021    HGB 8 2 (L) 07/18/2021    HCT 24 1 (L) 07/18/2021    MCV 96 07/18/2021     07/18/2021    MCH 32 2 07/18/2021    MCHC 33 8 07/18/2021    RDW 17 4 (H) 07/18/2021    MPV 11 6 07/18/2021    NRBC 0 07/18/2021     CMP:   Lab Results   Component Value Date    SODIUM 135 (L) 07/18/2021    K 3 6 07/18/2021     07/18/2021    CO2 22 07/18/2021    BUN 19 07/18/2021    CREATININE 0 90 07/18/2021    CALCIUM 7 6 (L) 07/18/2021    AST 32 07/18/2021    ALT 29 07/18/2021    ALKPHOS 54 07/18/2021    EGFR 66 07/18/2021     VTE Pharmacologic Prophylaxis: Reason for no pharmacologic prophylaxis acute GI bleed  VTE Mechanical Prophylaxis: sequential compression device      Maribel Voss PA-C

## 2021-07-18 NOTE — NURSING NOTE
7/18/21 (2913) One unit of PRBC's infusing  Oral temp prior to transfusion start 99 9, approximately 10 minutes into transfusion, oral temperature 101 4  Patient denies any symptoms of pain, shortness of breath, itching, etc  Patient not displaying any hives or physical distress  Fly GARAY notified of fever, received orders of Tylenol and to continue with transfusion and monitor  Tylenol administered per order  Shortly after transfusion began, patient had a large maroon bowel movement in bed pan  Patient grew dizzy with turning side to side  Lab notified of patients temperature increase and transfusion reaction protocol initiated  7/18/21 (3572) One unit of PRBC's transfused, patient tolerated transfusion well  Oral temperature at end of transfusion 100 8  Patient's  and daughter present in room  All aware of patient's pending transfer to [facility] for active GI bleeding  Will continue to monitor and assist with patient needs

## 2021-07-18 NOTE — ASSESSMENT & PLAN NOTE
Lab Results   Component Value Date    HGBA1C 7 7 (H) 01/25/2021       Recent Labs     07/17/21  0702 07/17/21  1108 07/17/21  1602 07/17/21  2101   POCGLU 122 153* 137 137       Blood Sugar Average: Last 72 hrs:     · Glucose 135  · Fingerstick blood sugar with sliding scale coverage  · Hold metformin while in hospital

## 2021-07-18 NOTE — CONSULTS
Consultation - Infectious Disease   Chris Guardado 74 y o  female MRN: [id number]  Unit/Bed#: [facility] 531-01 Encounter: [id number]      IMPRESSION & RECOMMENDATIONS:   Impression/Recommendations: This is a 74 y o  female, with history of cirrhosis, initially admitted at [facility] on 07/13 with fever and generalized weakness  Patient was initially on IV Zosyn for possible cholecystitis and UTI  Patient has persistent fever and now has evidence of GI bleed  She was transferred to [facility] for GI evaluation  1  SIRS, POA at [facility], with fever, tachycardia lactic acidosis  There is no obvious source of active infection  Initially, there was concern for cholecystitis but patient is without RUQ abdominal pain and HIDA is normal   There is concern for UTI given urine culture with growth of ESBL producing E coli and Enterococcus the patient has no urinary symptoms  Blood cultures have no growth  Workup for tick related infection pending, with negative parasite smear  Patient has persistent fever, but it may be all secondary to active GI bleed  At this point, we can continue IV Zosyn  If fever resolves after resolution of GI bleed and all cultures remain negative, will likely keep antibiotic course short, to avoid antibiotic toxicities  Continue IV Zosyn for now  Monitor temperature/WBC  Monitor hemodynamics  Follow-up on final blood cultures  Follow-up on Anaplasma PCR and Lyme serologies  2  Bacteriuria, with urine culture growing Enterococcus and ESBL producing E coli  Presence of 2 bacteria urine culture, especially in a patient without urinary symptoms, is most likely bladder colonization  Can continue IV Zosyn for now  If no other source of active infection, treat x 7 days total     3  GI bleed, possibly secondary to esophageal varices  GI evaluation with plan for EGD in progress  EGD per GI  4  Diarrhea, most likely secondary to GI bleed    Stool C diff toxin negative  No further p o  Vancomycin needed  5  Cirrhosis of unclear etiology  Patient has history of portal hypertension and varices  CT without significant ascites  Patient without clinical signs of peritonitis  [facility] of hospitalization records reviewed in detail  Discussed with the patient and her family in detail regarding the above plan  Discussed with Dr Donna Torres from [facility] service  Thank you for this consultation  We will follow along with you  HISTORY OF PRESENT ILLNESS:  Reason for Consult:  Fever  HPI: Diana Noriega is a 74 y o  female, with history of cirrhosis, presented to [facility] on 07/13 evening with fever and generalized weakness  Abdomen/pelvis CT and MRI showed distended gallbladder with wall thickening and gallstones, concerning for cholecystitis  Patient was admitted and started on IV Zosyn  Subsequently, HIDA was normal   However, patient had persistent fever  Urine culture with growth of Enterococcus and ESBL producing E coli  Therefore, IV Zosyn was continued  Earlier today, patient had 2 large bloody BM  She was transferred to [facility] due to concern for acute GI bleed  We are asked to evaluate the patient  At present, patient states she is anxious regarding upcoming EGD  Otherwise, she feels well  She has no abdominal pain  No urinary symptoms  No dyspnea or cough  No headache  REVIEW OF SYSTEMS:  A complete system-based review was done  Except for what is noted in HPI above, ROS of systems is otherwise negative  PAST MEDICAL HISTORY:  Past Medical History:   Diagnosis Date    Diabetes mellitus ([address] )     Kidney stone      Past Surgical History:   Procedure Laterality Date    EYE SURGERY      cataract removal    JOINT REPLACEMENT      LITHOTRIPSY       Problem list reviewed      FAMILY HISTORY:  Non-contributory    SOCIAL HISTORY:  Social History     Substance and Sexual Activity   Alcohol Use Not Currently     Social History Substance and Sexual Activity   Drug Use Never     Social History     Tobacco Use   Smoking Status Never Smoker   Smokeless Tobacco Never Used       ALLERGIES:  Allergies   Allergen Reactions    Nitrofurantoin Other (See Comments)     Paresthesias in hands       MEDICATIONS:  All current active medications have been reviewed  Patient is currently on IV Zosyn and p o  Vancomycin  PHYSICAL EXAM:  Vitals:  Temp:  [98 2 °F (36 8 °C)-101 4 °F (38 6 °C)] 98 2 °F (36 8 °C)  HR:  [] 92  Resp:  [18-20] 18  BP: ()/(57-78) 114/63  SpO2:  [94 %-100 %] 100 %  Temp (24hrs), Av 9 °F (37 7 °C), Min:98 2 °F (36 8 °C), Max:101 4 °F (38 6 °C)  Current:       Physical Exam:  General:  Morbidly obese, comfortable, nontoxic, in no acute distress  Awake, alert and oriented x 3  Eyes:  Conjunctive clear with no hemorrhages or effusions  Oropharynx:  No ulcers, no lesions, pharynx benign, no tonsillitis  Neck:  Supple, no lymphadenopathy, no mass, nontender  Lungs:  Expansion symmetric, no rales, no wheezing, no accessory muscle use  Cardiac:  Regular rate and rhythm, normal S1, normal S2, no murmurs  Abdomen:  Soft, distended, non-tender, no HSM  Extremities:  Mild leg edema, no erythema, nontender  No ulcers  Skin:  No rashes, no ulcers  Neurological:  Moves all four extremities spontaneously, sensation grossly intact    LABS, IMAGING, & OTHER STUDIES:  Lab Results:  I have personally reviewed pertinent labs    Results from last 7 days   Lab Units 21  0308 21  0443 21  1335 21  0541   POTASSIUM mmol/L 3 6 3 6 3 3* 2 9*   CHLORIDE mmol/L 104 104 104 104   CO2 mmol/L 22 22 21 21   BUN mg/dL 19 16 17 14   CREATININE mg/dL 0 90 0 72 0 83 0 81   EGFR ml/min/1 73sq m 66 86 73 75   CALCIUM mg/dL 7 6* 8 2* 7 8* 8 1*   AST U/L 32 45  --  61*   ALT U/L 29 36  --  41   ALK PHOS U/L 54 59  --  62     Results from last 7 days   Lab Units 21  1152 21  6019 21  0308 21  0443 07/16/21  0541   WBC Thousand/uL  --   --  13 53* 9 16 7 67   HEMOGLOBIN g/dL 8 1* 8 2* 7 9* 10 6* 11 9   PLATELETS Thousands/uL  --   --  175 128* 103*     Results from last 7 days   Lab Units 07/17/21  1437 07/14/21  0537 07/13/21  2248   BLOOD CULTURE   --   --  No Growth After 4 Days  No Growth After 4 Days  URINE CULTURE   --  >100,000 cfu/ml Enterococcus faecalis*  10,000-19,000 cfu/ml Escherichia coli ESBL*  --    C DIFF TOXIN B BY PCR  Negative  --   --        Imaging Studies:   I have personally reviewed pertinent imaging study reports and images in PACS  Chest/abdomen/pelvis CT reviewed personally  Distended gallbladder with gallstones and gallbladder wall thickening  EKG, Pathology, and Other Studies:   I have personally reviewed pertinent reports

## 2021-07-18 NOTE — CONSULTS
Consultation - [facility]  Eli Guerrero 74 y o  female MRN: [id number]  Unit/Bed#: [facility] 531-01 Encounter: [id number]              Inpatient consult to gastroenterology     Performed by  Jefferson Tanner MD     Authorized by Pily Estrada DO              Reason for Consult / Principal Problem:     Hematochezia and anemia      ASSESSMENT AND PLAN:      68-year-old with cirrhosis unknown etiology, complicated by hepatic encephalopathy, obstructive sleep apnea on CPAP presented to [facility] initially with UTI related sepsis  Hospital course complicated by hematochezia and anemia overnight  Patient transferred to [place] further care  1  Hematochezia and anemia  Patient has history of cirrhosis but no varices on EGD last year  Current differentials for source of bleed include peptic ulcer disease, portal hypertensive gastropathy, Dieulafoy's, diverticular bleeding  Variceal bleed less likely given platelets normal and no varices on EGD last year  Based on current evidence GI bleed seems to have stopped as hemoglobin stable and no further bowel movements for the last 10 hours  We will keep patient on PPI and octreotide drips  Patient already getting antibiotics for UTI sepsis so is covered for SBP prophylaxis  Monitor H&H Q 6  Plan to do EGD in the morning  Patient and family agreeable with plan of care  2  Cirrhosis  Unclear etiology  No history of alcohol abuse  Hepatitis-B and C were negative in August 2020  Patient has risk factors for nonalcoholic fatty liver disease so could have MOORE as the etiology  Patient has varices on imaging but EGD last year did not show any varices  Will do EGD today to investigate GI bleed  Ultrasound few days ago negative for liver lesions  3  Hepatic encephalopathy  Currently mental status at baseline  Continue lactulose and rifaximin  4  UTI sepsis  Patient continues to have fevers and tachycardia  On Zosyn    Blood cultures 07/13/2021 were negative  Deven Vasquez MD  Gastroenterology Fellow  [address]  Date: July 18, 2021       ______________________________________________________________________    HPI:  66-year-old with cirrhosis unknown etiology, complicated by hepatic encephalopathy, obstructive sleep apnea on CPAP presented to [facility] initially with UTI related sepsis  Hospital course complicated by hematochezia and anemia overnight  Patient transferred to [place] further care  Last bowel movement was early morning today almost 10 hours ago  No nausea, vomiting or abdominal pain  Occasional alcohol intake  Nonsmoker  Takes Celebrex  EGD in 2020 she did not show any varices  Hemoglobin decreased from 11 7 down to 7 9 earlier today  She received 1 unit blood transfusion since then hemoglobin stable at 8  BUN 19, creatinine 0 9, platelets normal       REVIEW OF SYSTEMS:    CONSTITUTIONAL: Denies any fever, chills, rigors, and weight loss  HEENT: No earache or tinnitus  Denies hearing loss or visual disturbances  CARDIOVASCULAR: No chest pain or palpitations  RESPIRATORY: Denies any cough, hemoptysis, shortness of breath or dyspnea on exertion  GASTROINTESTINAL: As noted in the History of Present Illness  GENITOURINARY: No problems with urination  Denies any hematuria or dysuria  NEUROLOGIC: No dizziness or vertigo, denies headaches  MUSCULOSKELETAL: Denies any muscle or joint pain  SKIN: Denies skin rashes or itching  ENDOCRINE: Denies excessive thirst  Denies intolerance to heat or cold  PSYCHOSOCIAL: Denies depression or anxiety  Denies any recent memory loss         Historical Information   Past Medical History:   Diagnosis Date    Diabetes mellitus ([facility] [address] )     Kidney stone      Past Surgical History:   Procedure Laterality Date    EYE SURGERY      cataract removal    JOINT REPLACEMENT      LITHOTRIPSY       Social History   Social History     Substance and Sexual Activity   Alcohol Use Not Currently     Social History     Substance and Sexual Activity   Drug Use Never     Social History     Tobacco Use   Smoking Status Never Smoker   Smokeless Tobacco Never Used     No family history on file      Meds/Allergies     Medications Prior to Admission   Medication    Acetaminophen (TYLENOL ARTHRITIS PAIN PO)    allopurinol (ZYLOPRIM) 100 mg tablet    ALPRAZolam (Xanax) 0 25 mg tablet    amoxicillin (AMOXIL) 500 mg capsule    atorvastatin (LIPITOR) 10 mg tablet    benzonatate (TESSALON PERLES) 100 mg capsule    brimonidine (ALPHAGAN P) 0 15 % ophthalmic solution    budesonide (Rhinocort Aqua) 32 MCG/ACT nasal spray    Calcium Carb-Cholecalciferol 600-500 MG-UNIT CAPS    celecoxib (CeleBREX) 200 mg capsule    citalopram (CeleXA) 10 mg tablet    Coenzyme Q10 (COQ-10) 10 MG CAPS    Cyclobenzaprine HCl (FLEXERIL PO)    denosumab (Prolia) 60 mg/mL    docusate sodium (Colace) 100 mg capsule    econazole nitrate 1 % cream    hydrochlorothiazide (HYDRODIURIL) 50 mg tablet    LUMIGAN 0 01 % ophthalmic drops    lutein 6 mg    metFORMIN (GLUCOPHAGE) 1000 MG tablet    pantoprazole (PROTONIX) 40 mg tablet    Polyethylene Glycol 3350 (MIRALAX PO)    potassium citrate (UROCIT-K) 10 mEq    Vitamin D, Cholecalciferol, 10 MCG (400 UNIT) TABS     Current Facility-Administered Medications   Medication Dose Route Frequency    acetaminophen (TYLENOL) tablet 650 mg  650 mg Oral Q8H PRN    brimonidine (ALPHAGAN P) 0 15 % ophthalmic solution 1 drop  1 drop Both Eyes TID    butalbital-acetaminophen-caffeine (FIORICET,ESGIC) -40 mg per tablet 1 tablet  1 tablet Oral Q6H PRN    citalopram (CeleXA) tablet 10 mg  10 mg Oral HS    insulin lispro (HumaLOG) 100 units/mL subcutaneous injection 2-12 Units  2-12 Units Subcutaneous 4x Daily (AC & HS)    morphine injection 2 mg  2 mg Intravenous Q4H PRN    octreotide (SandoSTATIN) 500 mcg in sodium chloride 0 9 % 250 mL infusion  50 mcg/hr Intravenous Continuous    octreotide (SandoSTATIN) injection 50 mcg  50 mcg Intravenous Once    ondansetron (ZOFRAN) injection 4 mg  4 mg Intravenous Q6H PRN    pantoprazole (PROTONIX) 80 mg in sodium chloride 0 9 % 100 mL infusion  8 mg/hr Intravenous Continuous    piperacillin-tazobactam (ZOSYN) 3 375 g in sodium chloride 0 9 % 100 mL IVPB  3 375 g Intravenous Q6H    rifaximin (XIFAXAN) tablet 550 mg  550 mg Oral Q12H [address]    sodium chloride 0 9 % infusion  50 mL/hr Intravenous Continuous    vancomycin (VANCOCIN) oral solution 125 mg  125 mg Oral Q12H [address]       Allergies   Allergen Reactions    Nitrofurantoin Other (See Comments)     Paresthesias in hands           Objective     Blood pressure 114/63, pulse 92, resp  rate 18, SpO2 100 %  There is no height or weight on file to calculate BMI  No intake or output data in the 24 hours ending 07/18/21 1319      PHYSICAL EXAM:      General Appearance:   Alert, cooperative, no distress   HEENT:   Normocephalic, atraumatic, anicteric      Neck:  Supple, symmetrical, trachea midline   Lungs:   Clear to auscultation bilaterally; no rales, rhonchi or wheezing; respirations unlabored    Heart[de-identified]   Regular rate and rhythm; no murmur, rub, or gallop  Abdomen:   Soft, non-tender, non-distended; normal bowel sounds; no masses, no organomegaly    Genitalia:   Deferred    Rectal:   Deferred    Extremities:  No cyanosis, clubbing or edema    Pulses:  2+ and symmetric all extremities    Skin:  No jaundice, rashes, or lesions    Lymph nodes:  No palpable cervical lymphadenopathy        Lab Results:   No results displayed because visit has over 200 results  Imaging Studies: I have personally reviewed pertinent imaging studies

## 2021-07-19 ENCOUNTER — ANESTHESIA (INPATIENT)
Dept: GASTROENTEROLOGY | Facility: HOSPITAL | Age: 66
DRG: 377 | End: 2021-07-19
Payer: MEDICARE

## 2021-07-19 ENCOUNTER — ANESTHESIA EVENT (INPATIENT)
Dept: GASTROENTEROLOGY | Facility: HOSPITAL | Age: 66
DRG: 377 | End: 2021-07-19
Payer: MEDICARE

## 2021-07-19 ENCOUNTER — APPOINTMENT (INPATIENT)
Dept: GASTROENTEROLOGY | Facility: HOSPITAL | Age: 66
DRG: 377 | End: 2021-07-19
Payer: MEDICARE

## 2021-07-19 PROBLEM — R00.0 TACHYCARDIA: Status: ACTIVE | Noted: 2021-07-19

## 2021-07-19 PROBLEM — R65.10 SIRS (SYSTEMIC INFLAMMATORY RESPONSE SYNDROME) (HCC): Status: ACTIVE | Noted: 2021-07-14

## 2021-07-19 PROBLEM — R82.71 BACTERIURIA: Status: ACTIVE | Noted: 2021-07-19

## 2021-07-19 LAB
A PHAGOCYTOPH DNA BLD QL NAA+PROBE: NEGATIVE
ABO GROUP BLD BPU: NORMAL
ALBUMIN SERPL BCP-MCNC: 1.9 G/DL (ref 3.5–5)
ALP SERPL-CCNC: 41 U/L (ref 46–116)
ALT SERPL W P-5'-P-CCNC: 26 U/L (ref 12–78)
ANION GAP SERPL CALCULATED.3IONS-SCNC: 5 MMOL/L (ref 4–13)
AST SERPL W P-5'-P-CCNC: 32 U/L (ref 5–45)
BACTERIA BLD CULT: NORMAL
BACTERIA BLD CULT: NORMAL
BILIRUB SERPL-MCNC: 1.71 MG/DL (ref 0.2–1)
BPU ID: NORMAL
BUN SERPL-MCNC: 21 MG/DL (ref 5–25)
CALCIUM ALBUM COR SERPL-MCNC: 9.2 MG/DL (ref 8.3–10.1)
CALCIUM SERPL-MCNC: 7.5 MG/DL (ref 8.3–10.1)
CHLORIDE SERPL-SCNC: 109 MMOL/L (ref 100–108)
CO2 SERPL-SCNC: 21 MMOL/L (ref 21–32)
CREAT SERPL-MCNC: 0.66 MG/DL (ref 0.6–1.3)
CROSSMATCH: NORMAL
ERYTHROCYTE [DISTWIDTH] IN BLOOD BY AUTOMATED COUNT: 17.8 % (ref 11.6–15.1)
GAMMA INTERFERON BACKGROUND BLD IA-ACNC: 0.2 IU/ML
GFR SERPL CREATININE-BSD FRML MDRD: 91 ML/MIN/1.73SQ M
GLUCOSE SERPL-MCNC: 119 MG/DL (ref 65–140)
GLUCOSE SERPL-MCNC: 130 MG/DL (ref 65–140)
GLUCOSE SERPL-MCNC: 131 MG/DL (ref 65–140)
GLUCOSE SERPL-MCNC: 144 MG/DL (ref 65–140)
GLUCOSE SERPL-MCNC: 169 MG/DL (ref 65–140)
HCT VFR BLD AUTO: 23.8 % (ref 34.8–46.1)
HCT VFR BLD AUTO: 24.7 % (ref 34.8–46.1)
HGB BLD-MCNC: 7.9 G/DL (ref 11.5–15.4)
HGB BLD-MCNC: 8 G/DL (ref 11.5–15.4)
INR PPP: 1.48 (ref 0.84–1.19)
M TB IFN-G BLD-IMP: NEGATIVE
M TB IFN-G CD4+ BCKGRND COR BLD-ACNC: 0 IU/ML
M TB IFN-G CD4+ BCKGRND COR BLD-ACNC: 0 IU/ML
MAGNESIUM SERPL-MCNC: 2.2 MG/DL (ref 1.6–2.6)
MCH RBC QN AUTO: 30.5 PG (ref 26.8–34.3)
MCHC RBC AUTO-ENTMCNC: 32.4 G/DL (ref 31.4–37.4)
MCV RBC AUTO: 94 FL (ref 82–98)
MITOGEN IGNF BCKGRD COR BLD-ACNC: 1.97 IU/ML
PHOSPHATE SERPL-MCNC: 2.6 MG/DL (ref 2.3–4.1)
PLATELET # BLD AUTO: 174 THOUSANDS/UL (ref 149–390)
PMV BLD AUTO: 11.3 FL (ref 8.9–12.7)
POTASSIUM SERPL-SCNC: 4 MMOL/L (ref 3.5–5.3)
PROT SERPL-MCNC: 4.9 G/DL (ref 6.4–8.2)
PROTHROMBIN TIME: 17.9 SECONDS (ref 11.6–14.5)
RBC # BLD AUTO: 2.62 MILLION/UL (ref 3.81–5.12)
RYE IGE QN: NEGATIVE
SODIUM SERPL-SCNC: 135 MMOL/L (ref 136–145)
UNIT DISPENSE STATUS: NORMAL
UNIT PRODUCT CODE: NORMAL
UNIT RH: NORMAL
WBC # BLD AUTO: 14.07 THOUSAND/UL (ref 4.31–10.16)

## 2021-07-19 PROCEDURE — C9113 INJ PANTOPRAZOLE SODIUM, VIA: HCPCS | Performed by: GENERAL PRACTICE

## 2021-07-19 PROCEDURE — 85018 HEMOGLOBIN: CPT | Performed by: PHYSICIAN ASSISTANT

## 2021-07-19 PROCEDURE — 94760 N-INVAS EAR/PLS OXIMETRY 1: CPT

## 2021-07-19 PROCEDURE — 82948 REAGENT STRIP/BLOOD GLUCOSE: CPT

## 2021-07-19 PROCEDURE — 3E0G8GC INTRODUCTION OF OTHER THERAPEUTIC SUBSTANCE INTO UPPER GI, VIA NATURAL OR ARTIFICIAL OPENING ENDOSCOPIC: ICD-10-PCS | Performed by: INTERNAL MEDICINE

## 2021-07-19 PROCEDURE — 43236 UPPR GI SCOPE W/SUBMUC INJ: CPT | Performed by: INTERNAL MEDICINE

## 2021-07-19 PROCEDURE — 85027 COMPLETE CBC AUTOMATED: CPT | Performed by: GENERAL PRACTICE

## 2021-07-19 PROCEDURE — 84100 ASSAY OF PHOSPHORUS: CPT | Performed by: GENERAL PRACTICE

## 2021-07-19 PROCEDURE — 83735 ASSAY OF MAGNESIUM: CPT | Performed by: GENERAL PRACTICE

## 2021-07-19 PROCEDURE — 99232 SBSQ HOSP IP/OBS MODERATE 35: CPT | Performed by: NURSE PRACTITIONER

## 2021-07-19 PROCEDURE — 85610 PROTHROMBIN TIME: CPT | Performed by: INTERNAL MEDICINE

## 2021-07-19 PROCEDURE — 94660 CPAP INITIATION&MGMT: CPT

## 2021-07-19 PROCEDURE — 0W3P8ZZ CONTROL BLEEDING IN GASTROINTESTINAL TRACT, VIA NATURAL OR ARTIFICIAL OPENING ENDOSCOPIC: ICD-10-PCS | Performed by: INTERNAL MEDICINE

## 2021-07-19 PROCEDURE — 85014 HEMATOCRIT: CPT | Performed by: PHYSICIAN ASSISTANT

## 2021-07-19 PROCEDURE — 99222 1ST HOSP IP/OBS MODERATE 55: CPT | Performed by: INTERNAL MEDICINE

## 2021-07-19 PROCEDURE — 99233 SBSQ HOSP IP/OBS HIGH 50: CPT | Performed by: INTERNAL MEDICINE

## 2021-07-19 PROCEDURE — 80053 COMPREHEN METABOLIC PANEL: CPT | Performed by: GENERAL PRACTICE

## 2021-07-19 RX ORDER — PROPOFOL 10 MG/ML
INJECTION, EMULSION INTRAVENOUS AS NEEDED
Status: DISCONTINUED | OUTPATIENT
Start: 2021-07-19 | End: 2021-07-19

## 2021-07-19 RX ORDER — EPINEPHRINE 0.1 MG/ML
SYRINGE (ML) INJECTION CODE/TRAUMA/SEDATION MEDICATION
Status: COMPLETED | OUTPATIENT
Start: 2021-07-19 | End: 2021-07-19

## 2021-07-19 RX ORDER — LIDOCAINE HYDROCHLORIDE 10 MG/ML
INJECTION, SOLUTION EPIDURAL; INFILTRATION; INTRACAUDAL; PERINEURAL AS NEEDED
Status: DISCONTINUED | OUTPATIENT
Start: 2021-07-19 | End: 2021-07-19

## 2021-07-19 RX ORDER — PROPOFOL 10 MG/ML
INJECTION, EMULSION INTRAVENOUS CONTINUOUS PRN
Status: DISCONTINUED | OUTPATIENT
Start: 2021-07-19 | End: 2021-07-19

## 2021-07-19 RX ADMIN — INSULIN LISPRO 2 UNITS: 100 INJECTION, SOLUTION INTRAVENOUS; SUBCUTANEOUS at 23:01

## 2021-07-19 RX ADMIN — SODIUM CHLORIDE 8 MG/HR: 9 INJECTION, SOLUTION INTRAVENOUS at 03:54

## 2021-07-19 RX ADMIN — BRIMONIDINE TARTRATE 1 DROP: 1.5 SOLUTION OPHTHALMIC at 08:29

## 2021-07-19 RX ADMIN — METOPROLOL TARTRATE 25 MG: 25 TABLET, FILM COATED ORAL at 23:01

## 2021-07-19 RX ADMIN — METOPROLOL TARTRATE 25 MG: 25 TABLET, FILM COATED ORAL at 10:43

## 2021-07-19 RX ADMIN — BRIMONIDINE TARTRATE 1 DROP: 1.5 SOLUTION OPHTHALMIC at 23:01

## 2021-07-19 RX ADMIN — PIPERACILLIN SODIUM, TAZOBACTAM SODIUM 3.38 G: 36; 4.5 INJECTION, POWDER, LYOPHILIZED, FOR SOLUTION INTRAVENOUS at 08:29

## 2021-07-19 RX ADMIN — MORPHINE SULFATE 2 MG: 2 INJECTION, SOLUTION INTRAMUSCULAR; INTRAVENOUS at 15:25

## 2021-07-19 RX ADMIN — PIPERACILLIN SODIUM, TAZOBACTAM SODIUM 3.38 G: 36; 4.5 INJECTION, POWDER, LYOPHILIZED, FOR SOLUTION INTRAVENOUS at 17:09

## 2021-07-19 RX ADMIN — LIDOCAINE HYDROCHLORIDE 100 MG: 10 INJECTION, SOLUTION EPIDURAL; INFILTRATION; INTRACAUDAL; PERINEURAL at 14:26

## 2021-07-19 RX ADMIN — PROPOFOL 50 MG: 10 INJECTION, EMULSION INTRAVENOUS at 14:28

## 2021-07-19 RX ADMIN — RIFAXIMIN 550 MG: 550 TABLET ORAL at 08:29

## 2021-07-19 RX ADMIN — RIFAXIMIN 550 MG: 550 TABLET ORAL at 23:01

## 2021-07-19 RX ADMIN — OCTREOTIDE ACETATE 50 MCG/HR: 500 INJECTION, SOLUTION INTRAVENOUS; SUBCUTANEOUS at 03:54

## 2021-07-19 RX ADMIN — PIPERACILLIN SODIUM, TAZOBACTAM SODIUM 3.38 G: 36; 4.5 INJECTION, POWDER, LYOPHILIZED, FOR SOLUTION INTRAVENOUS at 03:56

## 2021-07-19 RX ADMIN — EPINEPHRINE 0.4 MG: 0.1 INJECTION INTRACARDIAC; INTRAVENOUS at 14:43

## 2021-07-19 RX ADMIN — PROPOFOL 50 MG: 10 INJECTION, EMULSION INTRAVENOUS at 14:29

## 2021-07-19 RX ADMIN — PROPOFOL 100 MCG/KG/MIN: 10 INJECTION, EMULSION INTRAVENOUS at 14:27

## 2021-07-19 RX ADMIN — PROPOFOL 50 MG: 10 INJECTION, EMULSION INTRAVENOUS at 14:27

## 2021-07-19 RX ADMIN — CITALOPRAM HYDROBROMIDE 10 MG: 10 TABLET ORAL at 23:01

## 2021-07-19 RX ADMIN — PROPOFOL 50 MG: 10 INJECTION, EMULSION INTRAVENOUS at 14:26

## 2021-07-19 RX ADMIN — SODIUM CHLORIDE 50 ML/HR: 0.9 INJECTION, SOLUTION INTRAVENOUS at 05:48

## 2021-07-19 RX ADMIN — BRIMONIDINE TARTRATE 1 DROP: 1.5 SOLUTION OPHTHALMIC at 17:14

## 2021-07-19 RX ADMIN — ONDANSETRON 4 MG: 2 INJECTION INTRAMUSCULAR; INTRAVENOUS at 15:25

## 2021-07-19 RX ADMIN — PIPERACILLIN SODIUM, TAZOBACTAM SODIUM 3.38 G: 36; 4.5 INJECTION, POWDER, LYOPHILIZED, FOR SOLUTION INTRAVENOUS at 23:01

## 2021-07-19 NOTE — ASSESSMENT & PLAN NOTE
· POA to [facility] [place], operated by [facility] with fever, tachycardia, lactic acid  · No clear source of infection, low suspicion for UTI or acute cholecystitis  · Appreciate ID input   · Continue IV Zosyn for now  · Follow-up final blood culture  · Follow-up Anaplasma and Lyme serologies  · Monitor temperature and CBC/D

## 2021-07-19 NOTE — PROGRESS NOTES
[address] [facility]  Progress Note - Aryalia Jan 1955, 74 y o  female MRN: [id number]  Unit/Bed#: [facility] 531-01 Encounter: [id number]  Primary Care Provider: Abby cAosta DO   Date and time admitted to hospital: 7/18/2021  1:46 PM    * Acute GI bleeding  Assessment & Plan  · Upper GI bleed 2/2 oozing duodenal ulcer   · Appreciate GI input  · S/p EGD on 7/19: Two small nonbleeding esophageal varices with no recent stigmata of bleeding  Normal stomach  Multiple duodenal ulcers including one large ulcer in the 2nd part of the duodenum with oozing (Sheldon Class 1B) status post epinephrine and one clip with good hemostasis    · OK to discontinue Octreotide gtt  · Continue PPI gtt   · Start clear liquid diet   · Patient will need repeat EGD later this admission to reassess ulcers and biopsy   · Please notify GI with any evidence of significant bleeding   · S/p 3 units of pRBCs  · Monitor HGB and transfuse for HGB < 7    Acute blood loss anemia  Assessment & Plan  · 2/2 GIB  · S/p EGD with clipping and epinephrine to oozing duodenal ulcer   · Blood consent signed  · S/p 3 units pRBCs thus far  · Monitor HGB and transfuse for HGB < 7    SIRS (systemic inflammatory response syndrome) (HCC)  Assessment & Plan  · POA to [facility] with fever, tachycardia, lactic acid  · No clear source of infection, low suspicion for UTI or acute cholecystitis  · Appreciate ID input   · Continue IV Zosyn for now  · Follow-up final blood culture  · Follow-up Anaplasma and Lyme serologies  · Monitor temperature and CBC/D     Tachycardia  Assessment & Plan  Found to have narrow complex tachycardia on telemetry monitor   TTE: LVEF 60-65%, mild MR, mild TR  Cardiac catheterization May 2020: Mild luminal irregularities  · Appreciate cardiology and EP input   · Plan to start Metoprolol 25 mg BID   · Monitor on telemetry     Cirrhosis of liver with ascites ([facility] [address] )  Assessment & Plan  · Unclear etiology   · Hst of portal HTN and varices   · US with small perihepatic ascites  · Appreciate GI input     Bacteriuria  Assessment & Plan  Urine culture grew Enterococcus and ESBL E Coli   · Patient asymptomatic, likely colonization   · Continue Zosyn x7 days per ID     Morbid obesity ([address] )  Assessment & Plan  · RD consult    Type 2 diabetes mellitus without complication, without long-term current use of insulin [facility])  Assessment & Plan  Lab Results   Component Value Date    HGBA1C 7 7 (H) 2021       Recent Labs     21  1613 21  2112 21  0635 21  1048   POCGLU 139 165* 131 130       Blood Sugar Average: Last 72 hrs:  (P) 130 5   · Monitor ACCU checks AC + HS with lispro sliding scale input       VTE Pharmacologic Prophylaxis:   Pharmacologic: Pharmacologic VTE Prophylaxis contraindicated due to GI bleed  Mechanical VTE Prophylaxis in Place: Yes    Patient Centered Rounds: I have performed bedside rounds with nursing staff today  Discussions with Specialists or Other Care Team Provider: nursing, cm, GI, ID    Education and Discussions with Family / Patient: I have answered all questions to the best of my ability  Time Spent for Care: 30 minutes  More than 50% of total time spent on counseling and coordination of care as described above  Current Length of Stay: 1 day(s)    Current Patient Status: Inpatient   Certification Statement: The patient will continue to require additional inpatient hospital stay due to EGD today to eval GI bleed, Cards/EP consult for tachycardia     Discharge Plan: Patient is not medically stable for discharge today, likely in 72-96 hours pending progress  Code Status: Level 1 - Full Code      Subjective:   Patient resting in bed on CPAP  Talkative  Alert  Answers questions appropriately  Denies pain  Feels like she might have to urinate soon  Denies SOB, HA, dizziness, CP, vomiting, or diarrhea       Objective:     Vitals:   Temp (24hrs), Av 3 °F (37 4 °C), Min:98 °F (36 7 °C), Max:100 2 °F (37 9 °C)    Temp:  [98 °F (36 7 °C)-100 2 °F (37 9 °C)] 99 °F (37 2 °C)  HR:  [] 76  Resp:  [17-22] 18  BP: ()/(52-76) 103/55  SpO2:  [93 %-99 %] 93 %  There is no height or weight on file to calculate BMI  Input and Output Summary (last 24 hours): Intake/Output Summary (Last 24 hours) at 7/19/2021 1657  Last data filed at 7/19/2021 1506  Gross per 24 hour   Intake 2275 16 ml   Output 750 ml   Net 1525 16 ml       Physical Exam:     Physical Exam  Vitals and nursing note reviewed  Constitutional:       General: She is not in acute distress  Appearance: She is well-developed  She is obese  She is ill-appearing  She is not diaphoretic  HENT:      Head: Normocephalic  Cardiovascular:      Rate and Rhythm: Normal rate  Pulmonary:      Effort: Pulmonary effort is normal  No respiratory distress  Breath sounds: Decreased breath sounds present  No wheezing, rhonchi or rales  Abdominal:      General: Bowel sounds are normal  There is no distension  Palpations: Abdomen is soft  Tenderness: There is no abdominal tenderness  There is no guarding or rebound  Musculoskeletal:         General: Normal range of motion  Cervical back: Normal range of motion  Skin:     General: Skin is warm and dry  Capillary Refill: Capillary refill takes less than 2 seconds  Neurological:      Mental Status: She is alert and oriented to person, place, and time  Deep Tendon Reflexes: Reflexes are normal and symmetric  Comments: forgetful    Psychiatric:         Mood and Affect: Mood normal          Behavior: Behavior is cooperative           Additional Data:     Labs:    Results from last 7 days   Lab Units 07/19/21  1252 07/19/21  0548 07/18/21  0308   WBC Thousand/uL  --  14 07* 13 53*   HEMOGLOBIN g/dL 7 9* 8 0* 7 9*   HEMATOCRIT % 23 8* 24 7* 23 4*   PLATELETS Thousands/uL  --  174 175   NEUTROS PCT %  --   --  62   LYMPHS PCT %  --   --  21 MONOS PCT %  --   --  8   EOS PCT %  --   --  6     Results from last 7 days   Lab Units 07/19/21  0548   POTASSIUM mmol/L 4 0   CHLORIDE mmol/L 109*   CO2 mmol/L 21   BUN mg/dL 21   CREATININE mg/dL 0 66   CALCIUM mg/dL 7 5*   ALK PHOS U/L 41*   ALT U/L 26   AST U/L 32     Results from last 7 days   Lab Units 07/19/21  0549   INR  1 48*       * I Have Reviewed All Lab Data Listed Above  * Additional Pertinent Lab Tests Reviewed: All Labs Within Last 24 Hours Reviewed    Imaging:    Imaging Reports Reviewed Today Include: CT C/A/P, RUQ US, MRI abdomen, HIDA scan, CT head, CTA chest  Imaging Personally Reviewed by Myself Includes:  None     Recent Cultures (last 7 days):     Results from last 7 days   Lab Units 07/17/21  1437 07/14/21  0537 07/13/21  2248   BLOOD CULTURE   --   --  No Growth After 5 Days  No Growth After 5 Days     URINE CULTURE   --  >100,000 cfu/ml Enterococcus faecalis*  10,000-19,000 cfu/ml Escherichia coli ESBL*  --    C DIFF TOXIN B BY PCR  Negative  --   --        Last 24 Hours Medication List:   Current Facility-Administered Medications   Medication Dose Route Frequency Provider Last Rate    acetaminophen  650 mg Oral Q8H PRN Terrance Lora, DO      bisacodyl  20 mg Oral Once Elbert Curiel MD      brimonidine  1 drop Both Eyes TID Terrance Lora, DO      butalbital-acetaminophen-caffeine  1 tablet Oral Q6H PRN Terrance Lora, DO      citalopram  10 mg Oral HS Terrance Lora DO      insulin lispro  2-12 Units Subcutaneous 4x Daily (AC & HS) Terrance Lora, DO      metoprolol tartrate  25 mg Oral Q12H [address] [place], [place]      morphine injection  2 mg Intravenous Q4H PRN Terrance Lora, DO      ondansetron  4 mg Intravenous Q6H PRN Terrance Lora, DO      pantoprozole (PROTONIX) infusion (Continuous)  8 mg/hr Intravenous Continuous Terrance Lora, DO 8 mg/hr (07/19/21 0354)    piperacillin-tazobactam  3 375 g Intravenous Q6H Terrance Lora DO 3 375 g (07/19/21 0885)    rifaximin 550 mg Oral Q12H [address] Clarene [place], DO      sodium chloride  50 mL/hr Intravenous Continuous Clarene Keyon, DO 50 mL/hr (07/19/21 1420)        Today, Patient Was Seen By: TANISHA Freitas    ** Please Note: Dictation voice to text software may have been used in the creation of this document   **

## 2021-07-19 NOTE — ANESTHESIA PREPROCEDURE EVALUATION
Procedure:  EGD    Relevant Problems   CARDIO   (+) Abnormal heart rhythm      ENDO   (+) Type 2 diabetes mellitus without complication, without long-term current use of insulin (HCC)      GI/HEPATIC   (+) Acute GI bleeding   (+) Cirrhosis of liver with ascites (HCC)      /RENAL   (+) Kidney stones      HEMATOLOGY   (+) Acute blood loss anemia   (+) Thrombocytopenia (HCC)      PULMONARY   (+) MELVIN on CPAP        Physical Exam    Airway       Dental       Cardiovascular  Rhythm: regular, Rate: normal,     Pulmonary  Breath sounds clear to auscultation,     Other Findings        Anesthesia Plan  ASA Score- 4     Anesthesia Type- IV sedation with anesthesia with ASA Monitors  Additional Monitors:   Airway Plan:           Plan Factors-    Induction- intravenous  Postoperative Plan-     Informed Consent- Anesthetic plan and risks discussed with patient  I personally reviewed this patient with the CRNA  Discussed and agreed on the Anesthesia Plan with the CRNA  Candie Borja

## 2021-07-19 NOTE — CASE MANAGEMENT
Not a bundle  Not a readmission  Met with pt,  & dtr in room & explained CM role  Pt lives with  in 2 sty home with 2 cj  Bedroom & full bath on 2nd flr  Half bath on 1st  Reports pt was IPTA  Retired  Drives  DME cane, rw  No h/o vna or rhb  Uses [facility]  Emergency contact, , Giovanny Fontana [phone]  Will have ride home  CM reviewed d/c planning process including the following: identifying help at home, patient preference for d/c planning needs, Discharge Lounge, [facility] Meds to Bed program, availability of treatment team to discuss questions or concerns patient and/or family may have regarding understanding medications and recognizing signs and symptoms once discharged  CM also encouraged patient to follow up with all recommended appointments after discharge  Patient advised of importance for patient and family to participate in managing patients medical well being

## 2021-07-19 NOTE — ASSESSMENT & PLAN NOTE
· 2/2 GIB  · S/p EGD with clipping and epinephrine to oozing duodenal ulcer   · Blood consent signed  · S/p 3 units pRBCs thus far  · Monitor HGB and transfuse for HGB < 7

## 2021-07-19 NOTE — ASSESSMENT & PLAN NOTE
Lab Results   Component Value Date    HGBA1C 7 7 (H) 01/25/2021       Recent Labs     07/18/21  1613 07/18/21  2112 07/19/21  0635 07/19/21  1048   POCGLU 139 165* 131 130       Blood Sugar Average: Last 72 hrs:  (P) 130 5   · Monitor ACCU checks AC + HS with lispro sliding scale input

## 2021-07-19 NOTE — ASSESSMENT & PLAN NOTE
· Upper GI bleed 2/2 oozing duodenal ulcer   · Appreciate GI input  · S/p EGD on 7/19: Two small nonbleeding esophageal varices with no recent stigmata of bleeding  Normal stomach  Multiple duodenal ulcers including one large ulcer in the 2nd part of the duodenum with oozing (Sheldon Class 1B) status post epinephrine and one clip with good hemostasis    · OK to discontinue Octreotide gtt  · Continue PPI gtt   · Start clear liquid diet   · Patient will need repeat EGD later this admission to reassess ulcers and biopsy   · Please notify GI with any evidence of significant bleeding   · S/p 3 units of pRBCs  · Monitor HGB and transfuse for HGB < 7

## 2021-07-19 NOTE — ASSESSMENT & PLAN NOTE
Urine culture grew Enterococcus and ESBL E Coli   · Patient asymptomatic, likely colonization   · Continue Zosyn x7 days per ID

## 2021-07-19 NOTE — PROGRESS NOTES
Progress Note - Infectious Disease   Emigdio Gore 74 y o  female MRN: [id number]  Unit/Bed#: [facility] 531-01 Encounter: [id number]      Impression/Recommendations:  1  SIRS, POA at [facility], with fever, tachycardia lactic acidosis  There is no obvious source of active infection  Initially, there was concern for cholecystitis but patient is without RUQ abdominal pain and HIDA is normal   There is concern for UTI given urine culture with growth of ESBL producing E coli and Enterococcus the patient has no urinary symptoms  Blood cultures have no growth  Workup for tick related infection pending, with negative parasite smear and Anaplasma PCR  Patient has persistent fever, but it may be all secondary to active GI bleed  At this point, we can continue IV Zosyn  If fever resolves after resolution of GI bleed and all cultures remain negative, will likely keep antibiotic course short, to avoid antibiotic toxicities  Temperature appears to be normalizing, with only low-grade fever now  Continue IV Zosyn for now  Monitor temperature/WBC  Monitor hemodynamics  Follow-up on final blood cultures  Follow-up on Anaplasma PCR and Lyme serologies      2  Bacteriuria, with urine culture growing Enterococcus and ESBL producing E coli  Presence of 2 bacteria urine culture, especially in a patient without urinary symptoms, is most likely bladder colonization  Can continue IV Zosyn for now  If no other source of active infection, treat x 7 days total, through tomorrow      3  GI bleed, possibly secondary to esophageal varices  GI evaluation with plan for EGD in progress  EGD per GI      4  Diarrhea, most likely secondary to GI bleed  Stool C diff toxin negative  No further p o  Vancomycin needed      5  Cirrhosis of unclear etiology  Patient has history of portal hypertension and varices  CT without significant ascites    Patient without clinical signs of peritonitis      Discussed with the patient in detail regarding the above plan  Discussed with Dr Elmer Al from [facility] service  Antibiotics:  Zosyn # 6     Subjective:  Patient was seen earlier  She is comfortable  No abdominal pain  No urinary symptoms  Temperature is down, intermittently low-grade only  No chills  No diarrhea  Objective:  Vitals:  Temp:  [98 °F (36 7 °C)-100 2 °F (37 9 °C)] 99 °F (37 2 °C)  HR:  [] 74  Resp:  [17-22] 18  BP: ()/(52-76) 113/60  SpO2:  [93 %-99 %] 93 %  Temp (24hrs), Av 3 °F (37 4 °C), Min:98 °F (36 7 °C), Max:100 2 °F (37 9 °C)  Current: Temperature: 99 °F (37 2 °C)    Physical Exam:     General: Awake, alert, cooperative, no distress  Neck:  Supple  No mass  No lymphadenopathy  Lungs: Expansion symmetric, no rales, no wheezing, respirations unlabored  Heart:  Regular rate and rhythm, S1 and S2 normal, no murmur  Abdomen: Soft, nondistended, non-tender, bowel sounds active all four quadrants, no masses, no organomegaly  Extremities: Stable mild leg edema  No erythema/warmth  No ulcer  Nontender to palpation  Skin:  No rash  Neuro: Moves all extremities  Invasive Devices     Peripheral Intravenous Line            Peripheral IV 21 Right Antecubital 3 days    Peripheral IV 21 Left;Proximal;Ventral (anterior) Forearm 1 day    Peripheral IV 21 Left;Proximal;Ventral (anterior) Forearm 1 day    Peripheral IV 21 Right;Upper Arm <1 day                Labs studies:   I have personally reviewed pertinent labs    Results from last 7 days   Lab Units 21  0548 21  2101 21  0308 21  0443   POTASSIUM mmol/L 4 0 3 8 3 6 3 6   CHLORIDE mmol/L 109* 107 104 104   CO2 mmol/L  22 22   BUN mg/dL  19 16   CREATININE mg/dL 0 66 0 71 0 90 0 72   EGFR ml/min/1 73sq m 91 88 66 86   CALCIUM mg/dL 7 5* 7 8* 7 6* 8 2*   AST U/L 32  --  32 45   ALT U/L 26  --  29 36   ALK PHOS U/L 41*  --  54 59     Results from last 7 days   Lab Units 21  9434 07/19/21  0548 07/18/21  2102 07/18/21  0308 07/17/21  0443   WBC Thousand/uL  --  14 07*  --  13 53* 9 16   HEMOGLOBIN g/dL 7 9* 8 0* 7 4* 7 9* 10 6*   PLATELETS Thousands/uL  --  174  --  175 128*     Results from last 7 days   Lab Units 07/17/21  1437 07/14/21  0537 07/13/21  2248   BLOOD CULTURE   --   --  No Growth After 5 Days  No Growth After 5 Days  URINE CULTURE   --  >100,000 cfu/ml Enterococcus faecalis*  10,000-19,000 cfu/ml Escherichia coli ESBL*  --    C DIFF TOXIN B BY PCR  Negative  --   --        Imaging Studies:   I have personally reviewed pertinent imaging study reports and images in PACS  EKG, Pathology, and Other Studies:   I have personally reviewed pertinent reports

## 2021-07-19 NOTE — ASSESSMENT & PLAN NOTE
Found to have narrow complex tachycardia on telemetry monitor   TTE: LVEF 60-65%, mild MR, mild TR  Cardiac catheterization May 2020: Mild luminal irregularities  · Appreciate cardiology and EP input   · Plan to start Metoprolol 25 mg BID   · Monitor on telemetry

## 2021-07-19 NOTE — ASSESSMENT & PLAN NOTE
· Unclear etiology   · Hst of portal HTN and varices   · US with small perihepatic ascites    · Appreciate GI input

## 2021-07-19 NOTE — ANESTHESIA POSTPROCEDURE EVALUATION
Post-Op Assessment Note    CV Status:  Stable    Pain management: adequate     Mental Status:  Sleepy   Hydration Status:  Euvolemic   PONV Controlled:  Controlled   Airway Patency:  Patent      Post Op Vitals Reviewed: Yes      Staff: Anesthesiologist, CRNA   Comments: report given to RN; VSS; facemask at 6l /min for transport        No complications documented      /65 (07/19/21 1502)    Temp 100 °F (37 8 °C) (07/19/21 1502)    Pulse 90 (07/19/21 1502)   Resp 18 (07/19/21 1502)    SpO2 99 % (07/19/21 1502)

## 2021-07-19 NOTE — CONSULTS
Consultation - Electrophysiology-Cardiology (EP)   Gamaliel Cope 74 y o  female MRN: [id number]  Unit/Bed#: [facility] 531-01 Encounter: [id number]      Inpatient consult to Electrophysiology  Consult performed by: Jose Sinclair PA-C  Consult ordered by: TANISHA Ren          Assessment/Plan   Primary diagnosis:   1  Acute GIB    * patient transferred from [facility] due to acute GIB needing EGD, EP consulted on  for tachycardia management    * s/p EGD today, procedure results pending, GI following    * Hb 7 9 this AM    * did receive 1unit PRBC at OSH     2  NSVT and SVT    * tele reviewed patient having asymptomatic episodes of SVT and NSVT on tele, she is not on AVNB therapy     * echo from  showing EF of 60-65%   * BP is stable    * plan to initiate lopressor 25mg PO BID today and observe BP and tele for further rhythm abnormalities   * of note on event monitoring at [facility] from  she did have frequent PAC's and small runs of AT      Secondary diagnosis:   1  Cirrhosis   2  Morbid obesity   3  MELVIN on CPAP   4  T2DM   5  Hx Nephrolithiasis      Cardiac Studies/Imaging:     Imaging: I have personally reviewed pertinent reports  ECHO: Results for orders placed during the hospital encounter of 21    Echo complete with contrast if indicated    Narrative  [address] [place] [address]    Transthoracic Echocardiogram  2D, M-mode, Doppler, and Color Doppler    Study date:  2021    Patient: Sonali Bain  MR number: [id number]  Account number: [de-identified]  : 1955  Age: 74 years  Gender: Female  Status: Inpatient  Location: [address] [place] [address]  Height: 62 in  Weight: 264 4 lb  BP: 119/ 63 mmHg    Indications: Fever,sepsis    Diagnoses: A41 9 - Sepsis, unspecified    Sonographer:  Felice Wood RDCS, ROB  Primary Physician:  Edilia Lobo  Referring Physician:  Bibiana Patrick MD  Group:  [facility]  Interpreting Physician:  Brandy Gold MD    SUMMARY    LEFT VENTRICLE:  Size was normal   Systolic function was normal  Ejection fraction was estimated in the range of 60 % to 65 %  There were no regional wall motion abnormalities  Wall thickness was mildly increased  LEFT ATRIUM:  The atrium was mildly dilated  MITRAL VALVE:  There was mild regurgitation  TRICUSPID VALVE:  There was mild regurgitation  Estimated peak PA pressure was 30 mmHg  HISTORY: PRIOR HISTORY: sepsis,cirrhosis    PROCEDURE: The study was performed in the [facility]  This was a routine study  The transthoracic approach was used  The study included complete 2D imaging, M-mode, complete spectral Doppler, and color Doppler  The heart  rate was 102 bpm, at the start of the study  Images were obtained from the parasternal, apical, subcostal, and suprasternal notch acoustic windows  Image quality was adequate  LEFT VENTRICLE: Size was normal  Systolic function was normal  Ejection fraction was estimated in the range of 60 % to 65 %  There were no regional wall motion abnormalities  Wall thickness was mildly increased  No evidence of apical  thrombus  DOPPLER: Left ventricular diastolic function parameters were abnormal     RIGHT VENTRICLE: The size was normal  Systolic function was normal  Wall thickness was normal     LEFT ATRIUM: The atrium was mildly dilated  RIGHT ATRIUM: Size was normal     MITRAL VALVE: Valve structure was normal  There was normal leaflet separation  DOPPLER: The transmitral velocity was within the normal range  There was no evidence for stenosis  There was mild regurgitation  AORTIC VALVE: The valve was trileaflet  Leaflets exhibited normal thickness, mild calcification, and normal cuspal separation  DOPPLER: Transaortic velocity was within the normal range  There was no evidence for stenosis  There was no  significant regurgitation      TRICUSPID VALVE: The valve structure was normal  There was normal leaflet separation  DOPPLER: The transtricuspid velocity was within the normal range  There was no evidence for stenosis  There was mild regurgitation  Estimated peak PA  pressure was 30 mmHg  PULMONIC VALVE: Leaflets exhibited normal thickness, no calcification, and normal cuspal separation  DOPPLER: The transpulmonic velocity was within the normal range  There was no significant regurgitation  PERICARDIUM: There was no pericardial effusion  The pericardium was normal in appearance  AORTA: The root exhibited normal size  SYSTEMIC VEINS: IVC: The inferior vena cava was normal in size  SYSTEM MEASUREMENT TABLES    2D  %FS: 33 54 %  AV Diam: 2 79 cm  Ao asc: 3 75 cm  EDV(Teich): 125 07 ml  EF(Teich): 61 96 %  ESV(Teich): 47 57 ml  IVSd: 0 82 cm  LA Diam: 3 35 cm  LAAs A4C: 20 26 cm2  LAESV A-L A4C: 53 83 ml  LAESV MOD A4C: 51 92 ml  LALs A4C: 6 47 cm  LVIDd: 5 12 cm  LVIDs: 3 4 cm  LVPWd: 0 97 cm  RAAs A4C: 13 25 cm2  RAESV A-L: 26 68 ml  RAESV MOD: 26 42 ml  RALs: 5 58 cm  RVIDd: 3 62 cm  RWT: 0 38  SV(Teich): 77 5 ml    CW  TR Vmax: 2 49 m/s  TR maxP 76 mmHg    MM  TAPSE: 2 59 cm    PW  E' Av 21 m/s  E' Lat: 0 26 m/s  E' Sept: 0 16 m/s  E/E' Av 38  E/E' Lat: 4 32  E/E' Sept: 7 12  MV A [place]: 0 76 m/s  MV Dec [place]: 5 99 m/s2  MV DecT: 173 45 ms  MV E Fady: 1 11 m/s  MV E/A Ratio: 1 82    [facility] Accredited Echocardiography Laboratory    Prepared and electronically signed by    Shell Ramirez MD  Signed 2021 15:25:09      CATH/STRESS TEST ():  SUMMARY:  -  Stress results: There was no chest pain during stress  -  ECG conclusions: The stress ECG was negative for ischemia and normal   -  Perfusion imaging: There was a moderate-sized, mildly severe, partially reversible myocardial perfusion defect of the basal to mid anterior and anteroseptal wall possibly due to attenuation from breast tissue   Cannot completely exclude  possibility of underlying anterior wall ischemia  Prone imaging done with nearly complete resolution of anterior wall defect  -  Gated SPECT: The calculated left ventricular ejection fraction was 68 %  Left ventricular ejection fraction was within normal limits by visual estimate  There was no diagnostic evidence for left ventricular regional abnormality      IMPRESSIONS: There is a mainly fixed anterior/anteroseptal defect seen at rest and post Lexiscan  The defect does appear to have some mild reversibility which could be related to shifting breast attenuation artifact  SDS 6  Cannot exclude  mild superimposed anterior/anteroseptal ischemia but suspect this is less likely  Prone imaging was obtained with near resolution of anterior/anteroseptal wall defect  Equivocal study  Left ventricular systolic function was normal      PREVIOUS STUDY COMPARISON: Compared to prior Dobutamine stress test 11/12/2015, the anterior/anteroseptal defect was not previously noted  Tele:       Tele:       EKG:       History of Present Illness   Physician Requesting Consult: Jett Ba MD  Reason for Consult / Principal Problem: tachycardia    HPI: Vickie Taylor is a 74y o  year old female with a history as above who [facility] due to fevers  She was transferred to [facility] on 7-18 for further GI workup  EP consulted on 7-19 for tachycardia management  On 7-14 patient presented to [facility] with malaise and generalized weakness  In the ED she was diagnosed with acute sepsis due to pyrexia and tachycardia with lactate of 4 1  initial CT abdomen showing possible cholecystitis with general surgery being consulted, UA showing acute cystitis being initiated on Zosyn and patient being admitted to the medical team  During her stay general surgery recommended MRCP but no acute lap ryann was performed with MRCP confirming this plan  ID was consulted on 7-15 for further fever of unknown origin workup   Recommendation from IV was to continue zosyn with GI being consulted on 7-16 who felt patient was suffering from hepatic encephalopathy from decompensated cirrhosis ; lactulose was recommended  On this same hospital day she was noted to have melanotic stools with hemocult stool being positive  On 7-17 to 7-18 she continued to have melanotic stools with patient needing 1unit PRBC's and was transferred to [facility] for EGD of note hemoglobin decreased from 11 7 to 7 9 during her stay  After being transferred to [facility] EP was consulted on 7-19 due to NSVT and SVT on tele  Patient is asymptomatic from these episodes and follows closely with dr Tera Burch at [facility]  She was given IV lopressor this AM with good rate control  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  Review of Systems  ROS as noted above, otherwise 12 point review of systems was performed and is negative         Historical Information   Past Medical History:   Diagnosis Date    Diabetes mellitus ([address] )     Kidney stone      Past Surgical History:   Procedure Laterality Date    EYE SURGERY      cataract removal    JOINT REPLACEMENT      LITHOTRIPSY       Social History     Substance and Sexual Activity   Alcohol Use Not Currently     Social History     Substance and Sexual Activity   Drug Use Never     Social History     Tobacco Use   Smoking Status Never Smoker   Smokeless Tobacco Never Used     Family History: non-contributory    Meds/Allergies   Hospital Medications:   Current Facility-Administered Medications   Medication Dose Route Frequency    acetaminophen (TYLENOL) tablet 650 mg  650 mg Oral Q8H PRN    bisacodyl (DULCOLAX) EC tablet 20 mg  20 mg Oral Once    brimonidine (ALPHAGAN P) 0 15 % ophthalmic solution 1 drop  1 drop Both Eyes TID    butalbital-acetaminophen-caffeine (FIORICET,ESGIC) -40 mg per tablet 1 tablet  1 tablet Oral Q6H PRN    citalopram (CeleXA) tablet 10 mg  10 mg Oral HS    insulin lispro (HumaLOG) 100 units/mL subcutaneous injection 2-12 Units  2-12 Units Subcutaneous 4x Daily (AC & HS)    metoprolol tartrate (LOPRESSOR) tablet 25 mg  25 mg Oral Q12H TAIMKO    morphine injection 2 mg  2 mg Intravenous Q4H PRN    octreotide (SandoSTATIN) 500 mcg in sodium chloride 0 9 % 250 mL infusion  50 mcg/hr Intravenous Continuous    ondansetron (ZOFRAN) injection 4 mg  4 mg Intravenous Q6H PRN    pantoprazole (PROTONIX) 80 mg in sodium chloride 0 9 % 100 mL infusion  8 mg/hr Intravenous Continuous    piperacillin-tazobactam (ZOSYN) 3 375 g in sodium chloride 0 9 % 100 mL IVPB  3 375 g Intravenous Q6H    rifaximin (XIFAXAN) tablet 550 mg  550 mg Oral Q12H [address]    sodium chloride 0 9 % infusion  50 mL/hr Intravenous Continuous     Home Medications:   Medications Prior to Admission   Medication    Acetaminophen (TYLENOL ARTHRITIS PAIN PO)    allopurinol (ZYLOPRIM) 100 mg tablet    ALPRAZolam (Xanax) 0 25 mg tablet    amoxicillin (AMOXIL) 500 mg capsule    atorvastatin (LIPITOR) 10 mg tablet    benzonatate (TESSALON PERLES) 100 mg capsule    brimonidine (ALPHAGAN P) 0 15 % ophthalmic solution    budesonide (Rhinocort Aqua) 32 MCG/ACT nasal spray    Calcium Carb-Cholecalciferol 600-500 MG-UNIT CAPS    celecoxib (CeleBREX) 200 mg capsule    citalopram (CeleXA) 10 mg tablet    Coenzyme Q10 (COQ-10) 10 MG CAPS    Cyclobenzaprine HCl (FLEXERIL PO)    denosumab (Prolia) 60 mg/mL    docusate sodium (Colace) 100 mg capsule    econazole nitrate 1 % cream    hydrochlorothiazide (HYDRODIURIL) 50 mg tablet    LUMIGAN 0 01 % ophthalmic drops    lutein 6 mg    metFORMIN (GLUCOPHAGE) 1000 MG tablet    pantoprazole (PROTONIX) 40 mg tablet    Polyethylene Glycol 3350 (MIRALAX PO)    potassium citrate (UROCIT-K) 10 mEq    Vitamin D, Cholecalciferol, 10 MCG (400 UNIT) TABS       Allergies   Allergen Reactions    Nitrofurantoin Other (See Comments)     Paresthesias in hands       Objective   Vitals: Blood pressure 134/72, pulse 92, temperature 100 °F (37 8 °C), temperature source Tympanic, resp  rate 22, SpO2 98 %  Orthostatic Blood Pressures      Most Recent Value   Blood Pressure  134/72 filed at 07/19/2021 1517   Patient Position - Orthostatic VS  Lying filed at 07/19/2021 9341            Intake/Output Summary (Last 24 hours) at 7/19/2021 1539  Last data filed at 7/19/2021 1506  Gross per 24 hour   Intake 2466 83 ml   Output 1000 ml   Net 1466 83 ml       Invasive Devices     Peripheral Intravenous Line            Peripheral IV 07/16/21 Right Antecubital 2 days    Peripheral IV 07/18/21 Left;Proximal;Ventral (anterior) Forearm 1 day    Peripheral IV 07/18/21 Left;Proximal;Ventral (anterior) Forearm 1 day    Peripheral IV 07/19/21 Right;Upper Arm <1 day                Physical Exam  Constitutional:       Appearance: She is well-developed  HENT:      Head: Normocephalic and atraumatic  Eyes:      Pupils: Pupils are equal, round, and reactive to light  Cardiovascular:      Rate and Rhythm: Normal rate and regular rhythm  Pulmonary:      Effort: Pulmonary effort is normal       Breath sounds: Normal breath sounds  Abdominal:      General: Bowel sounds are normal       Palpations: Abdomen is soft  Musculoskeletal:         General: Normal range of motion  Cervical back: Normal range of motion and neck supple  Skin:     General: Skin is warm and dry  Neurological:      Mental Status: She is alert and oriented to person, place, and time  Lab Results: I have personally reviewed pertinent lab results      Results from last 7 days   Lab Units 07/19/21  1252 07/19/21  0548 07/18/21 2102 07/18/21  0308 07/17/21  0443   WBC Thousand/uL  --  14 07*  --  13 53* 9 16   HEMOGLOBIN g/dL 7 9* 8 0* 7 4* 7 9* 10 6*   HEMATOCRIT % 23 8* 24 7* 22 3* 23 4* 31 4*   PLATELETS Thousands/uL  --  174  --  175 128*     Results from last 7 days   Lab Units 07/19/21  0548 07/18/21 2101 07/18/21  0308   POTASSIUM mmol/L 4 0 3 8 3 6   CHLORIDE mmol/L 109* 107 104   CO2 mmol/L 21 21 22   BUN mg/dL 21 21 19   CREATININE mg/dL 0 66 0 71 0 90   CALCIUM mg/dL 7 5* 7 8* 7 6*     Results from last 7 days   Lab Units 07/19/21  0549 07/18/21  0623   INR  1 48* 1 53*     Results from last 7 days   Lab Units 07/19/21  0548 07/18/21  2101   MAGNESIUM mg/dL 2 2 1 8

## 2021-07-20 LAB
ABO GROUP BLD BPU: NORMAL
ALBUMIN SERPL BCP-MCNC: 1.8 G/DL (ref 3.5–5)
ALP SERPL-CCNC: 40 U/L (ref 46–116)
ALT SERPL W P-5'-P-CCNC: 25 U/L (ref 12–78)
ANION GAP SERPL CALCULATED.3IONS-SCNC: 4 MMOL/L (ref 4–13)
ANISOCYTOSIS BLD QL SMEAR: PRESENT
AST SERPL W P-5'-P-CCNC: 29 U/L (ref 5–45)
B BURGDOR IGG+IGM SER-ACNC: 6
B MICROTI IGG TITR SER: NORMAL {TITER}
B MICROTI IGM TITR SER: NORMAL {TITER}
BASOPHILS # BLD MANUAL: 0 THOUSAND/UL (ref 0–0.1)
BASOPHILS NFR MAR MANUAL: 0 % (ref 0–1)
BILIRUB SERPL-MCNC: 1.25 MG/DL (ref 0.2–1)
BPU ID: NORMAL
BUN SERPL-MCNC: 19 MG/DL (ref 5–25)
CALCIUM ALBUM COR SERPL-MCNC: 9 MG/DL (ref 8.3–10.1)
CALCIUM SERPL-MCNC: 7.2 MG/DL (ref 8.3–10.1)
CHLORIDE SERPL-SCNC: 112 MMOL/L (ref 100–108)
CO2 SERPL-SCNC: 21 MMOL/L (ref 21–32)
CREAT SERPL-MCNC: 0.61 MG/DL (ref 0.6–1.3)
CROSSMATCH: NORMAL
EOSINOPHIL # BLD MANUAL: 0.72 THOUSAND/UL (ref 0–0.4)
EOSINOPHIL NFR BLD MANUAL: 5 % (ref 0–6)
ERYTHROCYTE [DISTWIDTH] IN BLOOD BY AUTOMATED COUNT: 19.1 % (ref 11.6–15.1)
GFR SERPL CREATININE-BSD FRML MDRD: 93 ML/MIN/1.73SQ M
GLUCOSE SERPL-MCNC: 106 MG/DL (ref 65–140)
GLUCOSE SERPL-MCNC: 108 MG/DL (ref 65–140)
GLUCOSE SERPL-MCNC: 119 MG/DL (ref 65–140)
GLUCOSE SERPL-MCNC: 203 MG/DL (ref 65–140)
GLUCOSE SERPL-MCNC: 220 MG/DL (ref 65–140)
HCT VFR BLD AUTO: 23.4 % (ref 34.8–46.1)
HCT VFR BLD AUTO: 27 % (ref 34.8–46.1)
HGB BLD-MCNC: 7.7 G/DL (ref 11.5–15.4)
HGB BLD-MCNC: 8.6 G/DL (ref 11.5–15.4)
LYMPHOCYTES # BLD AUTO: 0.87 THOUSAND/UL (ref 0.6–4.47)
LYMPHOCYTES # BLD AUTO: 6 % (ref 14–44)
MACROCYTES BLD QL AUTO: PRESENT
MAGNESIUM SERPL-MCNC: 2.1 MG/DL (ref 1.6–2.6)
MCH RBC QN AUTO: 31.7 PG (ref 26.8–34.3)
MCHC RBC AUTO-ENTMCNC: 32.9 G/DL (ref 31.4–37.4)
MCV RBC AUTO: 96 FL (ref 82–98)
METAMYELOCYTES NFR BLD MANUAL: 2 % (ref 0–1)
MONOCYTES # BLD AUTO: 0.72 THOUSAND/UL (ref 0–1.22)
MONOCYTES NFR BLD: 5 % (ref 4–12)
NEUTROPHILS # BLD MANUAL: 11.85 THOUSAND/UL (ref 1.85–7.62)
NEUTS BAND NFR BLD MANUAL: 4 % (ref 0–8)
NEUTS SEG NFR BLD AUTO: 78 % (ref 43–75)
NRBC BLD AUTO-RTO: 0 /100 WBCS
OVALOCYTES BLD QL SMEAR: PRESENT
PHOSPHATE SERPL-MCNC: 2.5 MG/DL (ref 2.3–4.1)
PLATELET # BLD AUTO: 203 THOUSANDS/UL (ref 149–390)
PLATELET BLD QL SMEAR: ADEQUATE
PMV BLD AUTO: 10.9 FL (ref 8.9–12.7)
POIKILOCYTOSIS BLD QL SMEAR: PRESENT
POLYCHROMASIA BLD QL SMEAR: PRESENT
POTASSIUM SERPL-SCNC: 4 MMOL/L (ref 3.5–5.3)
PROT SERPL-MCNC: 5 G/DL (ref 6.4–8.2)
RBC # BLD AUTO: 2.43 MILLION/UL (ref 3.81–5.12)
RBC MORPH BLD: PRESENT
SODIUM SERPL-SCNC: 137 MMOL/L (ref 136–145)
UNIT DISPENSE STATUS: NORMAL
UNIT PRODUCT CODE: NORMAL
UNIT RH: NORMAL
WBC # BLD AUTO: 14.45 THOUSAND/UL (ref 4.31–10.16)

## 2021-07-20 PROCEDURE — 85018 HEMOGLOBIN: CPT | Performed by: NURSE PRACTITIONER

## 2021-07-20 PROCEDURE — C9113 INJ PANTOPRAZOLE SODIUM, VIA: HCPCS | Performed by: GENERAL PRACTICE

## 2021-07-20 PROCEDURE — 84100 ASSAY OF PHOSPHORUS: CPT | Performed by: INTERNAL MEDICINE

## 2021-07-20 PROCEDURE — 85014 HEMATOCRIT: CPT | Performed by: NURSE PRACTITIONER

## 2021-07-20 PROCEDURE — 83735 ASSAY OF MAGNESIUM: CPT | Performed by: INTERNAL MEDICINE

## 2021-07-20 PROCEDURE — 85027 COMPLETE CBC AUTOMATED: CPT | Performed by: INTERNAL MEDICINE

## 2021-07-20 PROCEDURE — NC001 PR NO CHARGE: Performed by: INTERNAL MEDICINE

## 2021-07-20 PROCEDURE — 97167 OT EVAL HIGH COMPLEX 60 MIN: CPT

## 2021-07-20 PROCEDURE — 94760 N-INVAS EAR/PLS OXIMETRY 1: CPT

## 2021-07-20 PROCEDURE — 80053 COMPREHEN METABOLIC PANEL: CPT | Performed by: INTERNAL MEDICINE

## 2021-07-20 PROCEDURE — 99233 SBSQ HOSP IP/OBS HIGH 50: CPT | Performed by: INTERNAL MEDICINE

## 2021-07-20 PROCEDURE — C9113 INJ PANTOPRAZOLE SODIUM, VIA: HCPCS | Performed by: NURSE PRACTITIONER

## 2021-07-20 PROCEDURE — 99232 SBSQ HOSP IP/OBS MODERATE 35: CPT | Performed by: NURSE PRACTITIONER

## 2021-07-20 PROCEDURE — 82948 REAGENT STRIP/BLOOD GLUCOSE: CPT

## 2021-07-20 PROCEDURE — 85007 BL SMEAR W/DIFF WBC COUNT: CPT | Performed by: INTERNAL MEDICINE

## 2021-07-20 PROCEDURE — 97163 PT EVAL HIGH COMPLEX 45 MIN: CPT

## 2021-07-20 RX ORDER — PANTOPRAZOLE SODIUM 40 MG/1
40 TABLET, DELAYED RELEASE ORAL
Status: DISCONTINUED | OUTPATIENT
Start: 2021-07-21 | End: 2021-07-21

## 2021-07-20 RX ADMIN — BRIMONIDINE TARTRATE 1 DROP: 1.5 SOLUTION OPHTHALMIC at 15:19

## 2021-07-20 RX ADMIN — PIPERACILLIN SODIUM, TAZOBACTAM SODIUM 3.38 G: 36; 4.5 INJECTION, POWDER, LYOPHILIZED, FOR SOLUTION INTRAVENOUS at 21:44

## 2021-07-20 RX ADMIN — SODIUM CHLORIDE 8 MG/HR: 9 INJECTION, SOLUTION INTRAVENOUS at 22:25

## 2021-07-20 RX ADMIN — METOPROLOL TARTRATE 25 MG: 25 TABLET, FILM COATED ORAL at 21:27

## 2021-07-20 RX ADMIN — SODIUM CHLORIDE 8 MG/HR: 9 INJECTION, SOLUTION INTRAVENOUS at 13:14

## 2021-07-20 RX ADMIN — BRIMONIDINE TARTRATE 1 DROP: 1.5 SOLUTION OPHTHALMIC at 08:42

## 2021-07-20 RX ADMIN — PIPERACILLIN SODIUM, TAZOBACTAM SODIUM 3.38 G: 36; 4.5 INJECTION, POWDER, LYOPHILIZED, FOR SOLUTION INTRAVENOUS at 15:19

## 2021-07-20 RX ADMIN — CITALOPRAM HYDROBROMIDE 10 MG: 10 TABLET ORAL at 21:26

## 2021-07-20 RX ADMIN — PIPERACILLIN SODIUM, TAZOBACTAM SODIUM 3.38 G: 36; 4.5 INJECTION, POWDER, LYOPHILIZED, FOR SOLUTION INTRAVENOUS at 08:43

## 2021-07-20 RX ADMIN — INSULIN LISPRO 4 UNITS: 100 INJECTION, SOLUTION INTRAVENOUS; SUBCUTANEOUS at 21:34

## 2021-07-20 RX ADMIN — METOPROLOL TARTRATE 25 MG: 25 TABLET, FILM COATED ORAL at 21:39

## 2021-07-20 RX ADMIN — BRIMONIDINE TARTRATE 1 DROP: 1.5 SOLUTION OPHTHALMIC at 21:40

## 2021-07-20 RX ADMIN — MORPHINE SULFATE 2 MG: 2 INJECTION, SOLUTION INTRAMUSCULAR; INTRAVENOUS at 21:38

## 2021-07-20 RX ADMIN — PIPERACILLIN SODIUM, TAZOBACTAM SODIUM 3.38 G: 36; 4.5 INJECTION, POWDER, LYOPHILIZED, FOR SOLUTION INTRAVENOUS at 02:58

## 2021-07-20 RX ADMIN — RIFAXIMIN 550 MG: 550 TABLET ORAL at 08:39

## 2021-07-20 RX ADMIN — METOPROLOL TARTRATE 25 MG: 25 TABLET, FILM COATED ORAL at 08:38

## 2021-07-20 RX ADMIN — RIFAXIMIN 550 MG: 550 TABLET ORAL at 21:42

## 2021-07-20 RX ADMIN — INSULIN LISPRO 4 UNITS: 100 INJECTION, SOLUTION INTRAVENOUS; SUBCUTANEOUS at 11:04

## 2021-07-20 NOTE — PHYSICAL THERAPY NOTE
Physical Therapy Evaluation     Patient's Name: Glenn Estevez    Admitting Diagnosis  Severe sepsis ([facility] ) [A41 9, R65 20]    Problem List  Patient Active Problem List   Diagnosis    SIRS (systemic inflammatory response syndrome) ([facility] )    Hyponatremia    Cirrhosis of liver with ascites ([facility] )    Acute cholecystitis    Thrombocytopenia (HCC)    Acute cystitis without hematuria    Type 2 diabetes mellitus without complication, without long-term current use of insulin (HCC)    Class 3 severe obesity due to excess calories with serious comorbidity and body mass index (BMI) of 45 0 to 49 9 in adult ([facility] )    MELVIN on CPAP    Kidney stones    Abnormal heart rhythm    Hypokalemia    Acute upper GI bleed    Morbid obesity ([place] [address] )    Acute blood loss anemia    Bacteriuria    Tachycardia       Past Medical History  Past Medical History:   Diagnosis Date    Diabetes mellitus ([facility] )     Kidney stone        Past Surgical History  Past Surgical History:   Procedure Laterality Date    EYE SURGERY      cataract removal    JOINT REPLACEMENT      LITHOTRIPSY            07/20/21 1032   PT Last Visit   PT Visit Date 07/20/21   Note Type   Note type Evaluation   Pain Assessment   Pain Assessment Tool Pain Assessment not indicated - pt denies pain   Home Living   Type of [address] [place] [address] Two level;1/2 bath on main level  (2STE)   Bathroom Shower/Tub Walk-in shower   Bathroom Toilet Raised   Bathroom Equipment Shower chair  (denies use PTA)   Home Equipment Walker;Cane  (reports use of SPC PTA)   Prior Function   Level of [place] Independent with ADLs and functional mobility; Needs assistance with IADLs   Lives With Spouse   Receives Help From Family   ADL Assistance Independent   IADLs Needs assistance   Falls in the last 6 months 1 to 4  (3 per pt report)   Vocational Retired   Comments +   Restrictions/Precautions   [facility] Bearing Precautions Per Order No   Other Precautions Chair Alarm; Bed Alarm;Multiple lines;Telemetry; Fall Risk   General   Family/Caregiver Present No   Cognition   Overall Cognitive Status WFL   Arousal/Participation Responsive   Orientation Level Oriented X4   Memory Decreased recall of precautions   Following Commands Follows one step commands with increased time or repetition   Comments Pt presents drowsy/lethargic, requires near constant cues initially for alertness  Pt with some decreased safety awareness and insight into deficits  RLE Assessment   RLE Assessment   (functionally 3+/5)   LLE Assessment   LLE Assessment   (functionally 3+/5)   Bed Mobility   Supine to Sit 4  Minimal assistance   Additional items Assist x 1; Increased time required;Verbal cues   Additional Comments Supine in bed upon PT arrival   Pt left upright in bedside chair with chair alarm intact and all needs in reach  Transfers   Sit to Stand 4  Minimal assistance   Additional items Assist x 1; Increased time required;Verbal cues   Stand to Sit 4  Minimal assistance   Additional items Assist x 1; Increased time required;Verbal cues   Additional Comments Transfers with RW   VC for hand placement and safety  Ambulation/Elevation   Gait pattern Excessively slow; Short stride;Decreased foot clearance   Gait Assistance 4  Minimal assist   Additional items Assist x 1;Verbal cues; Tactile cues   Assistive Device Rolling walker   Distance 3 ft x 1, 5 ft forward/backward  (limited by fatigue)   Balance   Static Sitting Fair   Dynamic Sitting Fair -   Static Standing Poor +   Dynamic Standing Poor   Ambulatory Poor   Endurance Deficit   Endurance Deficit Yes   Endurance Deficit Description fatigue, weakness   Activity Tolerance   Activity Tolerance Patient limited by fatigue   Medical Staff Made Aware OT, OTS   Nurse Made Aware RN cleared pt to be seen by PT   Assessment   Prognosis Good   Problem List Decreased strength;Decreased endurance; Impaired balance;Decreased mobility; Decreased safety awareness   Assessment Pt seen for high complexity PT evaluation due to decrease in functional mobility status compared to baseline  Pt with active PT eval/treat orders at this time  Pt is a 74 y o  F who presented to [facility] as a transfer from [address] [place] [address] on 7/18/21 where she initially presented with sepsis and acute GI bleed  Complicated by tachy arrhythmia and transferred to [facility] for further evaluation  Pt  has a past medical history of Diabetes mellitus ([address] ) and Kidney stone  Pt resides with  in [facility] with 2STE  Pt presents with decreased strength, balance, endurane that contribute to limitations in bed mobility, functional transfers, functional mobility  Pt requires Min A for all mobility at this time  Pt left upright in bedside chair with chair alarm intact and with all needs in reach  Pt will benefit from skilled therapy in order to address current impairments and functional limitations  PT to follow pt and recommending rehab pending progress  The patient's AM-PAC Basic Mobility Inpatient Short Form Raw Score is 17, Standardized Score is 39 67  A standardized score less than 42 9 suggests the patient may benefit from discharge to post-acute rehabilitation services  Please also refer to the recommendation of the Physical Therapist for safe discharge planning  Barriers to Discharge Decreased caregiver support; Inaccessible home environment   Goals   Patient Goals to rest   STG Expiration Date 08/03/21   Short Term Goal #1 1  Pt will demonstrate ability to perform all aspects of bed mobility with Mod I in order to increase independence and decrease burden on caregivers  2  Pt will demonstrate ability to perform functional transfers with Mod I in order to increase independence and decrease burden on caregivers  3  Pt will demonstrate ability to ambulate 200 ft with least restrictive AD with Mod I in order to return to mobility safely   4  Pt will demonstrate ability to negotiate full flight steps with/without HR and Mod I in order to return to household/community mobility safely  5  Pt will demonstrate improved balance by one grade order to decrease risk of falls  6  Pt will increase b/l LE strength by 1 grade in order to increase ease of functional mobility and transfers  Plan   Treatment/Interventions Functional transfer training;LE strengthening/ROM; Therapeutic exercise; Endurance training;Patient/family training;Equipment eval/education; Bed mobility;Gait training;Spoke to nursing   PT Frequency Other (Comment)  (3-5x/wk)   Recommendation   PT Discharge Recommendation Post acute rehabilitation services  (pending progress)   Equipment Recommended [facility] Recommended Wheeled walker   Change/add to 1-4 All?  No   PT - OK to Discharge Yes  (to rehab once medically cleared)   AM-PAC Basic Mobility Inpatient   Turning in Bed Without Bedrails 3   Lying on Back to Sitting on Edge of Flat Bed 3   Moving Bed to Chair 3   Standing Up From Chair 3   Walk in Room 3   Climb 3-5 Stairs 2   Basic Mobility Inpatient Raw Score 17   Basic Mobility Standardized Score 39 67   Modified Parish Scale   Modified Parish Scale 4         Maryjo Kim, PT, DPT

## 2021-07-20 NOTE — ASSESSMENT & PLAN NOTE
Lab Results   Component Value Date    HGBA1C 7 7 (H) 01/25/2021       Recent Labs     07/19/21  1545 07/19/21 2024 07/20/21  0614 07/20/21  1054   POCGLU 144* 169* 108 220*       Blood Sugar Average: Last 72 hrs:  (P) 753 [id number]   · Monitor ACCU checks AC + HS    · Lispro sliding scale

## 2021-07-20 NOTE — OCCUPATIONAL THERAPY NOTE
Occupational Therapy Evaluation     Patient Name: Pedro Keane  Today's Date: 7/20/2021  Problem List  Principal Problem:    Acute upper GI bleed  Active Problems:    SIRS (systemic inflammatory response syndrome) (HCC)    Cirrhosis of liver with ascites (HCC)    Type 2 diabetes mellitus without complication, without long-term current use of insulin (HCC)    Morbid obesity (HCC)    Acute blood loss anemia    Bacteriuria    Tachycardia    Past Medical History  Past Medical History:   Diagnosis Date    Diabetes mellitus ([address] )     Kidney stone      Past Surgical History  Past Surgical History:   Procedure Laterality Date    EYE SURGERY      cataract removal    JOINT REPLACEMENT      LITHOTRIPSY               07/20/21 1040   OT Last Visit   OT Visit Date 07/20/21   Note Type   Note type Evaluation   Restrictions/Precautions   Weight Bearing Precautions Per Order No   Other Precautions Chair Alarm; Bed Alarm;Multiple lines;Telemetry; Fall Risk   Pain Assessment   Pain Assessment Tool 0-10   Pain Score No Pain   Home Living   Type of Home House   Home Layout Two level;1/2 bath on main level;Bed/bath upstairs  (2STE)   Bathroom Shower/Tub Walk-in shower   Bathroom Toilet Raised   Bathroom Equipment Shower chair  (Pt denies DME use PTA)   Home Equipment Walker;Cane  (Pt using SPC PTA )   Prior Function   Level of [place] Independent with ADLs and functional mobility; Needs assistance with IADLs   Lives With Spouse   Receives Help From Family   ADL Assistance Independent   IADLs Needs assistance   Falls in the last 6 months 1 to 4  (Pt reports 3 falls )   Vocational Retired   Lifestyle   Autonomy Pt reports independence with ADLs, assistance from  and family for  IADLs (+ driving), and recently MOD I for functional mobililty w/SPC    Reciprocal Relationships Pt lives with her  who provides assist PRN   Pt reports having local family   Service to Others Pt is retired    Intrinsic Gratification Pt loves watching and spending time with her 7 grandchildren  Psychosocial   Psychosocial (WDL) WDL   Subjective   Subjective "I'm sorry I wasn't able to do more"    ADL   Where Assessed Edge of bed   Eating Assistance 7  Independent   Grooming Assistance 5  Supervision/Setup   UB Bathing Assistance 4  Minimal Assistance   LB Bathing Assistance 3  Moderate Assistance   [address] 4  Minimal [address] 3  Moderate [address]  3  Moderate Assistance   Bed Mobility   Supine to Sit 4  Minimal assistance   Additional items Assist x 1; Increased time required;Verbal cues   Additional Comments Pt supine in bed upon arrival  End of evaluation Pt seated OOB in chair with chair alarm activated, call bell within reach, and needs met  Transfers   Sit to Stand 4  Minimal assistance   Additional items Assist x 1; Increased time required;Verbal cues   Stand to Sit 4  Minimal assistance   Additional items Assist x 1; Increased time required;Verbal cues;Armrests   Additional Comments w/ RW, VCs for safety, hand placement, and technique   Functional Mobility   Functional Mobility 4  Minimal assistance   Additional Comments Ax1, Pt traveled short home distance within room due to increased fatigue   Additional items Rolling walker   Balance   Static Sitting Fair   Dynamic Sitting Fair -   Static Standing Poor +   Dynamic Standing Poor   Ambulatory Poor   Activity Tolerance   Activity Tolerance Patient limited by fatigue   Medical Staff Made Aware PT LISA Harley    Nurse Made Aware Appropriate to see per RN   RUE Assessment   RUE Assessment WFL   LUE Assessment   LUE Assessment WFL   Hand Function   Gross Motor Coordination Functional   Fine Motor Coordination Functional   Cognition   Overall Cognitive Status WFL   Arousal/Participation Lethargic;Responsive;Arousable   Attention Within functional limits   Orientation Level Oriented X4   Memory Decreased recall of precautions Following Commands Follows one step commands with increased time or repetition   Comments Initially Pt lethargic, requiring VCs for staying awake and attending  With movement Pt was more alert and open to working with therapy  Pt engaged in appropriate conversations throughout session but repeatedly apologized for "not being able to do more"  Pt was reassured that her participation and willingness to engage despite fatigue was appreciated  Assessment   Limitation Decreased ADL status; Decreased Safe judgement during ADL;Decreased cognition;Decreased endurance;Decreased self-care trans;Decreased high-level ADLs   Prognosis Fair   Assessment Pt is a 74 y o  female seen for OT evaluation after admission to [facility] 7/18/2021 with Acute upper GI bleed s/p EGD today, procedure results pending  Pt  has a past medical history of Diabetes mellitus and Kidney stone  Contexts influencing pts occupational performance include living with her  in a [address] [place] [address] with local family support  Pt reports independence with ADLs, assistance from  and family for  IADLs, and recently MOD I for functional mobililty w/SPC  Pt drives at baseline  At time of evaluation pt is independent self-feeding, setup for grooming, Min A  for UB ADLs, Mod A for LB ADLs, Mod A for toileting, Min A  for bed mobiltiy, Min Ax1 functional transfers and Min Ax1 functional mobility w/RW  Pt currently presents with impairments in the following categories -difficulty performing ADLS, difficulty performing IADLS  and limited insight into deficits activity tolerance, endurance, standing balance/tolerance, sitting balance/tolerance, insight and safety    These impairments, as well as pt's fatigue, decreased caregiver support and risk for falls limit pt's ability to safely engage in all baseline areas of occupation, includinggrooming, bathing, dressing, toileting, functional mobility/transfers, community mobility, laundry , driving, house maintenance, meal prep, cleaning, care of children, social participation  and leisure activities   Pt would benefit from continued OT tx 3-5x/wk to promote safety and in order to return to prior level of functioning  When medically stable, OT recommended discharge to post-acute rehabilitation services  The patient's raw score on the AM-PAC Daily Activity inpatient short form is 16, standardized score is 35 96, less than 39 4  Patients at this level are likely to benefit from discharge to post-acute rehabilitation services  Please refer to the recommendation of the Occupational Therapist for safe discharge planning  Goals   Patient Goals to rest    LTG Time Frame 10-14   Long Term Goal Please see below    Plan   Treatment Interventions ADL retraining;Functional transfer training; Endurance training;Cognitive reorientation;Patient/family training;Equipment evaluation/education; Compensatory technique education;Continued evaluation; Energy conservation; Activityengagement   Goal Expiration Date 08/03/21   OT Frequency 3-5x/wk   Recommendation   OT Discharge Recommendation Post acute rehabilitation services  (vs home pending progress )   OT - OK to Discharge Yes  (when medically stable )   AM-PAC Daily Activity Inpatient   Lower Body Dressing 2   Bathing 2   Toileting 2   Upper Body Dressing 3   Grooming 3   Eating 4   Daily Activity Raw Score 16   Daily Activity Standardized Score (Calc for Raw Score >=11) 35 96   AM-PAC Applied Cognition Inpatient   Following a Speech/Presentation 3   Understanding Ordinary Conversation 4   Taking Medications 3   Remembering Where Things Are Placed or Put Away 4   Remembering List of 4-5 Errands 3   Taking Care of Complicated Tasks 3   Applied Cognition Raw Score 20   Applied Cognition Standardized Score 41 76   Modified Parish Scale   Modified [place] Scale 4     The below listed goals are to be met within 10-14 days    1  Pt will increase independence in UB ADLs to MOD I  with G balance with good sequencing and technique to complete ADL management  2  Pt will improve performance in LB ADLs to MOD I  with use of LHAE PRN with G sequencing and technique to complete ADL management  3  Pt will complete toileting with MOD I  with good hygiene and AD as needed  4  Pt will increase bed mobility to MOD I  to participate in functional activity with G tolerance and balance  5  Pt will improve functional transfers on/off all surfaces to MOD I  using DME PRN with G balance and safety  6  Pt will improve functional mobility to MOD I  during ADL/IADL/leisure tasks using DME PRN w/ good balance/safety  7  Pt will improve activity tolerance to G for 30 min txment sessions for enhanced functional transfers and ADLs performance  8  Pt will improve dynamic standing balance during functional tasks to Fair using DME PRN  9  Pt will engage in ongoing cognitive assessment w/ good participation to assist with safe d/c planning/recommendations  10  Pt will be monitored and screened for signs/symptoms of depression through screening tools and discuss positive coping mechanisms and leisure interests to increase positive affect and promote overall well being  11  Pt will independently recall 3 fall risks strategies for safety when performing functional tasks w/ use of DME PRN  12  Pt will demonstrate 100% carryover of energy conservation techniques after skilled education with MOD I to improve functional performance in ADLs and  IADLs       Luis M Pereira, OTS

## 2021-07-20 NOTE — ASSESSMENT & PLAN NOTE
Urine culture grew Enterococcus and ESBL E Coli   · Patient asymptomatic, likely colonization   · Observe off antibiotics

## 2021-07-20 NOTE — PROGRESS NOTES
Gastroenterology Progress Note      PATIENT INFORMATION      Patient: Diana Noriega 74 y o  female   MRN: [id number]  PCP: Abran Vaca DO  Unit/Bed#: [facility] 531-01 Encounter: [id number]  Date Of Visit: 21  Current Length of Stay: 3 day(s)     ASSESSMENTS & PLAN    69-year-old with cirrhosis unknown etiology, complicated by hepatic encephalopathy, obstructive sleep apnea on CPAP presented to [facility] initially with UTI related sepsis   Hospital course complicated by hematochezia and anemia overnight  She underwent EGD on 2021 found to have multiple duodenal ulcers      Duodenal ulcers - s/p EGD with multiple duodenal ulcers including one large ulcer in the 2nd part of the duodenum with oozing ([place] Class 1B) status post epinephrine and one clip with good hemostasis  Unclear etiology but possibly in the setting of daily celecoxib versus H pylori versus gastrinoma  Need no more evidence of bleeding  · Recommend celecoxib cessation  · serum H pylori pending  · serum gastrin level pending  · Patient transition the p o  Protonix 40 mg b i d   · Okay to advance diet  · Repeat EGD in 8 weeks  · Monitor hemoglobin and bowel movements  · Gastroenterology will sign off  Please call with questions      MOORE cirrhosis - complicated by history of small varices on recent EGD and history of hepatic encephalopathy  Currently not decompensated  · Outpatient follow-up with Dr Sumanth Novoa at [address]     Patient denies abdominal pain, nausea, vomiting, constipation, diarrhea, fevers/chills  OBJECTIVE     Vitals:   Temp (24hrs), Av 5 °F (36 9 °C), Min:98 2 °F (36 8 °C), Max:99 1 °F (37 3 °C)    Temp:  [98 2 °F (36 8 °C)-99 1 °F (37 3 °C)] 98 4 °F (36 9 °C)  HR:  [68-76] 71  Resp:  [18-20] 20  BP: ()/(47-58) 84/47  SpO2:  [90 %-98 %] 93 %  There is no height or weight on file to calculate BMI       Input and Output Summary (last 24 hours): Intake/Output Summary (Last 24 hours) at 7/21/2021 1235  Last data filed at 7/21/2021 4156  Gross per 24 hour   Intake 830 ml   Output 689 ml   Net 141 ml       Physical Exam:   GENERAL: NAD  HEENT:  NC/AT, no scleral icterus  CARDIAC:  RRR, +S1/S2  PULMONARY:  non-labored breathing  ABDOMEN:  Soft, NT/ND, +BS, no rebound/guarding/rigidity  Extremities:  No edema, cyanosis, or clubbing  NEUROLOGIC:  Alert  SKIN:  No rashes or erythema      ADDITIONAL DATA     Labs & Recent Cultures:     Results from last 7 days   Lab Units 07/21/21  0522 07/20/21  0414 07/18/21  0623 07/18/21  0308   WBC Thousand/uL 16 85* 14 45*   < > 13 53*   HEMOGLOBIN g/dL 8 2* 7 7*  --  7 9*   HEMATOCRIT % 25 4* 23 4*  --  23 4*   PLATELETS Thousands/uL 244 203   < > 175   NEUTROS PCT %  --   --   --  62   LYMPHS PCT %  --   --   --  21   LYMPHO PCT %  --  6*  --   --    MONOS PCT %  --   --   --  8   MONO PCT %  --  5  --   --    EOS PCT %  --  5  --  6    < > = values in this interval not displayed       Results from last 7 days   Lab Units 07/21/21  0522   POTASSIUM mmol/L 3 5   CHLORIDE mmol/L 108   CO2 mmol/L 20*   BUN mg/dL 15   CREATININE mg/dL 0 61   CALCIUM mg/dL 7 7*   ALK PHOS U/L 51   ALT U/L 29   AST U/L 31     Results from last 7 days   Lab Units 07/19/21  0549   INR  1 48*         Results from last 7 days   Lab Units 07/17/21  1437   C DIFF TOXIN B BY PCR  Negative         Nutrition:  Diet GI; Non Ulcerogenic  Radiology Results:   No orders to display     Scheduled Medications:  brimonidine, 1 drop, TID  citalopram, 10 mg, HS  insulin lispro, 2-12 Units, 4x Daily (AC & HS)  metoprolol tartrate, 25 mg, Q12H TAMIKO  pantoprazole, 40 mg, Early Morning  rifaximin, 550 mg, Q12H [address]        PRN MEDS:  acetaminophen, 650 mg, Q8H PRN  butalbital-acetaminophen-caffeine, 1 tablet, Q6H PRN  morphine injection, 2 mg, Q4H PRN  ondansetron, 4 mg, Q6H PRN        Last 24 Hours Medication List:   Current Facility-Administered Medications Medication Dose Route Frequency Provider Last Rate    acetaminophen  650 mg Oral Q8H PRN Elaine Middleton DO      brimonidine  1 drop Both Eyes TID Elaine Middleton,       butalbital-acetaminophen-caffeine  1 tablet Oral Q6H PRN Elaine Middleton, DO      citalopram  10 mg Oral HS Elaine Middleton DO      insulin lispro  2-12 Units Subcutaneous 4x Daily (AC & HS) Elaine Middleton DO      metoprolol tartrate  25 mg Oral Q12H [facility] Jalil Booth, [place]      morphine injection  2 mg Intravenous Q4H PRN Elaine Middleton,       ondansetron  4 mg Intravenous Q6H PRN Elaine Middleton,       pantoprazole  40 mg Oral Early Morning TANISHA Hayden      rifaximin  550 mg Oral Q12H [address] Martin Christianson, DO            Time Spent for Care: 30 mins spent in total   More than 50% of total time spent on counseling and coordination of care as described above  Current Length of Stay: 3 day(s)      Code Status: Level 1 - Full Code          ** Please Note: This note is constructed using a voice recognition dictation system   **

## 2021-07-20 NOTE — PROGRESS NOTES
Progress Note - Infectious Disease   Glenn Estevez 74 y o  female MRN: [id number]  Unit/Bed#: [facility] 531-01 Encounter: [id number]      Impression/Recommendations:  1  SIRS, POA at [facility], with fever, tachycardia lactic acidosis  Pilo Locker is no obvious source of active infection   Initially, there was concern for cholecystitis but patient is without RUQ abdominal pain and HIDA is normal  Pilo Locker is concern for UTI given urine culture with growth of ESBL producing E coli and Enterococcus the patient has no urinary symptoms   Blood cultures have no growth   Workup for tick related infection all normal   Patient has persistent fever, but it may be all secondary to active GI bleed    Fever appears to have resolved, with no further febrile episodes since here  Will have patient complete 7 day course of Zosyn then discontinue and observe  Continue IV Zosyn for now  Monitor temperature/WBC  Monitor hemodynamics  Complete 7 day antibiotic course today, then discontinue and observe      2  Bacteriuria, with urine culture growing Enterococcus and ESBL producing E coli   Presence of 2 bacteria urine culture, especially in a patient without urinary symptoms, is most likely bladder colonization  Regardless, patient is completing antibiotic course for possible UTI PICC  Can continue IV Zosyn as in above  Complete 7 day antibiotic course today      3  GI bleed, possibly secondary to esophageal varices  Patient is status post EGD with findings of bleeding duodenal ulcer but no bleeding varices  GI follow-up      4  Diarrhea, most likely secondary to GI bleed   Stool C diff toxin negative  Diarrhea is improved    Monitor for recurrent diarrhea      5  Cirrhosis of unclear etiology   Patient has history of portal hypertension and varices   CT without significant ascites   Patient without clinical signs of peritonitis      Discussed with the patient in detail regarding the above plan      Antibiotics:  Zosyn # 7    Subjective:  Patient with abdominal pain since EGD, better today  No urinary symptoms  Temperature stays down  No chills  No diarrhea      Objective:  Vitals:  Temp:  [97 8 °F (36 6 °C)-100 °F (37 8 °C)] 98 1 °F (36 7 °C)  HR:  [65-92] 65  Resp:  [18-22] 18  BP: ()/(54-72) 101/56  SpO2:  [91 %-99 %] 98 %  Temp (24hrs), Av 7 °F (37 1 °C), Min:97 8 °F (36 6 °C), Max:100 °F (37 8 °C)  Current: Temperature: 98 1 °F (36 7 °C)    Physical Exam:     General: Awake, alert, cooperative, no distress  Neck:  Supple  No mass  No lymphadenopathy  Lungs: Expansion symmetric, no rales, no wheezing, respirations unlabored  Heart:  Regular rate and rhythm, S1 and S2 normal, no murmur  Abdomen: Soft, nondistended, mild diffuse tenderness, bowel sounds active all four quadrants, no masses, no organomegaly  Extremities: Trace leg edema  No erythema/warmth  No ulcer  Nontender to palpation  Skin:  No rash  Neuro: Moves all extremities  Invasive Devices     Peripheral Intravenous Line            Peripheral IV 21 Left;Proximal;Ventral (anterior) Forearm 2 days    Peripheral IV 21 Left;Proximal;Ventral (anterior) Forearm 2 days    Peripheral IV 21 Right;Upper Arm 1 day                Labs studies:   I have personally reviewed pertinent labs    Results from last 7 days   Lab Units 21  0414 21  0548 21  2101 21  0308   POTASSIUM mmol/L 4 0 4 0 3 8 3 6   CHLORIDE mmol/L 112* 109* 107 104   CO2 mmol/L  22   BUN mg/dL  19   CREATININE mg/dL 0 61 0 66 0 71 0 90   EGFR ml/min/1 73sq m 93 91 88 66   CALCIUM mg/dL 7 2* 7 5* 7 8* 7 6*   AST U/L 29 32  --  32   ALT U/L 25 26  --  29   ALK PHOS U/L 40* 41*  --  54     Results from last 7 days   Lab Units 21  0414 21  1252 21  0548 21  0308   WBC Thousand/uL 14 45*  --  14 07* 13 53*   HEMOGLOBIN g/dL 7 7* 7 9* 8 0* 7 9*   PLATELETS Thousands/uL 203  --  174 175     Results from last 7 days   Lab Units 07/17/21  1437 07/14/21  0537 07/13/21  2248   BLOOD CULTURE   --   --  No Growth After 5 Days  No Growth After 5 Days  URINE CULTURE   --  >100,000 cfu/ml Enterococcus faecalis*  10,000-19,000 cfu/ml Escherichia coli ESBL*  --    C DIFF TOXIN B BY PCR  Negative  --   --        Imaging Studies:   I have personally reviewed pertinent imaging study reports and images in PACS  EKG, Pathology, and Other Studies:   I have personally reviewed pertinent reports

## 2021-07-20 NOTE — PLAN OF CARE
Problem: PHYSICAL THERAPY ADULT  Goal: Performs mobility at highest level of function for planned discharge setting  See evaluation for individualized goals  Description: Treatment/Interventions: Functional transfer training, LE strengthening/ROM, Therapeutic exercise, Endurance training, Patient/family training, Equipment eval/education, Bed mobility, Gait training, Spoke to nursing  Equipment Recommended: Duc Garcia       See flowsheet documentation for full assessment, interventions and recommendations  Note: Prognosis: Good  Problem List: Decreased strength, Decreased endurance, Impaired balance, Decreased mobility, Decreased safety awareness  Assessment: Pt seen for high complexity PT evaluation due to decrease in functional mobility status compared to baseline  Pt with active PT eval/treat orders at this time  Pt is a 74 y o  F who presented to [facility] as a transfer from [address] [place] [address] on 7/18/21 where she initially presented with sepsis and acute GI bleed  Complicated by tachy arrhythmia and transferred to [facility] for further evaluation  Pt  has a past medical history of Diabetes mellitus ([address] ) and Kidney stone  Pt resides with  in [facility] with 2STE  Pt presents with decreased strength, balance, endurane that contribute to limitations in bed mobility, functional transfers, functional mobility  Pt requires Min A for all mobility at this time  Pt left upright in bedside chair with chair alarm intact and with all needs in reach  Pt will benefit from skilled therapy in order to address current impairments and functional limitations  PT to follow pt and recommending rehab pending progress  The patient's AM-PAC Basic Mobility Inpatient Short Form Raw Score is 17, Standardized Score is 39 67  A standardized score less than 42 9 suggests the patient may benefit from discharge to post-acute rehabilitation services   Please also refer to the recommendation of the Physical Therapist for safe discharge planning  Barriers to Discharge: Decreased caregiver support, Inaccessible home environment        PT Discharge Recommendation: Post acute rehabilitation services (pending progress)     PT - OK to Discharge: Yes (to rehab once medically cleared)    See flowsheet documentation for full assessment

## 2021-07-20 NOTE — ASSESSMENT & PLAN NOTE
· 2/2 GIB due to bleeding duodenal ulcer   · S/p EGD with clipping and epinephrine to oozing duodenal ulcer   · S/p 2 units pRBCs thus far  · Monitor HGB and transfuse for HGB < 7

## 2021-07-20 NOTE — PROGRESS NOTES
Pastoral Care Progress Note    2021  Patient: Pedro Keane : 1955  Admission Date & Time: 2021 1346  MRN: [id number] CSN: [id number]         21 1400   Clinical Encounter Type   Visited With Patient   Episcopal Encounters   Episcopal Needs Prayer   Sacramental Encounters   Communion Given Indicator Yes   Sacrament of Sick-Anointing Anointed

## 2021-07-20 NOTE — ASSESSMENT & PLAN NOTE
· POA to [facility] with fever, tachycardia, lactic acid  · No clear source of infection, low suspicion for UTI or acute cholecystitis  · Appreciate ID input   · Continue IV Zosyn for 7 days through 7/20 9pm  · Blood cultures show no growth at 5 days   · Anaplasma and Lyme serologies, negative   · Monitor temperature and CBC/D

## 2021-07-20 NOTE — ASSESSMENT & PLAN NOTE
Found to have narrow complex tachycardia on telemetry monitor   TTE: LVEF 60-65%, mild MR, mild TR  Cardiac catheterization May 2020: Mild luminal irregularities  · Appreciate cardiology and EP input   · Started Metoprolol 25 mg BID with improvement   · On discharge EP recommends changing to Toprol XL 25 mg daily

## 2021-07-20 NOTE — ASSESSMENT & PLAN NOTE
· Upper GI bleed 2/2 oozing duodenal ulcer   · Appreciate GI input  · S/p EGD on 7/19: Two small nonbleeding esophageal varices with no recent stigmata of bleeding  Normal stomach  Multiple duodenal ulcers including one large ulcer in the 2nd part of the duodenum with oozing (Sheldon Class 1B) status post epinephrine and one clip with good hemostasis    · OK to discontinue Octreotide gtt  · Continue PPI gtt for 72 hours through 7/21 7am   · Start Protonix 40 mg daily 7/21  · Stop Celecoxib   · Check serum H pylori and gastrin level   · Advance to non ulcerogenic diet   · Patient will need repeat EGD in 8 weeks as outpatient   · Please notify GI with any evidence of significant bleeding   · S/p 2 units of pRBCs this far   · Monitor HGB and transfuse PRN

## 2021-07-20 NOTE — PROGRESS NOTES
[facility]  Progress Note - Diaan Noriega 1955, 74 y o  female MRN: [id number]  Unit/Bed#: [facility] 531-01 Encounter: [id number]  Primary Care Provider: Abran Vaca DO   Date and time admitted to hospital: 7/18/2021  1:46 PM    * Acute upper GI bleed  Assessment & Plan  · Upper GI bleed 2/2 oozing duodenal ulcer   · Appreciate GI input  · S/p EGD on 7/19: Two small nonbleeding esophageal varices with no recent stigmata of bleeding  Normal stomach  Multiple duodenal ulcers including one large ulcer in the 2nd part of the duodenum with oozing (Sheldon Class 1B) status post epinephrine and one clip with good hemostasis    · OK to discontinue Octreotide gtt  · Continue PPI gtt for 72 hours through 7/21 7am   · Start Protonix 40 mg daily 7/21  · Stop Celecoxib   · Check serum H pylori and gastrin level   · Advance to non ulcerogenic diet   · Patient will need repeat EGD in 8 weeks as outpatient   · Please notify GI with any evidence of significant bleeding   · S/p 2 units of pRBCs this far   · Monitor HGB and transfuse PRN    Acute blood loss anemia  Assessment & Plan  · 2/2 GIB due to bleeding duodenal ulcer   · S/p EGD with clipping and epinephrine to oozing duodenal ulcer   · S/p 2 units pRBCs thus far  · Monitor HGB and transfuse for HGB < 7    SIRS (systemic inflammatory response syndrome) (HCC)  Assessment & Plan  · POA to [facility] with fever, tachycardia, lactic acid  · No clear source of infection, low suspicion for UTI or acute cholecystitis  · Appreciate ID input   · Continue IV Zosyn for 7 days through 7/20 9pm  · Blood cultures show no growth at 5 days   · Anaplasma and Lyme serologies, negative   · Monitor temperature and CBC/D     Tachycardia  Assessment & Plan  Found to have narrow complex tachycardia on telemetry monitor   TTE: LVEF 60-65%, mild MR, mild TR  Cardiac catheterization May 2020: Mild luminal irregularities  · Appreciate cardiology and EP input   · Started Metoprolol 25 mg BID with improvement   · On discharge EP recommends changing to Toprol XL 25 mg daily     Cirrhosis of liver with ascites (HCC)  Assessment & Plan  · Unclear etiology   · Hst of portal HTN and varices   · US with small perihepatic ascites  · Appreciate GI input     Bacteriuria  Assessment & Plan  Urine culture grew Enterococcus and ESBL E Coli   · Patient asymptomatic, likely colonization   · Continue Zosyn x7 days per ID     Morbid obesity ([address] )  Assessment & Plan  · RD consult    Type 2 diabetes mellitus without complication, without long-term current use of insulin [facility])  Assessment & Plan  Lab Results   Component Value Date    HGBA1C 7 7 (H) 01/25/2021       Recent Labs     07/19/21  1545 07/19/21 2024 07/20/21  0614 07/20/21  1054   POCGLU 144* 169* 108 220*       Blood Sugar Average: Last 72 hrs:  (P) 713 [id number]   · Monitor ACCU checks AC + HS with lispro sliding scale input       VTE Pharmacologic Prophylaxis:   Pharmacologic: Pharmacologic VTE Prophylaxis contraindicated due to GI bleed  Mechanical VTE Prophylaxis in Place: Yes    Patient Centered Rounds: I have performed bedside rounds with nursing staff today  Discussions with Specialists or Other Care Team Provider: nursing, cm, GI    Education and Discussions with Family / Patient: I have answered all questions to the best of my ability  Spoke to daughter via telephone     Time Spent for Care: 30 minutes  More than 50% of total time spent on counseling and coordination of care as described above  Current Length of Stay: 2 day(s)    Current Patient Status: Inpatient   Certification Statement: The patient will continue to require additional inpatient hospital stay due to bleeding duodenal ulcer with need for HGB monitoring     Discharge Plan: Patient is not medically stable for discharge today, likely in 24 hours pending HGB and tolerance of diet       Code Status: Level 1 - Full Code      Subjective:   Patient resting OOB in chair on room air  States she feels more like herself today, less confused  Tolerating clear liquids  Denies HA, dizziness, CP, or SOB  Objective:     Vitals:   Temp (24hrs), Av 7 °F (37 1 °C), Min:97 8 °F (36 6 °C), Max:100 °F (37 8 °C)    Temp:  [97 8 °F (36 6 °C)-100 °F (37 8 °C)] 98 1 °F (36 7 °C)  HR:  [65-92] 65  Resp:  [18-22] 18  BP: ()/(54-72) 101/56  SpO2:  [91 %-99 %] 98 %  There is no height or weight on file to calculate BMI  Input and Output Summary (last 24 hours): Intake/Output Summary (Last 24 hours) at 2021 1445  Last data filed at 2021 1046  Gross per 24 hour   Intake 580 ml   Output 550 ml   Net 30 ml       Physical Exam:     Physical Exam  Vitals and nursing note reviewed  Constitutional:       General: She is not in acute distress  Appearance: She is well-developed  She is obese  She is ill-appearing (appears deconditioned )  She is not toxic-appearing or diaphoretic  Comments: Pleasant, talkative female resting OOB in chair on room air    HENT:      Head: Normocephalic  Cardiovascular:      Rate and Rhythm: Normal rate  Pulmonary:      Effort: Pulmonary effort is normal  No respiratory distress  Breath sounds: Normal breath sounds  No wheezing, rhonchi or rales  Abdominal:      General: Bowel sounds are normal  There is no distension  Palpations: Abdomen is soft  Tenderness: There is no abdominal tenderness  Musculoskeletal:         General: Normal range of motion  Cervical back: Normal range of motion  Right lower leg: No edema  Left lower leg: No edema  Skin:     General: Skin is warm and dry  Capillary Refill: Capillary refill takes less than 2 seconds  Neurological:      Mental Status: She is alert and oriented to person, place, and time  Mental status is at baseline        Comments: Forgetful at times    Psychiatric:         Mood and Affect: Mood normal          Behavior: Behavior normal  Thought Content: Thought content normal          Judgment: Judgment normal          Additional Data:     Labs:    Results from last 7 days   Lab Units 07/20/21  0414 07/18/21  0623 07/18/21  0308   WBC Thousand/uL 14 45*   < > 13 53*   HEMOGLOBIN g/dL 7 7*  --  7 9*   HEMATOCRIT % 23 4*  --  23 4*   PLATELETS Thousands/uL 203   < > 175   NEUTROS PCT %  --   --  62   LYMPHS PCT %  --   --  21   LYMPHO PCT % 6*  --   --    MONOS PCT %  --   --  8   MONO PCT % 5  --   --    EOS PCT % 5  --  6    < > = values in this interval not displayed  Results from last 7 days   Lab Units 07/20/21  0414   POTASSIUM mmol/L 4 0   CHLORIDE mmol/L 112*   CO2 mmol/L 21   BUN mg/dL 19   CREATININE mg/dL 0 61   CALCIUM mg/dL 7 2*   ALK PHOS U/L 40*   ALT U/L 25   AST U/L 29     Results from last 7 days   Lab Units 07/19/21  0549   INR  1 48*       * I Have Reviewed All Lab Data Listed Above  * Additional Pertinent Lab Tests Reviewed: All Labs Within Last 24 Hours Reviewed    Imaging:    Imaging Reports Reviewed Today Include: CT C/A/P, RUQ US, MRI abdomen, HIDA scan, CT head, CTA chest  Imaging Personally Reviewed by Myself Includes:  None     Recent Cultures (last 7 days):     Results from last 7 days   Lab Units 07/17/21  1437 07/14/21  0537 07/13/21  2248   BLOOD CULTURE   --   --  No Growth After 5 Days  No Growth After 5 Days     URINE CULTURE   --  >100,000 cfu/ml Enterococcus faecalis*  10,000-19,000 cfu/ml Escherichia coli ESBL*  --    C DIFF TOXIN B BY PCR  Negative  --   --        Last 24 Hours Medication List:   Current Facility-Administered Medications   Medication Dose Route Frequency Provider Last Rate    acetaminophen  650 mg Oral Q8H PRN Marta Arabia, DO      brimonidine  1 drop Both Eyes TID Marta Arabia, DO      butalbital-acetaminophen-caffeine  1 tablet Oral Q6H PRN Marta Arabia, DO      citalopram  10 mg Oral HS Marta Arabia, DO      insulin lispro  2-12 Units Subcutaneous 4x Daily (AC & HS) Lewis Munoz Itzel Molina DO      metoprolol tartrate  25 mg Oral Q12H [address] [place], [place]      morphine injection  2 mg Intravenous Q4H PRN Francesco Reason, DO      ondansetron  4 mg Intravenous Q6H PRN Francesco Reason, DO      pantoprozole (PROTONIX) infusion (Continuous)  8 mg/hr Intravenous Continuous TANISHA Howell 8 mg/hr (07/20/21 1314)    [START ON 7/21/2021] pantoprazole  40 mg Oral Early Morning TANISHA Howell      piperacillin-tazobactam  3 375 g Intravenous Q6H Siomara Rutherford MD 3 375 g (07/20/21 0843)    rifaximin  550 mg Oral Q12H 221 Martin Christianson DO          Today, Patient Was Seen By: TANISHA Howell    ** Please Note: Dictation voice to text software may have been used in the creation of this document   **

## 2021-07-20 NOTE — PROGRESS NOTES
Gastroenterology Progress Note      PATIENT INFORMATION      Patient: Katherine Bethea 74 y o  female   MRN: [id number]  PCP: David Matt DO  Unit/Bed#: [facility] 531-01 Encounter: [id number]  Date Of Visit: 21  Current Length of Stay: 2 day(s)     ASSESSMENTS & PLAN    55-year-old with cirrhosis unknown etiology, complicated by hepatic encephalopathy, obstructive sleep apnea on CPAP presented to [address] initially with UTI related sepsis  Hospital course complicated by hematochezia and anemia overnight  She underwent EGD on 2021 found to have multiple duodenal ulcers  Duodenal ulcers - s/p EGD with multiple duodenal ulcers including one large ulcer in the 2nd part of the duodenum with oozing (Sheldon Class 1B) status post epinephrine and one clip with good hemostasis  Unclear etiology but possibly in the setting of daily celecoxib versus H pylori versus gastrinoma  Need no more evidence of bleeding  · Recommend celecoxib cessation  · Check serum H pylori  · Check serum gastrin level  · PPI drip for 72 total hours  · Okay to advance diet  · Repeat EGD in 8 weeks  · Monitor hemoglobin and bowel movements    MOORE cirrhosis - complicated by history of small varices on recent EGD and history of hepatic encephalopathy  Currently not decompensated  · Outpatient follow-up with Dr Diego Sat at [facility]     Patient had 2 small black BMs overnight  Hemoglobin stable  She states her abdominal pain has resolved  No nausea vomiting  OBJECTIVE     Vitals:   Temp (24hrs), Av 7 °F (37 1 °C), Min:97 8 °F (36 6 °C), Max:100 °F (37 8 °C)    Temp:  [97 8 °F (36 6 °C)-100 °F (37 8 °C)] 98 1 °F (36 7 °C)  HR:  [65-92] 65  Resp:  [18-22] 18  BP: ()/(54-72) 101/56  SpO2:  [91 %-99 %] 98 %  There is no height or weight on file to calculate BMI  Input and Output Summary (last 24 hours):        Intake/Output Summary (Last 24 hours) at 2021 1404  Last data filed at 7/20/2021 1046  Gross per 24 hour   Intake 580 ml   Output 550 ml   Net 30 ml       Physical Exam:   GENERAL: NAD  HEENT:  NC/AT, no scleral icterus  CARDIAC:  RRR, +S1/S2  PULMONARY:  non-labored breathing  ABDOMEN:  Mild tenderness palpation diffusely, +BS, no rebound/guarding/rigidity  Extremities:  No edema, cyanosis, or clubbing  NEUROLOGIC:  Alert  SKIN:  No rashes or erythema      ADDITIONAL DATA     Labs & Recent Cultures:     Results from last 7 days   Lab Units 07/20/21  0414 07/18/21  0623 07/18/21  0308   WBC Thousand/uL 14 45*   < > 13 53*   HEMOGLOBIN g/dL 7 7*  --  7 9*   HEMATOCRIT % 23 4*  --  23 4*   PLATELETS Thousands/uL 203   < > 175   NEUTROS PCT %  --   --  62   LYMPHS PCT %  --   --  21   LYMPHO PCT % 6*  --   --    MONOS PCT %  --   --  8   MONO PCT % 5  --   --    EOS PCT % 5  --  6    < > = values in this interval not displayed  Results from last 7 days   Lab Units 07/20/21  0414   POTASSIUM mmol/L 4 0   CHLORIDE mmol/L 112*   CO2 mmol/L 21   BUN mg/dL 19   CREATININE mg/dL 0 61   CALCIUM mg/dL 7 2*   ALK PHOS U/L 40*   ALT U/L 25   AST U/L 29     Results from last 7 days   Lab Units 07/19/21  0549   INR  1 48*         Results from last 7 days   Lab Units 07/17/21  1437 07/14/21  0537 07/13/21  2248   BLOOD CULTURE   --   --  No Growth After 5 Days  No Growth After 5 Days     URINE CULTURE   --  >100,000 cfu/ml Enterococcus faecalis*  10,000-19,000 cfu/ml Escherichia coli ESBL*  --    C DIFF TOXIN B BY PCR  Negative  --   --          Nutrition:  Diet Clear Liquid  Radiology Results:   No orders to display     Scheduled Medications:  brimonidine, 1 drop, TID  citalopram, 10 mg, HS  insulin lispro, 2-12 Units, 4x Daily (AC & HS)  metoprolol tartrate, 25 mg, Q12H TAMIKO  piperacillin-tazobactam, 3 375 g, Q6H  rifaximin, 550 mg, Q12H [facility]        PRN MEDS:  acetaminophen, 650 mg, Q8H PRN  butalbital-acetaminophen-caffeine, 1 tablet, Q6H PRN  morphine injection, 2 mg, Q4H PRN  ondansetron, 4 mg, Q6H PRN        Last 24 Hours Medication List:   Current Facility-Administered Medications   Medication Dose Route Frequency Provider Last Rate    acetaminophen  650 mg Oral Q8H PRN Mortimer Bayley, DO      brimonidine  1 drop Both Eyes TID Mortimer Bayley, DO      butalbital-acetaminophen-caffeine  1 tablet Oral Q6H PRN Mortimer Bayley, DO      citalopram  10 mg Oral HS Mortimer Bayley, DO      insulin lispro  2-12 Units Subcutaneous 4x Daily (AC & HS) Mortimer Bayley, DO      metoprolol tartrate  25 mg Oral Q12H [facility] [place], [place]      morphine injection  2 mg Intravenous Q4H PRN Mortimer Bayley,       ondansetron  4 mg Intravenous Q6H PRN Mortimer Bayley, DO      pantoprozole (PROTONIX) infusion (Continuous)  8 mg/hr Intravenous Continuous Mortimer Bayley, DO 8 mg/hr (07/20/21 1314)    piperacillin-tazobactam  3 375 g Intravenous Q6H Annie Ochoa MD 3 375 g (07/20/21 0843)    rifaximin  550 mg Oral Q12H 221 Martin Christianson, DO            Time Spent for Care: 30 mins spent in total   More than 50% of total time spent on counseling and coordination of care as described above  Current Length of Stay: 2 day(s)      Code Status: Level 1 - Full Code          ** Please Note: This note is constructed using a voice recognition dictation system   **

## 2021-07-20 NOTE — PLAN OF CARE
Problem: OCCUPATIONAL THERAPY ADULT  Goal: Performs self-care activities at highest level of function for planned discharge setting  See evaluation for individualized goals  Description: Treatment Interventions: ADL retraining, Functional transfer training, Endurance training, Cognitive reorientation, Patient/family training, Equipment evaluation/education, Compensatory technique education, Continued evaluation, Energy conservation, Activityengagement          See flowsheet documentation for full assessment, interventions and recommendations  Note: Limitation: Decreased ADL status, Decreased Safe judgement during ADL, Decreased cognition, Decreased endurance, Decreased self-care trans, Decreased high-level ADLs  Prognosis: Fair  Assessment: Pt is a 74 y o  female seen for OT evaluation after admission to [facility] 7/18/2021 with Acute upper GI bleed s/p EGD today, procedure results pending  Pt  has a past medical history of Diabetes mellitus and Kidney stone  Contexts influencing pts occupational performance include living with her  in a [address] [place] [address] with local family support  Pt reports independence with ADLs, assistance from  and family for  IADLs, and recently MOD I for functional mobililty w/SPC  Pt drives at baseline  At time of evaluation pt is independent self-feeding, setup for grooming, Min A  for UB ADLs, Mod A for LB ADLs, Mod A for toileting, Min A  for bed mobiltiy, Min Ax1 functional transfers and Min Ax1 functional mobility w/RW  Pt currently presents with impairments in the following categories -difficulty performing ADLS, difficulty performing IADLS  and limited insight into deficits activity tolerance, endurance, standing balance/tolerance, sitting balance/tolerance, insight and safety    These impairments, as well as pt's fatigue, decreased caregiver support and risk for falls limit pt's ability to safely engage in all baseline areas of occupation, includinggrooming, bathing, dressing, toileting, functional mobility/transfers, community mobility, laundry , driving, house maintenance, meal prep, cleaning, care of children, social participation  and leisure activities   Pt would benefit from continued OT tx 3-5x/wk to promote safety and in order to return to prior level of functioning  When medically stable, OT recommended discharge to post-acute rehabilitation services  The patient's raw score on the AM-PAC Daily Activity inpatient short form is 16, standardized score is 35 96, less than 39 4  Patients at this level are likely to benefit from discharge to post-acute rehabilitation services  Please refer to the recommendation of the Occupational Therapist for safe discharge planning       OT Discharge Recommendation: Post acute rehabilitation services (vs home pending progress )  OT - OK to Discharge: Yes (when medically stable )

## 2021-07-21 LAB
ALBUMIN SERPL BCP-MCNC: 2 G/DL (ref 3.5–5)
ALP SERPL-CCNC: 51 U/L (ref 46–116)
ALT SERPL W P-5'-P-CCNC: 29 U/L (ref 12–78)
AMMONIA PLAS-SCNC: 34 UMOL/L (ref 11–35)
ANION GAP SERPL CALCULATED.3IONS-SCNC: 7 MMOL/L (ref 4–13)
AST SERPL W P-5'-P-CCNC: 31 U/L (ref 5–45)
BILIRUB SERPL-MCNC: 1.29 MG/DL (ref 0.2–1)
BUN SERPL-MCNC: 15 MG/DL (ref 5–25)
CALCIUM ALBUM COR SERPL-MCNC: 9.3 MG/DL (ref 8.3–10.1)
CALCIUM SERPL-MCNC: 7.7 MG/DL (ref 8.3–10.1)
CHLORIDE SERPL-SCNC: 108 MMOL/L (ref 100–108)
CO2 SERPL-SCNC: 20 MMOL/L (ref 21–32)
CREAT SERPL-MCNC: 0.61 MG/DL (ref 0.6–1.3)
ERYTHROCYTE [DISTWIDTH] IN BLOOD BY AUTOMATED COUNT: 19.6 % (ref 11.6–15.1)
GFR SERPL CREATININE-BSD FRML MDRD: 93 ML/MIN/1.73SQ M
GLUCOSE SERPL-MCNC: 113 MG/DL (ref 65–140)
GLUCOSE SERPL-MCNC: 121 MG/DL (ref 65–140)
GLUCOSE SERPL-MCNC: 133 MG/DL (ref 65–140)
GLUCOSE SERPL-MCNC: 170 MG/DL (ref 65–140)
GLUCOSE SERPL-MCNC: 231 MG/DL (ref 65–140)
HCT VFR BLD AUTO: 25.4 % (ref 34.8–46.1)
HGB BLD-MCNC: 8.2 G/DL (ref 11.5–15.4)
MAGNESIUM SERPL-MCNC: 2.1 MG/DL (ref 1.6–2.6)
MCH RBC QN AUTO: 31.5 PG (ref 26.8–34.3)
MCHC RBC AUTO-ENTMCNC: 32.3 G/DL (ref 31.4–37.4)
MCV RBC AUTO: 98 FL (ref 82–98)
NRBC BLD AUTO-RTO: 0 /100 WBCS
PHOSPHATE SERPL-MCNC: 1.9 MG/DL (ref 2.3–4.1)
PLATELET # BLD AUTO: 244 THOUSANDS/UL (ref 149–390)
PMV BLD AUTO: 10.9 FL (ref 8.9–12.7)
POTASSIUM SERPL-SCNC: 3.5 MMOL/L (ref 3.5–5.3)
PROT SERPL-MCNC: 5.7 G/DL (ref 6.4–8.2)
RBC # BLD AUTO: 2.6 MILLION/UL (ref 3.81–5.12)
SODIUM SERPL-SCNC: 135 MMOL/L (ref 136–145)
WBC # BLD AUTO: 16.85 THOUSAND/UL (ref 4.31–10.16)

## 2021-07-21 PROCEDURE — 85027 COMPLETE CBC AUTOMATED: CPT | Performed by: NURSE PRACTITIONER

## 2021-07-21 PROCEDURE — 80053 COMPREHEN METABOLIC PANEL: CPT | Performed by: NURSE PRACTITIONER

## 2021-07-21 PROCEDURE — 82948 REAGENT STRIP/BLOOD GLUCOSE: CPT

## 2021-07-21 PROCEDURE — 83735 ASSAY OF MAGNESIUM: CPT | Performed by: NURSE PRACTITIONER

## 2021-07-21 PROCEDURE — 82941 ASSAY OF GASTRIN: CPT | Performed by: INTERNAL MEDICINE

## 2021-07-21 PROCEDURE — 94660 CPAP INITIATION&MGMT: CPT

## 2021-07-21 PROCEDURE — 94760 N-INVAS EAR/PLS OXIMETRY 1: CPT

## 2021-07-21 PROCEDURE — 82140 ASSAY OF AMMONIA: CPT | Performed by: NURSE PRACTITIONER

## 2021-07-21 PROCEDURE — 86677 HELICOBACTER PYLORI ANTIBODY: CPT | Performed by: INTERNAL MEDICINE

## 2021-07-21 PROCEDURE — NC001 PR NO CHARGE: Performed by: INTERNAL MEDICINE

## 2021-07-21 PROCEDURE — 99232 SBSQ HOSP IP/OBS MODERATE 35: CPT | Performed by: INTERNAL MEDICINE

## 2021-07-21 PROCEDURE — 84100 ASSAY OF PHOSPHORUS: CPT | Performed by: NURSE PRACTITIONER

## 2021-07-21 PROCEDURE — 99233 SBSQ HOSP IP/OBS HIGH 50: CPT | Performed by: INTERNAL MEDICINE

## 2021-07-21 RX ORDER — PANTOPRAZOLE SODIUM 40 MG/1
40 TABLET, DELAYED RELEASE ORAL
Status: DISCONTINUED | OUTPATIENT
Start: 2021-07-21 | End: 2021-07-24 | Stop reason: HOSPADM

## 2021-07-21 RX ORDER — POTASSIUM CHLORIDE 20 MEQ/1
40 TABLET, EXTENDED RELEASE ORAL ONCE
Status: COMPLETED | OUTPATIENT
Start: 2021-07-21 | End: 2021-07-21

## 2021-07-21 RX ADMIN — BRIMONIDINE TARTRATE 1 DROP: 1.5 SOLUTION OPHTHALMIC at 09:13

## 2021-07-21 RX ADMIN — BRIMONIDINE TARTRATE 1 DROP: 1.5 SOLUTION OPHTHALMIC at 21:45

## 2021-07-21 RX ADMIN — INSULIN LISPRO 2 UNITS: 100 INJECTION, SOLUTION INTRAVENOUS; SUBCUTANEOUS at 21:45

## 2021-07-21 RX ADMIN — PANTOPRAZOLE SODIUM 40 MG: 40 TABLET, DELAYED RELEASE ORAL at 05:21

## 2021-07-21 RX ADMIN — INSULIN LISPRO 4 UNITS: 100 INJECTION, SOLUTION INTRAVENOUS; SUBCUTANEOUS at 11:46

## 2021-07-21 RX ADMIN — RIFAXIMIN 550 MG: 550 TABLET ORAL at 09:20

## 2021-07-21 RX ADMIN — PANTOPRAZOLE SODIUM 40 MG: 40 TABLET, DELAYED RELEASE ORAL at 15:28

## 2021-07-21 RX ADMIN — POTASSIUM CHLORIDE 40 MEQ: 1500 TABLET, EXTENDED RELEASE ORAL at 09:19

## 2021-07-21 RX ADMIN — BRIMONIDINE TARTRATE 1 DROP: 1.5 SOLUTION OPHTHALMIC at 15:28

## 2021-07-21 RX ADMIN — CITALOPRAM HYDROBROMIDE 10 MG: 10 TABLET ORAL at 21:45

## 2021-07-21 RX ADMIN — POTASSIUM & SODIUM PHOSPHATES POWDER PACK 280-160-250 MG 1 PACKET: 280-160-250 PACK at 10:34

## 2021-07-21 RX ADMIN — RIFAXIMIN 550 MG: 550 TABLET ORAL at 21:45

## 2021-07-21 RX ADMIN — METOPROLOL TARTRATE 25 MG: 25 TABLET, FILM COATED ORAL at 21:45

## 2021-07-21 NOTE — PROGRESS NOTES
[facility]  Progress Note - Sulema Chacon 1955, 74 y o  female MRN: [id number]  Unit/Bed#: [facility] 531-01 Encounter: [id number]  Primary Care Provider: Angie Bowers DO   Date and time admitted to hospital: 7/18/2021  1:46 PM    Tachycardia  Assessment & Plan  Found to have narrow complex tachycardia on telemetry monitor   TTE: LVEF 60-65%, mild MR, mild TR  Cardiac catheterization May 2020: Mild luminal irregularities  · Appreciate cardiology and EP input   · Started Metoprolol 25 mg BID with improvement   · On discharge EP recommends changing to Toprol XL 25 mg daily     Bacteriuria  Assessment & Plan  Urine culture grew Enterococcus and ESBL E Coli   · Patient asymptomatic, likely colonization   · Observe off antibiotics     Acute blood loss anemia  Assessment & Plan  · 2/2 GIB due to bleeding duodenal ulcer   · S/p EGD with clipping and epinephrine to oozing duodenal ulcer   · S/p 2 units pRBCs thus far  · Monitor HGB and transfuse for HGB < 7    Morbid obesity ([address] )  Assessment & Plan  · RD consult    Type 2 diabetes mellitus without complication, without long-term current use of insulin [facility])  Assessment & Plan  Lab Results   Component Value Date    HGBA1C 7 7 (H) 01/25/2021       Recent Labs     07/19/21  1545 07/19/21 2024 07/20/21  0614 07/20/21  1054   POCGLU 144* 169* 108 220*       Blood Sugar Average: Last 72 hrs:  (P) 423 [id number]   · Monitor ACCU checks AC + HS    · Lispro sliding scale     Cirrhosis of liver with ascites (HCC)  Assessment & Plan  · Unclear etiology   · Hst of portal HTN and varices   · US with small perihepatic ascites    · Appreciate GI input     SIRS (systemic inflammatory response syndrome) (HCC)  Assessment & Plan  · POA to [facility] with fever, tachycardia, lactic acid  · No clear source of infection, low suspicion for UTI or acute cholecystitis  · Appreciate ID input   · Continue IV Zosyn for 7 days through 7/20 9pm  · Blood cultures show no growth at 5 days   · Anaplasma and Lyme serologies, negative   · Monitor temperature and CBC/D     * Acute upper GI bleed  Assessment & Plan  · Upper GI bleed 2/2 oozing duodenal ulcer   · Appreciate GI input  · S/p EGD on : Two small nonbleeding esophageal varices with no recent stigmata of bleeding  Normal stomach  Multiple duodenal ulcers including one large ulcer in the 2nd part of the duodenum with oozing ([place]ldon Class 1B) status post epinephrine and one clip with good hemostasis  · OK to discontinue Octreotide gtt  · Continue PPI gtt for 72 hours through  7am   · Start Protonix 40 mg daily   · Stop Celecoxib   · Check serum H pylori and gastrin level   · Advance to non ulcerogenic diet   · Patient will need repeat EGD in 8 weeks as outpatient   · Please notify GI with any evidence of significant bleeding   · S/p 2 units of pRBCs this far   · Monitor HGB and transfuse PRN         VTE Pharmacologic Prophylaxis:   Pharmacologic: Pharmacologic VTE Prophylaxis contraindicated due to gi bleeding   Mechanical VTE Prophylaxis in Place: Yes    Patient Centered Rounds: I have performed bedside rounds with nursing staff today  Discussions with Specialists or Other Care Team Provider: GI, nursing, CM     Education and Discussions with Family / Patient: patient     Time Spent for Care: 45 minutes  More than 50% of total time spent on counseling and coordination of care as described above  Current Length of Stay: 3 day(s)    Current Patient Status: Inpatient   Certification Statement: The patient will continue to require additional inpatient hospital stay due to dualdenal ulcer with hg monitorign     Discharge Plan: pending above     Code Status: Level 1 - Full Code      Subjective:   Patient is doing well today  She did not want potassium IV       Objective:     Vitals:   Temp (24hrs), Av 7 °F (37 1 °C), Min:98 3 °F (36 8 °C), Max:99 1 °F (37 3 °C)    Temp:  [98 3 °F (36 8 °C)-99 1 °F (37 3 °C)] 99 °F (37 2 °C)  HR:  [68-74] 71  Resp:  [18-20] 18  BP: ()/(47-58) 91/49  SpO2:  [90 %-98 %] 91 %  There is no height or weight on file to calculate BMI  Input and Output Summary (last 24 hours): Intake/Output Summary (Last 24 hours) at 7/21/2021 1754  Last data filed at 7/21/2021 1401  Gross per 24 hour   Intake 930 ml   Output 1100 ml   Net -170 ml       Physical Exam:     Physical Exam  Vitals and nursing note reviewed  Constitutional:       Appearance: She is obese  HENT:      Head: Normocephalic and atraumatic  Mouth/Throat:      Mouth: Mucous membranes are dry  Pharynx: Oropharynx is clear  Eyes:      General: No scleral icterus  Left eye: No discharge  Extraocular Movements: Extraocular movements intact  Conjunctiva/sclera: Conjunctivae normal       Pupils: Pupils are equal, round, and reactive to light  Cardiovascular:      Rate and Rhythm: Normal rate  Pulmonary:      Effort: Pulmonary effort is normal  No respiratory distress  Breath sounds: Normal breath sounds  Abdominal:      General: Abdomen is flat  Bowel sounds are normal  There is no distension  Palpations: Abdomen is soft  There is no mass  Tenderness: There is no abdominal tenderness  Hernia: No hernia is present  Musculoskeletal:         General: Normal range of motion  Cervical back: Normal range of motion and neck supple  Skin:     General: Skin is warm and dry  Coloration: Skin is not jaundiced or pale  Findings: No bruising or erythema  Neurological:      General: No focal deficit present  Mental Status: She is alert and oriented to person, place, and time     Psychiatric:         Mood and Affect: Mood normal          Behavior: Behavior normal          Additional Data:     Labs:    Results from last 7 days   Lab Units 07/21/21  0522 07/20/21  0414 07/18/21  0623 07/18/21  0308   WBC Thousand/uL 16 85* 14 45*   < > 13 53*   HEMOGLOBIN g/dL 8  2* 7 7*  --  7 9*   HEMATOCRIT % 25 4* 23 4*  --  23 4*   PLATELETS Thousands/uL 244 203   < > 175   NEUTROS PCT %  --   --   --  62   LYMPHS PCT %  --   --   --  21   LYMPHO PCT %  --  6*  --   --    MONOS PCT %  --   --   --  8   MONO PCT %  --  5  --   --    EOS PCT %  --  5  --  6    < > = values in this interval not displayed  Results from last 7 days   Lab Units 07/21/21  0522   POTASSIUM mmol/L 3 5   CHLORIDE mmol/L 108   CO2 mmol/L 20*   BUN mg/dL 15   CREATININE mg/dL 0 61   CALCIUM mg/dL 7 7*   ALK PHOS U/L 51   ALT U/L 29   AST U/L 31     Results from last 7 days   Lab Units 07/19/21  0549   INR  1 48*       * I Have Reviewed All Lab Data Listed Above  * Additional Pertinent Lab Tests Reviewed: Jeff [facility] Admission Reviewed    Imaging:    Imaging Reports Reviewed Today Include:    Imaging Personally Reviewed by Myself Includes:     Recent Cultures (last 7 days):     Results from last 7 days   Lab Units 07/17/21  1437   C DIFF TOXIN B BY PCR  Negative       Last 24 Hours Medication List:   Current Facility-Administered Medications   Medication Dose Route Frequency Provider Last Rate    acetaminophen  650 mg Oral Q8H PRN Laureano Drummond DO      brimonidine  1 drop Both Eyes TID Laureano Drummond DO      butalbital-acetaminophen-caffeine  1 tablet Oral Q6H PRN Laureano Drummond DO      citalopram  10 mg Oral HS Laureano Drummond DO      insulin lispro  2-12 Units Subcutaneous 4x Daily (AC & HS) Laureano Drummond DO      metoprolol tartrate  25 mg Oral Q12H [address] [place], [place]      morphine injection  2 mg Intravenous Q4H PRN Laureano Drummond, DO      ondansetron  4 mg Intravenous Q6H PRN Laureano Drummond DO      pantoprazole  40 mg Oral BID AC Misty Frazier MD      rifaximin  550 mg Oral Q12H [address] Martin Christianson DO          Today, Patient Was Seen By: John Braun MD    ** Please Note: Dictation voice to text software may have been used in the creation of this document   **

## 2021-07-21 NOTE — RESTORATIVE TECHNICIAN NOTE
Restorative Technician Note      Patient Name: Gala Levine     Note Type: Mobility  Patient Position Upon Consult: Bedside chair  Activity Performed: Ambulated  Assistive Device: Standard walker  Patient Position at End of Consult: Bedside chair

## 2021-07-21 NOTE — RESPIRATORY THERAPY NOTE
Resp Care      07/21/21 0519   Respiratory Assessment   Resp Comments Pt slept comfortably through the night on CPAP with no distress noted     Non-Invasive Information   O2 Interface Device Nasal mask   Non-Invasive Ventilation Mode CPAP  (Focus)   SpO2 98 %   $ Pulse Oximetry Spot Check Charge Completed   Non-Invasive Settings   FiO2 (%) 21   PEEP/CPAP (cm H2O) 10   Temperature (Set)   (no heater)   Trigger Sensitivity Flow (lpm) 3   Non-Invasive Readings   Total Rate 19   Spontaneous Vt (mL) 470   Leak (lpm) 20   Skin Intervention Mask rotated;Skin intact   I/E Ratio (Obs) 1:2 4   Non-Invasive Alarms   Low Insp Pressure Time (sec) 2 sec   High Resp Rate (BPM) 45 BPM   Low Resp Rate (BPM) 8 BPM   Apnea Interval (sec) 20   Maintenance   Water bag changed   (n/a)

## 2021-07-21 NOTE — PROGRESS NOTES
Progress Note - Infectious Disease   Paula Jan 74 y o  female MRN: [id number]  Unit/Bed#: [facility] 531-01 Encounter: [id number]      Impression/Recommendations:  1  SIRS, POA at [facility], with fever, tachycardia lactic acidosis  Mary Yusfu is no obvious source of active infection   Initially, there was concern for cholecystitis but patient is without RUQ abdominal pain and HIDA is normal  Mary Yusuf is concern for UTI given urine culture with growth of ESBL producing E coli and Enterococcus the patient has no urinary symptoms   Blood cultures have no growth   Workup for tick related infection all normal   Patient has persistent fever, but it may be all secondary to active GI bleed    Fever appears to have resolved, with no further febrile episodes since here  Patient completed empiric 7 day antibiotic course  Temperature remains down  Observe off further antibiotic  Monitor temperature/WBC  Monitor hemodynamics      2  Bacteriuria, with urine culture growing Enterococcus and ESBL producing E coli   Presence of 2 bacteria urine culture, especially in a patient without urinary symptoms, is most likely bladder colonization  Regardless, patient completed empiric antibiotic course  No further antibiotic      3  GI bleed, possibly secondary to esophageal varices  Patient is status post EGD with findings of bleeding duodenal ulcer but no bleeding varices  GI follow-up      4  Diarrhea, most likely secondary to GI bleed   Stool C diff toxin negative  Diarrhea is improved  Monitor for recurrent diarrhea      5  Cirrhosis of unclear etiology   Patient has history of portal hypertension and varices   CT without significant ascites   Patient without clinical signs of peritonitis      Discussed with the patient and her daughter in detail regarding the above plan      Antibiotics:  Off antibiotic      Subjective:  Patient with improved abdominal pain  No urinary symptoms  Temperature stays down   No chills    No diarrhea      Objective:  Vitals:  Temp:  [98 2 °F (36 8 °C)-99 1 °F (37 3 °C)] 98 4 °F (36 9 °C)  HR:  [68-76] 71  Resp:  [18-20] 20  BP: ()/(47-58) 84/47  SpO2:  [90 %-98 %] 93 %  Temp (24hrs), Av 5 °F (36 9 °C), Min:98 2 °F (36 8 °C), Max:99 1 °F (37 3 °C)  Current: Temperature: 98 4 °F (36 9 °C)    Physical Exam:     General: Awake, alert, cooperative, no distress  Neck:  Supple  No mass  No lymphadenopathy  Lungs: Expansion symmetric, no rales, no wheezing, respirations unlabored  Heart:  Regular rate and rhythm, S1 and S2 normal, no murmur  Abdomen: Soft, stable distention, improved generalized abdominal tenderness, bowel sounds active all four quadrants, no masses, no organomegaly  Extremities: Stable leg edema  No erythema/warmth  No ulcer  Nontender to palpation  Skin:  No rash  Neuro: Moves all extremities  Invasive Devices     Peripheral Intravenous Line            Peripheral IV 21 Left;Proximal;Ventral (anterior) Forearm 3 days    Peripheral IV 21 Left;Proximal;Ventral (anterior) Forearm 3 days                Labs studies:   I have personally reviewed pertinent labs  Results from last 7 days   Lab Units 21  0548   POTASSIUM mmol/L 3 5 4 0 4 0   CHLORIDE mmol/L 108 112* 109*   CO2 mmol/L 20* 21 21   BUN mg/dL 15 19 21   CREATININE mg/dL 0 61 0 61 0 66   EGFR ml/min/1 73sq m 93 93 91   CALCIUM mg/dL 7 7* 7 2* 7 5*   AST U/L 31 29 32   ALT U/L 29 25 26   ALK PHOS U/L 51 40* 41*     Results from last 7 days   Lab Units 21  0521  0414 21  0548   WBC Thousand/uL 16 85*  --  14 45* 14 07*   HEMOGLOBIN g/dL 8 2* 8 6* 7 7* 8 0*   PLATELETS Thousands/uL 244  --  203 174     Results from last 7 days   Lab Units 21  1437   C DIFF TOXIN B BY PCR  Negative       Imaging Studies:   I have personally reviewed pertinent imaging study reports and images in PACS      EKG, Pathology, and Other Studies:   I have personally reviewed pertinent reports

## 2021-07-22 LAB
ANION GAP SERPL CALCULATED.3IONS-SCNC: 4 MMOL/L (ref 4–13)
BUN SERPL-MCNC: 11 MG/DL (ref 5–25)
CALCIUM SERPL-MCNC: 7.8 MG/DL (ref 8.3–10.1)
CHLORIDE SERPL-SCNC: 109 MMOL/L (ref 100–108)
CO2 SERPL-SCNC: 21 MMOL/L (ref 21–32)
CREAT SERPL-MCNC: 0.56 MG/DL (ref 0.6–1.3)
ERYTHROCYTE [DISTWIDTH] IN BLOOD BY AUTOMATED COUNT: 19.6 % (ref 11.6–15.1)
GASTRIN SERPL-MCNC: 32 PG/ML (ref 0–115)
GFR SERPL CREATININE-BSD FRML MDRD: 96 ML/MIN/1.73SQ M
GLUCOSE SERPL-MCNC: 104 MG/DL (ref 65–140)
GLUCOSE SERPL-MCNC: 116 MG/DL (ref 65–140)
GLUCOSE SERPL-MCNC: 125 MG/DL (ref 65–140)
GLUCOSE SERPL-MCNC: 156 MG/DL (ref 65–140)
GLUCOSE SERPL-MCNC: 188 MG/DL (ref 65–140)
H PYLORI IGG SER IA-ACNC: 0.58 INDEX VALUE (ref 0–0.79)
HCT VFR BLD AUTO: 25.1 % (ref 34.8–46.1)
HGB BLD-MCNC: 8.2 G/DL (ref 11.5–15.4)
MCH RBC QN AUTO: 31.4 PG (ref 26.8–34.3)
MCHC RBC AUTO-ENTMCNC: 32.7 G/DL (ref 31.4–37.4)
MCV RBC AUTO: 96 FL (ref 82–98)
NRBC BLD AUTO-RTO: 0 /100 WBCS
PLATELET # BLD AUTO: 249 THOUSANDS/UL (ref 149–390)
PMV BLD AUTO: 10.6 FL (ref 8.9–12.7)
POTASSIUM SERPL-SCNC: 3.8 MMOL/L (ref 3.5–5.3)
RBC # BLD AUTO: 2.61 MILLION/UL (ref 3.81–5.12)
SODIUM SERPL-SCNC: 134 MMOL/L (ref 136–145)
TRANSFUSION STATUS PATIENT QL: NORMAL
WBC # BLD AUTO: 17.52 THOUSAND/UL (ref 4.31–10.16)

## 2021-07-22 PROCEDURE — 97530 THERAPEUTIC ACTIVITIES: CPT

## 2021-07-22 PROCEDURE — 80048 BASIC METABOLIC PNL TOTAL CA: CPT | Performed by: INTERNAL MEDICINE

## 2021-07-22 PROCEDURE — 97535 SELF CARE MNGMENT TRAINING: CPT

## 2021-07-22 PROCEDURE — 99233 SBSQ HOSP IP/OBS HIGH 50: CPT | Performed by: FAMILY MEDICINE

## 2021-07-22 PROCEDURE — 99232 SBSQ HOSP IP/OBS MODERATE 35: CPT | Performed by: INTERNAL MEDICINE

## 2021-07-22 PROCEDURE — 94660 CPAP INITIATION&MGMT: CPT

## 2021-07-22 PROCEDURE — 85027 COMPLETE CBC AUTOMATED: CPT | Performed by: INTERNAL MEDICINE

## 2021-07-22 PROCEDURE — 94760 N-INVAS EAR/PLS OXIMETRY 1: CPT

## 2021-07-22 PROCEDURE — 97116 GAIT TRAINING THERAPY: CPT

## 2021-07-22 PROCEDURE — 82948 REAGENT STRIP/BLOOD GLUCOSE: CPT

## 2021-07-22 RX ORDER — ALPRAZOLAM 0.25 MG/1
0.25 TABLET ORAL ONCE AS NEEDED
Status: COMPLETED | OUTPATIENT
Start: 2021-07-22 | End: 2021-07-22

## 2021-07-22 RX ADMIN — RIFAXIMIN 550 MG: 550 TABLET ORAL at 22:18

## 2021-07-22 RX ADMIN — BRIMONIDINE TARTRATE 1 DROP: 1.5 SOLUTION OPHTHALMIC at 09:06

## 2021-07-22 RX ADMIN — RIFAXIMIN 550 MG: 550 TABLET ORAL at 09:06

## 2021-07-22 RX ADMIN — METOPROLOL TARTRATE 25 MG: 25 TABLET, FILM COATED ORAL at 09:07

## 2021-07-22 RX ADMIN — PANTOPRAZOLE SODIUM 40 MG: 40 TABLET, DELAYED RELEASE ORAL at 06:11

## 2021-07-22 RX ADMIN — METOPROLOL TARTRATE 25 MG: 25 TABLET, FILM COATED ORAL at 22:22

## 2021-07-22 RX ADMIN — INSULIN LISPRO 2 UNITS: 100 INJECTION, SOLUTION INTRAVENOUS; SUBCUTANEOUS at 11:53

## 2021-07-22 RX ADMIN — CITALOPRAM HYDROBROMIDE 10 MG: 10 TABLET ORAL at 22:18

## 2021-07-22 RX ADMIN — PANTOPRAZOLE SODIUM 40 MG: 40 TABLET, DELAYED RELEASE ORAL at 17:35

## 2021-07-22 RX ADMIN — INSULIN LISPRO 2 UNITS: 100 INJECTION, SOLUTION INTRAVENOUS; SUBCUTANEOUS at 17:31

## 2021-07-22 RX ADMIN — ALPRAZOLAM 0.25 MG: 0.25 TABLET ORAL at 23:34

## 2021-07-22 RX ADMIN — BRIMONIDINE TARTRATE 1 DROP: 1.5 SOLUTION OPHTHALMIC at 22:21

## 2021-07-22 NOTE — PROGRESS NOTES
Progress Note - Infectious Disease   Caity Bedolla 74 y o  female MRN: [id number]  Unit/Bed#: [facility] 531-01 Encounter: [id number]      Impression/Recommendations:  1  SIRS, POA at [facility], with fever, tachycardia lactic acidosis  [facility] is no obvious source of active infection   Initially, there was concern for cholecystitis but patient is without RUQ abdominal pain and HIDA is normal  Vladislav David is concern for UTI given urine culture with growth of ESBL producing E coli and Enterococcus the patient has no urinary symptoms   Blood cultures have no growth   Workup for tick related infection all normal   Patient has persistent fever, but it may be all secondary to active GI bleed    Fever appears to have resolved, with no further febrile episodes since here  Patient completed empiric 7 day antibiotic course  She remains well off antibiotic  Observe off further antibiotic  Monitor temperature/WBC  Monitor hemodynamics      2  Bacteriuria, with urine culture growing Enterococcus and ESBL producing E coli   Presence of 2 bacteria urine culture, especially in a patient without urinary symptoms, is most likely bladder colonization   Regardless, patient completed empiric antibiotic course  No further antibiotic      3  GI bleed, possibly secondary to esophageal varices   Patient is status post EGD with findings of bleeding duodenal ulcer but no bleeding varices  GI follow-up      4  Diarrhea, most likely secondary to GI bleed   Stool C diff toxin negative   Diarrhea is improved  Monitor for recurrent diarrhea      5  Cirrhosis of unclear etiology   Patient has history of portal hypertension and varices   CT without significant ascites   Patient without clinical signs of peritonitis      Discussed with the patient in detail regarding the above plan  Discharge plan per primary service      Antibiotics:  Off antibiotic      Subjective:  Patient with improved abdominal pain  No urinary symptoms    Temperature stays down   No chills  No diarrhea      Objective:  Vitals:  Temp:  [98 9 °F (37 2 °C)-99 5 °F (37 5 °C)] 98 9 °F (37 2 °C)  HR:  [71-90] 79  Resp:  [18] 18  BP: ()/(49-68) 104/68  SpO2:  [91 %-99 %] 99 %  Temp (24hrs), Av 1 °F (37 3 °C), Min:98 9 °F (37 2 °C), Max:99 5 °F (37 5 °C)  Current: Temperature: 98 9 °F (37 2 °C)    Physical Exam:     General: Awake, alert, cooperative, no distress  Neck:  Supple  No mass  No lymphadenopathy  Lungs: Expansion symmetric, no rales, no wheezing, respirations unlabored  Heart:  Regular rate and rhythm, S1 and S2 normal, no murmur  Abdomen: Soft, stable distention, mild improved LUQ tenderness, bowel sounds active all four quadrants, no masses, no organomegaly  Extremities: Stable leg edema  No erythema/warmth  No ulcer  Nontender to palpation  Skin:  No rash  Neuro: Moves all extremities  Invasive Devices     Peripheral Intravenous Line            Peripheral IV 21 Right Antecubital <1 day                Labs studies:   I have personally reviewed pertinent labs  Results from last 7 days   Lab Units 214 21  0548   POTASSIUM mmol/L 3 8 3 5 4 0 4 0   CHLORIDE mmol/L 109* 108 112* 109*   CO2 mmol/L 21 20* 21 21   BUN mg/dL 11 15 19 21   CREATININE mg/dL 0 56* 0 61 0 61 0 66   EGFR ml/min/1 73sq m 96 93 93 91   CALCIUM mg/dL 7 8* 7 7* 7 2* 7 5*   AST U/L  --  31 29 32   ALT U/L  --  29 25 26   ALK PHOS U/L  --  51 40* 41*     Results from last 7 days   Lab Units 21  07214 21  0414   WBC Thousand/uL 17 52* 16 85*  --  14 45*   HEMOGLOBIN g/dL 8 2* 8 2* 8 6* 7 7*   PLATELETS Thousands/uL 249 244  --  203     Results from last 7 days   Lab Units 21  1437   C DIFF TOXIN B BY PCR  Negative       Imaging Studies:   I have personally reviewed pertinent imaging study reports and images in PACS      EKG, Pathology, and Other Studies:   I have personally reviewed pertinent reports

## 2021-07-22 NOTE — ASSESSMENT & PLAN NOTE
· POA to [facility] with fever, tachycardia, lactic acid  · No clear source of infection, low suspicion for UTI or acute cholecystitis  · Appreciate ID input   · Completed IV Zosyn for 7 days through 7/20 9pm  · Blood cultures show no growth at 5 days   · Anaplasma and Lyme serologies, negative   · Monitor temperature and CBC/D

## 2021-07-22 NOTE — ASSESSMENT & PLAN NOTE
· Unclear etiology   · Hst of portal HTN and varices   · US with small perihepatic ascites    · Appreciate GI input   · Currently on rifaximin

## 2021-07-22 NOTE — RESTORATIVE TECHNICIAN NOTE
Restorative Technician Note      Patient Name: Chris Guardado     Note Type: Mobility  Patient Position Upon Consult: Supine  Activity Performed: Ambulated  Assistive Device: Standard walker  Patient Position at End of Consult: Bedside chair; All needs within reach

## 2021-07-22 NOTE — CASE MANAGEMENT
Seen by PT/OT and recommending STR  Discussed with patient, she is interested in this  SNF list given within 20 mile radius of her home  She would like referral to [facility] as she has 3 family members employed there  Referral sent  Also requested additional choices, she will review with her  this evening for other choices

## 2021-07-22 NOTE — PLAN OF CARE
Problem: PHYSICAL THERAPY ADULT  Goal: Performs mobility at highest level of function for planned discharge setting  See evaluation for individualized goals  Description: Treatment/Interventions: Functional transfer training, LE strengthening/ROM, Therapeutic exercise, Endurance training, Patient/family training, Equipment eval/education, Bed mobility, Gait training, Spoke to nursing  Equipment Recommended: Hassan Shone       See flowsheet documentation for full assessment, interventions and recommendations  Outcome: Progressing  Note: Prognosis: Good  Problem List: Decreased strength, Impaired balance, Decreased endurance, Decreased mobility, Decreased safety awareness  Assessment: Pt seen for PT treatment session with focus on functional transfers, functional mobility, endurance  Pt making steady progress toward goals this session  Pt able to ambulate significantly increased distance this session, however, continues to be limited by SOB and decreased endurance, unable to ambulate household distances consecutively at this time  Pt demonstrating LE strength gains evident by ability to perform STS with decreased assist   Pt left on toilet with all needs in reach  Pt will benefit from skilled therapy in order to address current impairments and functional limitations  PT to follow pt and recommending rehab once medically cleared  The patient's AM-PAC Basic Mobility Inpatient Short Form Raw Score is 17, Standardized Score is 39 67  A standardized score less than 42 9 suggests the patient may benefit from discharge to post-acute rehabilitation services  Please also refer to the recommendation of the Physical Therapist for safe discharge planning  Barriers to Discharge: Inaccessible home environment, Decreased caregiver support        PT Discharge Recommendation: Post acute rehabilitation services     PT - OK to Discharge: Yes (to rehab once medically cleared)    See flowsheet documentation for full assessment

## 2021-07-22 NOTE — OCCUPATIONAL THERAPY NOTE
Occupational Therapy Treatment Note:     07/22/21 1035   OT Last Visit   OT Visit Date 07/22/21   Note Type   Note Type Treatment   Restrictions/Precautions   Weight Bearing Precautions Per Order No   Other Precautions O2;Fall Risk   Pain Assessment   Pain Score No Pain   ADL   Where Assessed Commode   UB Bathing Assistance 4  Minimal Assistance   LB Bathing Assistance 3  Moderate Assistance   UB Dressing Assistance 4  Minimal Assistance   LB Dressing Assistance 3  Moderate Assistance   LB Dressing Comments LBCM   Toileting Assistance  3  Moderate Assistance   Toileting Comments BSC over commode   Functional Standing Tolerance   Time ~ 1 min   Activity static standing   Comments RW   Bed Mobility   Additional Comments OOB upon presentation and at end of session    Transfers   Sit to Stand 4  Minimal assistance   Additional items Assist x 1   Stand to Sit 4  Minimal assistance   Additional items Assist x 1   Stand pivot 4  Minimal assistance   Additional items Assist x 1   Additional Comments RW, LOB with SPT with assist to correct   Functional Mobility   Functional Mobility 4  Minimal assistance   Additional Comments  to and from BR   Additional items Rolling walker   Cognition   Overall Cognitive Status WFL   Arousal/Participation Alert   Attention Within functional limits   Orientation Level Oriented X4   Memory Within functional limits   Following Commands Follows one step commands without difficulty   Comments Plesant and cooperative   Activity Tolerance   Activity Tolerance Patient limited by fatigue   Medical Staff Made Aware NSG aware   Assessment   Assessment Pt was seen this date for OT tx session focusing on self care tasks, sit to stand progressions, standing tolerance, tranfers, functional mobility, safety awareness, compensatory techniques, energy conservation,and overall activity tolerance   Pt presents  Seated OOB in chiar, completes previously mentioned tasks at documented assist levels please see above in flow sheet  Pt continues to require increased assist for self cares and transfer tasks  Pt with multiple LOB during funciotnal mobility and transfers requiring assist to correct  Resting OOB in chair at end of session with all needs I nreach  Would benefit from continued OT tx to improve overall funcitonal abilites   Continue to follow wit hcurrent POC   Plan   Treatment Interventions ADL retraining   Goal Expiration Date 08/03/21   OT Treatment Day 1   OT Frequency 3-5x/wk   Recommendation   OT Discharge Recommendation Post acute rehabilitation services       QING Ceron

## 2021-07-22 NOTE — PLAN OF CARE
Problem: OCCUPATIONAL THERAPY ADULT  Goal: Performs self-care activities at highest level of function for planned discharge setting  See evaluation for individualized goals  Description: Treatment Interventions: ADL retraining          See flowsheet documentation for full assessment, interventions and recommendations  Outcome: Progressing  Note: Limitation: Decreased ADL status, Decreased Safe judgement during ADL, Decreased cognition, Decreased endurance, Decreased self-care trans, Decreased high-level ADLs  Prognosis: Fair  Assessment: Pt was seen this date for OT tx session focusing on self care tasks, sit to stand progressions, standing tolerance, tranfers, functional mobility, safety awareness, compensatory techniques, energy conservation,and overall activity tolerance  Pt presents  Seated OOB in chiar, completes previously mentioned tasks at documented assist levels please see above in flow sheet  Pt continues to require increased assist for self cares and transfer tasks  Pt with multiple LOB during funciotnal mobility and transfers requiring assist to correct  Resting OOB in chair at end of session with all needs I nreach  Would benefit from continued OT tx to improve overall funcitonal abilites   Continue to follow wit hcurrent POC     OT Discharge Recommendation: Post acute rehabilitation services  OT - OK to Discharge: Yes (when medically stable )

## 2021-07-22 NOTE — PROGRESS NOTES
Gastroenterology Progress Note      PATIENT INFORMATION      Patient: Terrell Gaxiola 74 y o  female   MRN: [id number]  PCP: Lazarus Glenn, DO  [facility] Encounter: [id number]  Date Of Visit: 07/22/21  Current Length of Stay: 4 day(s)     ASSESSMENTS & PLAN    58-year-old with cirrhosis unknown etiology, complicated by hepatic encephalopathy, obstructive sleep apnea on CPAP presented to [facility] initially with UTI related sepsis   Hospital course complicated by hematochezia and anemia overnight  She underwent EGD on 07/19/2021 found to have multiple duodenal ulcers      Duodenal ulcers - s/p EGD with multiple duodenal ulcers including one large ulcer in the 2nd part of the duodenum with oozing (Sheldon Class 1B) status post epinephrine and one clip with good hemostasis  Unclear etiology but possibly in the setting of daily celecoxib versus H pylori versus gastrinoma  Need no more evidence of bleeding  · Recommend celecoxib cessation  · Check serum H pylori and gastrin level pending  · PPI bid x 8 weeks  · Okay to advance diet  · Repeat EGD in 8 weeks  · Monitor hemoglobin and bowel movements  · No further inpatient gastroenterology workup  GI will sign off      MOORE cirrhosis - complicated by history of small varices on recent EGD and history of hepatic encephalopathy  Currently not decompensated  [address]  Child Murray B  · Ascites:  No significant history of ascites however CT this admission showed small volume ascites  Hold off on diuretics at time especially given soft blood pressures  · Varices:  EGDs admission shows 2 small varices  Given the patient's child Eliazar Cost B consider beta-blocker as outpatient however patient on metoprolol with soft blood pressures so will hold off at this time  · Hepatic encephalopathy:  Patient may have grade 0/grade 1 hepatic encephalopathy chronically as outpatient  Was previously on lactulose prior to admission    Okay to continue lactulose and titrate to 3 bowel movements  · [address]  screening:  MRI abdomen this admission showed no evidence of [address]   · Transplant candidacy:  Patient will need outpatient follow-up for transplant evaluation given her meld is almost 15 and she appears to have good support and good compliance  · Outpatient follow-up with primary gastroenterology        SUBJECTIVE     Patient reports no significant complaints  No overnight events  No bowel movements overnight  Abdominal pain significantly improved  OBJECTIVE     Vitals:   Temp (24hrs), Av °F (37 2 °C), Min:98 4 °F (36 9 °C), Max:99 5 °F (37 5 °C)    Temp:  [98 4 °F (36 9 °C)-99 5 °F (37 5 °C)] 99 5 °F (37 5 °C)  HR:  [69-90] 71  Resp:  [18-20] 18  BP: ()/(47-61) 108/60  SpO2:  [91 %-97 %] 91 %  There is no height or weight on file to calculate BMI  Input and Output Summary (last 24 hours): Intake/Output Summary (Last 24 hours) at 2021 0630  Last data filed at 2021 0554  Gross per 24 hour   Intake 560 ml   Output 1400 ml   Net -840 ml       Physical Exam:   GENERAL: NAD  HEENT:  NC/AT, no scleral icterus  CARDIAC:  RRR, +S1/S2  PULMONARY:  non-labored breathing  ABDOMEN:  Soft, NT/ND, +BS, no rebound/guarding/rigidity  Extremities:  No edema, cyanosis, or clubbing  NEUROLOGIC:  Alert  SKIN:  No rashes or erythema      ADDITIONAL DATA     Labs & Recent Cultures:     Results from last 7 days   Lab Units 21  0522 21  0414 21  0623 21  0308   WBC Thousand/uL 16 85* 14 45*   < > 13 53*   HEMOGLOBIN g/dL 8 2* 7 7*  --  7 9*   HEMATOCRIT % 25 4* 23 4*  --  23 4*   PLATELETS Thousands/uL 244 203   < > 175   NEUTROS PCT %  --   --   --  62   LYMPHS PCT %  --   --   --  21   LYMPHO PCT %  --  6*  --   --    MONOS PCT %  --   --   --  8   MONO PCT %  --  5  --   --    EOS PCT %  --  5  --  6    < > = values in this interval not displayed       Results from last 7 days   Lab Units 21  0522   POTASSIUM mmol/L 3 5   CHLORIDE mmol/L 108   CO2 mmol/L 20*   BUN mg/dL 15   CREATININE mg/dL 0 61   CALCIUM mg/dL 7 7*   ALK PHOS U/L 51   ALT U/L 29   AST U/L 31     Results from last 7 days   Lab Units 07/19/21  0549   INR  1 48*         Results from last 7 days   Lab Units 07/17/21  1437   C DIFF TOXIN B BY PCR  Negative         Nutrition:  Diet GI; Non Ulcerogenic  Radiology Results:   No orders to display     Scheduled Medications:  brimonidine, 1 drop, TID  citalopram, 10 mg, HS  insulin lispro, 2-12 Units, 4x Daily (AC & HS)  metoprolol tartrate, 25 mg, Q12H TAMIKO  pantoprazole, 40 mg, BID AC  rifaximin, 550 mg, Q12H [address]        PRN MEDS:  acetaminophen, 650 mg, Q8H PRN  butalbital-acetaminophen-caffeine, 1 tablet, Q6H PRN  morphine injection, 2 mg, Q4H PRN  ondansetron, 4 mg, Q6H PRN        Last 24 Hours Medication List:   Current Facility-Administered Medications   Medication Dose Route Frequency Provider Last Rate    acetaminophen  650 mg Oral Q8H PRN Clarene Keyon, DO      brimonidine  1 drop Both Eyes TID Clarene Ekyon, DO      butalbital-acetaminophen-caffeine  1 tablet Oral Q6H PRN Clarene Keyon, DO      citalopram  10 mg Oral HS Clarene Keyon, DO      insulin lispro  2-12 Units Subcutaneous 4x Daily (AC & HS) Clarene Keyon, DO      metoprolol tartrate  25 mg Oral Q12H [address] [place], [place]      morphine injection  2 mg Intravenous Q4H PRN Clarene Keyon, DO      ondansetron  4 mg Intravenous Q6H PRN Clarene [place], DO      pantoprazole  40 mg Oral BID AC Misty Frazier MD      rifaximin  550 mg Oral Q12H [address],             Time Spent for Care: 30 mins spent in total   More than 50% of total time spent on counseling and coordination of care as described above  Current Length of Stay: 4 day(s)      Code Status: Level 1 - Full Code          ** Please Note: This note is constructed using a voice recognition dictation system   **

## 2021-07-22 NOTE — PROGRESS NOTES
[facility]  Progress Note - Aryalia Jan 1955, 74 y o  female MRN: [id number]  Unit/Bed#: [facility] 531-01 Encounter: [id number]  Primary Care Provider: Abby Acosta DO   Date and time admitted to hospital: 7/18/2021  1:46 PM    * Acute upper GI bleed  Assessment & Plan  · Upper GI bleed 2/2 oozing duodenal ulcer   · Appreciate GI input  · S/p EGD on 7/19: Two small nonbleeding esophageal varices with no recent stigmata of bleeding  Normal stomach  Multiple duodenal ulcers including one large ulcer in the 2nd part of the duodenum with oozing (Sheldon Class 1B) status post epinephrine and one clip with good hemostasis    · OK to discontinue Octreotide gtt  · Continue PPI gtt for 72 hours through 7/21 7am   · Start Protonix 40 mg daily 7/21  · Stop Celecoxib   · Check serum H pylori and gastrin level   · Advance to non ulcerogenic diet   · Patient will need repeat EGD in 8 weeks as outpatient   · Please notify GI with any evidence of significant bleeding   · S/p 2 units of pRBCs this far   · Monitor HGB and transfuse PRN    Tachycardia  Assessment & Plan  Found to have narrow complex tachycardia on telemetry monitor   TTE: LVEF 60-65%, mild MR, mild TR  Cardiac catheterization May 2020: Mild luminal irregularities  · Appreciate cardiology and EP input   · Started Metoprolol 25 mg BID with improvement   · On discharge EP recommends changing to Toprol XL 25 mg daily     Bacteriuria  Assessment & Plan  Urine culture grew Enterococcus and ESBL E Coli   · Patient asymptomatic, likely colonization   · Observe off antibiotics     Acute blood loss anemia  Assessment & Plan  · 2/2 GIB due to bleeding duodenal ulcer   · S/p EGD with clipping and epinephrine to oozing duodenal ulcer   · S/p 2 units pRBCs thus far  · Monitor HGB and transfuse for HGB < 7    Morbid obesity (HCC)  Assessment & Plan  · RD consult    Type 2 diabetes mellitus without complication, without long-term current use of insulin [facility])  Assessment & Plan  Lab Results   Component Value Date    HGBA1C 7 7 (H) 2021       Recent Labs     21  1102 21  1606 21  2045 21  0608   POCGLU 231* 113 170* 104       Blood Sugar Average: Last 72 hrs:  (P) 151 [id number]   · Monitor ACCU checks AC + HS    · Lispro sliding scale     Cirrhosis of liver with ascites (HCC)  Assessment & Plan  · Unclear etiology   · Hst of portal HTN and varices   · US with small perihepatic ascites  · Appreciate GI input   · Currently on rifaximin    SIRS (systemic inflammatory response syndrome) (HCC)  Assessment & Plan  · POA to GSL with fever, tachycardia, lactic acid  · No clear source of infection, low suspicion for UTI or acute cholecystitis  · Appreciate ID input   · Completed IV Zosyn for 7 days through  9pm  · Blood cultures show no growth at 5 days   · Anaplasma and Lyme serologies, negative   · Monitor temperature and CBC/D        VTE Pharmacologic Prophylaxis:   Pharmacologic: Pharmacologic VTE Prophylaxis contraindicated due to GI bleed  Mechanical VTE Prophylaxis in Place: Yes    Patient Centered Rounds: I have performed bedside rounds with nursing staff today  Current Length of Stay: 4 day(s)    Current Patient Status: Inpatient   Certification Statement: The patient will continue to require additional inpatient hospital stay due to Not medically clear    Discharge Plan:  Pending progress    Code Status: Level 1 - Full Code      Subjective:   Patient examined at bedside  Patient complains of feeling tired  Otherwise denies any abdominal pain, nausea or vomiting  Objective:     Vitals:   Temp (24hrs), Av 1 °F (37 3 °C), Min:98 9 °F (37 2 °C), Max:99 5 °F (37 5 °C)    Temp:  [98 9 °F (37 2 °C)-99 5 °F (37 5 °C)] 99 °F (37 2 °C)  HR:  [71-90] 73  Resp:  [18] 18  BP: ()/(60-68) 99/62  SpO2:  [91 %-99 %] 98 %  There is no height or weight on file to calculate BMI       Input and Output Summary (last 24 hours): Intake/Output Summary (Last 24 hours) at 7/22/2021 1858  Last data filed at 7/22/2021 0554  Gross per 24 hour   Intake --   Output 700 ml   Net -700 ml       Physical Exam:     Physical Exam  Constitutional:       Appearance: She is well-developed  She is obese  HENT:      Head: Normocephalic and atraumatic  Pulmonary:      Effort: Pulmonary effort is normal  No respiratory distress  Breath sounds: Normal breath sounds  Musculoskeletal:      Cervical back: Normal range of motion  Skin:     General: Skin is warm and dry  Additional Data:     Labs:    Results from last 7 days   Lab Units 07/22/21  0717 07/20/21  2124 07/20/21  0414 07/18/21  0623 07/18/21  0308   WBC Thousand/uL 17 52*   < > 14 45*   < > 13 53*   HEMOGLOBIN g/dL 8 2*  --  7 7*  --  7 9*   HEMATOCRIT % 25 1*  --  23 4*  --  23 4*   PLATELETS Thousands/uL 249   < > 203   < > 175   BANDS PCT %  --   --  4  --   --    NEUTROS PCT %  --   --   --   --  62   LYMPHS PCT %  --   --   --   --  21   LYMPHO PCT %  --   --  6*  --   --    MONOS PCT %  --   --   --   --  8   MONO PCT %  --   --  5  --   --    EOS PCT %  --   --  5  --  6    < > = values in this interval not displayed       Results from last 7 days   Lab Units 07/22/21  0717 07/21/21  0522   SODIUM mmol/L 134* 135*   POTASSIUM mmol/L 3 8 3 5   CHLORIDE mmol/L 109* 108   CO2 mmol/L 21 20*   BUN mg/dL 11 15   CREATININE mg/dL 0 56* 0 61   ANION GAP mmol/L 4 7   CALCIUM mg/dL 7 8* 7 7*   ALBUMIN g/dL  --  2 0*   TOTAL BILIRUBIN mg/dL  --  1 29*   ALK PHOS U/L  --  51   ALT U/L  --  29   AST U/L  --  31   GLUCOSE RANDOM mg/dL 116 121     Results from last 7 days   Lab Units 07/19/21  0549   INR  1 48*     Results from last 7 days   Lab Units 07/22/21  1621 07/22/21  1048 07/22/21  0608 07/21/21  2045 07/21/21  1606 07/21/21  1102 07/21/21  0639 07/20/21  2112 07/20/21  1612 07/20/21  1054 07/20/21  0614 07/19/21 2024   POC GLUCOSE mg/dl 156* 188* 104 170* 113 231* 133 203* 119 220* 108 169*         Results from last 7 days   Lab Units 07/17/21  0443 07/16/21  0541   PROCALCITONIN ng/ml 0 49* 0 74*           * I Have Reviewed All Lab Data Listed Above  * Additional Pertinent Lab Tests Reviewed: [facility]         Recent Cultures (last 7 days):     Results from last 7 days   Lab Units 07/17/21  1437   C DIFF TOXIN B BY PCR  Negative       Last 24 Hours Medication List:   Current Facility-Administered Medications   Medication Dose Route Frequency Provider Last Rate    acetaminophen  650 mg Oral Q8H PRN Francesco Reason, DO      brimonidine  1 drop Both Eyes TID Francesco Reason, DO      butalbital-acetaminophen-caffeine  1 tablet Oral Q6H PRN Francesco Reason, DO      citalopram  10 mg Oral HS Francesco Reason, DO      insulin lispro  2-12 Units Subcutaneous 4x Daily (AC & HS) Francesco Reason, DO      metoprolol tartrate  25 mg Oral Q12H [address] [facility], [place]      morphine injection  2 mg Intravenous Q4H PRN Francesco Reason, DO      ondansetron  4 mg Intravenous Q6H PRN Francesco Reason, DO      pantoprazole  40 mg Oral BID AC Misty Frazier MD      rifaximin  550 mg Oral Q12H [address] Francesco Reason, DO          Today, Patient Was Seen By: Sasha Reaves MD    ** Please Note: Dictation voice to text software may have been used in the creation of this document   **

## 2021-07-22 NOTE — PHYSICAL THERAPY NOTE
PHYSICAL THERAPY NOTE          Patient Name: Vickie Taylor  HFGAK'N Date: 7/22/2021 07/22/21 1511   PT Last Visit   PT Visit Date 07/22/21   Note Type   Note Type Treatment   Pain Assessment   Pain Assessment Tool Pain Assessment not indicated - pt denies pain   Restrictions/Precautions   Weight Bearing Precautions Per Order No   Other Precautions Chair Alarm; Bed Alarm; Fall Risk;O2;Multiple lines  (2L O2 NC)   General   Chart Reviewed Yes   Response to Previous Treatment Patient with no complaints from previous session  Family/Caregiver Present No   Cognition   Overall Cognitive Status WFL   Arousal/Participation Alert   Attention Within functional limits   Orientation Level Oriented X4   Memory Within functional limits   Following Commands Follows one step commands without difficulty   Comments Pt with some decreased safety awareness  Subjective   Subjective Pt pleasant and agreeable to participate in therapy session  Bed Mobility   Additional Comments OOB in chair upon PT arrival   Pt left on toilet with all needs in reach  Transfers   Sit to Stand 4  Minimal assistance   Additional items Assist x 1; Increased time required;Verbal cues   Stand to Sit 4  Minimal assistance   Additional items Assist x 1; Increased time required;Verbal cues   Additional Comments Transfers with RW   VC for hand placement and safety  Ambulation/Elevation   Gait pattern Excessively slow; Short stride; Foward flexed   Gait Assistance   (CGA)   Additional items Assist x 1;Verbal cues; Tactile cues   Assistive Device Rolling walker   Distance 40 ft x 2 with sitting rest break   Balance   Static Sitting Fair   Dynamic Sitting Fair -   Static Standing Fair -   Dynamic Standing Poor +   Ambulatory Poor +   Endurance Deficit   Endurance Deficit Yes   Endurance Deficit Description fatigue, weakness, SOB   Activity Tolerance   Activity Tolerance Patient limited by fatigue   Medical Staff Made Aware restorative tech [address] RN cleared pt to be seen by PT   Assessment   Prognosis Good   Problem List Decreased strength; Impaired balance;Decreased endurance;Decreased mobility; Decreased safety awareness   Assessment Pt seen for PT treatment session with focus on functional transfers, functional mobility, endurance  Pt making steady progress toward goals this session  Pt able to ambulate significantly increased distance this session, however, continues to be limited by SOB and decreased endurance, unable to ambulate household distances consecutively at this time  Pt demonstrating LE strength gains evident by ability to perform STS with decreased assist   Pt left on toilet with all needs in reach  Pt will benefit from skilled therapy in order to address current impairments and functional limitations  PT to follow pt and recommending rehab once medically cleared  The patient's AM-[facility] Basic Mobility Inpatient Short Form Raw Score is 17, Standardized Score is 39 67  A standardized score less than 42 9 suggests the patient may benefit from discharge to post-acute rehabilitation services  Please also refer to the recommendation of the Physical Therapist for safe discharge planning  Barriers to Discharge Inaccessible home environment;Decreased caregiver support   Goals   Patient Goals to go home   STG Expiration Date 08/03/21   Plan   Treatment/Interventions Functional transfer training;LE strengthening/ROM; Therapeutic exercise; Endurance training;Patient/family training;Equipment eval/education; Bed mobility;Gait training;Spoke to nursing   Progress Progressing toward goals   PT Frequency Other (Comment)  (3-5x/wk)   Recommendation   PT Discharge Recommendation Post acute rehabilitation services   Equipment Recommended [address] [place] Recommended Wheeled walker   Change/add to [employer]?  No   PT - OK to Discharge Yes  (to rehab once medically cleared)   AM-PAC Basic Mobility Inpatient   Turning in Bed Without Bedrails 3   Lying on Back to Sitting on Edge of Flat Bed 3   Moving Bed to Chair 3   Standing Up From Chair 3   Walk in Room 3   Climb 3-5 Stairs 2   Basic Mobility Inpatient Raw Score 17   Basic Mobility Standardized Score 39 67     Stefanie Kim, PT, DPT

## 2021-07-22 NOTE — ASSESSMENT & PLAN NOTE
Lab Results   Component Value Date    HGBA1C 7 7 (H) 01/25/2021       Recent Labs     07/21/21  1102 07/21/21  1606 07/21/21  2045 07/22/21  0608   POCGLU 231* 113 170* 104       Blood Sugar Average: Last 72 hrs:  (P) 151 [id number]   · Monitor ACCU checks AC + HS    · Lispro sliding scale

## 2021-07-23 PROBLEM — R65.10 SIRS (SYSTEMIC INFLAMMATORY RESPONSE SYNDROME) (HCC): Status: RESOLVED | Noted: 2021-07-14 | Resolved: 2021-07-23

## 2021-07-23 PROBLEM — U07.1 LAB TEST POSITIVE FOR DETECTION OF COVID-19 VIRUS: Status: ACTIVE | Noted: 2021-07-23

## 2021-07-23 LAB
A PHAGOCYTOPH IGG TITR SER IF: NEGATIVE {TITER}
A PHAGOCYTOPH IGM TITR SER IF: NEGATIVE {TITER}
ANION GAP SERPL CALCULATED.3IONS-SCNC: 6 MMOL/L (ref 4–13)
BUN SERPL-MCNC: 9 MG/DL (ref 5–25)
CALCIUM SERPL-MCNC: 8.4 MG/DL (ref 8.3–10.1)
CHLORIDE SERPL-SCNC: 108 MMOL/L (ref 100–108)
CO2 SERPL-SCNC: 22 MMOL/L (ref 21–32)
CREAT SERPL-MCNC: 0.56 MG/DL (ref 0.6–1.3)
E CHAFFEENSIS IGG TITR SER IF: NEGATIVE {TITER}
E CHAFFEENSIS IGM TITR SER IF: NEGATIVE {TITER}
ERYTHROCYTE [DISTWIDTH] IN BLOOD BY AUTOMATED COUNT: 19.4 % (ref 11.6–15.1)
GFR SERPL CREATININE-BSD FRML MDRD: 96 ML/MIN/1.73SQ M
GLUCOSE SERPL-MCNC: 115 MG/DL (ref 65–140)
GLUCOSE SERPL-MCNC: 120 MG/DL (ref 65–140)
GLUCOSE SERPL-MCNC: 157 MG/DL (ref 65–140)
GLUCOSE SERPL-MCNC: 181 MG/DL (ref 65–140)
GLUCOSE SERPL-MCNC: 209 MG/DL (ref 65–140)
HCT VFR BLD AUTO: 25.2 % (ref 34.8–46.1)
HGB BLD-MCNC: 8.1 G/DL (ref 11.5–15.4)
MCH RBC QN AUTO: 31.3 PG (ref 26.8–34.3)
MCHC RBC AUTO-ENTMCNC: 32.1 G/DL (ref 31.4–37.4)
MCV RBC AUTO: 97 FL (ref 82–98)
PLATELET # BLD AUTO: 249 THOUSANDS/UL (ref 149–390)
PMV BLD AUTO: 10.5 FL (ref 8.9–12.7)
POTASSIUM SERPL-SCNC: 3.7 MMOL/L (ref 3.5–5.3)
RBC # BLD AUTO: 2.59 MILLION/UL (ref 3.81–5.12)
SARS-COV-2 RNA RESP QL NAA+PROBE: NEGATIVE
SARS-COV-2 RNA RESP QL NAA+PROBE: NEGATIVE
SARS-COV-2 RNA RESP QL NAA+PROBE: POSITIVE
SODIUM SERPL-SCNC: 136 MMOL/L (ref 136–145)
WBC # BLD AUTO: 16.83 THOUSAND/UL (ref 4.31–10.16)

## 2021-07-23 PROCEDURE — 85027 COMPLETE CBC AUTOMATED: CPT | Performed by: FAMILY MEDICINE

## 2021-07-23 PROCEDURE — 99239 HOSP IP/OBS DSCHRG MGMT >30: CPT | Performed by: FAMILY MEDICINE

## 2021-07-23 PROCEDURE — U0005 INFEC AGEN DETEC AMPLI PROBE: HCPCS | Performed by: FAMILY MEDICINE

## 2021-07-23 PROCEDURE — 94760 N-INVAS EAR/PLS OXIMETRY 1: CPT

## 2021-07-23 PROCEDURE — 80048 BASIC METABOLIC PNL TOTAL CA: CPT | Performed by: FAMILY MEDICINE

## 2021-07-23 PROCEDURE — 99233 SBSQ HOSP IP/OBS HIGH 50: CPT | Performed by: INTERNAL MEDICINE

## 2021-07-23 PROCEDURE — 82948 REAGENT STRIP/BLOOD GLUCOSE: CPT

## 2021-07-23 PROCEDURE — 99233 SBSQ HOSP IP/OBS HIGH 50: CPT | Performed by: FAMILY MEDICINE

## 2021-07-23 PROCEDURE — U0003 INFECTIOUS AGENT DETECTION BY NUCLEIC ACID (DNA OR RNA); SEVERE ACUTE RESPIRATORY SYNDROME CORONAVIRUS 2 (SARS-COV-2) (CORONAVIRUS DISEASE [COVID-19]), AMPLIFIED PROBE TECHNIQUE, MAKING USE OF HIGH THROUGHPUT TECHNOLOGIES AS DESCRIBED BY CMS-2020-01-R: HCPCS | Performed by: FAMILY MEDICINE

## 2021-07-23 RX ORDER — PANTOPRAZOLE SODIUM 40 MG/1
40 TABLET, DELAYED RELEASE ORAL
Refills: 0
Start: 2021-07-23

## 2021-07-23 RX ADMIN — INSULIN LISPRO 2 UNITS: 100 INJECTION, SOLUTION INTRAVENOUS; SUBCUTANEOUS at 17:00

## 2021-07-23 RX ADMIN — BRIMONIDINE TARTRATE 1 DROP: 1.5 SOLUTION OPHTHALMIC at 09:38

## 2021-07-23 RX ADMIN — PANTOPRAZOLE SODIUM 40 MG: 40 TABLET, DELAYED RELEASE ORAL at 16:56

## 2021-07-23 RX ADMIN — RIFAXIMIN 550 MG: 550 TABLET ORAL at 11:07

## 2021-07-23 RX ADMIN — BRIMONIDINE TARTRATE 1 DROP: 1.5 SOLUTION OPHTHALMIC at 16:57

## 2021-07-23 RX ADMIN — RIFAXIMIN 550 MG: 550 TABLET ORAL at 21:56

## 2021-07-23 RX ADMIN — CITALOPRAM HYDROBROMIDE 10 MG: 10 TABLET ORAL at 21:55

## 2021-07-23 RX ADMIN — INSULIN LISPRO 2 UNITS: 100 INJECTION, SOLUTION INTRAVENOUS; SUBCUTANEOUS at 21:57

## 2021-07-23 RX ADMIN — METOPROLOL TARTRATE 25 MG: 25 TABLET, FILM COATED ORAL at 09:37

## 2021-07-23 RX ADMIN — INSULIN LISPRO 4 UNITS: 100 INJECTION, SOLUTION INTRAVENOUS; SUBCUTANEOUS at 11:09

## 2021-07-23 RX ADMIN — BRIMONIDINE TARTRATE 1 DROP: 1.5 SOLUTION OPHTHALMIC at 21:58

## 2021-07-23 RX ADMIN — METOPROLOL TARTRATE 25 MG: 25 TABLET, FILM COATED ORAL at 21:55

## 2021-07-23 RX ADMIN — PANTOPRAZOLE SODIUM 40 MG: 40 TABLET, DELAYED RELEASE ORAL at 05:29

## 2021-07-23 NOTE — ASSESSMENT & PLAN NOTE
· Upper GI bleed 2/2 oozing duodenal ulcer   · Appreciate GI input  · S/p EGD on 7/19: Two small nonbleeding esophageal varices with no recent stigmata of bleeding  Normal stomach  Multiple duodenal ulcers including one large ulcer in the 2nd part of the duodenum with oozing (Sheldon Class 1B) status post epinephrine and one clip with good hemostasis  · Was treated with octreotide GTT and Protonix GTT  · Currently on Protonix 40 mg b i d    · Stop Celecoxib   · Negative H pylori and normal gastrin level  · Advance to non ulcerogenic diet   · Patient will need repeat EGD in 8 weeks as outpatient , GI does not recommend any further inpatient treatment  · Please notify GI with any evidence of significant bleeding   · S/p 2 units of pRBCs this far   · Hemoglobin remains stable

## 2021-07-23 NOTE — PROGRESS NOTES
[address] [facility]  Progress Note - Shraddha Tenorio 1955, 74 y o  female MRN: [id number]  Unit/Bed#: [facility] 531-01 Encounter: [id number]  Primary Care Provider: Yanet Nichols DO   Date and time admitted to hospital: 7/18/2021  1:46 PM    * Acute upper GI bleed  Assessment & Plan  · Upper GI bleed 2/2 oozing duodenal ulcer   · Appreciate GI input  · S/p EGD on 7/19: Two small nonbleeding esophageal varices with no recent stigmata of bleeding  Normal stomach  Multiple duodenal ulcers including one large ulcer in the 2nd part of the duodenum with oozing (Sheldon Class 1B) status post epinephrine and one clip with good hemostasis  · Was treated with octreotide GTT and Protonix GTT  · Currently on Protonix 40 mg b i d  · Stop Celecoxib   · Negative H pylori and normal gastrin level  · Advance to non ulcerogenic diet   · Patient will need repeat EGD in 8 weeks as outpatient , GI does not recommend any further inpatient treatment  · Please notify GI with any evidence of significant bleeding   · S/p 2 units of pRBCs this far   · Hemoglobin remains stable    Lab test positive for detection of COVID-19 virus  Assessment & Plan  Patient is vaccinated with moderna on January and February with two doses  Screening test is positive   As per protocol, two more test will be obtained, if both negative, will be deemed false positive  Patient asymptomatic    Placed on isolation       Tachycardia  Assessment & Plan  Found to have narrow complex tachycardia on telemetry monitor   TTE: LVEF 60-65%, mild MR, mild TR  Cardiac catheterization May 2020: Mild luminal irregularities  · Appreciate cardiology and EP input   · Started Metoprolol 25 mg BID with improvement   · On discharge EP recommends changing to Toprol XL 25 mg daily     Bacteriuria  Assessment & Plan  Urine culture grew Enterococcus and ESBL E Coli   · Patient asymptomatic, likely colonization   · Observe off antibiotics     Acute blood loss anemia  Assessment & Plan  · 2/2 GIB due to bleeding duodenal ulcer   · S/p EGD with clipping and epinephrine to oozing duodenal ulcer   · S/p 2 units pRBCs thus far  · Patient did not require any further transfusions as hemoglobin maintained above 8    Morbid obesity ([address] )  Assessment & Plan  · RD consult    Type 2 diabetes mellitus without complication, without long-term current use of insulin [facility])  Assessment & Plan  Lab Results   Component Value Date    HGBA1C 7 7 (H) 01/25/2021       Recent Labs     07/22/21  1621 07/22/21  2049 07/23/21  0626 07/23/21  1108   POCGLU 156* 125 120 209*       Blood Sugar Average: Last 72 hrs:  (P) 157 [id number]   · Monitor ACCU checks AC + HS    · Lispro sliding scale     Cirrhosis of liver with ascites (HCC)  Assessment & Plan  · Unclear etiology   · Hst of portal HTN and varices   · US with small perihepatic ascites  · Appreciate GI input   · Currently on rifaximin    SIRS (systemic inflammatory response syndrome) (HCC)-resolved as of 7/23/2021  Assessment & Plan  · POA to [facility] with fever, tachycardia, lactic acid  · No clear source of infection, low suspicion for UTI or acute cholecystitis  · Appreciate ID input   · Completed IV Zosyn for 7 days through 7/20 9pm  · Blood cultures show no growth at 5 days   · Anaplasma and Lyme serologies, negative            VTE Pharmacologic Prophylaxis:   Pharmacologic: Pharmacologic VTE Prophylaxis contraindicated due to GI bleed  Mechanical VTE Prophylaxis in Place: Yes    Patient Centered Rounds: I have performed bedside rounds with nursing staff today  Discussions with Specialists or Other Care Team Provider: HERIBERTO    Education and Discussions with Family / Patient: discussed with rakesh Gonzalez over the phone  Updated her regarding positive covid test      Time Spent for Care: 30 minutes  More than 50% of total time spent on counseling and coordination of care as described above      Current Length of Stay: 5 day(s)    Current Patient Status: Inpatient   Certification Statement: The patient will continue to require additional inpatient hospital stay due to rule out covid infection    Discharge Plan: likely tomorrow to rehab    Code Status: Level 1 - Full Code      Subjective:   Patient examined at bedside  No respiratory distress  No abdominal pain  Objective:     Vitals:   Temp (24hrs), Av 9 °F (37 7 °C), Min:99 1 °F (37 3 °C), Max:100 8 °F (38 2 °C)    Temp:  [99 1 °F (37 3 °C)-100 8 °F (38 2 °C)] 99 8 °F (37 7 °C)  HR:  [62-82] 71  Resp:  [18-20] 18  BP: ()/(45-61) 89/45  SpO2:  [94 %-97 %] 94 %  There is no height or weight on file to calculate BMI  Input and Output Summary (last 24 hours): Intake/Output Summary (Last 24 hours) at 2021 1650  Last data filed at 2021 1101  Gross per 24 hour   Intake 580 ml   Output 900 ml   Net -320 ml       Physical Exam:     Physical Exam  Constitutional:       Appearance: She is well-developed  She is obese  HENT:      Head: Normocephalic and atraumatic  Pulmonary:      Effort: Pulmonary effort is normal  No respiratory distress  Breath sounds: Normal breath sounds  Musculoskeletal:      Cervical back: Normal range of motion  Skin:     General: Skin is warm and dry  Labs:    Results from last 7 days   Lab Units 21  0526 21  2124 21  0414 21  0623 21  0308   WBC Thousand/uL 16 83*   < > 14 45*   < > 13 53*   HEMOGLOBIN g/dL 8 1*  --  7 7*  --  7 9*   HEMATOCRIT % 25 2*  --  23 4*  --  23 4*   PLATELETS Thousands/uL 249   < > 203   < > 175   BANDS PCT %  --   --  4  --   --    NEUTROS PCT %  --   --   --   --  62   LYMPHS PCT %  --   --   --   --  21   LYMPHO PCT %  --   --  6*  --   --    MONOS PCT %  --   --   --   --  8   MONO PCT %  --   --  5  --   --    EOS PCT %  --   --  5  --  6    < > = values in this interval not displayed       Results from last 7 days   Lab Units 21  0501 07/21/21  0522   SODIUM mmol/L 136 135*   POTASSIUM mmol/L 3 7 3 5   CHLORIDE mmol/L 108 108   CO2 mmol/L 22 20*   BUN mg/dL 9 15   CREATININE mg/dL 0 56* 0 61   ANION GAP mmol/L 6 7   CALCIUM mg/dL 8 4 7 7*   ALBUMIN g/dL  --  2 0*   TOTAL BILIRUBIN mg/dL  --  1 29*   ALK PHOS U/L  --  51   ALT U/L  --  29   AST U/L  --  31   GLUCOSE RANDOM mg/dL 115 121     Results from last 7 days   Lab Units 07/19/21  0549   INR  1 48*     Results from last 7 days   Lab Units 07/23/21  1108 07/23/21  0626 07/22/21  2049 07/22/21  1621 07/22/21  1048 07/22/21  0608 07/21/21  2045 07/21/21  1606 07/21/21  1102 07/21/21  0639 07/20/21  2112 07/20/21  1612   POC GLUCOSE mg/dl 209* 120 125 156* 188* 104 170* 113 231* 133 203* 119         Results from last 7 days   Lab Units 07/17/21  0443   PROCALCITONIN ng/ml 0 49*            Recent Cultures (last 7 days):     Results from last 7 days   Lab Units 07/17/21  1437   C DIFF TOXIN B BY PCR  Negative       Last 24 Hours Medication List:   Current Facility-Administered Medications   Medication Dose Route Frequency Provider Last Rate    acetaminophen  650 mg Oral Q8H PRN Colonel Ang, DO      brimonidine  1 drop Both Eyes TID Colonel Ang, DO      butalbital-acetaminophen-caffeine  1 tablet Oral Q6H PRN Colonel Ang, DO      citalopram  10 mg Oral HS Colonel Ang, DO      insulin lispro  2-12 Units Subcutaneous 4x Daily (AC & HS) Colonel Ang, DO      metoprolol tartrate  25 mg Oral Q12H [address] Jalil Booth [place]      morphine injection  2 mg Intravenous Q4H PRN Colonel Ang, DO      ondansetron  4 mg Intravenous Q6H PRN Colonel Ang, DO      pantoprazole  40 mg Oral BID AC Misty Frazier MD      rifaximin  550 mg Oral Q12H [address] Colonel Ang, DO          Today, Patient Was Seen By: Jewell Dockery MD    ** Please Note: Dictation voice to text software may have been used in the creation of this document   **

## 2021-07-23 NOTE — ASSESSMENT & PLAN NOTE
· 2/2 GIB due to bleeding duodenal ulcer   · S/p EGD with clipping and epinephrine to oozing duodenal ulcer   · S/p 2 units pRBCs thus far  · Patient did not require any further transfusions as hemoglobin maintained above 8

## 2021-07-23 NOTE — DISCHARGE SUMMARY
[facility]  Discharge- [facility] 1955, 74 y o  female MRN: [id number]  Unit/Bed#: [facility] 531-01 Encounter: [id number]  Primary Care Provider: Ravindra Laboy DO   Date and time admitted to hospital: 7/18/2021  1:46 PM    * Acute upper GI bleed  Assessment & Plan  · Upper GI bleed 2/2 oozing duodenal ulcer   · Appreciate GI input  · S/p EGD on 7/19: Two small nonbleeding esophageal varices with no recent stigmata of bleeding  Normal stomach  Multiple duodenal ulcers including one large ulcer in the 2nd part of the duodenum with oozing (Sheldon Class 1B) status post epinephrine and one clip with good hemostasis  · Was treated with octreotide GTT and Protonix GTT  · Currently on Protonix 40 mg b i d  · Stop Celecoxib   · Negative H pylori and normal gastrin level  · Advance to non ulcerogenic diet   · Patient will need repeat EGD in 8 weeks as outpatient , GI does not recommend any further inpatient treatment  · Please notify GI with any evidence of significant bleeding   · S/p 2 units of pRBCs this far   · Hemoglobin remains stable  Patient can be discharged with follow-up with outpatient GI  Lab test positive for detection of COVID-19 virus  Assessment & Plan  Patient is vaccinated with moderna on January and February with two doses  Screening test is positive   As per protocol, to more test was obtained and both of them were negative therefore the 1st positive was false positive        Tachycardia  Assessment & Plan  Found to have narrow complex tachycardia on telemetry monitor   TTE: LVEF 60-65%, mild MR, mild TR  Cardiac catheterization May 2020: Mild luminal irregularities  · Appreciate cardiology and EP input   · Started Metoprolol 25 mg BID with improvement   · On discharge EP recommends changing to Toprol XL 25 mg daily     Bacteriuria  Assessment & Plan  Urine culture grew Enterococcus and ESBL E Coli   · Patient asymptomatic, likely colonization · Observe off antibiotics     Acute blood loss anemia  Assessment & Plan  · 2/2 GIB due to bleeding duodenal ulcer   · S/p EGD with clipping and epinephrine to oozing duodenal ulcer   · S/p 2 units pRBCs thus far  · Patient did not require any further transfusions as hemoglobin maintained above 8    Morbid obesity ([facility] )  Assessment & Plan  · RD consult    Type 2 diabetes mellitus without complication, without long-term current use of insulin [facility])  Assessment & Plan  Lab Results   Component Value Date    HGBA1C 7 7 (H) 01/25/2021       Recent Labs     07/23/21  1108 07/23/21  1659 07/23/21  2046 07/24/21  0639   POCGLU 209* 181* 157* 121       Blood Sugar Average: Last 72 hrs:  (P) 421 [id number]   · Resume metformin at discharge    Cirrhosis of liver with ascites (HCC)  Assessment & Plan  · Unclear etiology   · Hst of portal HTN and varices   · US with small perihepatic ascites  · Appreciate GI input   · Currently on rifaximin  · Noted patient is following up with hepatologist outpatient at [place] [address] / Practitioner: Zoraida Bright MD  PCP: Yohana Barron DO  Admission Date:   Admission Orders (From admission, onward)     Ordered        07/18/21 1400  Inpatient Admission  Once                   Discharge Date: 07/24/21    Medical Problems     Resolved Problems  Date Reviewed: 7/23/2021        Resolved    SIRS (systemic inflammatory response syndrome) ([facility] ) 7/23/2021     Resolved by  Zoraida Bright MD                Consultations During Hospital Stay:  · Gastroenterology  · Electrophysiology    Procedures Performed:   EGD 07/19/2021: IMPRESSION:  Two small nonbleeding esophageal varices with no recent stigmata of bleeding  Normal stomach  Multiple duodenal ulcers including one large ulcer in the 2nd part of the duodenum with oozing (Sheldon Class 1B) status post epinephrine and one clip with good hemostasis    Nodular mucosa in the duodenum of unclear significance      RECOMMENDATION:  Continue PPI drip  Start clear liquid diet  Patient will need repeat EGD later this admission to reassess ulcers and biopsy  We will see patient tomorrow morning  Please call on-call GI fellow with any evidence of significant GI bleeding     ·     Significant Findings / Test Results:   MRCP: IMPRESSION:     Nondiagnostic MRCP imaging      Grossly, there is no intra or extrahepatic ductal dilatation      Distended gallbladder with stones and sludge along with wall thickening and pericholecystic fluid in keeping with acute cholecystitis      Cirrhosis with evidence of portal hypertension and splenomegaly      Abdominopelvic ascites        NM hepatobiliary: IMPRESSION:     1  No scintigraphic findings for acute cholecystitis  Gallbladder filling indicating a patent cystic duct  Incidental Findings:   · None     Test Results Pending at Discharge (will require follow up): · None     Outpatient Tests Requested:  · Need EGD in 8 weeks    Complications:  Encephalopathy, melanotic stool, and SVT and SVT    Reason for Admission:  [address] [place] [address] Course:     Luda Quevedo is a 74 y o  female patient with history of Duane Eduardo cirrhosis who originally presented to the hospital on 7/18/2021 transferred from [address] [place] [address]  Initially she was admitted in [address] [place] [address] for possible cholecystitis and was started on IV Zosyn with general surgery consult  Patient underwent MRCP, however no lap choly was performed as there was no acute cholecystitis findings in hepatobiliary scan  During the hospital stay, patient was encephalopathic which was thought to be from decompensated cirrhosis  The hospital stay was further complicated with melena, and significant decrease in hemoglobin  Patient was transferred to [facility] for further GI evaluation  On presentation, patient was started on octreotide and Protonix infusion  Patient underwent EGD that revealed multiple duodenal ulcers status post clip placement    H pylori and gastrin level was normal   During the hospital stay, patient required 2 units of PRBC transfusion  After EGD, hemoglobin remained stable and did not require any further transfusions  Patient is switched over to p o  Protonix now  During the hospital stay, patient also had SVT and NSVT noted in telemetry  EP was consulted and recommended to start low-dose beta-blocker  With beta-blocker, heart rate remained well controlled throughout hospital stay  EP recommends discharging patient on Toprol XL 25 mg daily  PT and OT recommends rehab after discharge  Patient will be discharged to [facility]  Please see above list of diagnoses and related plan for additional information  Condition at Discharge: good     Discharge Day Visit / Exam:     Subjective: This morning patient feels well  Alert and oriented  Vitals: Blood Pressure: 102/58 (07/23/21 1120)  Pulse: 73 (07/23/21 1120)  Temperature: 99 1 °F (37 3 °C) (07/23/21 0833)  Temp Source: Oral (07/23/21 8426)  Respirations: 19 (07/23/21 1120)  SpO2: 96 % (07/23/21 1120)  Exam:   Physical Exam  Constitutional:       Appearance: She is well-developed  She is obese  HENT:      Head: Normocephalic and atraumatic  Pulmonary:      Effort: Pulmonary effort is normal  No respiratory distress  Breath sounds: Normal breath sounds  Musculoskeletal:      Cervical back: Normal range of motion  Skin:     General: Skin is warm and dry  Discussion with Family:  Updated daughter    Discharge instructions/Information to patient and family:   See after visit summary for information provided to patient and family  Provisions for Follow-Up Care:  See after visit summary for information related to follow-up care and any pertinent home health orders  Disposition:     [facility] Readmission: no     Discharge Statement:  I spent 45 minutes discharging the patient   This time was spent on the day of discharge  I had direct contact with the patient on the day of discharge  Greater than 50% of the total time was spent examining patient, answering all patient questions, arranging and discussing plan of care with patient as well as directly providing post-discharge instructions  Additional time then spent on discharge activities  Discharge Medications:  See after visit summary for reconciled discharge medications provided to patient and family        ** Please Note: This note has been constructed using a voice recognition system **

## 2021-07-23 NOTE — CASE MANAGEMENT
Cm spoke with pt to obtain additional SNF choices  She requested referral to [facility], [facility] and [place]  Referral was already  Made to [facility] and they are willing to accept pt  Verified COVID vaccines received 01/19/21 and 02/16/21  CM made pt aware of bed and she is willing to accept  Unable to get through to spouse and no option to leave message  Cm spoke with spouse Jaylen Trejo family will transport  [facility] can accept latest admission 7-8pm  COVID test pending

## 2021-07-23 NOTE — CASE MANAGEMENT
Pt's COVID test came back positive  Facility was made aware  Spoke with Taylor Grant in admissions and advised PCR was done, two additional tests were ordered and if they are both negative then per MD pt would not be considered COVID positive  Facility is willing to accept pt pending two negative COVID PCR test  If admission will be later than 8pm then pt would need to discharge tomorrow  Cm or bedside nurse will contact genaro Palafox [phone] once tests have resulted  If results are back later than 530 pm discharge will be Saturday

## 2021-07-23 NOTE — PROGRESS NOTES
Rafia Age visited Pt      07/23/21 1400   Clinical Encounter Type   Visited With Patient   Routine Visit Follow-up   Denominational Encounters   Denominational Needs Prayer   Sacramental Encounters   Communion Given Indicator Yes

## 2021-07-23 NOTE — ASSESSMENT & PLAN NOTE
Patient is vaccinated with moderna on January and February with two doses  Screening test is positive   As per protocol, two more test will be obtained, if both negative, will be deemed false positive  Patient asymptomatic    Placed on isolation

## 2021-07-23 NOTE — ASSESSMENT & PLAN NOTE
Lab Results   Component Value Date    HGBA1C 7 7 (H) 01/25/2021       Recent Labs     07/22/21  1621 07/22/21  2049 07/23/21  0626 07/23/21  1108   POCGLU 156* 125 120 209*       Blood Sugar Average: Last 72 hrs:  (P) 157 [id number]   · Monitor ACCU checks AC + HS    · Lispro sliding scale

## 2021-07-23 NOTE — ASSESSMENT & PLAN NOTE
· POA to [facility] with fever, tachycardia, lactic acid  · No clear source of infection, low suspicion for UTI or acute cholecystitis  · Appreciate ID input   · Completed IV Zosyn for 7 days through 7/20 9pm  · Blood cultures show no growth at 5 days   · Anaplasma and Lyme serologies, negative

## 2021-07-23 NOTE — PROGRESS NOTES
Progress Note - Infectious Disease   Carol Ann Mckinney 74 y o  female MRN: [id number]  Unit/Bed#: [facility] 531-01 Encounter: [id number]      Impression/Recommendations:  1  SIRS, POA at [facility], with fever, tachycardia lactic acidosis  Melissa Anglican is no obvious source of active infection   Initially, there was concern for cholecystitis but patient is without RUQ abdominal pain and HIDA is normal  Melissa Anglican is concern for UTI given urine culture with growth of ESBL producing E coli and Enterococcus the patient has no urinary symptoms   Blood cultures have no growth   Workup for tick related infection all normal   Patient has persistent fever, but it may be all secondary to active GI bleed    Fever appears to have resolved, with no further febrile episodes since here   Patient completed empiric 7 day antibiotic course   She remains well off antibiotic  Recurrent low-grade fever most likely secondary to atelectasis  Observe off further antibiotic  Monitor temperature/WBC  Monitor hemodynamics  Encourage ICS use      2  Bacteriuria, with urine culture growing Enterococcus and ESBL producing E coli   Presence of 2 bacteria urine culture, especially in a patient without urinary symptoms, is most likely bladder colonization   Regardless, patient completed empiric antibiotic course  No further antibiotic      3  GI bleed, possibly secondary to esophageal varices   Patient is status post EGD with findings of bleeding duodenal ulcer but no bleeding varices  GI follow-up      4  Diarrhea, most likely secondary to GI bleed   Stool C diff toxin negative   Diarrhea is improved  Monitor for recurrent diarrhea      5  Cirrhosis of unclear etiology   Patient has history of portal hypertension and varices   CT without significant ascites   Patient without clinical signs of peritonitis      Discussed with the patient in detail regarding the above plan    Discharge plan per primary service      Antibiotics:  Off antibiotic      Subjective:  Patient with improved abdominal pain, but still present  No urinary symptoms  Temperature low-grade   No chills  No diarrhea      Objective:  Vitals:  Temp:  [99 °F (37 2 °C)-100 8 °F (38 2 °C)] 99 1 °F (37 3 °C)  HR:  [62-82] 72  Resp:  [19-20] 19  BP: ()/(52-62) 111/52  SpO2:  [94 %-98 %] 94 %  Temp (24hrs), Av 6 °F (37 6 °C), Min:99 °F (37 2 °C), Max:100 8 °F (38 2 °C)  Current: Temperature: 99 1 °F (37 3 °C)    Physical Exam:     General: Awake, alert, cooperative, no distress  Neck:  Supple  No mass  No lymphadenopathy  Lungs: Decreased breath sounds, no rales, no wheezing, respirations unlabored  Heart:  Regular rate and rhythm, S1 and S2 normal, no murmur  Abdomen: Soft, mild distention, mild diffuse tender, bowel sounds active all four quadrants, no masses, no organomegaly  Extremities: Stable leg edema  No erythema/warmth  No ulcer  Nontender to palpation  Skin:  No rash  Neuro: Moves all extremities  Invasive Devices     Peripheral Intravenous Line            Peripheral IV 21 Right Antecubital 1 day                Labs studies:   I have personally reviewed pertinent labs    Results from last 7 days   Lab Units 21  0717 21  0522 21  0414 21  0548   POTASSIUM mmol/L 3 7 3 8 3 5 4 0 4 0   CHLORIDE mmol/L 108 109* 108 112* 109*   CO2 mmol/L 22  20* 21 21   BUN mg/dL 9 11 15 19 21   CREATININE mg/dL 0 56* 0 56* 0 61 0 61 0 66   EGFR ml/min/1 73sq m 96 96 93 93 91   CALCIUM mg/dL 8 4 7 8* 7 7* 7 2* 7 5*   AST U/L  --   --  31 29 32   ALT U/L  --   --  29 25 26   ALK PHOS U/L  --   --  51 40* 41*     Results from last 7 days   Lab Units 21  0526 21  0717 21  0522   WBC Thousand/uL 16 83* 17 52* 16 85*   HEMOGLOBIN g/dL 8 1* 8 2* 8 2*   PLATELETS Thousands/uL 249 249 244     Results from last 7 days   Lab Units 21  1437   C DIFF TOXIN B BY PCR  Negative       Imaging Studies:   I have personally reviewed pertinent imaging study reports and images in PACS  EKG, Pathology, and Other Studies:   I have personally reviewed pertinent reports

## 2021-07-24 VITALS
DIASTOLIC BLOOD PRESSURE: 65 MMHG | RESPIRATION RATE: 18 BRPM | OXYGEN SATURATION: 96 % | SYSTOLIC BLOOD PRESSURE: 119 MMHG | TEMPERATURE: 99.3 F | HEART RATE: 67 BPM

## 2021-07-24 LAB — GLUCOSE SERPL-MCNC: 121 MG/DL (ref 65–140)

## 2021-07-24 PROCEDURE — 99232 SBSQ HOSP IP/OBS MODERATE 35: CPT | Performed by: INTERNAL MEDICINE

## 2021-07-24 PROCEDURE — 82948 REAGENT STRIP/BLOOD GLUCOSE: CPT

## 2021-07-24 RX ORDER — ALPRAZOLAM 0.5 MG/1
0.5 TABLET ORAL
Status: COMPLETED | OUTPATIENT
Start: 2021-07-24 | End: 2021-07-24

## 2021-07-24 RX ORDER — METOPROLOL SUCCINATE 25 MG/1
25 TABLET, EXTENDED RELEASE ORAL DAILY
Refills: 0
Start: 2021-07-24 | End: 2021-08-16 | Stop reason: HOSPADM

## 2021-07-24 RX ADMIN — RIFAXIMIN 550 MG: 550 TABLET ORAL at 08:29

## 2021-07-24 RX ADMIN — METOPROLOL TARTRATE 25 MG: 25 TABLET, FILM COATED ORAL at 08:29

## 2021-07-24 RX ADMIN — BRIMONIDINE TARTRATE 1 DROP: 1.5 SOLUTION OPHTHALMIC at 08:29

## 2021-07-24 RX ADMIN — ALPRAZOLAM 0.5 MG: 0.5 TABLET ORAL at 00:59

## 2021-07-24 RX ADMIN — PANTOPRAZOLE SODIUM 40 MG: 40 TABLET, DELAYED RELEASE ORAL at 06:31

## 2021-07-24 RX ADMIN — ACETAMINOPHEN 650 MG: 325 TABLET, FILM COATED ORAL at 00:42

## 2021-07-24 NOTE — ASSESSMENT & PLAN NOTE
Patient is vaccinated with moderna on January and February with two doses  Screening test is positive   As per protocol, to more test was obtained and both of them were negative therefore the 1st positive was false positive

## 2021-07-24 NOTE — ASSESSMENT & PLAN NOTE
· Unclear etiology   · Hst of portal HTN and varices   · US with small perihepatic ascites    · Appreciate GI input   · Currently on rifaximin  · Noted patient is following up with hepatologist outpatient at [facility]

## 2021-07-24 NOTE — NURSING NOTE
Pt  Bathed, dressed, VSS  Telephone report given to facility  Transported, via wheelchair, by family to [facility]

## 2021-07-24 NOTE — PROGRESS NOTES
Progress Note - Infectious Disease   Diana Noriega 74 y o  female MRN: [id number]  Unit/Bed#: [facility] 531-01 Encounter: [id number]      Impression/Recommendations:  1  SIRS, POA at [facility], with fever, tachycardia lactic acidosis  Jess Rubinstein is no obvious source of active infection   Initially, there was concern for cholecystitis but patient is without RUQ abdominal pain and HIDA is normal  Jess Rubinstein is concern for UTI given urine culture with growth of ESBL producing E coli and Enterococcus the patient has no urinary symptoms   Blood cultures have no growth   Workup for tick related infection all normal   Patient has persistent fever, but it may be all secondary to active GI bleed    Fever appears to have resolved, with no further febrile episodes since here   Patient completed empiric 7 day antibiotic course   She remains well off antibiotic  Recurrent low-grade since EGD most likely secondary to atelectasis  Fever once again resolved with ICS use  WBC mildly elevated but stable, likely secondary to cirrhosis  Observe off further antibiotic  Monitor temperature/WBC  Monitor hemodynamics  Continue ICS use      2  Bacteriuria, with urine culture growing Enterococcus and ESBL producing E coli   Presence of 2 bacteria urine culture, especially in a patient without urinary symptoms, is most likely bladder colonization   Regardless, patient completed empiric antibiotic course  No further antibiotic      3  GI bleed, possibly secondary to esophageal varices   Patient is status post EGD with findings of bleeding duodenal ulcer but no bleeding varices  GI follow-up      4  Diarrhea, most likely secondary to GI bleed   Stool C diff toxin negative   Diarrhea is improved    Monitor for recurrent diarrhea      5  Cirrhosis of unclear etiology   Patient has history of portal hypertension and varices   CT without significant ascites   Patient without clinical signs of peritonitis      Discussed with the patient in detail regarding the above plan  Discharge plan per primary service      Antibiotics:  Off antibiotic      Subjective:  Patient with improved abdominal pain  No urinary symptoms  Temperature is down   No chills  No diarrhea      Objective:  Vitals:  Temp:  [99 3 °F (37 4 °C)-99 8 °F (37 7 °C)] 99 3 °F (37 4 °C)  HR:  [67-82] 67  Resp:  [18-21] 18  BP: ()/(45-88) 119/65  SpO2:  [93 %-98 %] 96 %  Temp (24hrs), Av 6 °F (37 6 °C), Min:99 3 °F (37 4 °C), Max:99 8 °F (37 7 °C)  Current: Temperature: 99 3 °F (37 4 °C)    Physical Exam:     General: Awake, alert, cooperative, no distress  Neck:  Supple  No mass  No lymphadenopathy  Lungs: Expansion symmetric, no rales, no wheezing, respirations unlabored  Heart:  Regular rate and rhythm, S1 and S2 normal, no murmur  Abdomen: Soft, stable mild distention, improved diffuse tenderness, bowel sounds active all four quadrants, no masses, no organomegaly  Extremities: Stable mild leg edema  No erythema/warmth  No ulcer  Nontender to palpation  Skin:  No rash  Neuro: Moves all extremities  Invasive Devices     Peripheral Intravenous Line            Peripheral IV 21 Right Antecubital 2 days                Labs studies:   I have personally reviewed pertinent labs    Results from last 7 days   Lab Units 21  052117 21  0522 21  0414 21  0548   POTASSIUM mmol/L 3 7 3 8 3 5 4 0 4 0   CHLORIDE mmol/L 108 109* 108 112* 109*   CO2 mmol/L * 21 21   BUN mg/dL 9 11 15 19 21   CREATININE mg/dL 0 56* 0 56* 0 61 0 61 0 66   EGFR ml/min/1 73sq m 96 96 93 93 91   CALCIUM mg/dL 8 4 7 8* 7 7* 7 2* 7 5*   AST U/L  --   --  31 29 32   ALT U/L  --   --  29 25 26   ALK PHOS U/L  --   --  51 40* 41*     Results from last 7 days   Lab Units 21  0526 21  0717 21  0522   WBC Thousand/uL 16 83* 17 52* 16 85*   HEMOGLOBIN g/dL 8 1* 8 2* 8 2*   PLATELETS Thousands/uL 249 249 244     Results from last 7 days   Lab Units 07/17/21  1437   C DIFF TOXIN B BY PCR  Negative       Imaging Studies:   I have personally reviewed pertinent imaging study reports and images in PACS  EKG, Pathology, and Other Studies:   I have personally reviewed pertinent reports

## 2021-07-24 NOTE — ASSESSMENT & PLAN NOTE
Lab Results   Component Value Date    HGBA1C 7 7 (H) 01/25/2021       Recent Labs     07/23/21  1108 07/23/21  1659 07/23/21  2046 07/24/21  0639   POCGLU 209* 181* 157* 121       Blood Sugar Average: Last 72 hrs:  (P) 127 [id number]   · Resume metformin at discharge

## 2021-07-24 NOTE — ASSESSMENT & PLAN NOTE
· Upper GI bleed 2/2 oozing duodenal ulcer   · Appreciate GI input  · S/p EGD on 7/19: Two small nonbleeding esophageal varices with no recent stigmata of bleeding  Normal stomach  Multiple duodenal ulcers including one large ulcer in the 2nd part of the duodenum with oozing (Sheldon Class 1B) status post epinephrine and one clip with good hemostasis  · Was treated with octreotide GTT and Protonix GTT  · Currently on Protonix 40 mg b i d  · Stop Celecoxib   · Negative H pylori and normal gastrin level  · Advance to non ulcerogenic diet   · Patient will need repeat EGD in 8 weeks as outpatient , GI does not recommend any further inpatient treatment  · Please notify GI with any evidence of significant bleeding   · S/p 2 units of pRBCs this far   · Hemoglobin remains stable  Patient can be discharged with follow-up with outpatient GI

## 2021-07-24 NOTE — PROGRESS NOTES
VSS, denies pain  Family coming in to take pt  to [facility]  Telephone report given- discharge paperwork faxed  IV catheter dc'd  Stable, awaiting transport to SNF

## 2021-08-07 ENCOUNTER — APPOINTMENT (EMERGENCY)
Dept: RADIOLOGY | Facility: HOSPITAL | Age: 66
DRG: 871 | End: 2021-08-07
Payer: MEDICARE

## 2021-08-07 ENCOUNTER — HOSPITAL ENCOUNTER (INPATIENT)
Facility: HOSPITAL | Age: 66
LOS: 9 days | DRG: 871 | End: 2021-08-16
Attending: EMERGENCY MEDICINE | Admitting: INTERNAL MEDICINE
Payer: MEDICARE

## 2021-08-07 DIAGNOSIS — G93.41 ACUTE METABOLIC ENCEPHALOPATHY: ICD-10-CM

## 2021-08-07 DIAGNOSIS — E11.9 TYPE 2 DIABETES MELLITUS WITHOUT COMPLICATION, WITHOUT LONG-TERM CURRENT USE OF INSULIN (HCC): ICD-10-CM

## 2021-08-07 DIAGNOSIS — E87.2 LACTIC ACIDOSIS: ICD-10-CM

## 2021-08-07 DIAGNOSIS — R53.1 GENERALIZED WEAKNESS: ICD-10-CM

## 2021-08-07 DIAGNOSIS — R18.8 CIRRHOSIS OF LIVER WITH ASCITES, UNSPECIFIED HEPATIC CIRRHOSIS TYPE (HCC): ICD-10-CM

## 2021-08-07 DIAGNOSIS — D72.829 LEUKOCYTOSIS: ICD-10-CM

## 2021-08-07 DIAGNOSIS — K74.60 CIRRHOSIS OF LIVER WITH ASCITES (HCC): ICD-10-CM

## 2021-08-07 DIAGNOSIS — R41.82 ALTERED MENTAL STATUS: Primary | ICD-10-CM

## 2021-08-07 DIAGNOSIS — E80.6 HYPERBILIRUBINEMIA: ICD-10-CM

## 2021-08-07 DIAGNOSIS — R82.71 BACTERIURIA: ICD-10-CM

## 2021-08-07 DIAGNOSIS — K74.60 CIRRHOSIS OF LIVER WITH ASCITES, UNSPECIFIED HEPATIC CIRRHOSIS TYPE (HCC): ICD-10-CM

## 2021-08-07 DIAGNOSIS — R65.10 SIRS (SYSTEMIC INFLAMMATORY RESPONSE SYNDROME) (HCC): ICD-10-CM

## 2021-08-07 DIAGNOSIS — R18.8 CIRRHOSIS OF LIVER WITH ASCITES (HCC): ICD-10-CM

## 2021-08-07 PROBLEM — D64.9 CHRONIC ANEMIA: Status: ACTIVE | Noted: 2021-08-07

## 2021-08-07 PROBLEM — E87.20 LACTIC ACIDOSIS: Status: ACTIVE | Noted: 2021-08-07

## 2021-08-07 LAB
ALBUMIN SERPL BCP-MCNC: 2.5 G/DL (ref 3.5–5)
ALP SERPL-CCNC: 74 U/L (ref 46–116)
ALT SERPL W P-5'-P-CCNC: 31 U/L (ref 12–78)
AMMONIA PLAS-SCNC: 49 UMOL/L (ref 11–35)
ANION GAP SERPL CALCULATED.3IONS-SCNC: 10 MMOL/L (ref 4–13)
APTT PPP: 44 SECONDS (ref 23–37)
AST SERPL W P-5'-P-CCNC: 46 U/L (ref 5–45)
BACTERIA UR QL AUTO: ABNORMAL /HPF
BASOPHILS # BLD AUTO: 0.05 THOUSANDS/ΜL (ref 0–0.1)
BASOPHILS NFR BLD AUTO: 0 % (ref 0–1)
BILIRUB SERPL-MCNC: 2.1 MG/DL (ref 0.2–1)
BILIRUB UR QL STRIP: NEGATIVE
BUN SERPL-MCNC: 32 MG/DL (ref 5–25)
CALCIUM ALBUM COR SERPL-MCNC: 10.2 MG/DL (ref 8.3–10.1)
CALCIUM SERPL-MCNC: 9 MG/DL (ref 8.3–10.1)
CHLORIDE SERPL-SCNC: 101 MMOL/L (ref 100–108)
CLARITY UR: ABNORMAL
CO2 SERPL-SCNC: 29 MMOL/L (ref 21–32)
COLOR UR: YELLOW
CREAT SERPL-MCNC: 1.45 MG/DL (ref 0.6–1.3)
EOSINOPHIL # BLD AUTO: 0.12 THOUSAND/ΜL (ref 0–0.61)
EOSINOPHIL NFR BLD AUTO: 1 % (ref 0–6)
ERYTHROCYTE [DISTWIDTH] IN BLOOD BY AUTOMATED COUNT: 17.8 % (ref 11.6–15.1)
GFR SERPL CREATININE-BSD FRML MDRD: 37 ML/MIN/1.73SQ M
GLUCOSE SERPL-MCNC: 122 MG/DL (ref 65–140)
GLUCOSE UR STRIP-MCNC: NEGATIVE MG/DL
HCT VFR BLD AUTO: 28.6 % (ref 34.8–46.1)
HGB BLD-MCNC: 9.2 G/DL (ref 11.5–15.4)
HGB UR QL STRIP.AUTO: NEGATIVE
IMM GRANULOCYTES # BLD AUTO: 0.08 THOUSAND/UL (ref 0–0.2)
IMM GRANULOCYTES NFR BLD AUTO: 1 % (ref 0–2)
INR PPP: 1.51 (ref 0.84–1.19)
KETONES UR STRIP-MCNC: NEGATIVE MG/DL
LACTATE SERPL-SCNC: 3.4 MMOL/L (ref 0.5–2)
LEUKOCYTE ESTERASE UR QL STRIP: ABNORMAL
LYMPHOCYTES # BLD AUTO: 3.58 THOUSANDS/ΜL (ref 0.6–4.47)
LYMPHOCYTES NFR BLD AUTO: 24 % (ref 14–44)
MCH RBC QN AUTO: 30.5 PG (ref 26.8–34.3)
MCHC RBC AUTO-ENTMCNC: 32.2 G/DL (ref 31.4–37.4)
MCV RBC AUTO: 95 FL (ref 82–98)
MONOCYTES # BLD AUTO: 1.48 THOUSAND/ΜL (ref 0.17–1.22)
MONOCYTES NFR BLD AUTO: 10 % (ref 4–12)
NEUTROPHILS # BLD AUTO: 9.56 THOUSANDS/ΜL (ref 1.85–7.62)
NEUTS SEG NFR BLD AUTO: 64 % (ref 43–75)
NITRITE UR QL STRIP: NEGATIVE
NON-SQ EPI CELLS URNS QL MICRO: ABNORMAL /HPF
NRBC BLD AUTO-RTO: 0 /100 WBCS
PH UR STRIP.AUTO: 6 [PH]
PLATELET # BLD AUTO: 191 THOUSANDS/UL (ref 149–390)
PMV BLD AUTO: 10.6 FL (ref 8.9–12.7)
POTASSIUM SERPL-SCNC: 3.5 MMOL/L (ref 3.5–5.3)
PROT SERPL-MCNC: 7 G/DL (ref 6.4–8.2)
PROT UR STRIP-MCNC: NEGATIVE MG/DL
PROTHROMBIN TIME: 17.9 SECONDS (ref 11.6–14.5)
RBC # BLD AUTO: 3.02 MILLION/UL (ref 3.81–5.12)
RBC #/AREA URNS AUTO: ABNORMAL /HPF
SODIUM SERPL-SCNC: 140 MMOL/L (ref 136–145)
SP GR UR STRIP.AUTO: 1.01 (ref 1–1.03)
UROBILINOGEN UR QL STRIP.AUTO: 0.2 E.U./DL
WBC # BLD AUTO: 14.87 THOUSAND/UL (ref 4.31–10.16)
WBC #/AREA URNS AUTO: ABNORMAL /HPF

## 2021-08-07 PROCEDURE — 81001 URINALYSIS AUTO W/SCOPE: CPT | Performed by: EMERGENCY MEDICINE

## 2021-08-07 PROCEDURE — 99223 1ST HOSP IP/OBS HIGH 75: CPT | Performed by: NURSE PRACTITIONER

## 2021-08-07 PROCEDURE — 99285 EMERGENCY DEPT VISIT HI MDM: CPT

## 2021-08-07 PROCEDURE — 87086 URINE CULTURE/COLONY COUNT: CPT | Performed by: INTERNAL MEDICINE

## 2021-08-07 PROCEDURE — 71045 X-RAY EXAM CHEST 1 VIEW: CPT

## 2021-08-07 PROCEDURE — 83605 ASSAY OF LACTIC ACID: CPT | Performed by: EMERGENCY MEDICINE

## 2021-08-07 PROCEDURE — 85730 THROMBOPLASTIN TIME PARTIAL: CPT | Performed by: EMERGENCY MEDICINE

## 2021-08-07 PROCEDURE — 85610 PROTHROMBIN TIME: CPT | Performed by: EMERGENCY MEDICINE

## 2021-08-07 PROCEDURE — 84145 PROCALCITONIN (PCT): CPT | Performed by: EMERGENCY MEDICINE

## 2021-08-07 PROCEDURE — 87186 SC STD MICRODIL/AGAR DIL: CPT | Performed by: INTERNAL MEDICINE

## 2021-08-07 PROCEDURE — 93005 ELECTROCARDIOGRAM TRACING: CPT

## 2021-08-07 PROCEDURE — 80053 COMPREHEN METABOLIC PANEL: CPT | Performed by: EMERGENCY MEDICINE

## 2021-08-07 PROCEDURE — 87077 CULTURE AEROBIC IDENTIFY: CPT | Performed by: INTERNAL MEDICINE

## 2021-08-07 PROCEDURE — 99285 EMERGENCY DEPT VISIT HI MDM: CPT | Performed by: EMERGENCY MEDICINE

## 2021-08-07 PROCEDURE — 87040 BLOOD CULTURE FOR BACTERIA: CPT | Performed by: EMERGENCY MEDICINE

## 2021-08-07 PROCEDURE — 82140 ASSAY OF AMMONIA: CPT | Performed by: EMERGENCY MEDICINE

## 2021-08-07 PROCEDURE — 36415 COLL VENOUS BLD VENIPUNCTURE: CPT | Performed by: EMERGENCY MEDICINE

## 2021-08-07 PROCEDURE — 1123F ACP DISCUSS/DSCN MKR DOCD: CPT | Performed by: EMERGENCY MEDICINE

## 2021-08-07 PROCEDURE — 85025 COMPLETE CBC W/AUTO DIFF WBC: CPT | Performed by: EMERGENCY MEDICINE

## 2021-08-07 RX ORDER — TRAZODONE HYDROCHLORIDE 50 MG/1
25 TABLET ORAL
Status: DISCONTINUED | OUTPATIENT
Start: 2021-08-08 | End: 2021-08-15

## 2021-08-07 RX ORDER — FERROUS SULFATE 325(65) MG
325 TABLET ORAL
COMMUNITY

## 2021-08-07 RX ORDER — PANTOPRAZOLE SODIUM 40 MG/1
40 TABLET, DELAYED RELEASE ORAL
Status: DISCONTINUED | OUTPATIENT
Start: 2021-08-08 | End: 2021-08-16 | Stop reason: HOSPADM

## 2021-08-07 RX ORDER — FERROUS SULFATE 325(65) MG
325 TABLET ORAL
Status: DISCONTINUED | OUTPATIENT
Start: 2021-08-08 | End: 2021-08-16 | Stop reason: HOSPADM

## 2021-08-07 RX ORDER — HYDROCHLOROTHIAZIDE 25 MG/1
25 TABLET ORAL DAILY
COMMUNITY
End: 2021-08-16 | Stop reason: HOSPADM

## 2021-08-07 RX ORDER — CITALOPRAM 10 MG/1
10 TABLET ORAL
Status: DISCONTINUED | OUTPATIENT
Start: 2021-08-08 | End: 2021-08-15

## 2021-08-07 RX ORDER — CARVEDILOL 6.25 MG/1
6.25 TABLET ORAL 2 TIMES DAILY WITH MEALS
Status: DISCONTINUED | OUTPATIENT
Start: 2021-08-08 | End: 2021-08-16 | Stop reason: HOSPADM

## 2021-08-07 RX ORDER — TRAZODONE HYDROCHLORIDE 50 MG/1
25 TABLET ORAL
COMMUNITY
End: 2021-08-16 | Stop reason: HOSPADM

## 2021-08-07 RX ORDER — LACTULOSE 10 G/10G
10 SOLUTION ORAL 3 TIMES DAILY
COMMUNITY
End: 2021-08-16 | Stop reason: HOSPADM

## 2021-08-07 RX ORDER — ALPRAZOLAM 0.5 MG/1
0.5 TABLET ORAL
Status: DISCONTINUED | OUTPATIENT
Start: 2021-08-07 | End: 2021-08-16 | Stop reason: HOSPADM

## 2021-08-07 RX ORDER — CEFTRIAXONE 1 G/50ML
1000 INJECTION, SOLUTION INTRAVENOUS ONCE
Status: COMPLETED | OUTPATIENT
Start: 2021-08-07 | End: 2021-08-08

## 2021-08-07 RX ORDER — CARVEDILOL 3.12 MG/1
3.12 TABLET ORAL 2 TIMES DAILY WITH MEALS
COMMUNITY

## 2021-08-07 RX ORDER — POTASSIUM CITRATE 10 MEQ/1
10 TABLET, EXTENDED RELEASE ORAL 2 TIMES DAILY
COMMUNITY
End: 2021-08-16 | Stop reason: HOSPADM

## 2021-08-07 RX ORDER — FLUTICASONE PROPIONATE 50 MCG
1 SPRAY, SUSPENSION (ML) NASAL DAILY
Status: DISCONTINUED | OUTPATIENT
Start: 2021-08-08 | End: 2021-08-16 | Stop reason: HOSPADM

## 2021-08-07 RX ADMIN — CEFTRIAXONE 1000 MG: 1 INJECTION, SOLUTION INTRAVENOUS at 23:34

## 2021-08-08 PROBLEM — G93.41 ACUTE METABOLIC ENCEPHALOPATHY: Status: ACTIVE | Noted: 2021-08-08

## 2021-08-08 PROBLEM — A41.9 SEVERE SEPSIS (HCC): Status: ACTIVE | Noted: 2021-08-07

## 2021-08-08 PROBLEM — R65.10 SIRS (SYSTEMIC INFLAMMATORY RESPONSE SYNDROME) (HCC): Status: ACTIVE | Noted: 2021-08-07

## 2021-08-08 PROBLEM — R65.20 SEVERE SEPSIS (HCC): Status: ACTIVE | Noted: 2021-08-07

## 2021-08-08 PROBLEM — N17.9 AKI (ACUTE KIDNEY INJURY) (HCC): Status: ACTIVE | Noted: 2021-08-08

## 2021-08-08 LAB
ALBUMIN SERPL BCP-MCNC: 2.2 G/DL (ref 3.5–5)
ALP SERPL-CCNC: 70 U/L (ref 46–116)
ALT SERPL W P-5'-P-CCNC: 25 U/L (ref 12–78)
ANION GAP SERPL CALCULATED.3IONS-SCNC: 9 MMOL/L (ref 4–13)
AST SERPL W P-5'-P-CCNC: 38 U/L (ref 5–45)
BASOPHILS # BLD AUTO: 0.04 THOUSANDS/ΜL (ref 0–0.1)
BASOPHILS NFR BLD AUTO: 0 % (ref 0–1)
BILIRUB SERPL-MCNC: 1.76 MG/DL (ref 0.2–1)
BUN SERPL-MCNC: 32 MG/DL (ref 5–25)
CALCIUM ALBUM COR SERPL-MCNC: 9.9 MG/DL (ref 8.3–10.1)
CALCIUM SERPL-MCNC: 8.5 MG/DL (ref 8.3–10.1)
CHLORIDE SERPL-SCNC: 105 MMOL/L (ref 100–108)
CO2 SERPL-SCNC: 29 MMOL/L (ref 21–32)
CREAT SERPL-MCNC: 1.27 MG/DL (ref 0.6–1.3)
EOSINOPHIL # BLD AUTO: 0.21 THOUSAND/ΜL (ref 0–0.61)
EOSINOPHIL NFR BLD AUTO: 2 % (ref 0–6)
ERYTHROCYTE [DISTWIDTH] IN BLOOD BY AUTOMATED COUNT: 18 % (ref 11.6–15.1)
GFR SERPL CREATININE-BSD FRML MDRD: 43 ML/MIN/1.73SQ M
GLUCOSE SERPL-MCNC: 116 MG/DL (ref 65–140)
GLUCOSE SERPL-MCNC: 123 MG/DL (ref 65–140)
GLUCOSE SERPL-MCNC: 137 MG/DL (ref 65–140)
GLUCOSE SERPL-MCNC: 144 MG/DL (ref 65–140)
GLUCOSE SERPL-MCNC: 146 MG/DL (ref 65–140)
GLUCOSE SERPL-MCNC: 193 MG/DL (ref 65–140)
HCT VFR BLD AUTO: 25.6 % (ref 34.8–46.1)
HGB BLD-MCNC: 8.1 G/DL (ref 11.5–15.4)
IMM GRANULOCYTES # BLD AUTO: 0.08 THOUSAND/UL (ref 0–0.2)
IMM GRANULOCYTES NFR BLD AUTO: 1 % (ref 0–2)
LACTATE SERPL-SCNC: 2.2 MMOL/L (ref 0.5–2)
LACTATE SERPL-SCNC: 2.7 MMOL/L (ref 0.5–2)
LACTATE SERPL-SCNC: 3.1 MMOL/L (ref 0.5–2)
LYMPHOCYTES # BLD AUTO: 3.57 THOUSANDS/ΜL (ref 0.6–4.47)
LYMPHOCYTES NFR BLD AUTO: 28 % (ref 14–44)
MCH RBC QN AUTO: 30 PG (ref 26.8–34.3)
MCHC RBC AUTO-ENTMCNC: 31.6 G/DL (ref 31.4–37.4)
MCV RBC AUTO: 95 FL (ref 82–98)
MONOCYTES # BLD AUTO: 1.45 THOUSAND/ΜL (ref 0.17–1.22)
MONOCYTES NFR BLD AUTO: 11 % (ref 4–12)
NEUTROPHILS # BLD AUTO: 7.42 THOUSANDS/ΜL (ref 1.85–7.62)
NEUTS SEG NFR BLD AUTO: 58 % (ref 43–75)
NRBC BLD AUTO-RTO: 0 /100 WBCS
PLATELET # BLD AUTO: 151 THOUSANDS/UL (ref 149–390)
PMV BLD AUTO: 10.4 FL (ref 8.9–12.7)
POTASSIUM SERPL-SCNC: 3.2 MMOL/L (ref 3.5–5.3)
PROCALCITONIN SERPL-MCNC: 0.08 NG/ML
PROCALCITONIN SERPL-MCNC: <0.05 NG/ML
PROT SERPL-MCNC: 6.2 G/DL (ref 6.4–8.2)
RBC # BLD AUTO: 2.7 MILLION/UL (ref 3.81–5.12)
SODIUM SERPL-SCNC: 143 MMOL/L (ref 136–145)
WBC # BLD AUTO: 12.77 THOUSAND/UL (ref 4.31–10.16)

## 2021-08-08 PROCEDURE — 84145 PROCALCITONIN (PCT): CPT | Performed by: NURSE PRACTITIONER

## 2021-08-08 PROCEDURE — 94660 CPAP INITIATION&MGMT: CPT

## 2021-08-08 PROCEDURE — 83605 ASSAY OF LACTIC ACID: CPT | Performed by: NURSE PRACTITIONER

## 2021-08-08 PROCEDURE — 80053 COMPREHEN METABOLIC PANEL: CPT | Performed by: NURSE PRACTITIONER

## 2021-08-08 PROCEDURE — 82948 REAGENT STRIP/BLOOD GLUCOSE: CPT

## 2021-08-08 PROCEDURE — 94760 N-INVAS EAR/PLS OXIMETRY 1: CPT

## 2021-08-08 PROCEDURE — 85025 COMPLETE CBC W/AUTO DIFF WBC: CPT | Performed by: NURSE PRACTITIONER

## 2021-08-08 PROCEDURE — 99232 SBSQ HOSP IP/OBS MODERATE 35: CPT | Performed by: INTERNAL MEDICINE

## 2021-08-08 RX ORDER — FUROSEMIDE 10 MG/ML
40 INJECTION INTRAMUSCULAR; INTRAVENOUS
Status: DISCONTINUED | OUTPATIENT
Start: 2021-08-08 | End: 2021-08-10

## 2021-08-08 RX ORDER — ASCORBIC ACID 1000 MG
TABLET ORAL 2 TIMES DAILY
Status: DISCONTINUED | OUTPATIENT
Start: 2021-08-08 | End: 2021-08-08

## 2021-08-08 RX ORDER — LACTULOSE 20 G/30ML
20 SOLUTION ORAL 3 TIMES DAILY
Status: DISCONTINUED | OUTPATIENT
Start: 2021-08-08 | End: 2021-08-10

## 2021-08-08 RX ORDER — LACTULOSE 20 G/30ML
10 SOLUTION ORAL 3 TIMES DAILY
Status: DISCONTINUED | OUTPATIENT
Start: 2021-08-08 | End: 2021-08-08

## 2021-08-08 RX ORDER — ALBUMIN (HUMAN) 12.5 G/50ML
25 SOLUTION INTRAVENOUS 2 TIMES DAILY
Status: DISCONTINUED | OUTPATIENT
Start: 2021-08-08 | End: 2021-08-10

## 2021-08-08 RX ADMIN — CITALOPRAM HYDROBROMIDE 10 MG: 10 TABLET ORAL at 22:34

## 2021-08-08 RX ADMIN — FUROSEMIDE 40 MG: 10 INJECTION, SOLUTION INTRAMUSCULAR; INTRAVENOUS at 11:55

## 2021-08-08 RX ADMIN — CITALOPRAM HYDROBROMIDE 10 MG: 10 TABLET ORAL at 00:51

## 2021-08-08 RX ADMIN — TRAZODONE HYDROCHLORIDE 25 MG: 50 TABLET ORAL at 00:51

## 2021-08-08 RX ADMIN — FERROUS SULFATE TAB 325 MG (65 MG ELEMENTAL FE) 325 MG: 325 (65 FE) TAB at 09:55

## 2021-08-08 RX ADMIN — PANTOPRAZOLE SODIUM 40 MG: 40 TABLET, DELAYED RELEASE ORAL at 06:59

## 2021-08-08 RX ADMIN — CARVEDILOL 6.25 MG: 6.25 TABLET, FILM COATED ORAL at 09:55

## 2021-08-08 RX ADMIN — ALPRAZOLAM 0.5 MG: 0.5 TABLET ORAL at 22:34

## 2021-08-08 RX ADMIN — ALBUMIN (HUMAN) 25 G: 0.25 INJECTION, SOLUTION INTRAVENOUS at 12:11

## 2021-08-08 RX ADMIN — SODIUM CHLORIDE 3.38 G: 9 INJECTION, SOLUTION INTRAVENOUS at 04:31

## 2021-08-08 RX ADMIN — PANTOPRAZOLE SODIUM 40 MG: 40 TABLET, DELAYED RELEASE ORAL at 15:57

## 2021-08-08 RX ADMIN — INSULIN LISPRO 2 UNITS: 100 INJECTION, SOLUTION INTRAVENOUS; SUBCUTANEOUS at 22:34

## 2021-08-08 RX ADMIN — FLUTICASONE PROPIONATE 1 SPRAY: 50 SPRAY, METERED NASAL at 09:30

## 2021-08-08 RX ADMIN — LACTULOSE 20 G: 10 SOLUTION ORAL at 15:57

## 2021-08-08 RX ADMIN — ALBUMIN (HUMAN) 25 G: 0.25 INJECTION, SOLUTION INTRAVENOUS at 22:05

## 2021-08-08 RX ADMIN — FUROSEMIDE 40 MG: 10 INJECTION, SOLUTION INTRAMUSCULAR; INTRAVENOUS at 15:57

## 2021-08-08 RX ADMIN — LACTULOSE 10 G: 10 SOLUTION ORAL at 09:55

## 2021-08-08 RX ADMIN — BIMATOPROST 1 DROP: 0.1 SOLUTION/ DROPS OPHTHALMIC at 22:35

## 2021-08-08 RX ADMIN — TRAZODONE HYDROCHLORIDE 25 MG: 50 TABLET ORAL at 22:34

## 2021-08-08 RX ADMIN — CARVEDILOL 6.25 MG: 6.25 TABLET, FILM COATED ORAL at 15:58

## 2021-08-08 NOTE — ASSESSMENT & PLAN NOTE
· Unclear etiology   · History of varices and portal hypertension  · Ammonia is 47  · Per Gastroenterology Dr Kp Gastelum office visit 08/04/2021:  Likely now decompensated cirrhosis with history of subclinical hepatic encephalopathy prior to admission and ascites with associated lower extremity edema  Diuretics were trialed however patient developed MUNA  Needs repeat ultrasound for ascites assessment with or without paracentesis, and possibly a repeat trial of low-dose diuretic and would prefer spironolactone  · Per family, Chad Mcgill senior and kirt at bedside, patient had ultrasound today, however results are not yet available in Epic  · Ultrasound 07/14/2021 with small perihepatic ascites  · Consult GI  · Currently on rifaximin  · Continue outpatient follow-up with hepatology at [facility]

## 2021-08-08 NOTE — ASSESSMENT & PLAN NOTE
· Presents with nausea and confusion  · Lactic acid 3 4-suspect urinary tract infection-patient with history of same  · Lactic acidosis could be due in part to metformin  · Afebrile, WBC 14, BP is 145/75  · Patient with ascites-will hold fluid bolus  · Blood cultures are pending  · Ceftriaxone started in the ER-will switch to Zosyn due to history of MDRO

## 2021-08-08 NOTE — ASSESSMENT & PLAN NOTE
Lab Results   Component Value Date    HGBA1C 7 7 (H) 01/25/2021       Recent Labs     08/08/21  0050   POCGLU 123       Blood Sugar Average: Last 72 hrs:  (P) 123     · Glucose 122  · Diabetic diet  · Fingerstick blood sugar with sliding scale coverage  · Hold metformin

## 2021-08-08 NOTE — ED PROVIDER NOTES
History  Chief Complaint   Patient presents with    Altered Mental Status     intermittent confusion over the last 24 hours  hx of frequent UTI's   Weakness - Generalized     generalized weakness over the last 24 hours  Patient is a 68-year-old female sent to the emergency department via EMS from [facility] secondary to intermittent confusion over the past 24 hours, nausea with dry heaving and generalized weakness, patient recently admitted at this facility with subsequent transfer to tertiary care center for GI bleed, has complicated medical history, at this point in time she denies any nausea, no abdominal pain, no dysuria or hematuria, she does have loose bowel movements secondary to lactulose which is being administered for elevated ammonia levels due to non alcoholic liver cirrhosis, she does report a 34 lb weight gain while in the hospital secondary to IV fluids, she reports decreased urination as well, denies any chest pain or shortness of breath, no fever or chills          Prior to Admission Medications   Prescriptions Last Dose Informant Patient Reported? Taking?    ALPRAZolam (Xanax) 0 25 mg tablet 2021 at Unknown time  Yes Yes   Sig: Take by mouth    Acetaminophen (TYLENOL ARTHRITIS PAIN PO) 2021 at Unknown time  Yes Yes   Sig: Tylenol Arthritis Pain 650 mg tablet,extended release   Calcium Carb-Cholecalciferol 600-500 MG-UNIT CAPS 2021 at Unknown time  Yes Yes   Coenzyme Q10 (COQ-10) 10 MG CAPS 2021 at Unknown time  Yes Yes   Si (two) times a day    FLUTICASONE PROPIONATE, NASAL, NA 2021 at Unknown time  Yes Yes   Sig: into each nostril   LUMIGAN 0 01 % ophthalmic drops 2021 at Unknown time  Yes Yes   Sig: INSTILL 1 DROP INTO BOTH EYES AT NIGHT   Polyethylene Glycol 3350 (MIRALAX PO) Past Week at Unknown time  Yes Yes   Sig: Miralax   daily   allopurinol (ZYLOPRIM) 100 mg tablet 2021 at Unknown time  Yes Yes   atorvastatin (LIPITOR) 10 mg tablet 2021 at Unknown time  Yes Yes   brimonidine (ALPHAGAN P) 0 15 % ophthalmic solution 2021 at Unknown time Self Yes Yes   Si drop 3 (three) times a day   carvedilol (COREG) 6 25 mg tablet 2021 at Unknown time  Yes Yes   Sig: Take 6 25 mg by mouth 2 (two) times a day with meals   citalopram (CeleXA) 10 mg tablet 2021 at Unknown time Self Yes Yes   Sig: Take 10 mg by mouth daily at bedtime   denosumab (Prolia) 60 mg/mL Unknown at Unknown time  Yes No   Sig: Prolia 60 mg/mL subcutaneous syringe   docusate sodium (Colace) 100 mg capsule 2021 at Unknown time  Yes Yes   Sig: Every 12 hours   ferrous sulfate 325 (65 Fe) mg tablet 2021 at Unknown time  Yes Yes   Sig: Take 325 mg by mouth daily with breakfast   hydrochlorothiazide (HYDRODIURIL) 25 mg tablet 2021 at Unknown time  Yes Yes   Sig: Take 25 mg by mouth daily   lactulose (CEPHULAC) 10 g packet 2021 at Unknown time  Yes Yes   Sig: Take 10 g by mouth 3 (three) times a day   lutein 6 mg   Yes No   metFORMIN (GLUCOPHAGE) 1000 MG tablet 2021 at Unknown time  Yes Yes   Sig: Take 1,000 mg by mouth 2 (two) times a day   metoprolol succinate (TOPROL-XL) 25 mg 24 hr tablet Unknown at Unknown time  No No   Sig: Take 1 tablet (25 mg total) by mouth daily   pantoprazole (PROTONIX) 40 mg tablet 2021 at Unknown time  No Yes   Sig: Take 1 tablet (40 mg total) by mouth 2 (two) times a day before meals   potassium citrate (UROCIT-K) 10 mEq 2021 at Unknown time  Yes Yes   Sig: Take 10 mEq by mouth 2 (two) times a day   rifaximin (XIFAXAN) 550 mg tablet Unknown at Unknown time  No No   Sig: Take 1 tablet (550 mg total) by mouth every 12 (twelve) hours   traZODone (DESYREL) 50 mg tablet 2021 at Unknown time  Yes Yes   Sig: Take 25 mg by mouth daily at bedtime      Facility-Administered Medications: None       Past Medical History:   Diagnosis Date    Diabetes mellitus ([facility] [address] )     Kidney stone        Past Surgical History:   Procedure Laterality Date    EYE SURGERY      cataract removal    JOINT REPLACEMENT      LITHOTRIPSY         History reviewed  No pertinent family history  I have reviewed and agree with the history as documented  E-Cigarette/Vaping    E-Cigarette Use Never User      E-Cigarette/Vaping Substances    Nicotine No     THC No     CBD No     Flavoring No     Other No     Unknown No      Social History     Tobacco Use    Smoking status: Never Smoker    Smokeless tobacco: Never Used   Vaping Use    Vaping Use: Never used   Substance Use Topics    Alcohol use: Not Currently    Drug use: Never       Review of Systems   Constitutional: Positive for fatigue  Negative for fever  HENT: Negative  Eyes: Negative  Respiratory: Negative  Cardiovascular: Negative  Gastrointestinal: Positive for nausea and vomiting  Negative for abdominal pain  Endocrine: Negative  Genitourinary: Negative  Negative for dysuria  Musculoskeletal: Negative  Skin: Negative  Allergic/Immunologic: Negative  Neurological: Positive for weakness  Hematological: Negative  Psychiatric/Behavioral: Positive for confusion  Physical Exam  Physical Exam  Constitutional:       Appearance: She is well-developed  She is obese  She is ill-appearing  HENT:      Head: Normocephalic and atraumatic  Mouth/Throat:      Mouth: Mucous membranes are moist    Eyes:      General: Scleral icterus (Mild) present  Conjunctiva/sclera: Conjunctivae normal       Pupils: Pupils are equal, round, and reactive to light  Cardiovascular:      Rate and Rhythm: Normal rate  Heart sounds: Normal heart sounds  Pulmonary:      Effort: Pulmonary effort is normal    Abdominal:      General: There is distension  Palpations: Abdomen is soft  Tenderness: There is no abdominal tenderness  Musculoskeletal:         General: Normal range of motion  Cervical back: Normal range of motion and neck supple        Right lower leg: Edema present  Left lower leg: Edema present  Skin:     General: Skin is warm and dry  Findings: Ecchymosis ( multiple on upper extremities) present  Neurological:      Mental Status: She is alert and oriented to person, place, and time  GCS: GCS eye subscore is 4  GCS verbal subscore is 5  GCS motor subscore is 6           Vital Signs  ED Triage Vitals   Temperature Pulse Respirations Blood Pressure SpO2   08/07/21 2001 08/07/21 2001 08/07/21 2001 08/07/21 2001 08/07/21 2001   97 7 °F (36 5 °C) 86 21 130/69 92 %      Temp Source Heart Rate Source Patient Position - Orthostatic VS BP Location FiO2 (%)   08/07/21 2001 08/07/21 2200 08/07/21 2001 08/07/21 2001 --   Temporal Monitor Lying Right arm       Pain Score       08/07/21 2251       No Pain           Vitals:    08/07/21 2001 08/07/21 2200 08/07/21 2238 08/08/21 0039   BP: 130/69 145/75 128/71    Pulse: 86 95 90 97   Patient Position - Orthostatic VS: Lying                  ED Medications  Medications   pantoprazole (PROTONIX) EC tablet 40 mg (has no administration in time range)   traZODone (DESYREL) tablet 25 mg (has no administration in time range)   ALPRAZolam (XANAX) tablet 0 5 mg (has no administration in time range)   fluticasone (FLONASE) 50 mcg/act nasal spray 1 spray (has no administration in time range)   bimatoprost (LUMIGAN) 0 01 % ophthalmic solution 1 drop (has no administration in time range)   carvedilol (COREG) tablet 6 25 mg (has no administration in time range)   citalopram (CeleXA) tablet 10 mg (has no administration in time range)   ferrous sulfate tablet 325 mg (has no administration in time range)   insulin lispro (HumaLOG) 100 units/mL subcutaneous injection 2-12 Units (has no administration in time range)   insulin lispro (HumaLOG) 100 units/mL subcutaneous injection 2-12 Units (2 Units Subcutaneous Not Given 8/8/21 0050)   cefTRIAXone (ROCEPHIN) IVPB (premix in dextrose) 1,000 mg 50 mL (1,000 mg Intravenous New Bag 8/7/21 2334)       Diagnostic Studies  Results Reviewed     Procedure Component Value Units Date/Time    Lactic acid 2 Hours [[id number]]  (Abnormal) Collected: 08/07/21 2339    Lab Status: Final result Specimen: Blood from Arm, Left Updated: 08/08/21 0013     LACTIC ACID 3 1 mmol/L     Narrative:      Result may be elevated if tourniquet was used during collection  Urine Microscopic [[id number]]  (Abnormal) Collected: 08/07/21 2142    Lab Status: Final result Specimen: Urine, Clean Catch Updated: 08/07/21 2200     RBC, UA 1-2 /hpf      WBC, UA 4-10 /hpf      Epithelial Cells Moderate /hpf      Bacteria, UA Innumerable /hpf     UA w Reflex to Microscopic w Reflex to Culture [[id number]]  (Abnormal) Collected: 08/07/21 2142    Lab Status: Final result Specimen: Urine, Clean Catch Updated: 08/07/21 2148     Color, UA Yellow     Clarity, UA Cloudy     Specific Gravity, UA 1 015     pH, UA 6 0     Leukocytes, UA Small     Nitrite, UA Negative     Protein, UA Negative mg/dl      Glucose, UA Negative mg/dl      Ketones, UA Negative mg/dl      Urobilinogen, UA 0 2 E U /dl      Bilirubin, UA Negative     Blood, UA Negative    Lactic acid [[id number]]  (Abnormal) Collected: 08/07/21 2015    Lab Status: Final result Specimen: Blood from Arm, Right Updated: 08/07/21 2056     LACTIC ACID 3 4 mmol/L     Narrative:      Result may be elevated if tourniquet was used during collection      Comprehensive metabolic panel [[id number]]  (Abnormal) Collected: 08/07/21 2015    Lab Status: Final result Specimen: Blood from Arm, Right Updated: 08/07/21 2056     Sodium 140 mmol/L      Potassium 3 5 mmol/L      Chloride 101 mmol/L      CO2 29 mmol/L      ANION GAP 10 mmol/L      BUN 32 mg/dL      Creatinine 1 45 mg/dL      Glucose 122 mg/dL      Calcium 9 0 mg/dL      Corrected Calcium 10 2 mg/dL      AST 46 U/L      ALT 31 U/L      Alkaline Phosphatase 74 U/L      Total Protein 7 0 g/dL      Albumin 2 5 g/dL      Total Bilirubin 2 10 mg/dL eGFR 37 ml/min/1 73sq m     Narrative:      [place] guidelines for Chronic Kidney Disease (CKD):     Stage 1 with normal or high GFR (GFR > 90 mL/min/1 73 square meters)    Stage 2 Mild CKD (GFR = 60-89 mL/min/1 73 square meters)    Stage 3A Moderate CKD (GFR = 45-59 mL/min/1 73 square meters)    Stage 3B Moderate CKD (GFR = 30-44 mL/min/1 73 square meters)    Stage 4 Severe CKD (GFR = 15-29 mL/min/1 73 square meters)    Stage 5 End Stage CKD (GFR <15 mL/min/1 73 square meters)  Note: GFR calculation is accurate only with a steady state creatinine    Ammonia [[id number]]  (Abnormal) Collected: 08/07/21 2016    Lab Status: Final result Specimen: Blood from Arm, Right Updated: 08/07/21 2046     Ammonia 49 umol/L     Protime-INR [[id number]]  (Abnormal) Collected: 08/07/21 2015    Lab Status: Final result Specimen: Blood from Arm, Right Updated: 08/07/21 2045     Protime 17 9 seconds      INR 1 51    APTT [[id number]]  (Abnormal) Collected: 08/07/21 2015    Lab Status: Final result Specimen: Blood from Arm, Right Updated: 08/07/21 2045     PTT 44 seconds     CBC and differential [[id number]]  (Abnormal) Collected: 08/07/21 2015    Lab Status: Final result Specimen: Blood from Arm, Right Updated: 08/07/21 2028     WBC 14 87 Thousand/uL      RBC 3 02 Million/uL      Hemoglobin 9 2 g/dL      Hematocrit 28 6 %      MCV 95 fL      MCH 30 5 pg      MCHC 32 2 g/dL      RDW 17 8 %      MPV 10 6 fL      Platelets 510 Thousands/uL      nRBC 0 /100 WBCs      Neutrophils Relative 64 %      Immat GRANS % 1 %      Lymphocytes Relative 24 %      Monocytes Relative 10 %      Eosinophils Relative 1 %      Basophils Relative 0 %      Neutrophils Absolute 9 56 Thousands/µL      Immature Grans Absolute 0 08 Thousand/uL      Lymphocytes Absolute 3 58 Thousands/µL      Monocytes Absolute 1 48 Thousand/µL      Eosinophils Absolute 0 12 Thousand/µL      Basophils Absolute 0 05 Thousands/µL     Procalcitonin with AM Reflex [[id number]] Collected: 08/07/21 2015    Lab Status: In process Specimen: Blood from Arm, Right Updated: 08/07/21 2028    Blood culture #2 [[id number]] Collected: 08/07/21 2015    Lab Status: In process Specimen: Blood from Arm, Right Updated: 08/07/21 2026    Blood culture #1 [[id number]] Collected: 08/07/21 2021    Lab Status: In process Specimen: Blood from Arm, Left Updated: 08/07/21 2026                 XR chest 1 view portable    (Results Pending)              Procedures  ECG 12 Lead Documentation Only    Date/Time: 8/7/2021 8:29 PM  Performed by: Iman Abebe DO  Authorized by: Iman Abebe DO     Indications / Diagnosis:  Generalized weakness  ECG reviewed by me, the ED Provider: yes    Patient location:  ED  Previous ECG:     Previous ECG:  Compared to current    Comparison ECG info:  07/18/2021    Comparison to cardiac monitor: Yes    Interpretation:     Interpretation: non-specific    Rate:     ECG rate:  98    ECG rate assessment: normal    Rhythm:     Rhythm: sinus rhythm    Ectopy:     Ectopy: PVCs      PVCs:  Infrequent  QRS:     QRS intervals:  Normal  ST segments:     ST segments:  Normal  T waves:     T waves: flattening    Q waves:     Q waves:  V4, V5 and V6             ED Course  ED Course as of Aug 08 0051   Sat Aug 07, 2021   2156 Although patient meets sepsis criteria, no IV fluids were administered secondary to diffuse anasarca, with stable vital signs, BP of 130/69 which remains stable      Sun Aug 08, 2021   0050 Patient with elevated lactic acid, possible urinary tract infection, mild AKA, as well as leukocytosis, discussed with hospitalist service who agreed to admit for further evaluation and treatment, plan was discussed with patient and family at bedside who are in agreement as well                                                Disposition  Final diagnoses:    Altered mental status   Generalized weakness   Lactic acidosis   Leukocytosis   Cirrhosis of liver with ascites, unspecified hepatic cirrhosis type ([facility] [address] )     Time reflects when diagnosis was documented in both MDM as applicable and the Disposition within this note     Time User Action Codes Description Comment    8/7/2021 10:02 PM Cárdenas Battles Add [R41 82] Altered mental status     8/7/2021 10:02 PM Cárdenas Battles Add [R53 1] Generalized weakness     8/7/2021 10:02 PM Cárdenas Battles Add [E87 2] Lactic acidosis     8/7/2021 10:02 PM Cárdenas Battles Add [D72 829] Leukocytosis     8/8/2021 12:11 AM Gerardine Rosalba Add [K74 60,  R18 8] Cirrhosis of liver with ascites, unspecified hepatic cirrhosis type [facility])       ED Disposition     ED Disposition Condition Date/Time Comment    Admit Stable Sat Aug 7, 2021 10:02 PM Case was discussed with NP Morrell Claude and the patient's admission status was agreed to be Admission Status: inpatient status to the service of Dr Lowell Reyes          Follow-up Information    None         Current Discharge Medication List      CONTINUE these medications which have NOT CHANGED    Details   Acetaminophen (TYLENOL ARTHRITIS PAIN PO) Tylenol Arthritis Pain 650 mg tablet,extended release      allopurinol (ZYLOPRIM) 100 mg tablet       ALPRAZolam (Xanax) 0 25 mg tablet Take by mouth       atorvastatin (LIPITOR) 10 mg tablet       brimonidine (ALPHAGAN P) 0 15 % ophthalmic solution 1 drop 3 (three) times a day      Calcium Carb-Cholecalciferol 600-500 MG-UNIT CAPS       carvedilol (COREG) 6 25 mg tablet Take 6 25 mg by mouth 2 (two) times a day with meals      citalopram (CeleXA) 10 mg tablet Take 10 mg by mouth daily at bedtime      Coenzyme Q10 (COQ-10) 10 MG CAPS 2 (two) times a day       docusate sodium (Colace) 100 mg capsule Every 12 hours      ferrous sulfate 325 (65 Fe) mg tablet Take 325 mg by mouth daily with breakfast      FLUTICASONE PROPIONATE, NASAL, NA into each nostril      hydrochlorothiazide (HYDRODIURIL) 25 mg tablet Take 25 mg by mouth daily      lactulose (CEPHULAC) 10 g packet Take 10 g by mouth 3 (three) times a day      LUMIGAN 0 01 % ophthalmic drops INSTILL 1 DROP INTO BOTH EYES AT NIGHT      metFORMIN (GLUCOPHAGE) 1000 MG tablet Take 1,000 mg by mouth 2 (two) times a day      pantoprazole (PROTONIX) 40 mg tablet Take 1 tablet (40 mg total) by mouth 2 (two) times a day before meals  Refills: 0    Associated Diagnoses: Acute GI bleeding      Polyethylene Glycol 3350 (MIRALAX PO) Miralax   daily      potassium citrate (UROCIT-K) 10 mEq Take 10 mEq by mouth 2 (two) times a day      traZODone (DESYREL) 50 mg tablet Take 25 mg by mouth daily at bedtime      denosumab (Prolia) 60 mg/mL Prolia 60 mg/mL subcutaneous syringe      lutein 6 mg       metoprolol succinate (TOPROL-XL) 25 mg 24 hr tablet Take 1 tablet (25 mg total) by mouth daily  Refills: 0    Associated Diagnoses: Paroxysmal tachycardia ([facility] [address] ); Tachycardia      rifaximin (XIFAXAN) 550 mg tablet Take 1 tablet (550 mg total) by mouth every 12 (twelve) hours  Refills: 0    Associated Diagnoses: Cirrhosis of liver with ascites, unspecified hepatic cirrhosis type ([facility] [address] )           No discharge procedures on file      PDMP Review       Value Time User    PDMP Reviewed  Yes 1/29/2021  3:55 PM Moise Garcia DO          ED Provider  Electronically Signed by           Korey Wolf DO  08/08/21 7416

## 2021-08-08 NOTE — ASSESSMENT & PLAN NOTE
· MUNA without CKD secondary to recent outpatient diuretic use  · Baseline creatinine close to the 0 5 - 0 6    Recently trialed on furosemide up to 80 mg   · Improving today, will reach trial diuretics with IV albumin    Results from last 7 days   Lab Units 08/08/21  0523 08/07/21 2015   BUN mg/dL 32* 32*   CREATININE mg/dL 1 27 1 45*   EGFR ml/min/1 73sq m 43 37

## 2021-08-08 NOTE — ASSESSMENT & PLAN NOTE
Lab Results   Component Value Date    HGBA1C 7 7 (H) 01/25/2021     Recent Labs     08/08/21  1644 08/08/21  2101 08/09/21  0836 08/09/21  1053   POCGLU 144* 193* 104 166*     · Holding metformin given lactic acidosis  Continue sliding scale

## 2021-08-08 NOTE — ASSESSMENT & PLAN NOTE
· MUNA without CKD secondary to recent outpatient diuretic use  · Baseline creatinine close to the 0 5 - 0 6    Recently trialed on furosemide up to 80 mg   · Continues to improve with trial of diuretics with IV albumin    Results from last 7 days   Lab Units 08/09/21  0500 08/08/21  0523 08/07/21 2015   BUN mg/dL 23 32* 32*   CREATININE mg/dL 1 13 1 27 1 45*   EGFR ml/min/1 73sq m 50 43 37

## 2021-08-08 NOTE — ASSESSMENT & PLAN NOTE
· Hemoglobin 9 2 Baseline is around 10-11  · History of GI bleed  · Daily H&H and trend hemoglobin  · Continue iron

## 2021-08-08 NOTE — H&P
[address]  H&P- Marya Mooney 1955, 74 y o  female MRN: [id number]  Unit/Bed#: -01 Encounter: [id number]  Primary Care Provider: Ezequiel Quezada DO   Date and time admitted to hospital: 8/7/2021  7:57 PM    * Severe sepsis [facility])  Assessment & Plan  · Presents with nausea and confusion  · Lactic acid 3 4-suspect urinary tract infection-patient with history of same  · Lactic acidosis could be due in part to metformin  · Afebrile, WBC 14, BP is 145/75  · Patient with ascites-will hold fluid bolus  · Blood cultures are pending  · Ceftriaxone started in the ER-will switch to Zosyn due to history of MDRO    Cirrhosis of liver with ascites ([facility] [address] )  Assessment & Plan  · Unclear etiology   · History of varices and portal hypertension  · Ammonia is 49  · Per Gastroenterology Dr Pablito Damon office visit 08/04/2021:  Likely now decompensated cirrhosis with history of subclinical hepatic encephalopathy prior to admission and ascites with associated lower extremity edema  Diuretics were trialed however patient developed MUNA  Needs repeat ultrasound for ascites assessment with or without paracentesis, and possibly a repeat trial of low-dose diuretic and would prefer spironolactone  · Per family, Chad King senior and kirt at bedside, patient had ultrasound today, however results are not yet available in Epic  · Ultrasound 07/14/2021 with small perihepatic ascites  · Consult GI  · Currently on rifaximin  · Continue outpatient follow-up with hepatology at [facility]    MUNA (acute kidney injury) [facility])  Assessment & Plan  · Creatinine 1 45 with baseline 0 5-0 6  · Suspect multifactorial-decreased oral intake and diuretic use  · Patient has prior history of urosepsis along with kidney stones and obstruction resulting in percutaneous nephrostomy tube several years ago  · Will hold off on fluids due to ascites  · Per office visit gastroenterology Dr Pablito Damon 08/04-"patient with decompensated hepatic encephalopathy with ascites and lower extremity edema-trial of Lasix 20 mg up titrated to 80 mg over the course of a week with patient developing MUNA with creatinine up to 1 4-concerned about her ability to tolerate diuretics "  · Daily metabolic panel and trend creatinine    Chronic anemia  Assessment & Plan  · Hemoglobin 9 2 Baseline is around 10-11  · History of GI bleed  · Daily H&H and trend hemoglobin  · Continue iron    Bacteriuria  Assessment & Plan  · Bacteriuria present UA with leukocyte esterase  · Urine culture last admission grew Enterococcus and ESBL producing E coli  · Was seen by ID last admission and it is thought that the presence of two bacteria in the urine culture in a patient without urinary symptoms is most likely bladder colonization  · Will start Zosyn empirically    Kidney stones  Assessment & Plan  · History of nephrolithiasis  · Is taking hydrochlorothiazide-hold due to MUNA  · Continue outpatient follow-up    MELVIN on CPAP  Assessment & Plan  · Continue CPAP at bedtime    Type 2 diabetes mellitus without complication, without long-term current use of insulin (HCC)  Assessment & Plan  Lab Results   Component Value Date    HGBA1C 7 7 (H) 01/25/2021       Recent Labs     08/08/21  0050   POCGLU 123       Blood Sugar Average: Last 72 hrs:  (P) 123     · Glucose 122  · Diabetic diet  · Fingerstick blood sugar with sliding scale coverage  · Hold metformin    VTE Prophylaxis: Pharmacologic VTE Prophylaxis contraindicated due to Recent GI bleed  / sequential compression device   Code Status:  Full  POLST: POLST form is not discussed and not completed at this time  Discussion with family:  Patient    Anticipated Length of Stay:  Patient will be admitted on an Inpatient basis with an anticipated length of stay of  greater than 2 midnights  Justification for Hospital Stay:  Per plan above    Total Time for Visit, including Counseling / Coordination of Care: 45 minutes    Greater than 50% of this total time spent on direct patient counseling and coordination of care  Chief Complaint:   Generalized weakness and increased confusion    History of Present Illness:    Glenn Martin is a 74 y o  female with history of cirrhosis with varices, GI bleed, non-insulin-dependent type 2 diabetes, MUNA, nephrolithiasis,  Obesity, abnormal heart rhythm, acute cystitis,thrombocytopenia, and hyponatremia who presents with generalized weakness and increased confusion  She was admitted in July for suspected cholecystitis and developed acute GI bleed  Since her discharge, she has been in [facility] for rehabilitation  Within the past day she has developed generalized weakness, intermittent confusion, and nausea with dry heaving  She said that it was a gradual onset, it is intermittent, and she does not note any aggravating or relieving factors  She denies fever, chills, congestion, shortness of breath, hematemesis, abdominal pain, chest pain, dysuria, back pain, flank pain, or focal weakness  Review of Systems:    Review of Systems   Constitutional: Negative for chills and fever  HENT: Positive for congestion  Respiratory: Negative for cough and shortness of breath  Cardiovascular: Negative for chest pain, palpitations and leg swelling  Gastrointestinal: Positive for nausea  Negative for abdominal distention, abdominal pain, constipation, diarrhea and vomiting  Genitourinary: Negative for dysuria and frequency  Musculoskeletal: Negative for arthralgias and myalgias  Skin: Negative for color change and pallor  Neurological: Positive for weakness  Negative for dizziness, syncope and headaches  Psychiatric/Behavioral: Positive for confusion  Negative for dysphoric mood  All other systems reviewed and are negative        Past Medical and Surgical History:     Past Medical History:   Diagnosis Date    Diabetes mellitus ([facility] [address] )     Kidney stone        Past Surgical History:   Procedure Laterality Date  EYE SURGERY      cataract removal    JOINT REPLACEMENT      LITHOTRIPSY         Meds/Allergies:    Prior to Admission medications    Medication Sig Start Date End Date Taking?  Authorizing Provider   Acetaminophen (TYLENOL ARTHRITIS PAIN PO) Tylenol Arthritis Pain 650 mg tablet,extended release 2/5/15  Yes Historical Provider, MD   allopurinol (ZYLOPRIM) 100 mg tablet  1/9/20  Yes Historical Provider, MD   ALPRAZolam (Xanax) 0 25 mg tablet Take by mouth    Yes Historical Provider, MD   atorvastatin (LIPITOR) 10 mg tablet  1/22/20  Yes Historical Provider, MD   brimonidine (ALPHAGAN P) 0 15 % ophthalmic solution 1 drop 3 (three) times a day   Yes Historical Provider, MD   Calcium Carb-Cholecalciferol 600-500 MG-UNIT CAPS    Yes Historical Provider, MD   carvedilol (COREG) 6 25 mg tablet Take 6 25 mg by mouth 2 (two) times a day with meals   Yes Historical Provider, MD   citalopram (CeleXA) 10 mg tablet Take 10 mg by mouth daily at bedtime   Yes Historical Provider, MD   Coenzyme Q10 (COQ-10) 10 MG CAPS 2 (two) times a day    Yes Historical Provider, MD   docusate sodium (Colace) 100 mg capsule Every 12 hours   Yes Historical Provider, MD   ferrous sulfate 325 (65 Fe) mg tablet Take 325 mg by mouth daily with breakfast   Yes Historical Provider, MD   FLUTICASONE PROPIONATE, NASAL, NA into each nostril   Yes Historical Provider, MD   hydrochlorothiazide (HYDRODIURIL) 25 mg tablet Take 25 mg by mouth daily   Yes Historical Provider, MD   lactulose (CEPHULAC) 10 g packet Take 10 g by mouth 3 (three) times a day   Yes Historical Provider, MD   LUMIGAN 0 01 % ophthalmic drops INSTILL 1 DROP INTO BOTH EYES AT NIGHT 2/21/20  Yes Historical Provider, MD   metFORMIN (GLUCOPHAGE) 1000 MG tablet Take 1,000 mg by mouth 2 (two) times a day 3/22/20  Yes Historical Provider, MD   pantoprazole (PROTONIX) 40 mg tablet Take 1 tablet (40 mg total) by mouth 2 (two) times a day before meals 7/23/21  Yes Manas Sheehan MD   Polyethylene Glycol 3350 (MIRALAX PO) Miralax   daily   Yes Historical Provider, MD   potassium citrate (UROCIT-K) 10 mEq Take 10 mEq by mouth 2 (two) times a day   Yes Historical Provider, MD   traZODone (DESYREL) 50 mg tablet Take 25 mg by mouth daily at bedtime   Yes Historical Provider, MD   potassium citrate (UROCIT-K) 10 mEq  1/22/20 8/7/21 Yes Historical Provider, MD   denosumab (Prolia) 60 mg/mL Prolia 60 mg/mL subcutaneous syringe 10/22/15   Historical Provider, MD   lutein 6 mg     Historical Provider, MD   metoprolol succinate (TOPROL-XL) 25 mg 24 hr tablet Take 1 tablet (25 mg total) by mouth daily 7/24/21   Altagracia Armando MD   rifaximin (XIFAXAN) 550 mg tablet Take 1 tablet (550 mg total) by mouth every 12 (twelve) hours 7/23/21 8/22/21  Altagracia Armando MD   budesonide (Rhinocort Aqua) 32 MCG/ACT nasal spray   8/7/21  Historical Provider, MD   econazole nitrate 1 % cream USE TWICE DAILY TO AFFECTED AREA 3/22/20 8/7/21  Historical Provider, MD     I have reveiwed home medications using records provided by [facility]  Allergies:    Allergies   Allergen Reactions    Nitrofurantoin Other (See Comments)     Paresthesias in hands       Social History:     Marital Status: /Civil Union   Occupation:  Retired  Patient Pre-hospital Living Situation:  [facility]  Patient Pre-hospital Level of Mobility:  Requires assist/walker  Patient Pre-hospital Diet Restrictions:  Diabetic/cardiac/nonulcer genic  Substance Use History:   Social History     Substance and Sexual Activity   Alcohol Use Not Currently     Social History     Tobacco Use   Smoking Status Never Smoker   Smokeless Tobacco Never Used     Social History     Substance and Sexual Activity   Drug Use Never       Family History:    non-contributory    Physical Exam:     Vitals:   Blood Pressure: 128/71 (08/07/21 2238)  Pulse: 97 (08/08/21 0039)  Temperature: 98 9 °F (37 2 °C) (08/07/21 2238)  Temp Source: Temporal (08/07/21 2001)  Respirations: 17 (08/07/21 2238)  Height: 5' 2" (157 5 cm) (08/07/21 2238)  Weight - Scale: 126 kg (278 lb 10 6 oz) (08/07/21 2238)  SpO2: (!) 87 % (08/08/21 0039)    Physical Exam  Vitals and nursing note reviewed  Constitutional:       Appearance: She is obese  HENT:      Head: Normocephalic and atraumatic  Mouth/Throat:      Mouth: Mucous membranes are moist       Pharynx: Oropharynx is clear  Eyes:      Extraocular Movements: Extraocular movements intact  Pupils: Pupils are equal, round, and reactive to light  Cardiovascular:      Rate and Rhythm: Normal rate and regular rhythm  Pulses: Normal pulses  Heart sounds: Normal heart sounds  Pulmonary:      Effort: Pulmonary effort is normal       Breath sounds: Normal breath sounds  Abdominal:      General: Abdomen is flat  Bowel sounds are normal  There is distension  Palpations: Abdomen is soft  Musculoskeletal:         General: Normal range of motion  Cervical back: Normal range of motion and neck supple  Right lower leg: Edema present  Left lower leg: Edema present  Skin:     General: Skin is warm and dry  Capillary Refill: Capillary refill takes less than 2 seconds  Neurological:      General: No focal deficit present  Mental Status: She is alert and oriented to person, place, and time  Additional Data:     Lab Results: I have personally reviewed pertinent reports        Results from last 7 days   Lab Units 08/07/21 2015   WBC Thousand/uL 14 87*   HEMOGLOBIN g/dL 9 2*   HEMATOCRIT % 28 6*   PLATELETS Thousands/uL 191   NEUTROS PCT % 64   LYMPHS PCT % 24   MONOS PCT % 10   EOS PCT % 1     Results from last 7 days   Lab Units 08/07/21 2015   SODIUM mmol/L 140   POTASSIUM mmol/L 3 5   CHLORIDE mmol/L 101   CO2 mmol/L 29   BUN mg/dL 32*   CREATININE mg/dL 1 45*   ANION GAP mmol/L 10   CALCIUM mg/dL 9 0   ALBUMIN g/dL 2 5*   TOTAL BILIRUBIN mg/dL 2 10*   ALK PHOS U/L 74   ALT U/L 31   AST U/L 46*   GLUCOSE RANDOM mg/dL 122 Results from last 7 days   Lab Units 08/07/21 2015   INR  1 51*     Results from last 7 days   Lab Units 08/08/21  0050   POC GLUCOSE mg/dl 123         Results from last 7 days   Lab Units 08/07/21  2339 08/07/21 2015   LACTIC ACID mmol/L 3 1* 3 4*       Imaging: I have personally reviewed pertinent reports  XR chest 1 view portable    (Results Pending)       EKG, Pathology, and Other Studies Reviewed on Admission:   · EKG:  Sinus rhythm with PACs rate of 98    Allscripts / Epic Records Reviewed: Yes     ** Please Note: This note has been constructed using a voice recognition system   **

## 2021-08-08 NOTE — ASSESSMENT & PLAN NOTE
· Creatinine 1 45 with baseline 0 5-0 6  · Suspect multifactorial-decreased oral intake and diuretic use  · Patient has prior history of urosepsis along with kidney stones and obstruction resulting in percutaneous nephrostomy tube several years ago  · Will hold off on fluids due to ascites  · Per office visit gastroenterology Dr Claudell Keep 08/04-"patient with decompensated hepatic encephalopathy with ascites and lower extremity edema-trial of Lasix 20 mg up titrated to 80 mg over the course of a week with patient developing MUNA with creatinine up to 1 4-concerned about her ability to tolerate diuretics "  · Daily metabolic panel and trend creatinine

## 2021-08-08 NOTE — ASSESSMENT & PLAN NOTE
· Asymptomatic bacteriuria with history of enterococcus and ESBL  · Will defer on further antibiotics for now

## 2021-08-08 NOTE — ASSESSMENT & PLAN NOTE
· Hemoglobin stable; recent hospitalization for duodenal bleeding    Results from last 7 days   Lab Units 08/08/21  0523 08/07/21 2015   HEMOGLOBIN g/dL 8 1* 9 2*

## 2021-08-08 NOTE — ASSESSMENT & PLAN NOTE
· No evidence of sepsis  No evidence of acute infection  · Elevated lactic acid secondary to liver disease and inability to clear    Stop further checks    Results from last 7 days   Lab Units 08/08/21  0523 08/08/21  0212 08/07/21  2339 08/07/21 2015   LACTIC ACID mmol/L 2 2* 2 7* 3 1* 3 4*

## 2021-08-08 NOTE — ASSESSMENT & PLAN NOTE
· Acute metabolic encephalopathy secondary to hepatic encephalopathy and MUNA  · No evidence of acute infection  · Recently found to have asymptomatic bacteriuria and antibiotics discontinued  · Urinalysis weekly positive  Discontinue zosyn for now  · Completeness check MRI brain    · Doubt SBP but will check abdominal ultrasound to rule out ascites

## 2021-08-08 NOTE — ASSESSMENT & PLAN NOTE
· Hemoglobin is trending down; recent hospitalization for duodenal bleeding recheck  · Recheck in a m  with type and screen    Results from last 7 days   Lab Units 08/09/21  0500 08/08/21  0523 08/07/21 2015   HEMOGLOBIN g/dL 7 5* 8 1* 9 2*

## 2021-08-08 NOTE — PLAN OF CARE
Problem: Potential for Falls  Goal: Patient will remain free of falls  Description: INTERVENTIONS:  - Educate patient/family on patient safety including physical limitations  - Instruct patient to call for assistance with activity   - Consult OT/PT to assist with strengthening/mobility   - Keep Call bell within reach  - Keep bed low and locked with side rails adjusted as appropriate  - Keep care items and personal belongings within reach  - Initiate and maintain comfort rounds  - Make Fall Risk Sign visible to staff  - Offer Toileting every  Hours, in advance of need  - Initiate/Maintain alarm  - Obtain necessary fall risk management equipment:   - Apply yellow socks and bracelet for high fall risk patients  - Consider moving patient to room near nurses station  Outcome: Progressing     Problem: MOBILITY - ADULT  Goal: Maintain or return to baseline ADL function  Description: INTERVENTIONS:  -  Assess patient's ability to carry out ADLs; assess patient's baseline for ADL function and identify physical deficits which impact ability to perform ADLs (bathing, care of mouth/teeth, toileting, grooming, dressing, etc )  - Assess/evaluate cause of self-care deficits   - Assess range of motion  - Assess patient's mobility; develop plan if impaired  - Assess patient's need for assistive devices and provide as appropriate  - Encourage maximum independence but intervene and supervise when necessary  - Involve family in performance of ADLs  - Assess for home care needs following discharge   - Consider OT consult to assist with ADL evaluation and planning for discharge  - Provide patient education as appropriate  Outcome: Progressing  Goal: Maintains/Returns to pre admission functional level  Description: INTERVENTIONS:  - Perform BMAT or MOVE assessment daily    - Set and communicate daily mobility goal to care team and patient/family/caregiver     - Collaborate with rehabilitation services on mobility goals if consulted  - Perform Range of Motion  times a day  - Reposition patient every  hours    - Dangle patient  times a day  - Stand patient  times a day  - Ambulate patient  times a day  - Out of bed to chair  times a day   - Out of bed for meals  times a day  - Out of bed for toileting  - Record patient progress and toleration of activity level   Outcome: Progressing     Problem: Prexisting or High Potential for Compromised Skin Integrity  Goal: Skin integrity is maintained or improved  Description: INTERVENTIONS:  - Identify patients at risk for skin breakdown  - Assess and monitor skin integrity  - Assess and monitor nutrition and hydration status  - Monitor labs   - Assess for incontinence   - Turn and reposition patient  - Assist with mobility/ambulation  - Relieve pressure over bony prominences  - Avoid friction and shearing  - Provide appropriate hygiene as needed including keeping skin clean and dry  - Evaluate need for skin moisturizer/barrier cream  - Collaborate with interdisciplinary team   - Patient/family teaching  - Consider wound care consult   Outcome: Progressing     Problem: PAIN - ADULT  Goal: Verbalizes/displays adequate comfort level or baseline comfort level  Description: Interventions:  - Encourage patient to monitor pain and request assistance  - Assess pain using appropriate pain scale  - Administer analgesics based on type and severity of pain and evaluate response  - Implement non-pharmacological measures as appropriate and evaluate response  - Consider cultural and social influences on pain and pain management  - Notify physician/advanced practitioner if interventions unsuccessful or patient reports new pain  Outcome: Progressing     Problem: INFECTION - ADULT  Goal: Absence or prevention of progression during hospitalization  Description: INTERVENTIONS:  - Assess and monitor for signs and symptoms of infection  - Monitor lab/diagnostic results  - Monitor all insertion sites, i e  indwelling lines, tubes, and drains  - Monitor endotracheal if appropriate and nasal secretions for changes in amount and color  - [place] appropriate cooling/warming therapies per order  - Administer medications as ordered  - Instruct and encourage patient and family to use good hand hygiene technique  - Identify and instruct in appropriate isolation precautions for identified infection/condition  Outcome: Progressing  Goal: Absence of fever/infection during neutropenic period  Description: INTERVENTIONS:  - Monitor WBC    Outcome: Progressing     Problem: SAFETY ADULT  Goal: Patient will remain free of falls  Description: INTERVENTIONS:  - Educate patient/family on patient safety including physical limitations  - Instruct patient to call for assistance with activity   - Consult OT/PT to assist with strengthening/mobility   - Keep Call bell within reach  - Keep bed low and locked with side rails adjusted as appropriate  - Keep care items and personal belongings within reach  - Initiate and maintain comfort rounds  - Make Fall Risk Sign visible to staff  - Offer Toileting every Hours, in advance of need  - Initiate/Maintain alarm  - Obtain necessary fall risk management equipment:   - Apply yellow socks and bracelet for high fall risk patients  - Consider moving patient to room near nurses station  Outcome: Progressing  Goal: Maintain or return to baseline ADL function  Description: INTERVENTIONS:  -  Assess patient's ability to carry out ADLs; assess patient's baseline for ADL function and identify physical deficits which impact ability to perform ADLs (bathing, care of mouth/teeth, toileting, grooming, dressing, etc )  - Assess/evaluate cause of self-care deficits   - Assess range of motion  - Assess patient's mobility; develop plan if impaired  - Assess patient's need for assistive devices and provide as appropriate  - Encourage maximum independence but intervene and supervise when necessary  - Involve family in performance of ADLs  - Assess for home care needs following discharge   - Consider OT consult to assist with ADL evaluation and planning for discharge  - Provide patient education as appropriate  Outcome: Progressing  Goal: Maintains/Returns to pre admission functional level  Description: INTERVENTIONS:  - Perform BMAT or MOVE assessment daily    - Set and communicate daily mobility goal to care team and patient/family/caregiver  - Collaborate with rehabilitation services on mobility goals if consulted  - Perform Range of Motion  times a day  - Reposition patient every  hours  - Dangle patient  times a day  - Stand patient  times a day  - Ambulate patient  times a day  - Out of bed to chair  times a day   - Out of bed for meals  times a day  - Out of bed for toileting  - Record patient progress and toleration of activity level   Outcome: Progressing     Problem: DISCHARGE PLANNING  Goal: Discharge to home or other facility with appropriate resources  Description: INTERVENTIONS:  - Identify barriers to discharge w/patient and caregiver  - Arrange for needed discharge resources and transportation as appropriate  - Identify discharge learning needs (meds, wound care, etc )  - Arrange for interpretive services to assist at discharge as needed  - Refer to Case Management Department for coordinating discharge planning if the patient needs post-hospital services based on physician/advanced practitioner order or complex needs related to functional status, cognitive ability, or social support system  Outcome: Progressing     Problem: Knowledge Deficit  Goal: Patient/family/caregiver demonstrates understanding of disease process, treatment plan, medications, and discharge instructions  Description: Complete learning assessment and assess knowledge base    Interventions:  - Provide teaching at level of understanding  - Provide teaching via preferred learning methods  Outcome: Progressing

## 2021-08-08 NOTE — RESPIRATORY THERAPY NOTE
RT Protocol Note  Katherine Bethea 74 y o  female MRN: [id number]  Unit/Bed#: -01 Encounter: [id number]    Assessment    Principal Problem:    Severe sepsis ([facility] )  Active Problems:    Cirrhosis of liver with ascites ([facility] )    Type 2 diabetes mellitus without complication, without long-term current use of insulin (HCC)    MELVIN on CPAP    Bacteriuria    Chronic anemia      Home Pulmonary Medications:    Home Devices/Therapy: BiPAP/CPAP    Past Medical History:   Diagnosis Date    Diabetes mellitus ([facility] )     Kidney stone      Social History     Socioeconomic History    Marital status: /Civil Union     Spouse name: None    Number of children: None    Years of education: None    Highest education level: None   Occupational History    None   Tobacco Use    Smoking status: Never Smoker    Smokeless tobacco: Never Used   Vaping Use    Vaping Use: Never used   Substance and Sexual Activity    Alcohol use: Not Currently    Drug use: Never    Sexual activity: None   Other Topics Concern    None   Social History Narrative    None     Social Determinants of Health     Financial Resource Strain:     Difficulty of Paying Living Expenses:    Food Insecurity:     Worried About Running Out of Food in the Last Year:     Ran Out of Food in the Last Year:    Transportation Needs:     Lack of Transportation (Medical):      Lack of Transportation (Non-Medical):    Physical Activity:     Days of Exercise per Week:     Minutes of Exercise per Session:    Stress:     Feeling of Stress :    Social Connections:     Frequency of Communication with Friends and Family:     Frequency of Social Gatherings with Friends and Family:     Attends [employer] Services:     Active Member of Clubs or Organizations:     Attends Club or Organization Meetings:     Marital Status:    Intimate Partner Violence:     Fear of Current or Ex-Partner:     Emotionally Abused:     Physically Abused:     Sexually Abused: Subjective         Objective    Physical Exam:   Assessment Type: Assess only  General Appearance: Awake, Drowsy  Respiratory Pattern: Dyspnea with exertion  Chest Assessment: Chest expansion symmetrical  Bilateral Breath Sounds: Diminished    Vitals:  Blood pressure 128/71, pulse 90, temperature 98 9 °F (37 2 °C), resp  rate 17, height 5' 2" (1 575 m), weight 126 kg (278 lb 10 6 oz), SpO2 92 %  Imaging and other studies: I have personally reviewed pertinent reports  Plan    Respiratory Plan: Mild Distress pathway  Dx altered mental status, sepsis, Hx of MELVIN and cpap  Pt indicates she wears a cpap of 10 at home without oxygen  Requesting to wear it, placed on machine at [address] for hs

## 2021-08-08 NOTE — ASSESSMENT & PLAN NOTE
· Bacteriuria present UA with leukocyte esterase  · Urine culture last admission grew Enterococcus and ESBL producing E coli  · Was seen by ID last admission and it is thought that the presence of two bacteria in the urine culture in a patient without urinary symptoms is most likely bladder colonization  · Will start Zosyn empirically

## 2021-08-08 NOTE — ASSESSMENT & PLAN NOTE
· 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    · Did have outpatient ultrasound at outside facility unclear if does have ascites worth sampling  · Recheck official complete ultrasound to evaluate ascites and gallbladder given questionable history cholecystitis  · Recently started on carvedilol to decrease portal pressure

## 2021-08-08 NOTE — PROGRESS NOTES
[address]  Progress Note - [facility] 1955, 74 y o  female MRN: [id number]  Unit/Bed#: -01 Encounter: [id number]  Primary Care Provider: Ravindra Laboy DO   Date and time admitted to hospital: 8/7/2021  7:57 PM    * Acute metabolic encephalopathy  Assessment & Plan  · Acute metabolic encephalopathy secondary to hepatic encephalopathy and MUNA  · No evidence of acute infection  · Recently found to have asymptomatic bacteriuria and antibiotics discontinued  · Urinalysis weekly positive  Discontinue zosyn for now  · Completeness check MRI brain  · Doubt SBP but will check abdominal ultrasound to rule out ascites    SIRS (systemic inflammatory response syndrome) (HCC)  Assessment & Plan  · No evidence of sepsis  No evidence of acute infection  · Elevated lactic acid secondary to liver disease and inability to clear  Stop further checks    Results from last 7 days   Lab Units 08/08/21  0523 08/08/21  0212 08/07/21  2339 08/07/21 2015   LACTIC ACID mmol/L 2 2* 2 7* 3 1* 3 4*       MUNA (acute kidney injury) ([facility] )  Assessment & Plan  · MUNA without CKD secondary to recent outpatient diuretic use  · Baseline creatinine close to the 0 5 - 0 6    Recently trialed on furosemide up to 80 mg   · Improving today, will reach trial diuretics with IV albumin    Results from last 7 days   Lab Units 08/08/21  0523 08/07/21 2015   BUN mg/dL 32* 32*   CREATININE mg/dL 1 27 1 45*   EGFR ml/min/1 73sq m 43 37       Chronic anemia  Assessment & Plan  · Hemoglobin stable; recent hospitalization for duodenal bleeding    Results from last 7 days   Lab Units 08/08/21  0523 08/07/21 2015   HEMOGLOBIN g/dL 8 1* 9 2*       Bacteriuria  Assessment & Plan  · Asymptomatic bacteriuria with history of enterococcus and ESBL  · Will defer on further antibiotics for now    MELVIN on CPAP  Assessment & Plan  · Continue CPAP    Type 2 diabetes mellitus without complication, without long-term current use of insulin [facility])  Assessment & Plan  Lab Results   Component Value Date    HGBA1C 7 7 (H) 01/25/2021     Recent Labs     08/08/21  0050 08/08/21  0953   POCGLU 123 146*     · Holding metformin given lactic acidosis  Continue sliding scale  Cirrhosis of liver with ascites ([facility] )  Assessment & Plan  · 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  · Did have outpatient ultrasound at outside facility unclear if does have ascites worth sampling  · Recheck official complete ultrasound to evaluate ascites and gallbladder given questionable history cholecystitis  · Recently started on carvedilol to decrease portal pressure      VTE Pharmacologic Prophylaxis: VTE Score: 5 High Risk (Score >/= 5) - Pharmacological DVT Prophylaxis Contraindicated  Sequential Compression Devices Ordered  Patient Centered Rounds: I have performed bedside rounds with nursing staff today  Discussions with Specialists or Other Care Team Provider:     Education and Discussions with Family / Patient:  Daughter at bedside    Time Spent for Care: 25 mins  More than 50% of total time spent on counseling and coordination of care as described above  Current Length of Stay: 1 day(s)  Current Patient Status: Inpatient   Certification Statement: The patient will continue to require additional inpatient hospital stay due to encephalopathy  Discharge Plan / Estimated Discharge Date: Anticipate discharge in 48-72 hrs to discharge location to be determined pending rehab evaluations  Code Status: Level 1 - Full Code      Subjective:   Patient seen and examined  More awake today  Daughter at bedside  Objective:   Vitals: Blood pressure 137/81, pulse 95, temperature 98 9 °F (37 2 °C), resp  rate 19, height 5' 2" (1 575 m), weight 126 kg (278 lb 10 6 oz), SpO2 (!) 88 %  Physical Exam  Vitals reviewed  Constitutional:       General: She is not in acute distress  Appearance: Normal appearance     Eyes:      General: No scleral icterus  Cardiovascular:      Rate and Rhythm: Regular rhythm  Heart sounds: Normal heart sounds  Pulmonary:      Breath sounds: Decreased breath sounds present  No wheezing  Abdominal:      General: Bowel sounds are normal       Palpations: Abdomen is soft  Tenderness: There is no guarding or rebound  Musculoskeletal:         General: Swelling (2+ lower extremities bilaterally) present  Skin:     General: Skin is warm  Neurological:      Mental Status: She is alert and oriented to person, place, and time  Mental status is at baseline  Psychiatric:         Mood and Affect: Mood normal        Additional Data:   Labs:  Results from last 7 days   Lab Units 08/08/21  0523 08/07/21 2015   WBC Thousand/uL 12 77* 14 87*   HEMOGLOBIN g/dL 8 1* 9 2*   HEMATOCRIT % 25 6* 28 6*   MCV fL 95 95   PLATELETS Thousands/uL 151 191   INR   --  1 51*     Results from last 7 days   Lab Units 08/08/21  0523 08/07/21 2015   SODIUM mmol/L 143 140   POTASSIUM mmol/L 3 2* 3 5   CHLORIDE mmol/L 105 101   CO2 mmol/L 29 29   ANION GAP mmol/L 9 10   BUN mg/dL 32* 32*   CREATININE mg/dL 1 27 1 45*   CALCIUM mg/dL 8 5 9 0   ALBUMIN g/dL 2 2* 2 5*   TOTAL BILIRUBIN mg/dL 1 76* 2 10*   ALK PHOS U/L 70 74   ALT U/L 25 31   AST U/L 38 46*   EGFR ml/min/1 73sq m 43 37   GLUCOSE RANDOM mg/dL 116 122                  Results from last 7 days   Lab Units 08/08/21  0523 08/08/21  0212 08/07/21  2339 08/07/21 2015   LACTIC ACID mmol/L 2 2* 2 7* 3 1* 3 4*     Results from last 7 days   Lab Units 08/08/21  0953 08/08/21  0050   POC GLUCOSE mg/dl 146* 123             * I Have Reviewed All Lab Data Listed Above  Cultures:                   Lines/Drains:  Invasive Devices     Peripheral Intravenous Line            Peripheral IV 08/07/21 Right Antecubital <1 day              Telemetry:      Imaging:  Imaging Reports Reviewed Today Include:   No results found    Scheduled Meds:  Current Facility-Administered Medications   Medication Dose Route Frequency Provider Last Rate    ALPRAZolam  0 5 mg Oral HS PRN TANISHA Alonso      bimatoprost  1 drop Both Eyes HS TANISHA Alonso      carvedilol  6 25 mg Oral BID With Meals TANISHA Alonso      citalopram  10 mg Oral HS TANISHA Alonso      ferrous sulfate  325 mg Oral Daily With Breakfast TANISHA Alonso      fluticasone  1 spray Nasal Daily TANISHA Alonso      insulin lispro  2-12 Units Subcutaneous TID AC TANISHA Alonso      insulin lispro  2-12 Units Subcutaneous HS TANISHA Alonso      lactulose  10 g Oral TID TANISHA Alonso      pantoprazole  40 mg Oral BID AC TANISHA Alonso      piperacillin-tazobactam  3 375 g Intravenous Q6H TANISHA Alonso 3 375 g (08/08/21 0431)    traZODone  25 mg Oral HS TANISHA Alonso         [facility] Internal Medicine  Hospitalist    ** Please Note: This note has been constructed using a voice recognition system   **

## 2021-08-08 NOTE — ASSESSMENT & PLAN NOTE
Lab Results   Component Value Date    HGBA1C 7 7 (H) 01/25/2021     Recent Labs     08/08/21  0050 08/08/21  0953   POCGLU 123 146*     · Holding metformin given lactic acidosis  Continue sliding scale

## 2021-08-08 NOTE — ASSESSMENT & PLAN NOTE
· Acute metabolic encephalopathy secondary to hepatic encephalopathy and MUNA  · No evidence of acute infection  · Recently found to have asymptomatic bacteriuria and antibiotics discontinued  · Urinalysis weekly positive  Discontinued zosyn for now  · MRI brain negative for acute pathology    · Doubt SBP but will check abdominal ultrasound to rule out ascites  · Continue lactulose    Results from last 7 days   Lab Units 08/09/21  0500 08/07/21 2016   AMMONIA umol/L 53* 49*

## 2021-08-08 NOTE — ASSESSMENT & PLAN NOTE
· History of nephrolithiasis  · Is taking hydrochlorothiazide-hold due to MUNA  · Continue outpatient follow-up

## 2021-08-09 ENCOUNTER — APPOINTMENT (INPATIENT)
Dept: ULTRASOUND IMAGING | Facility: HOSPITAL | Age: 66
DRG: 871 | End: 2021-08-09
Payer: MEDICARE

## 2021-08-09 ENCOUNTER — APPOINTMENT (INPATIENT)
Dept: MRI IMAGING | Facility: HOSPITAL | Age: 66
DRG: 871 | End: 2021-08-09
Payer: MEDICARE

## 2021-08-09 LAB
ALBUMIN SERPL BCP-MCNC: 2.6 G/DL (ref 3.5–5)
ALP SERPL-CCNC: 53 U/L (ref 46–116)
ALT SERPL W P-5'-P-CCNC: 21 U/L (ref 12–78)
AMMONIA PLAS-SCNC: 53 UMOL/L (ref 11–35)
ANION GAP SERPL CALCULATED.3IONS-SCNC: 5 MMOL/L (ref 4–13)
ANION GAP SERPL CALCULATED.3IONS-SCNC: 9 MMOL/L (ref 4–13)
AST SERPL W P-5'-P-CCNC: 31 U/L (ref 5–45)
BILIRUB SERPL-MCNC: 1.84 MG/DL (ref 0.2–1)
BUN SERPL-MCNC: 23 MG/DL (ref 5–25)
BUN SERPL-MCNC: 23 MG/DL (ref 5–25)
CALCIUM ALBUM COR SERPL-MCNC: 9.6 MG/DL (ref 8.3–10.1)
CALCIUM SERPL-MCNC: 8.5 MG/DL (ref 8.3–10.1)
CALCIUM SERPL-MCNC: 8.6 MG/DL (ref 8.3–10.1)
CHLORIDE SERPL-SCNC: 104 MMOL/L (ref 100–108)
CHLORIDE SERPL-SCNC: 105 MMOL/L (ref 100–108)
CO2 SERPL-SCNC: 32 MMOL/L (ref 21–32)
CO2 SERPL-SCNC: 33 MMOL/L (ref 21–32)
CREAT SERPL-MCNC: 1.13 MG/DL (ref 0.6–1.3)
CREAT SERPL-MCNC: 1.16 MG/DL (ref 0.6–1.3)
ERYTHROCYTE [DISTWIDTH] IN BLOOD BY AUTOMATED COUNT: 17.8 % (ref 11.6–15.1)
GFR SERPL CREATININE-BSD FRML MDRD: 48 ML/MIN/1.73SQ M
GFR SERPL CREATININE-BSD FRML MDRD: 50 ML/MIN/1.73SQ M
GLUCOSE SERPL-MCNC: 100 MG/DL (ref 65–140)
GLUCOSE SERPL-MCNC: 104 MG/DL (ref 65–140)
GLUCOSE SERPL-MCNC: 117 MG/DL (ref 65–140)
GLUCOSE SERPL-MCNC: 147 MG/DL (ref 65–140)
GLUCOSE SERPL-MCNC: 166 MG/DL (ref 65–140)
GLUCOSE SERPL-MCNC: 166 MG/DL (ref 65–140)
HCT VFR BLD AUTO: 23.5 % (ref 34.8–46.1)
HGB BLD-MCNC: 7.5 G/DL (ref 11.5–15.4)
INR PPP: 1.68 (ref 0.84–1.19)
MAGNESIUM SERPL-MCNC: 1.3 MG/DL (ref 1.6–2.6)
MCH RBC QN AUTO: 30.4 PG (ref 26.8–34.3)
MCHC RBC AUTO-ENTMCNC: 31.9 G/DL (ref 31.4–37.4)
MCV RBC AUTO: 95 FL (ref 82–98)
PLATELET # BLD AUTO: 120 THOUSANDS/UL (ref 149–390)
PMV BLD AUTO: 10.4 FL (ref 8.9–12.7)
POTASSIUM SERPL-SCNC: 2.5 MMOL/L (ref 3.5–5.3)
POTASSIUM SERPL-SCNC: 3.2 MMOL/L (ref 3.5–5.3)
PROCALCITONIN SERPL-MCNC: 0.08 NG/ML
PROT SERPL-MCNC: 5.9 G/DL (ref 6.4–8.2)
PROTHROMBIN TIME: 19.5 SECONDS (ref 11.6–14.5)
RBC # BLD AUTO: 2.47 MILLION/UL (ref 3.81–5.12)
SODIUM SERPL-SCNC: 142 MMOL/L (ref 136–145)
SODIUM SERPL-SCNC: 146 MMOL/L (ref 136–145)
WBC # BLD AUTO: 8.67 THOUSAND/UL (ref 4.31–10.16)

## 2021-08-09 PROCEDURE — 99232 SBSQ HOSP IP/OBS MODERATE 35: CPT | Performed by: INTERNAL MEDICINE

## 2021-08-09 PROCEDURE — 76700 US EXAM ABDOM COMPLETE: CPT

## 2021-08-09 PROCEDURE — 85027 COMPLETE CBC AUTOMATED: CPT | Performed by: INTERNAL MEDICINE

## 2021-08-09 PROCEDURE — 85610 PROTHROMBIN TIME: CPT | Performed by: INTERNAL MEDICINE

## 2021-08-09 PROCEDURE — 93976 VASCULAR STUDY: CPT

## 2021-08-09 PROCEDURE — 80048 BASIC METABOLIC PNL TOTAL CA: CPT | Performed by: NURSE PRACTITIONER

## 2021-08-09 PROCEDURE — 80053 COMPREHEN METABOLIC PANEL: CPT | Performed by: INTERNAL MEDICINE

## 2021-08-09 PROCEDURE — G1004 CDSM NDSC: HCPCS

## 2021-08-09 PROCEDURE — 83735 ASSAY OF MAGNESIUM: CPT | Performed by: NURSE PRACTITIONER

## 2021-08-09 PROCEDURE — 82948 REAGENT STRIP/BLOOD GLUCOSE: CPT

## 2021-08-09 PROCEDURE — 82140 ASSAY OF AMMONIA: CPT | Performed by: INTERNAL MEDICINE

## 2021-08-09 PROCEDURE — 70551 MRI BRAIN STEM W/O DYE: CPT

## 2021-08-09 PROCEDURE — 84145 PROCALCITONIN (PCT): CPT | Performed by: EMERGENCY MEDICINE

## 2021-08-09 RX ORDER — POTASSIUM CHLORIDE 20 MEQ/1
40 TABLET, EXTENDED RELEASE ORAL EVERY 4 HOURS
Status: DISCONTINUED | OUTPATIENT
Start: 2021-08-09 | End: 2021-08-09

## 2021-08-09 RX ORDER — POTASSIUM CHLORIDE 14.9 MG/ML
20 INJECTION INTRAVENOUS
Status: COMPLETED | OUTPATIENT
Start: 2021-08-09 | End: 2021-08-09

## 2021-08-09 RX ORDER — POTASSIUM CHLORIDE 20MEQ/15ML
40 LIQUID (ML) ORAL EVERY 4 HOURS
Status: COMPLETED | OUTPATIENT
Start: 2021-08-09 | End: 2021-08-09

## 2021-08-09 RX ADMIN — CARVEDILOL 6.25 MG: 6.25 TABLET, FILM COATED ORAL at 17:19

## 2021-08-09 RX ADMIN — POTASSIUM CHLORIDE 40 MEQ: 20 SOLUTION ORAL at 05:51

## 2021-08-09 RX ADMIN — BIMATOPROST 1 DROP: 0.1 SOLUTION/ DROPS OPHTHALMIC at 21:40

## 2021-08-09 RX ADMIN — POTASSIUM CHLORIDE 40 MEQ: 20 SOLUTION ORAL at 09:36

## 2021-08-09 RX ADMIN — ALPRAZOLAM 0.5 MG: 0.5 TABLET ORAL at 21:38

## 2021-08-09 RX ADMIN — PANTOPRAZOLE SODIUM 40 MG: 40 TABLET, DELAYED RELEASE ORAL at 17:19

## 2021-08-09 RX ADMIN — CARVEDILOL 6.25 MG: 6.25 TABLET, FILM COATED ORAL at 09:36

## 2021-08-09 RX ADMIN — CITALOPRAM HYDROBROMIDE 10 MG: 10 TABLET ORAL at 21:38

## 2021-08-09 RX ADMIN — FERROUS SULFATE TAB 325 MG (65 MG ELEMENTAL FE) 325 MG: 325 (65 FE) TAB at 09:36

## 2021-08-09 RX ADMIN — TRAZODONE HYDROCHLORIDE 25 MG: 50 TABLET ORAL at 21:37

## 2021-08-09 RX ADMIN — INSULIN LISPRO 2 UNITS: 100 INJECTION, SOLUTION INTRAVENOUS; SUBCUTANEOUS at 10:59

## 2021-08-09 RX ADMIN — FLUTICASONE PROPIONATE 1 SPRAY: 50 SPRAY, METERED NASAL at 09:36

## 2021-08-09 RX ADMIN — FUROSEMIDE 40 MG: 10 INJECTION, SOLUTION INTRAMUSCULAR; INTRAVENOUS at 17:20

## 2021-08-09 RX ADMIN — PANTOPRAZOLE SODIUM 40 MG: 40 TABLET, DELAYED RELEASE ORAL at 06:04

## 2021-08-09 RX ADMIN — INSULIN LISPRO 2 UNITS: 100 INJECTION, SOLUTION INTRAVENOUS; SUBCUTANEOUS at 21:39

## 2021-08-09 RX ADMIN — ALBUMIN (HUMAN) 25 G: 0.25 INJECTION, SOLUTION INTRAVENOUS at 10:47

## 2021-08-09 RX ADMIN — LACTULOSE 20 G: 10 SOLUTION ORAL at 17:20

## 2021-08-09 RX ADMIN — FUROSEMIDE 40 MG: 10 INJECTION, SOLUTION INTRAMUSCULAR; INTRAVENOUS at 10:48

## 2021-08-09 RX ADMIN — LACTULOSE 20 G: 10 SOLUTION ORAL at 20:24

## 2021-08-09 RX ADMIN — ALBUMIN (HUMAN) 25 G: 0.25 INJECTION, SOLUTION INTRAVENOUS at 20:25

## 2021-08-09 RX ADMIN — POTASSIUM CHLORIDE 20 MEQ: 14.9 INJECTION, SOLUTION INTRAVENOUS at 05:51

## 2021-08-09 NOTE — CONSULTS
Consultation - [address] [place] [address] [facility]  Pedro Keane 74 y o  female MRN: [id number]  Unit/Bed#: -01 Encounter: [id number]        Inpatient consult to gastroenterology  Consult performed by: Ciro Lerma MD  Consult ordered by: Anabela Mi, [address]          Reason for Consult / Principal Problem:     Decompensated cirrhosis secondary to MOORE  Hepatic encephalopathy  Ascites      ASSESSMENT AND PLAN:      Decompensated cirrhosis secondary to MOORE  Hepatic encephalopathy  Ascites  Acute on chronic liver failure  -MELD-Na 16  -reason for decompensation could be secondary to acute kidney injury or insufficient bowel movements with lactulose, GI bleeding is also possible given drop in hemoglobin, SBP less likely, portal venous thrombus  -check ultrasound with Dopplers, diagnostic/therapeutic paracentesis  -titrate lactulose to 3 bowel movements per day, might need to add on rifaximin as there was evidence of asterixis on exam  -trend MELD labs  -low Na diet (2g)    MUNA  -likely secondary to over-diuresis  -improving with albumin supplementation  -on diuresis with furosemide 40mg BID, would encourage addition of spironolactone   -continue to monitor    Anemia  -no signs of overt GI bleeding  -continue to monitor, recent EGD with duodenal ulcers with small esophageal varices  -continue with PPI    ______________________________________________________________________    HPI:  66-year-old female seen in consultation for decompensated cirrhosis secondary to MOORE complicated by hepatic encephalopathy and ascites  Other past medical history significant for diabetes, morbid obesity, irregular heart rhythm, hyperlipidemia  Patient unfortunately slightly encephalopathic and unable to give a clear history as to why she is here so her story is corroborated with information from patient's chart  Currently admitted due to generalized weakness and increased confusion    She was recently admitted at the end of July with sepsis and altered mental status was thought to be secondary to cholecystitis but HIDA was negative possible secondary to UTI however this was thought to be colonization and she ultimately developed a GI bleed for which she was transferred to [facility] and underwent EGD which found duodenal ulcers and small nonbleeding esophageal varices  She was discharged to a rehab facility  Her PCP had been trying to adjust her diuretics due to increased lower extremity edema and increased abdominal girth(?  Ascites) but unfortunately developed MUNA  She denies nausea, vomiting, but complaining of increased abdominal girth, increased lower extremity edema, increased confusion  She states she is only having 1 bowel movement daily on lactulose  Showed an elevated lactate, normal procalcitonin,+ urinalysis came back with positive bacteria and this was thought to be the cause of her encephalopathy so she was started on IV antibiotics however this was stopped as it is most likely that she has colonization of urine and her elevated lactate was likely due to her underlying liver disease and inability to clear  Her ammonia was elevated and her hemoglobin had been stable however did trend down overnight without signs of overt GI bleed  Her creatinine was 1 45 however this has decreased with iron supplementation and has been restarted on furosemide 40 twice daily        REVIEW OF SYSTEMS:  Unable to determine due to patient's mental status          Historical Information   Past Medical History:   Diagnosis Date    Diabetes mellitus ([facility] )     Kidney stone      Past Surgical History:   Procedure Laterality Date    EYE SURGERY      cataract removal    JOINT REPLACEMENT      LITHOTRIPSY       Social History   Social History     Substance and Sexual Activity   Alcohol Use Not Currently     Social History     Substance and Sexual Activity   Drug Use Never     Social History     Tobacco Use   Smoking Status Never Smoker Smokeless Tobacco Never Used     History reviewed  No pertinent family history      Meds/Allergies     Medications Prior to Admission   Medication    Acetaminophen (TYLENOL ARTHRITIS PAIN PO)    allopurinol (ZYLOPRIM) 100 mg tablet    ALPRAZolam (Xanax) 0 25 mg tablet    atorvastatin (LIPITOR) 10 mg tablet    brimonidine (ALPHAGAN P) 0 15 % ophthalmic solution    Calcium Carb-Cholecalciferol 600-500 MG-UNIT CAPS    carvedilol (COREG) 6 25 mg tablet    citalopram (CeleXA) 10 mg tablet    Coenzyme Q10 (COQ-10) 10 MG CAPS    docusate sodium (Colace) 100 mg capsule    ferrous sulfate 325 (65 Fe) mg tablet    FLUTICASONE PROPIONATE, NASAL, NA    hydrochlorothiazide (HYDRODIURIL) 25 mg tablet    lactulose (CEPHULAC) 10 g packet    LUMIGAN 0 01 % ophthalmic drops    metFORMIN (GLUCOPHAGE) 1000 MG tablet    pantoprazole (PROTONIX) 40 mg tablet    Polyethylene Glycol 3350 (MIRALAX PO)    potassium citrate (UROCIT-K) 10 mEq    traZODone (DESYREL) 50 mg tablet    denosumab (Prolia) 60 mg/mL    lutein 6 mg    metoprolol succinate (TOPROL-XL) 25 mg 24 hr tablet    rifaximin (XIFAXAN) 550 mg tablet     Current Facility-Administered Medications   Medication Dose Route Frequency    albumin human (FLEXBUMIN) 25 % injection 25 g  25 g Intravenous BID    ALPRAZolam (XANAX) tablet 0 5 mg  0 5 mg Oral HS PRN    bimatoprost (LUMIGAN) 0 01 % ophthalmic solution 1 drop  1 drop Both Eyes HS    carvedilol (COREG) tablet 6 25 mg  6 25 mg Oral BID With Meals    citalopram (CeleXA) tablet 10 mg  10 mg Oral HS    ferrous sulfate tablet 325 mg  325 mg Oral Daily With Breakfast    fluticasone (FLONASE) 50 mcg/act nasal spray 1 spray  1 spray Nasal Daily    furosemide (LASIX) injection 40 mg  40 mg Intravenous BID (diuretic)    insulin lispro (HumaLOG) 100 units/mL subcutaneous injection 2-12 Units  2-12 Units Subcutaneous TID AC    insulin lispro (HumaLOG) 100 units/mL subcutaneous injection 2-12 Units  2-12 Units Subcutaneous HS    lactulose oral solution 20 g  20 g Oral TID    pantoprazole (PROTONIX) EC tablet 40 mg  40 mg Oral BID AC    traZODone (DESYREL) tablet 25 mg  25 mg Oral HS       Allergies   Allergen Reactions    Nitrofurantoin Other (See Comments)     Paresthesias in hands           Objective     Blood pressure 122/59, pulse 93, temperature 98 9 °F (37 2 °C), resp  rate 18, height 5' 2" (1 575 m), weight 124 kg (273 lb 3 2 oz), SpO2 92 %  Body mass index is 49 97 kg/m²        Intake/Output Summary (Last 24 hours) at 8/9/2021 1452  Last data filed at 8/9/2021 1205  Gross per 24 hour   Intake 180 ml   Output 1900 ml   Net -1720 ml         PHYSICAL EXAM:      General Appearance:   Alert, mildly confused, no distress   HEENT:   Normocephalic, atraumatic, anicteric      Neck:  Supple, symmetrical, trachea midline   Lungs:   respirations unlabored    Heart[de-identified]   Regular rate   Abdomen:   Soft, non-tender, +distended; normal bowel sounds    Genitalia:   Deferred    Rectal:   Deferred    Extremities:  + b/l edema        Skin:  No jaundice, rashes, or lesions    Lymph nodes:  No palpable cervical lymphadenopathy        Lab Results:   Admission on 08/07/2021   Component Date Value    WBC 08/07/2021 14 87*    RBC 08/07/2021 3 02*    Hemoglobin 08/07/2021 9 2*    Hematocrit 08/07/2021 28 6*    MCV 08/07/2021 95     MCH 08/07/2021 30 5     MCHC 08/07/2021 32 2     RDW 08/07/2021 17 8*    MPV 08/07/2021 10 6     Platelets 44/03/7684 191     nRBC 08/07/2021 0     Neutrophils Relative 08/07/2021 64     Immat GRANS % 08/07/2021 1     Lymphocytes Relative 08/07/2021 24     Monocytes Relative 08/07/2021 10     Eosinophils Relative 08/07/2021 1     Basophils Relative 08/07/2021 0     Neutrophils Absolute 08/07/2021 9 56*    Immature Grans Absolute 08/07/2021 0 08     Lymphocytes Absolute 08/07/2021 3 58     Monocytes Absolute 08/07/2021 1 48*    Eosinophils Absolute 08/07/2021 0 12     Basophils Absolute 08/07/2021 0 05     Sodium 08/07/2021 140     Potassium 08/07/2021 3 5     Chloride 08/07/2021 101     CO2 08/07/2021 29     ANION GAP 08/07/2021 10     BUN 08/07/2021 32*    Creatinine 08/07/2021 1 45*    Glucose 08/07/2021 122     Calcium 08/07/2021 9 0     Corrected Calcium 08/07/2021 10 2*    AST 08/07/2021 46*    ALT 08/07/2021 31     Alkaline Phosphatase 08/07/2021 74     Total Protein 08/07/2021 7 0     Albumin 08/07/2021 2 5*    Total Bilirubin 08/07/2021 2 10*    eGFR 08/07/2021 37     LACTIC ACID 08/07/2021 3 4*    Procalcitonin 08/07/2021 0 08     Protime 08/07/2021 17 9*    INR 08/07/2021 1 51*    PTT 08/07/2021 44*    Blood Culture 08/07/2021 Received in Microbiology Lab  Culture in Progress   Blood Culture 08/07/2021 Received in Microbiology Lab  Culture in Progress       Color, UA 08/07/2021 Yellow     Clarity, UA 08/07/2021 Cloudy     Specific Gravity, UA 08/07/2021 1 015     pH, UA 08/07/2021 6 0     Leukocytes, UA 08/07/2021 Small*    Nitrite, UA 08/07/2021 Negative     Protein, UA 08/07/2021 Negative     Glucose, UA 08/07/2021 Negative     Ketones, UA 08/07/2021 Negative     Urobilinogen, UA 08/07/2021 0 2     Bilirubin, UA 08/07/2021 Negative     Blood, UA 08/07/2021 Negative     Ammonia 08/07/2021 49*    LACTIC ACID 08/07/2021 3 1*    RBC, UA 08/07/2021 1-2     WBC, UA 08/07/2021 4-10*    Epithelial Cells 08/07/2021 Moderate*    Bacteria, UA 08/07/2021 Innumerable*    LACTIC ACID 08/08/2021 2 7*    Procalcitonin 08/08/2021 <0 05     POC Glucose 08/08/2021 123     LACTIC ACID 08/08/2021 2 2*    Sodium 08/08/2021 143     Potassium 08/08/2021 3 2*    Chloride 08/08/2021 105     CO2 08/08/2021 29     ANION GAP 08/08/2021 9     BUN 08/08/2021 32*    Creatinine 08/08/2021 1 27     Glucose 08/08/2021 116     Calcium 08/08/2021 8 5     Corrected Calcium 08/08/2021 9 9     AST 08/08/2021 38     ALT 08/08/2021 25     Alkaline Phosphatase 08/08/2021 70     Total Protein 08/08/2021 6 2*    Albumin 08/08/2021 2 2*    Total Bilirubin 08/08/2021 1 76*    eGFR 08/08/2021 43     WBC 08/08/2021 12 77*    RBC 08/08/2021 2 70*    Hemoglobin 08/08/2021 8 1*    Hematocrit 08/08/2021 25 6*    MCV 08/08/2021 95     MCH 08/08/2021 30 0     MCHC 08/08/2021 31 6     RDW 08/08/2021 18 0*    MPV 08/08/2021 10 4     Platelets 16/06/9125 151     nRBC 08/08/2021 0     Neutrophils Relative 08/08/2021 58     Immat GRANS % 08/08/2021 1     Lymphocytes Relative 08/08/2021 28     Monocytes Relative 08/08/2021 11     Eosinophils Relative 08/08/2021 2     Basophils Relative 08/08/2021 0     Neutrophils Absolute 08/08/2021 7 42     Immature Grans Absolute 08/08/2021 0 08     Lymphocytes Absolute 08/08/2021 3 57     Monocytes Absolute 08/08/2021 1 45*    Eosinophils Absolute 08/08/2021 0 21     Basophils Absolute 08/08/2021 0 04     Urine Culture 08/07/2021 50,000-59,000 cfu/ml Non lactose fermenting gram negative tosha*    POC Glucose 08/08/2021 146*    POC Glucose 08/08/2021 137     Procalcitonin 08/09/2021 0 08     POC Glucose 08/08/2021 144*    POC Glucose 08/08/2021 193*    Ammonia 08/09/2021 53*    WBC 08/09/2021 8 67     RBC 08/09/2021 2 47*    Hemoglobin 08/09/2021 7 5*    Hematocrit 08/09/2021 23 5*    MCV 08/09/2021 95     MCH 08/09/2021 30 4     MCHC 08/09/2021 31 9     RDW 08/09/2021 17 8*    Platelets 20/32/5937 120*    MPV 08/09/2021 10 4     Sodium 08/09/2021 142     Potassium 08/09/2021 2 5*    Chloride 08/09/2021 104     CO2 08/09/2021 33*    ANION GAP 08/09/2021 5     BUN 08/09/2021 23     Creatinine 08/09/2021 1 13     Glucose 08/09/2021 100     Calcium 08/09/2021 8 5     Corrected Calcium 08/09/2021 9 6     AST 08/09/2021 31     ALT 08/09/2021 21     Alkaline Phosphatase 08/09/2021 53     Total Protein 08/09/2021 5 9*    Albumin 08/09/2021 2 6*    Total Bilirubin 08/09/2021 1 84*    eGFR 08/09/2021 50     Protime 08/09/2021 19 5*    INR 08/09/2021 1 68*    Magnesium 08/09/2021 1 3*    POC Glucose 08/09/2021 104     POC Glucose 08/09/2021 166*       Imaging Studies: I have personally reviewed pertinent imaging studies

## 2021-08-09 NOTE — ASSESSMENT & PLAN NOTE
· Secondary to diuresis  Being repleted  Recheck in a adolfo [facility]     Results from last 7 days   Lab Units 08/09/21  0500 08/08/21  0523 08/07/21 2015   POTASSIUM mmol/L 2 5* 3 2* 3 5

## 2021-08-09 NOTE — PLAN OF CARE
Problem: Potential for Falls  Goal: Patient will remain free of falls  Description: INTERVENTIONS:  - Educate patient/family on patient safety including physical limitations  - Instruct patient to call for assistance with activity   - Consult OT/PT to assist with strengthening/mobility   - Keep Call bell within reach  - Keep bed low and locked with side rails adjusted as appropriate  - Keep care items and personal belongings within reach  - Initiate and maintain comfort rounds  - Make Fall Risk Sign visible to staff  - Apply yellow socks and bracelet for high fall risk patients  - Consider moving patient to room near nurses station  Outcome: Progressing     Problem: MOBILITY - ADULT  Goal: Maintain or return to baseline ADL function  Description: INTERVENTIONS:  -  Assess patient's ability to carry out ADLs; assess patient's baseline for ADL function and identify physical deficits which impact ability to perform ADLs (bathing, care of mouth/teeth, toileting, grooming, dressing, etc )  - Assess/evaluate cause of self-care deficits   - Assess range of motion  - Assess patient's mobility; develop plan if impaired  - Assess patient's need for assistive devices and provide as appropriate  - Encourage maximum independence but intervene and supervise when necessary  - Involve family in performance of ADLs  - Assess for home care needs following discharge   - Consider OT consult to assist with ADL evaluation and planning for discharge  - Provide patient education as appropriate  Outcome: Progressing  Goal: Maintains/Returns to pre admission functional level  Description: INTERVENTIONS:  - Perform BMAT or MOVE assessment daily    - Set and communicate daily mobility goal to care team and patient/family/caregiver     - Collaborate with rehabilitation services on mobility goals if consulted  - Out of bed for toileting  - Record patient progress and toleration of activity level   Outcome: Progressing     Problem: Prexisting or High Potential for Compromised Skin Integrity  Goal: Skin integrity is maintained or improved  Description: INTERVENTIONS:  - Identify patients at risk for skin breakdown  - Assess and monitor skin integrity  - Assess and monitor nutrition and hydration status  - Monitor labs   - Assess for incontinence   - Turn and reposition patient  - Assist with mobility/ambulation  - Relieve pressure over bony prominences  - Avoid friction and shearing  - Provide appropriate hygiene as needed including keeping skin clean and dry  - Evaluate need for skin moisturizer/barrier cream  - Collaborate with interdisciplinary team   - Patient/family teaching  - Consider wound care consult   Outcome: Progressing     Problem: PAIN - ADULT  Goal: Verbalizes/displays adequate comfort level or baseline comfort level  Description: Interventions:  - Encourage patient to monitor pain and request assistance  - Assess pain using appropriate pain scale  - Administer analgesics based on type and severity of pain and evaluate response  - Implement non-pharmacological measures as appropriate and evaluate response  - Consider cultural and social influences on pain and pain management  - Notify physician/advanced practitioner if interventions unsuccessful or patient reports new pain  Outcome: Progressing     Problem: INFECTION - ADULT  Goal: Absence or prevention of progression during hospitalization  Description: INTERVENTIONS:  - Assess and monitor for signs and symptoms of infection  - Monitor lab/diagnostic results  - Monitor all insertion sites, i e  indwelling lines, tubes, and drains  - Monitor endotracheal if appropriate and nasal secretions for changes in amount and color  - [place] appropriate cooling/warming therapies per order  - Administer medications as ordered  - Instruct and encourage patient and family to use good hand hygiene technique  - Identify and instruct in appropriate isolation precautions for identified infection/condition  Outcome: Progressing  Goal: Absence of fever/infection during neutropenic period  Description: INTERVENTIONS:  - Monitor WBC    Outcome: Progressing     Problem: SAFETY ADULT  Goal: Patient will remain free of falls  Description: INTERVENTIONS:  - Educate patient/family on patient safety including physical limitations  - Instruct patient to call for assistance with activity   - Consult OT/PT to assist with strengthening/mobility   - Keep Call bell within reach  - Keep bed low and locked with side rails adjusted as appropriate  - Keep care items and personal belongings within reach  - Initiate and maintain comfort rounds  - Make Fall Risk Sign visible to staff  - Apply yellow socks and bracelet for high fall risk patients  - Consider moving patient to room near nurses station  Outcome: Progressing  Goal: Maintain or return to baseline ADL function  Description: INTERVENTIONS:  -  Assess patient's ability to carry out ADLs; assess patient's baseline for ADL function and identify physical deficits which impact ability to perform ADLs (bathing, care of mouth/teeth, toileting, grooming, dressing, etc )  - Assess/evaluate cause of self-care deficits   - Assess range of motion  - Assess patient's mobility; develop plan if impaired  - Assess patient's need for assistive devices and provide as appropriate  - Encourage maximum independence but intervene and supervise when necessary  - Involve family in performance of ADLs  - Assess for home care needs following discharge   - Consider OT consult to assist with ADL evaluation and planning for discharge  - Provide patient education as appropriate  Outcome: Progressing  Goal: Maintains/Returns to pre admission functional level  Description: INTERVENTIONS:  - Perform BMAT or MOVE assessment daily    - Set and communicate daily mobility goal to care team and patient/family/caregiver     - Collaborate with rehabilitation services on mobility goals if consulted  - Out of bed for toileting  - Record patient progress and toleration of activity level   Outcome: Progressing     Problem: DISCHARGE PLANNING  Goal: Discharge to home or other facility with appropriate resources  Description: INTERVENTIONS:  - Identify barriers to discharge w/patient and caregiver  - Arrange for needed discharge resources and transportation as appropriate  - Identify discharge learning needs (meds, wound care, etc )  - Arrange for interpretive services to assist at discharge as needed  - Refer to Case Management Department for coordinating discharge planning if the patient needs post-hospital services based on physician/advanced practitioner order or complex needs related to functional status, cognitive ability, or social support system  Outcome: Progressing     Problem: Knowledge Deficit  Goal: Patient/family/caregiver demonstrates understanding of disease process, treatment plan, medications, and discharge instructions  Description: Complete learning assessment and assess knowledge base    Interventions:  - Provide teaching at level of understanding  - Provide teaching via preferred learning methods  Outcome: Progressing

## 2021-08-09 NOTE — PROGRESS NOTES
Will add 2 gm Na for cirrhosis with ascites  Maintain CCD 2 at this time  Decreased appetite for a few weeks PTA though says starting to resolve, will trial glucerna daily

## 2021-08-09 NOTE — PROGRESS NOTES
[address]  Progress Note - Carol Ann Mckinney 1955, 74 y o  female MRN: [id number]  Unit/Bed#: -01 Encounter: [id number]  Primary Care Provider: Elie Clifford DO   Date and time admitted to hospital: 8/7/2021  7:57 PM    * Acute metabolic encephalopathy  Assessment & Plan  · Acute metabolic encephalopathy secondary to hepatic encephalopathy and MUNA  · No evidence of acute infection  · Recently found to have asymptomatic bacteriuria and antibiotics discontinued  · Urinalysis weekly positive  Discontinued zosyn for now  · MRI brain negative for acute pathology  · Doubt SBP but will check abdominal ultrasound to rule out ascites  · Continue lactulose    Results from last 7 days   Lab Units 08/09/21  0500 08/07/21  2016   AMMONIA umol/L 53* 49*       SIRS (systemic inflammatory response syndrome) (HCC)  Assessment & Plan  · No evidence of sepsis  No evidence of acute infection  · Elevated lactic acid secondary to liver disease and inability to clear  Stop further checks    Results from last 7 days   Lab Units 08/08/21  0523 08/08/21  0212 08/07/21  2339 08/07/21 2015   LACTIC ACID mmol/L 2 2* 2 7* 3 1* 3 4*       MUNA (acute kidney injury) ([facility] [address] )  Assessment & Plan  · MUNA without CKD secondary to recent outpatient diuretic use  · Baseline creatinine close to the 0 5 - 0 6    Recently trialed on furosemide up to 80 mg   · Continues to improve with trial of diuretics with IV albumin    Results from last 7 days   Lab Units 08/09/21  0500 08/08/21  0523 08/07/21  2015   BUN mg/dL 23 32* 32*   CREATININE mg/dL 1 13 1 27 1 45*   EGFR ml/min/1 73sq m 50 43 37       Chronic anemia  Assessment & Plan  · Hemoglobin is trending down; recent hospitalization for duodenal bleeding recheck  · Recheck in a m  with type and screen    Results from last 7 days   Lab Units 08/09/21  0500 08/08/21  0523 08/07/21  2015   HEMOGLOBIN g/dL 7 5* 8 1* 9 2*       Bacteriuria  Assessment & Plan  · Asymptomatic bacteriuria with history of enterococcus and ESBL  · Will defer on further antibiotics for now    Morbid obesity ([facility] )  Assessment & Plan  · Body mass index is 49 97 kg/m²  Hypokalemia  Assessment & Plan  · Secondary to diuresis  Being repleted  Recheck in a adolfo Vasquez Ruth Ann Results from last 7 days   Lab Units 08/09/21  0500 08/08/21  0523 08/07/21 2015   POTASSIUM mmol/L 2 5* 3 2* 3 5       Kidney stones  Assessment & Plan  · Maintain on HCTZ currently on hold in favor of IV diuretics    MELVIN on CPAP  Assessment & Plan  · Continue CPAP    Type 2 diabetes mellitus without complication, without long-term current use of insulin [facility])  Assessment & Plan  Lab Results   Component Value Date    HGBA1C 7 7 (H) 01/25/2021     Recent Labs     08/08/21  1644 08/08/21  2101 08/09/21  0836 08/09/21  1053   POCGLU 144* 193* 104 166*     · Holding metformin given lactic acidosis  Continue sliding scale  Cirrhosis of liver with ascites ([facility] )  Assessment & Plan  · 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  · Did have outpatient ultrasound at outside facility unclear if does have ascites worth sampling  · Recheck official complete ultrasound to evaluate ascites and gallbladder given questionable history cholecystitis  · Recently started on carvedilol to decrease portal pressure      VTE Pharmacologic Prophylaxis: VTE Score: 5 High Risk (Score >/= 5) - Pharmacological DVT Prophylaxis Contraindicated  Sequential Compression Devices Ordered  Recent admission for anemia/GI bleeding    Patient Centered Rounds: I have performed bedside rounds with nursing staff today  Discussions with Specialists or Other Care Team Provider:  Case management    Education and Discussions with Family / Patient:  Daughter on telephone    Time Spent for Care: 25 mins  More than 50% of total time spent on counseling and coordination of care as described above      Current Length of Stay: 2 day(s)  Current Patient Status: Inpatient   Certification Statement: The patient will continue to require additional inpatient hospital stay due to kidney injury, encephalopathy  Discharge Plan / Estimated Discharge Date: Anticipate discharge in 48-72 hrs to discharge location to be determined pending rehab evaluations  Code Status: Level 1 - Full Code      Subjective:   Patient seen and examined  More awake today  Leg still swollen  Good urine output  Objective:   Vitals: Blood pressure 122/59, pulse 93, temperature 98 9 °F (37 2 °C), resp  rate 18, height 5' 2" (1 575 m), weight 124 kg (273 lb 3 2 oz), SpO2 92 %  Physical Exam  Vitals reviewed  Constitutional:       General: She is not in acute distress  Appearance: Normal appearance  Eyes:      General: No scleral icterus  Cardiovascular:      Rate and Rhythm: Regular rhythm  Heart sounds: Normal heart sounds  Pulmonary:      Breath sounds: Decreased breath sounds present  No wheezing  Abdominal:      General: Bowel sounds are normal       Palpations: Abdomen is soft  Tenderness: There is no guarding or rebound  Musculoskeletal:         General: Swelling (2-3+ lower extremities bilaterally) present  Skin:     General: Skin is warm  Neurological:      Mental Status: She is alert and oriented to person, place, and time  Mental status is at baseline     Psychiatric:         Mood and Affect: Mood normal        Additional Data:   Labs:  Results from last 7 days   Lab Units 08/09/21  0500 08/08/21 0523 08/07/21 2015   WBC Thousand/uL 8 67 12 77* 14 87*   HEMOGLOBIN g/dL 7 5* 8 1* 9 2*   HEMATOCRIT % 23 5* 25 6* 28 6*   MCV fL 95 95 95   PLATELETS Thousands/uL 120* 151 191   INR  1 68*  --  1 51*     Results from last 7 days   Lab Units 08/09/21  0500 08/08/21 0523 08/07/21 2015   SODIUM mmol/L 142 143 140   POTASSIUM mmol/L 2 5* 3 2* 3 5   CHLORIDE mmol/L 104 105 101   CO2 mmol/L 33* 29 29   ANION GAP mmol/L 5 9 10   BUN mg/dL 23 32* 32*   CREATININE mg/dL 1  13 1 27 1 45*   CALCIUM mg/dL 8 5 8 5 9 0   ALBUMIN g/dL 2 6* 2 2* 2 5*   TOTAL BILIRUBIN mg/dL 1 84* 1 76* 2 10*   ALK PHOS U/L 53 70 74   ALT U/L 21 25 31   AST U/L 31 38 46*   EGFR ml/min/1 73sq m 50 43 37   GLUCOSE RANDOM mg/dL 100 116 122     Results from last 7 days   Lab Units 08/09/21  0500   MAGNESIUM mg/dL 1 3*              Results from last 7 days   Lab Units 08/09/21  0500 08/08/21  0523 08/08/21  0212 08/07/21  2339 08/07/21 2015   LACTIC ACID mmol/L  --  2 2* 2 7* 3 1* 3 4*   PROCALCITONIN ng/ml 0 08  --  <0 05  --  0 08     Results from last 7 days   Lab Units 08/09/21  1053 08/09/21  0836 08/08/21  2101 08/08/21  1644 08/08/21  1203 08/08/21  0953 08/08/21  0050   POC GLUCOSE mg/dl 166* 104 193* 144* 137 146* 123             * I Have Reviewed All Lab Data Listed Above  Cultures:   Results from last 7 days   Lab Units 08/07/21  2142 08/07/21 2021 08/07/21 2015   BLOOD CULTURE   --  Received in Microbiology Lab  Culture in Progress  Received in Microbiology Lab  Culture in Progress  URINE CULTURE  50,000-59,000 cfu/ml Non lactose fermenting gram negative tosha*  --   --                  Lines/Drains:  Invasive Devices     Peripheral Intravenous Line            Peripheral IV 08/08/21 Right Forearm <1 day          Drain            External Urinary Catheter 1 day              Telemetry:      Imaging:  Imaging Reports Reviewed Today Include:   XR chest 1 view portable    Result Date: 8/9/2021  Impression: No acute cardiopulmonary disease  Workstation performed: [id number]     MRI brain wo contrast    Result Date: 8/9/2021  Impression: White matter changes suggestive of chronic microangiopathy  No acute intracranial pathology   Workstation performed: [id number]     Scheduled Meds:  Current Facility-Administered Medications   Medication Dose Route Frequency Provider Last Rate    albumin human  25 g Intravenous BID Dev Lucero DO 0 g (08/08/21 1240)    ALPRAZolam  0 5 mg Oral HS PRN Dean Perez Shayna Norman, TANISHA      bimatoprost  1 drop Both Eyes HS TANISHA Pino      carvedilol  6 25 mg Oral BID With Meals TANISHA Pino      citalopram  10 mg Oral HS TANISHA Pino      ferrous sulfate  325 mg Oral Daily With Breakfast TANISHA Pino      fluticasone  1 spray Nasal Daily TANISHA Pino      furosemide  40 mg Intravenous BID (diuretic) Daya Marie, DO      insulin lispro  2-12 Units Subcutaneous TID AC TANISHA Pino      insulin lispro  2-12 Units Subcutaneous HS TANISHA Pino      lactulose  20 g Oral TID Winnie S Josue, TANISHA      pantoprazole  40 mg Oral BID AC ATNISHA iPno      traZODone  25 mg Oral HS TANISHA Pino         [address] [facility]  Hospitalist    ** Please Note: This note has been constructed using a voice recognition system   **

## 2021-08-10 LAB
ABO GROUP BLD: NORMAL
ALBUMIN SERPL BCP-MCNC: 2.8 G/DL (ref 3.5–5)
ALP SERPL-CCNC: 60 U/L (ref 46–116)
ALT SERPL W P-5'-P-CCNC: 22 U/L (ref 12–78)
AMMONIA PLAS-SCNC: 35 UMOL/L (ref 11–35)
ANION GAP SERPL CALCULATED.3IONS-SCNC: 6 MMOL/L (ref 4–13)
ANION GAP SERPL CALCULATED.3IONS-SCNC: 7 MMOL/L (ref 4–13)
AST SERPL W P-5'-P-CCNC: 36 U/L (ref 5–45)
BILIRUB SERPL-MCNC: 1.76 MG/DL (ref 0.2–1)
BLD GP AB SCN SERPL QL: NEGATIVE
BUN SERPL-MCNC: 18 MG/DL (ref 5–25)
BUN SERPL-MCNC: 20 MG/DL (ref 5–25)
CALCIUM ALBUM COR SERPL-MCNC: 9.2 MG/DL (ref 8.3–10.1)
CALCIUM SERPL-MCNC: 8.2 MG/DL (ref 8.3–10.1)
CALCIUM SERPL-MCNC: 8.4 MG/DL (ref 8.3–10.1)
CHLORIDE SERPL-SCNC: 104 MMOL/L (ref 100–108)
CHLORIDE SERPL-SCNC: 105 MMOL/L (ref 100–108)
CO2 SERPL-SCNC: 32 MMOL/L (ref 21–32)
CO2 SERPL-SCNC: 34 MMOL/L (ref 21–32)
CREAT SERPL-MCNC: 1.19 MG/DL (ref 0.6–1.3)
CREAT SERPL-MCNC: 1.19 MG/DL (ref 0.6–1.3)
ERYTHROCYTE [DISTWIDTH] IN BLOOD BY AUTOMATED COUNT: 17.9 % (ref 11.6–15.1)
GFR SERPL CREATININE-BSD FRML MDRD: 47 ML/MIN/1.73SQ M
GFR SERPL CREATININE-BSD FRML MDRD: 47 ML/MIN/1.73SQ M
GLUCOSE SERPL-MCNC: 121 MG/DL (ref 65–140)
GLUCOSE SERPL-MCNC: 127 MG/DL (ref 65–140)
GLUCOSE SERPL-MCNC: 129 MG/DL (ref 65–140)
GLUCOSE SERPL-MCNC: 159 MG/DL (ref 65–140)
GLUCOSE SERPL-MCNC: 183 MG/DL (ref 65–140)
GLUCOSE SERPL-MCNC: 214 MG/DL (ref 65–140)
HCT VFR BLD AUTO: 24.8 % (ref 34.8–46.1)
HGB BLD-MCNC: 7.9 G/DL (ref 11.5–15.4)
INR PPP: 1.73 (ref 0.84–1.19)
MAGNESIUM SERPL-MCNC: 1.1 MG/DL (ref 1.6–2.6)
MCH RBC QN AUTO: 30.3 PG (ref 26.8–34.3)
MCHC RBC AUTO-ENTMCNC: 31.9 G/DL (ref 31.4–37.4)
MCV RBC AUTO: 95 FL (ref 82–98)
PLATELET # BLD AUTO: 115 THOUSANDS/UL (ref 149–390)
PMV BLD AUTO: 9.4 FL (ref 8.9–12.7)
POTASSIUM SERPL-SCNC: 2.8 MMOL/L (ref 3.5–5.3)
POTASSIUM SERPL-SCNC: 3.1 MMOL/L (ref 3.5–5.3)
PROT SERPL-MCNC: 6 G/DL (ref 6.4–8.2)
PROTHROMBIN TIME: 19.9 SECONDS (ref 11.6–14.5)
RBC # BLD AUTO: 2.61 MILLION/UL (ref 3.81–5.12)
RH BLD: NEGATIVE
SODIUM SERPL-SCNC: 143 MMOL/L (ref 136–145)
SODIUM SERPL-SCNC: 145 MMOL/L (ref 136–145)
SPECIMEN EXPIRATION DATE: NORMAL
WBC # BLD AUTO: 8.49 THOUSAND/UL (ref 4.31–10.16)

## 2021-08-10 PROCEDURE — 83735 ASSAY OF MAGNESIUM: CPT | Performed by: FAMILY MEDICINE

## 2021-08-10 PROCEDURE — 86900 BLOOD TYPING SEROLOGIC ABO: CPT | Performed by: INTERNAL MEDICINE

## 2021-08-10 PROCEDURE — 86901 BLOOD TYPING SEROLOGIC RH(D): CPT | Performed by: INTERNAL MEDICINE

## 2021-08-10 PROCEDURE — 82140 ASSAY OF AMMONIA: CPT | Performed by: INTERNAL MEDICINE

## 2021-08-10 PROCEDURE — 86850 RBC ANTIBODY SCREEN: CPT | Performed by: INTERNAL MEDICINE

## 2021-08-10 PROCEDURE — 82948 REAGENT STRIP/BLOOD GLUCOSE: CPT

## 2021-08-10 PROCEDURE — 85027 COMPLETE CBC AUTOMATED: CPT | Performed by: INTERNAL MEDICINE

## 2021-08-10 PROCEDURE — 80053 COMPREHEN METABOLIC PANEL: CPT | Performed by: INTERNAL MEDICINE

## 2021-08-10 PROCEDURE — 99232 SBSQ HOSP IP/OBS MODERATE 35: CPT | Performed by: FAMILY MEDICINE

## 2021-08-10 PROCEDURE — 80048 BASIC METABOLIC PNL TOTAL CA: CPT | Performed by: FAMILY MEDICINE

## 2021-08-10 PROCEDURE — 85610 PROTHROMBIN TIME: CPT | Performed by: INTERNAL MEDICINE

## 2021-08-10 RX ORDER — MAGNESIUM SULFATE HEPTAHYDRATE 40 MG/ML
2 INJECTION, SOLUTION INTRAVENOUS ONCE
Status: DISCONTINUED | OUTPATIENT
Start: 2021-08-10 | End: 2021-08-10

## 2021-08-10 RX ORDER — MAGNESIUM SULFATE HEPTAHYDRATE 40 MG/ML
4 INJECTION, SOLUTION INTRAVENOUS ONCE
Status: COMPLETED | OUTPATIENT
Start: 2021-08-10 | End: 2021-08-10

## 2021-08-10 RX ORDER — LACTULOSE 20 G/30ML
20 SOLUTION ORAL 2 TIMES DAILY
Status: DISCONTINUED | OUTPATIENT
Start: 2021-08-10 | End: 2021-08-12

## 2021-08-10 RX ORDER — POTASSIUM CHLORIDE 14.9 MG/ML
20 INJECTION INTRAVENOUS ONCE
Status: COMPLETED | OUTPATIENT
Start: 2021-08-10 | End: 2021-08-10

## 2021-08-10 RX ORDER — POTASSIUM CHLORIDE 20MEQ/15ML
40 LIQUID (ML) ORAL ONCE
Status: COMPLETED | OUTPATIENT
Start: 2021-08-10 | End: 2021-08-10

## 2021-08-10 RX ORDER — POTASSIUM CHLORIDE 20MEQ/15ML
40 LIQUID (ML) ORAL EVERY 4 HOURS
Status: COMPLETED | OUTPATIENT
Start: 2021-08-10 | End: 2021-08-10

## 2021-08-10 RX ORDER — FUROSEMIDE 40 MG/1
40 TABLET ORAL
Status: DISCONTINUED | OUTPATIENT
Start: 2021-08-10 | End: 2021-08-16 | Stop reason: HOSPADM

## 2021-08-10 RX ADMIN — POTASSIUM CHLORIDE 20 MEQ: 14.9 INJECTION, SOLUTION INTRAVENOUS at 09:25

## 2021-08-10 RX ADMIN — PANTOPRAZOLE SODIUM 40 MG: 40 TABLET, DELAYED RELEASE ORAL at 16:49

## 2021-08-10 RX ADMIN — Medication 400 MG: at 18:57

## 2021-08-10 RX ADMIN — POTASSIUM CHLORIDE 40 MEQ: 20 SOLUTION ORAL at 09:34

## 2021-08-10 RX ADMIN — INSULIN LISPRO 2 UNITS: 100 INJECTION, SOLUTION INTRAVENOUS; SUBCUTANEOUS at 16:50

## 2021-08-10 RX ADMIN — FUROSEMIDE 40 MG: 10 INJECTION, SOLUTION INTRAMUSCULAR; INTRAVENOUS at 08:34

## 2021-08-10 RX ADMIN — FERROUS SULFATE TAB 325 MG (65 MG ELEMENTAL FE) 325 MG: 325 (65 FE) TAB at 08:34

## 2021-08-10 RX ADMIN — LACTULOSE 20 G: 10 SOLUTION ORAL at 16:49

## 2021-08-10 RX ADMIN — LACTULOSE 20 G: 10 SOLUTION ORAL at 08:35

## 2021-08-10 RX ADMIN — TRAZODONE HYDROCHLORIDE 25 MG: 50 TABLET ORAL at 22:22

## 2021-08-10 RX ADMIN — POTASSIUM CHLORIDE 40 MEQ: 20 SOLUTION ORAL at 18:57

## 2021-08-10 RX ADMIN — PANTOPRAZOLE SODIUM 40 MG: 40 TABLET, DELAYED RELEASE ORAL at 08:50

## 2021-08-10 RX ADMIN — INSULIN LISPRO 4 UNITS: 100 INJECTION, SOLUTION INTRAVENOUS; SUBCUTANEOUS at 12:17

## 2021-08-10 RX ADMIN — FLUTICASONE PROPIONATE 1 SPRAY: 50 SPRAY, METERED NASAL at 08:34

## 2021-08-10 RX ADMIN — POTASSIUM CHLORIDE 40 MEQ: 20 SOLUTION ORAL at 15:14

## 2021-08-10 RX ADMIN — ALBUMIN (HUMAN) 25 G: 0.25 INJECTION, SOLUTION INTRAVENOUS at 08:34

## 2021-08-10 RX ADMIN — MAGNESIUM SULFATE HEPTAHYDRATE 4 G: 40 INJECTION, SOLUTION INTRAVENOUS at 15:17

## 2021-08-10 RX ADMIN — CITALOPRAM HYDROBROMIDE 10 MG: 10 TABLET ORAL at 22:21

## 2021-08-10 RX ADMIN — CARVEDILOL 6.25 MG: 6.25 TABLET, FILM COATED ORAL at 16:49

## 2021-08-10 RX ADMIN — ALPRAZOLAM 0.5 MG: 0.5 TABLET ORAL at 22:29

## 2021-08-10 RX ADMIN — CARVEDILOL 6.25 MG: 6.25 TABLET, FILM COATED ORAL at 08:34

## 2021-08-10 RX ADMIN — FUROSEMIDE 40 MG: 40 TABLET ORAL at 16:49

## 2021-08-10 RX ADMIN — BIMATOPROST 1 DROP: 0.1 SOLUTION/ DROPS OPHTHALMIC at 22:25

## 2021-08-10 NOTE — ASSESSMENT & PLAN NOTE
· Acute metabolic encephalopathy secondary to hepatic encephalopathy and MUNA  · No evidence of acute infection  Now clinically improving  · Recently found to have asymptomatic bacteriuria and antibiotics discontinued  · Urinalysis weekly positive  Discontinued zosyn for now  · MRI brain negative for acute pathology  · Continue lactulose  Ammonia level is decreasing    Start to decrease lactulose dose due to worsening hypokalemia    Results from last 7 days   Lab Units 08/10/21  0518 08/09/21  0500 08/07/21 2016   AMMONIA umol/L 35 53* 49*

## 2021-08-10 NOTE — ASSESSMENT & PLAN NOTE
· Hemoglobin is trending down; recent hospitalization for duodenal bleeding     Repeat hemoglobin is improving  Results from last 7 days   Lab Units 08/10/21  0518 08/09/21  0500 08/08/21  0523 08/07/21 2015   HEMOGLOBIN g/dL 7 9* 7 5* 8 1* 9 2*

## 2021-08-10 NOTE — RESPIRATORY THERAPY NOTE
RT Protocol Note  Gavin Bravo 74 y o  female MRN: [id number]  Unit/Bed#: -01 Encounter: [id number]    Assessment    Principal Problem:    Acute metabolic encephalopathy  Active Problems:    Cirrhosis of liver with ascites ([place] [address] )    Type 2 diabetes mellitus without complication, without long-term current use of insulin (HCC)    MELVIN on CPAP    Kidney stones    Hypokalemia    Morbid obesity (HCC)    Bacteriuria    SIRS (systemic inflammatory response syndrome) ([facility])    Chronic anemia    MUNA (acute kidney injury) ([place] [address] )      Home Pulmonary Medications:none    Home Devices/Therapy: BiPAP/CPAP    Past Medical History:   Diagnosis Date    Diabetes mellitus ([place] [address] )     Kidney stone      Social History     Socioeconomic History    Marital status: /Civil Union     Spouse name: None    Number of children: None    Years of education: None    Highest education level: None   Occupational History    None   Tobacco Use    Smoking status: Never Smoker    Smokeless tobacco: Never Used   Vaping Use    Vaping Use: Never used   Substance and Sexual Activity    Alcohol use: Not Currently    Drug use: Never    Sexual activity: None   Other Topics Concern    None   Social History Narrative    None     Social Determinants of Health     Financial Resource Strain:     Difficulty of Paying Living Expenses:    Food Insecurity:     Worried About Running Out of Food in the Last Year:     Ran Out of Food in the Last Year:    Transportation Needs:     Lack of Transportation (Medical):      Lack of Transportation (Non-Medical):    Physical Activity:     Days of Exercise per Week:     Minutes of Exercise per Session:    Stress:     Feeling of Stress :    Social Connections:     Frequency of Communication with Friends and Family:     Frequency of Social Gatherings with Friends and Family:     Attends Christian Services:     Active Member of Clubs or Organizations:     Attends Club or Organization Meetings:    Lisa Gomez Marital Status:    Intimate Partner Violence:     Fear of Current or Ex-Partner:     Emotionally Abused:     Physically Abused:     Sexually Abused:        Subjective         Objective    Physical Exam:   Assessment Type: Assess only  General Appearance: Awake  Respiratory Pattern: Dyspnea with exertion  Chest Assessment: Chest expansion symmetrical  Bilateral Breath Sounds: Diminished  Cough: Non-productive    Vitals:  Blood pressure 136/72, pulse 95, temperature 99 4 °F (37 4 °C), resp  rate 20, height 5' 2" (1 575 m), weight 124 kg (273 lb 3 2 oz), SpO2 93 %  Imaging and other studies: The lungs are clear  No pneumothorax or pleural effusion  Plan    Respiratory Plan: Mild Distress pathway        Resp Comments: Patient has CPAP from home    She is capable of managin gits use on her own and was encouraged to do so

## 2021-08-10 NOTE — ASSESSMENT & PLAN NOTE
· Asymptomatic bacteriuria with history of enterococcus and ESBL  · Will defer on further antibiotics for now as procal is negative

## 2021-08-10 NOTE — ASSESSMENT & PLAN NOTE
· MUNA without CKD secondary to recent outpatient diuretic use  · Baseline creatinine close to the 0 5 - 0 6  Recently trialed on furosemide up to 80 mg   · Continues to improve with trial of IV diuretics with IV albumin    Will now transition to oral diuretics and discontinue albumin    Results from last 7 days   Lab Units 08/10/21  0518 08/09/21  1452 08/09/21  0500 08/08/21  0523 08/07/21 2015   BUN mg/dL 20 23 23 32* 32*   CREATININE mg/dL 1 19 1 16 1 13 1 27 1 45*   EGFR ml/min/1 73sq m 47 48 50 43 37

## 2021-08-10 NOTE — ASSESSMENT & PLAN NOTE
· 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  · Did have outpatient ultrasound at outside facility   perihepatic ascites noted  does not need to be tapped    · Recently started on carvedilol to decrease portal pressure

## 2021-08-10 NOTE — ASSESSMENT & PLAN NOTE
Lab Results   Component Value Date    HGBA1C 7 7 (H) 01/25/2021     Recent Labs     08/09/21  1611 08/09/21  2105 08/10/21  0720 08/10/21  1111   POCGLU 147* 166* 129 214*     · Holding metformin given lactic acidosis  Continue sliding scale

## 2021-08-10 NOTE — ASSESSMENT & PLAN NOTE
· Secondary to diuresis  Being repleted  Recheck now and again in the morning  · Also noted to have severe hypomagnesemia with magnesium level of 1 1    Replace IV and oral magnesium in addition to the potassium    Results from last 7 days   Lab Units 08/10/21  0518 08/09/21  1452 08/09/21  0500 08/08/21  0523 08/07/21 2015   POTASSIUM mmol/L 2 8* 3 2* 2 5* 3 2* 3 5

## 2021-08-10 NOTE — CASE MANAGEMENT
Case Management Assessment & Discharge Planning Note    Patient name Terrell Gaxiola  Location [facility] [id number] MRN [id number]  : 1955 Date 8/10/2021       Current Admission Date: 2021  Current Admission Diagnosis:  Acute metabolic encephalopathy   Patient Active Problem List   Diagnosis    Hyponatremia    Cirrhosis of liver with ascites ([place] [address] )    Acute cholecystitis    Thrombocytopenia ([place] [address] )    Acute cystitis without hematuria    Type 2 diabetes mellitus without complication, without long-term current use of insulin (HCC)    Class 3 severe obesity due to excess calories with serious comorbidity and body mass index (BMI) of 45 0 to 49 9 in adult ([place] [address] )    MELVIN on CPAP    Kidney stones    Abnormal heart rhythm    Hypokalemia    Acute upper GI bleed    Morbid obesity ([place] [address] )    Acute blood loss anemia    Bacteriuria    Tachycardia    Lab test positive for detection of COVID-19 virus    SIRS (systemic inflammatory response syndrome) ([facility])    Acute on chronic anemia    MUNA (acute kidney injury) ([place] [address] )    Acute metabolic encephalopathy    Previous Admission - Discharge Date:21   LOS (days): 3  Geometric Mean LOS (GMLOS) (days): 4 30  Days to GMLOS:1 6 Previous Discharge Diagnosis:  There are no discharge diagnoses documented for the most recent discharge       PATIENT READMITTED TO HOSPITAL  Risk of Unplanned Readmission Score  Predictive Model Details          23 (Low)  Factor Value    Calculated 8/10/2021 12:05 24% Number of active Rx orders 38    Risk of Unplanned Readmission Model 10% Number of ED visits in last six months 2     9% Number of hospitalizations in last year 2     9% ECG/EKG order present in last 6 months     8% Latest calcium low (8 2 mg/dL)     7% Diagnosis of electrolyte disorder present     7% Latest INR high (1 73)     6% Imaging order present in last 6 months     6% Latest hemoglobin low (7 9 g/dL)     5% Age 74     4% Diagnosis of deficiency anemia present     3% Charlson Comorbidity Index 3     2% Current length of stay 2 585 days     1% Active ulcer medication Rx order present         BUNDLE:      Reason for Referral: Self referral, readmission, dc planning  Pt was here 7/13-18 for GI bleed and was transferred to [facility]  For management and was dc to [facility] 7/18  I met with patient yesterday- baseline information was obtained  At this time I left a message for her  to call me back to collaborate baseline information  Per patient she was close to going home from [facility] and preferred to go home if she can upon stability  Primary  is Kaveh Winston her spouse  Unsure about POA or Advance Directive-     Admit with encephalopathy- hepatic with MUNA    OBJECTIVE:  Pt is a 74y o  year old /[employer], white or  [1], female with [place] preference of [address] admitted on 7/9/2759  7:57 PM  Pt is admitted to [facility] [id number] at [address] with complaints of Acute metabolic encephalopathy   Current admission status: Inpatient       Preferred Pharmacy:   [facility]/pharmacy #[id number] [facility] [address] [facility] [address] [place] [address] [address] [place] [address]  Phone: [phone] Fax: [phone]    Primary Care Provider: Jorge Fung DO    Primary Insurance: MEDICARE  Secondary Insurance: COMMERCIAL MISCELLANEOUS    ASSESSMENT:  Active Health Care Agents    There are no active Health Care Agents on file                 [facility]: [facility]    Readmission Root Cause  30 Day Readmission: Yes  Who directed you to return to the hospital?: Other (comment) (Facilities)  Did you understand whom to contact if you had questions or problems?:  (n/a)  Did you get your prescriptions before you left the hospital?:  (n/a)  Were you able to get your prescriptions filled when you left the hospital?:  (n/a)  Did you take your medications as prescribed?:  (believes so)  Were you able to get to your follow-up appointments?: No  Reason[de-identified] Readmitted prior to appointment  Patient was readmitted due to: change in mental status  Action Plan: hepatic encephalopathy management    Patient Information  Mental Status: Alert  During Assessment patient was accompanied by: Not accompanied during assessment  Assessment information provided by[de-identified] Patient  Primary Caregiver: Self  Support Systems: Self, Son, Daughter  What city do you live in?: [address] entry access options   Select all that apply : Stairs  Number of steps to enter home : 2  Type of Current Residence: 2 story home  Upon entering residence, is there a bedroom on the main floor (no further steps)?: No  A bedroom is located on the following floor levels of residence (select all that apply):: 2nd Floor  Number of steps to 2nd floor from main floor: One Flight  Living Arrangements: Lives Alone  Is patient a ?: No    Activities of Daily Living Prior to Admission  Functional Status: Assistance  Completes ADLs independently?: Yes  Does patient currently own DME?: Yes  What DME does the patient currently own?: [employer] Riley CPAP ([facility] for CPAP)  Does the patient have a history of Short-Term Rehab?: Yes (Came in from [facility] from [address] [place] [address] ( admission date of 7/24))         Patient Information Continued  Income Source: Pension/prison  Does patient have prescription coverage?: Yes  Does patient have a history of substance abuse?: No  Does patient have a history of Mental Health Diagnosis?: Yes (anxiety)  Has patient received inpatient treatment related to mental health in the last 2 years?: No         Means of Transportation  Means of Transport to Appts[de-identified] Drives Self  In the past 12 months, has lack of transportation kept you from medical appointments or from getting medications?: No  In the past 12 months, has lack of transportation kept you from meetings, work, or from getting things needed for daily living?: No  Was application for public transport provided?: No    DISCHARGE DETAILS:    Discharge planning discussed with[de-identified] patient  Freedom of Choice: Yes    Contacts  Patient Contacts: Dr Afua Young to Patient[de-identified] Family  Contact Method:  (left message for him to call me back to discuss care needs)  Reason/Outcome: Continuity of Care, Discharge [address]         Is the patient interested in [facility] at discharge?: Yes    DME Referral Provided  Referral made for DME?: No       Discharge Destination Plan[de-identified]  (TBD- STR vs HHC depending on functional evaluation)        Await therapy evaluation to determine plan of care- HHC vs STR  Await return call from MD [facility]

## 2021-08-10 NOTE — PROGRESS NOTES
[facility]  Progress Note - Felicita Morales 74 y o  female MRN: [id number]    Unit/Bed#: -01 Encounter: [id number]    Assessment/Plan:  Decompensated cirrhosis secondary to MOORE  Hepatic encephalopathy  Ascites  Acute on chronic liver failure  -MELD-Na 16  -reason for decompensation could be secondary to acute kidney injury or insufficient bowel movements with lactulose, GI bleeding is also possible given drop in hemoglobin although hemoglobin has been stable, SBP less likely  -ultrasound with Dopplers without evidence of ascites, liver lesion and normal hepatic vasculature  -decrease lactulose dose given excessive bowel movements and titrate to 3 bowel movements per day  -mentation improving, no asterixis on exam today  -trend MELD labs  -low Na diet (2g)     MUNA-resolving  -likely secondary to over-diuresis  -improving with albumin supplementation  -on diuresis with furosemide IV 40mg BID, transition to oral given significant improvement in her lower extremity edema; in the absence of ascites would defer spironolactone  -lower extremity edema management as per primary team   -continue to monitor     Anemia  -no signs of overt GI bleeding  -continue to monitor, recent EGD with duodenal ulcers with small esophageal varices, repeat EGD recommended in 3 months  -continue with PPI    Electrolyte abnormalities  -likely due to excessive bowel movements and high-dose loop diuretics  -replete per primary team    Subjective:   Patient feeling better today  Tolerating diet  Having excessive bowel movements  Denies nausea or vomiting or blood in stool  Objective:     Vitals: Blood pressure 133/73, pulse (!) 109, temperature 99 4 °F (37 4 °C), resp  rate 16, height 5' 2" (1 575 m), weight 124 kg (274 lb 0 5 oz), SpO2 (!) 85 %  ,Body mass index is 50 12 kg/m²        Intake/Output Summary (Last 24 hours) at 8/10/2021 1501  Last data filed at 8/10/2021 0942  Gross per 24 hour   Intake 480 ml   Output 1600 ml   Net -1120 ml       Review of Systems: as per HPI  Review of Systems   Constitutional: Negative for fever  Cardiovascular: Positive for leg swelling  Gastrointestinal: Positive for diarrhea  Negative for abdominal pain, nausea and vomiting  Neurological: Positive for weakness  Psychiatric/Behavioral: Positive for confusion  Physical Exam:     Physical Exam  Constitutional:       General: She is not in acute distress  Appearance: She is obese  She is ill-appearing  HENT:      Head: Normocephalic  Eyes:      General: No scleral icterus  Pulmonary:      Effort: Pulmonary effort is normal    Abdominal:      Palpations: Abdomen is soft  Tenderness: There is no abdominal tenderness  Musculoskeletal:      Right lower leg: Edema present  Left lower leg: Edema present  Skin:     Coloration: Skin is not jaundiced  Neurological:      Mental Status: She is oriented to person, place, and time     Psychiatric:         Mood and Affect: Mood normal            Invasive Devices     Peripheral Intravenous Line            Peripheral IV 08/08/21 Right Forearm 1 day          Drain            External Urinary Catheter 2 days                        CBC:   Lab Results   Component Value Date    WBC 8 49 08/10/2021    HGB 7 9 (L) 08/10/2021    HCT 24 8 (L) 08/10/2021    MCV 95 08/10/2021     (L) 08/10/2021    MCH 30 3 08/10/2021    MCHC 31 9 08/10/2021    RDW 17 9 (H) 08/10/2021    MPV 9 4 08/10/2021   ,   CMP:   Lab Results   Component Value Date    K 2 8 (L) 08/10/2021     08/10/2021    CO2 34 (H) 08/10/2021    BUN 20 08/10/2021    CREATININE 1 19 08/10/2021    CALCIUM 8 2 (L) 08/10/2021    AST 36 08/10/2021    ALT 22 08/10/2021    ALKPHOS 60 08/10/2021    EGFR 47 08/10/2021   ,   Lipase: No results found for: LIPASE,  PT/INR:   Lab Results   Component Value Date    INR 1 73 (H) 08/10/2021

## 2021-08-10 NOTE — PROGRESS NOTES
[address]  Progress Note - Gera Friedman 1955, 74 y o  female MRN: [id number]  Unit/Bed#: -Krystal Encounter: [id number]  Primary Care Provider: Jack Jeffries DO   Date and time admitted to hospital: 8/7/2021  7:57 PM    * Acute metabolic encephalopathy  Assessment & Plan  · Acute metabolic encephalopathy secondary to hepatic encephalopathy and MUNA  · No evidence of acute infection  Now clinically improving  · Recently found to have asymptomatic bacteriuria and antibiotics discontinued  · Urinalysis weekly positive  Discontinued zosyn for now  · MRI brain negative for acute pathology  · Continue lactulose  Ammonia level is decreasing  Start to decrease lactulose dose due to worsening hypokalemia    Results from last 7 days   Lab Units 08/10/21  0518 08/09/21  0500 08/07/21 2016   AMMONIA umol/L 35 53* 49*       Hypokalemia  Assessment & Plan  · Secondary to diuresis  Being repleted  Recheck now and again in the morning  · Also noted to have severe hypomagnesemia with magnesium level of 1 1  Replace IV and oral magnesium in addition to the potassium    Results from last 7 days   Lab Units 08/10/21  0518 08/09/21  1452 08/09/21  0500 08/08/21  0523 08/07/21 2015   POTASSIUM mmol/L 2 8* 3 2* 2 5* 3 2* 3 5       MUNA (acute kidney injury) ([facility] [address] )  Assessment & Plan  · MUNA without CKD secondary to recent outpatient diuretic use  · Baseline creatinine close to the 0 5 - 0 6  Recently trialed on furosemide up to 80 mg   · Continues to improve with trial of IV diuretics with IV albumin    Will now transition to oral diuretics and discontinue albumin    Results from last 7 days   Lab Units 08/10/21  0518 08/09/21  1452 08/09/21  0500 08/08/21  0523 08/07/21 2015   BUN mg/dL 20 23 23 32* 32*   CREATININE mg/dL 1 19 1 16 1 13 1 27 1 45*   EGFR ml/min/1 73sq m 47 48 50 43 37       Acute on chronic anemia  Assessment & Plan  · Hemoglobin is trending down; recent hospitalization for duodenal bleeding   Repeat hemoglobin is improving  Results from last 7 days   Lab Units 08/10/21  0518 08/09/21  0500 08/08/21  0523 08/07/21 2015   HEMOGLOBIN g/dL 7 9* 7 5* 8 1* 9 2*       Bacteriuria  Assessment & Plan  · Asymptomatic bacteriuria with history of enterococcus and ESBL  · Will defer on further antibiotics for now as procal is negative    Morbid obesity (HCC)  Assessment & Plan  · Body mass index is 50 12 kg/m²  Kidney stones  Assessment & Plan  · Maintain on lasix  hold hctz    MELVIN on CPAP  Assessment & Plan  · Continue CPAP    Type 2 diabetes mellitus without complication, without long-term current use of insulin [facility])  Assessment & Plan  Lab Results   Component Value Date    HGBA1C 7 7 (H) 01/25/2021     Recent Labs     08/09/21  1611 08/09/21  2105 08/10/21  0720 08/10/21  1111   POCGLU 147* 166* 129 214*     · Holding metformin given lactic acidosis  Continue sliding scale  Cirrhosis of liver with ascites ([facility] [address] )  Assessment & Plan  · 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  · Did have outpatient ultrasound at outside facility   perihepatic ascites noted  does not need to be tapped  · Recently started on carvedilol to decrease portal pressure      VTE Pharmacologic Prophylaxis:   Pharmacologic: Pharmacologic VTE Prophylaxis contraindicated due to Cirrhosis  Mechanical VTE Prophylaxis in Place: Yes    Patient Centered Rounds: I have performed bedside rounds with nursing staff today  Discussions with Specialists or Other Care Team Provider:  Discussed with GI    Education and Discussions with Family / Patient:  Discussed with patient at bedside    Time Spent for Care: 30 minutes  More than 50% of total time spent on counseling and coordination of care as described above      Current Length of Stay: 3 day(s)    Current Patient Status: Inpatient   Certification Statement: The patient will continue to require additional inpatient hospital stay due to Acute metabolic encephalopathy    Discharge Plan:  Anticipate discharge in the next 24-48 hours    Code Status: Level 1 - Full Code      Subjective:   Patient denies any chest pain or shortness of breath or abdominal pain  Had a lot of diarrhea stay with lactulose  Objective:     Vitals:   Temp (24hrs), Av 4 °F (37 4 °C), Min:99 4 °F (37 4 °C), Max:99 4 °F (37 4 °C)    Temp:  [99 4 °F (37 4 °C)] 99 4 °F (37 4 °C)  HR:  [] 109  Resp:  [16-20] 16  BP: (132-136)/(72-76) 133/73  SpO2:  [85 %-96 %] 85 %  Body mass index is 50 12 kg/m²  Input and Output Summary (last 24 hours): Intake/Output Summary (Last 24 hours) at 8/10/2021 1427  Last data filed at 8/10/2021 0942  Gross per 24 hour   Intake 480 ml   Output 1600 ml   Net -1120 ml       Physical Exam:     Physical Exam  Vitals and nursing note reviewed  Constitutional:       Appearance: Normal appearance  HENT:      Head: Normocephalic and atraumatic  Right Ear: External ear normal       Left Ear: External ear normal       Nose: Nose normal       Mouth/Throat:      Pharynx: Oropharynx is clear  Cardiovascular:      Rate and Rhythm: Normal rate and regular rhythm  Heart sounds: Normal heart sounds  Pulmonary:      Effort: Pulmonary effort is normal       Breath sounds: Normal breath sounds  Abdominal:      General: Bowel sounds are normal       Palpations: Abdomen is soft  Tenderness: There is no abdominal tenderness  Musculoskeletal:         General: Normal range of motion  Cervical back: Normal range of motion and neck supple  Skin:     General: Skin is warm and dry  Capillary Refill: Capillary refill takes less than 2 seconds  Neurological:      General: No focal deficit present  Mental Status: She is alert and oriented to person, place, and time     Psychiatric:         Mood and Affect: Mood normal            Additional Data:     Labs:    Results from last 7 days   Lab Units 08/10/21  0518 21  0523   WBC Thousand/uL 8 49 12 77*   HEMOGLOBIN g/dL 7 9* 8 1*   HEMATOCRIT % 24 8* 25 6*   PLATELETS Thousands/uL 115* 151   NEUTROS PCT %  --  58   LYMPHS PCT %  --  28   MONOS PCT %  --  11   EOS PCT %  --  2     Results from last 7 days   Lab Units 08/10/21  0518   SODIUM mmol/L 145   POTASSIUM mmol/L 2 8*   CHLORIDE mmol/L 105   CO2 mmol/L 34*   BUN mg/dL 20   CREATININE mg/dL 1 19   ANION GAP mmol/L 6   CALCIUM mg/dL 8 2*   ALBUMIN g/dL 2 8*   TOTAL BILIRUBIN mg/dL 1 76*   ALK PHOS U/L 60   ALT U/L 22   AST U/L 36   GLUCOSE RANDOM mg/dL 121     Results from last 7 days   Lab Units 08/10/21  0518   INR  1 73*     Results from last 7 days   Lab Units 08/10/21  1111 08/10/21  0720 08/09/21  2105 08/09/21  1611 08/09/21  1053 08/09/21  0836 08/08/21  2101 08/08/21  1644 08/08/21  1203 08/08/21  0953 08/08/21  0050   POC GLUCOSE mg/dl 214* 129 166* 147* 166* 104 193* 144* 137 146* 123         Results from last 7 days   Lab Units 08/09/21  0500 08/08/21  0523 08/08/21  0212 08/07/21  2339 08/07/21 2015   LACTIC ACID mmol/L  --  2 2* 2 7* 3 1* 3 4*   PROCALCITONIN ng/ml 0 08  --  <0 05  --  0 08           * I Have Reviewed All Lab Data Listed Above  * Additional Pertinent Lab Tests Reviewed: Jeff [facility] Admission Reviewed    Imaging:    Imaging Reports Reviewed Today Include:  Ultrasound abdomen and liver Doppler  Imaging Personally Reviewed by Myself Includes:  None    Recent Cultures (last 7 days):     Results from last 7 days   Lab Units 08/07/21  2142 08/07/21  2021 08/07/21 2015   BLOOD CULTURE   --  No Growth at 24 hrs  No Growth at 24 hrs     URINE CULTURE  50,000-59,000 cfu/ml Non lactose fermenting gram negative tosha*  --   --        Last 24 Hours Medication List:   Current Facility-Administered Medications   Medication Dose Route Frequency Provider Last Rate    ALPRAZolam  0 5 mg Oral HS PRN Mat Gut, CRNP      bimatoprost  1 drop Both Eyes HS Mat Gut, CRNP      carvedilol  6 25 mg Oral BID With Meals TANISHA Dupree      citalopram  10 mg Oral HS TANISHA Dupree      ferrous sulfate  325 mg Oral Daily With Breakfast TANISHA Dupree      fluticasone  1 spray Nasal Daily TANISHA Dupere      furosemide  40 mg Oral BID (diuretic) Manjeet Sanchez MD      insulin lispro  2-12 Units Subcutaneous TID AC TANISHA Dupree      insulin lispro  2-12 Units Subcutaneous HS TANISHA Dupree      lactulose  20 g Oral BID Manjeet Sanchez MD      magnesium oxide  400 mg Oral BID Manjeet Sanchez MD      magnesium sulfate  4 g Intravenous Once Manjeet Sanchez MD      pantoprazole  40 mg Oral BID AC TANISHA Dupree      potassium chloride  40 mEq Oral Q4H Manjeet Sanchez MD      potassium chloride  40 mEq Oral Once Manjeet Sanchez MD      traZODone  25 mg Oral HS TANISHA Dupree          Today, Patient Was Seen By: Manjeet Sanchez MD    ** Please Note: Dictation voice to text software may have been used in the creation of this document   **

## 2021-08-11 LAB
AMMONIA PLAS-SCNC: 19 UMOL/L (ref 11–35)
ANION GAP SERPL CALCULATED.3IONS-SCNC: 8 MMOL/L (ref 4–13)
BACTERIA UR CULT: ABNORMAL
BUN SERPL-MCNC: 15 MG/DL (ref 5–25)
CALCIUM SERPL-MCNC: 8.1 MG/DL (ref 8.3–10.1)
CHLORIDE SERPL-SCNC: 106 MMOL/L (ref 100–108)
CO2 SERPL-SCNC: 30 MMOL/L (ref 21–32)
CREAT SERPL-MCNC: 1.06 MG/DL (ref 0.6–1.3)
GFR SERPL CREATININE-BSD FRML MDRD: 54 ML/MIN/1.73SQ M
GLUCOSE SERPL-MCNC: 108 MG/DL (ref 65–140)
GLUCOSE SERPL-MCNC: 110 MG/DL (ref 65–140)
GLUCOSE SERPL-MCNC: 142 MG/DL (ref 65–140)
GLUCOSE SERPL-MCNC: 151 MG/DL (ref 65–140)
GLUCOSE SERPL-MCNC: 173 MG/DL (ref 65–140)
MAGNESIUM SERPL-MCNC: 1.7 MG/DL (ref 1.6–2.6)
POTASSIUM SERPL-SCNC: 3.5 MMOL/L (ref 3.5–5.3)
SODIUM SERPL-SCNC: 144 MMOL/L (ref 136–145)

## 2021-08-11 PROCEDURE — 80048 BASIC METABOLIC PNL TOTAL CA: CPT | Performed by: FAMILY MEDICINE

## 2021-08-11 PROCEDURE — 82140 ASSAY OF AMMONIA: CPT | Performed by: FAMILY MEDICINE

## 2021-08-11 PROCEDURE — 83735 ASSAY OF MAGNESIUM: CPT | Performed by: FAMILY MEDICINE

## 2021-08-11 PROCEDURE — 97116 GAIT TRAINING THERAPY: CPT

## 2021-08-11 PROCEDURE — 94760 N-INVAS EAR/PLS OXIMETRY 1: CPT

## 2021-08-11 PROCEDURE — 82948 REAGENT STRIP/BLOOD GLUCOSE: CPT

## 2021-08-11 PROCEDURE — 97167 OT EVAL HIGH COMPLEX 60 MIN: CPT

## 2021-08-11 PROCEDURE — 97163 PT EVAL HIGH COMPLEX 45 MIN: CPT

## 2021-08-11 PROCEDURE — 99232 SBSQ HOSP IP/OBS MODERATE 35: CPT | Performed by: FAMILY MEDICINE

## 2021-08-11 PROCEDURE — 97535 SELF CARE MNGMENT TRAINING: CPT

## 2021-08-11 PROCEDURE — 94660 CPAP INITIATION&MGMT: CPT

## 2021-08-11 RX ORDER — POTASSIUM CHLORIDE 20MEQ/15ML
40 LIQUID (ML) ORAL DAILY
Status: DISCONTINUED | OUTPATIENT
Start: 2021-08-11 | End: 2021-08-12

## 2021-08-11 RX ORDER — FUROSEMIDE 10 MG/ML
20 INJECTION INTRAMUSCULAR; INTRAVENOUS ONCE
Status: COMPLETED | OUTPATIENT
Start: 2021-08-11 | End: 2021-08-11

## 2021-08-11 RX ADMIN — CARVEDILOL 6.25 MG: 6.25 TABLET, FILM COATED ORAL at 09:20

## 2021-08-11 RX ADMIN — Medication 400 MG: at 16:37

## 2021-08-11 RX ADMIN — CARVEDILOL 6.25 MG: 6.25 TABLET, FILM COATED ORAL at 16:37

## 2021-08-11 RX ADMIN — INSULIN LISPRO 2 UNITS: 100 INJECTION, SOLUTION INTRAVENOUS; SUBCUTANEOUS at 16:38

## 2021-08-11 RX ADMIN — TRAZODONE HYDROCHLORIDE 25 MG: 50 TABLET ORAL at 22:25

## 2021-08-11 RX ADMIN — CITALOPRAM HYDROBROMIDE 10 MG: 10 TABLET ORAL at 22:25

## 2021-08-11 RX ADMIN — FUROSEMIDE 40 MG: 40 TABLET ORAL at 16:37

## 2021-08-11 RX ADMIN — LACTULOSE 20 G: 10 SOLUTION ORAL at 09:21

## 2021-08-11 RX ADMIN — FUROSEMIDE 40 MG: 40 TABLET ORAL at 09:21

## 2021-08-11 RX ADMIN — BIMATOPROST 1 DROP: 0.1 SOLUTION/ DROPS OPHTHALMIC at 22:26

## 2021-08-11 RX ADMIN — Medication 400 MG: at 09:20

## 2021-08-11 RX ADMIN — POTASSIUM CHLORIDE 40 MEQ: 20 SOLUTION ORAL at 12:27

## 2021-08-11 RX ADMIN — INSULIN LISPRO 2 UNITS: 100 INJECTION, SOLUTION INTRAVENOUS; SUBCUTANEOUS at 12:27

## 2021-08-11 RX ADMIN — PANTOPRAZOLE SODIUM 40 MG: 40 TABLET, DELAYED RELEASE ORAL at 06:21

## 2021-08-11 RX ADMIN — PANTOPRAZOLE SODIUM 40 MG: 40 TABLET, DELAYED RELEASE ORAL at 16:37

## 2021-08-11 RX ADMIN — LACTULOSE 20 G: 10 SOLUTION ORAL at 16:36

## 2021-08-11 RX ADMIN — FLUTICASONE PROPIONATE 1 SPRAY: 50 SPRAY, METERED NASAL at 09:26

## 2021-08-11 RX ADMIN — FUROSEMIDE 20 MG: 10 INJECTION, SOLUTION INTRAMUSCULAR; INTRAVENOUS at 12:27

## 2021-08-11 RX ADMIN — FERROUS SULFATE TAB 325 MG (65 MG ELEMENTAL FE) 325 MG: 325 (65 FE) TAB at 09:21

## 2021-08-11 NOTE — ASSESSMENT & PLAN NOTE
· 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  · Did have outpatient ultrasound at outside facility   perihepatic ascites noted  does not need to be tapped  · Recently started on carvedilol to decrease portal pressure  · Fluid overload noted and weight is decreasing with diuresis  Baseline weight is around 260 lb  Today weight is 266 lb  Will give another dose of IV Lasix and try to diurese further and continue oral Lasix as well    Check daily weights and intake and output

## 2021-08-11 NOTE — PLAN OF CARE
Problem: Potential for Falls  Goal: Patient will remain free of falls  Description: INTERVENTIONS:  - Educate patient/family on patient safety including physical limitations  - Instruct patient to call for assistance with activity   - Consult OT/PT to assist with strengthening/mobility   - Keep Call bell within reach  - Keep bed low and locked with side rails adjusted as appropriate  - Keep care items and personal belongings within reach  - Initiate and maintain comfort rounds  - Make Fall Risk Sign visible to staff  - Apply yellow socks and bracelet for high fall risk patients  - Consider moving patient to room near nurses station  Outcome: Progressing     Problem: MOBILITY - ADULT  Goal: Maintain or return to baseline ADL function  Description: INTERVENTIONS:  -  Assess patient's ability to carry out ADLs; assess patient's baseline for ADL function and identify physical deficits which impact ability to perform ADLs (bathing, care of mouth/teeth, toileting, grooming, dressing, etc )  - Assess/evaluate cause of self-care deficits   - Assess range of motion  - Assess patient's mobility; develop plan if impaired  - Assess patient's need for assistive devices and provide as appropriate  - Encourage maximum independence but intervene and supervise when necessary  - Involve family in performance of ADLs  - Assess for home care needs following discharge   - Consider OT consult to assist with ADL evaluation and planning for discharge  - Provide patient education as appropriate  Outcome: Progressing  Goal: Maintains/Returns to pre admission functional level  Description: INTERVENTIONS:  - Perform BMAT or MOVE assessment daily    - Set and communicate daily mobility goal to care team and patient/family/caregiver     - Collaborate with rehabilitation services on mobility goals if consulted  - Out of bed for toileting  - Record patient progress and toleration of activity level   Outcome: Progressing     Problem: Prexisting or High Potential for Compromised Skin Integrity  Goal: Skin integrity is maintained or improved  Description: INTERVENTIONS:  - Identify patients at risk for skin breakdown  - Assess and monitor skin integrity  - Assess and monitor nutrition and hydration status  - Monitor labs   - Assess for incontinence   - Turn and reposition patient  - Assist with mobility/ambulation  - Relieve pressure over bony prominences  - Avoid friction and shearing  - Provide appropriate hygiene as needed including keeping skin clean and dry  - Evaluate need for skin moisturizer/barrier cream  - Collaborate with interdisciplinary team   - Patient/family teaching  - Consider wound care consult   Outcome: Progressing     Problem: PAIN - ADULT  Goal: Verbalizes/displays adequate comfort level or baseline comfort level  Description: Interventions:  - Encourage patient to monitor pain and request assistance  - Assess pain using appropriate pain scale  - Administer analgesics based on type and severity of pain and evaluate response  - Implement non-pharmacological measures as appropriate and evaluate response  - Consider cultural and social influences on pain and pain management  - Notify physician/advanced practitioner if interventions unsuccessful or patient reports new pain  Outcome: Progressing     Problem: INFECTION - ADULT  Goal: Absence or prevention of progression during hospitalization  Description: INTERVENTIONS:  - Assess and monitor for signs and symptoms of infection  - Monitor lab/diagnostic results  - Monitor all insertion sites, i e  indwelling lines, tubes, and drains  - Monitor endotracheal if appropriate and nasal secretions for changes in amount and color  - [place] appropriate cooling/warming therapies per order  - Administer medications as ordered  - Instruct and encourage patient and family to use good hand hygiene technique  - Identify and instruct in appropriate isolation precautions for identified infection/condition  Outcome: Progressing  Goal: Absence of fever/infection during neutropenic period  Description: INTERVENTIONS:  - Monitor WBC    Outcome: Progressing     Problem: SAFETY ADULT  Goal: Patient will remain free of falls  Description: INTERVENTIONS:  - Educate patient/family on patient safety including physical limitations  - Instruct patient to call for assistance with activity   - Consult OT/PT to assist with strengthening/mobility   - Keep Call bell within reach  - Keep bed low and locked with side rails adjusted as appropriate  - Keep care items and personal belongings within reach  - Initiate and maintain comfort rounds  - Make Fall Risk Sign visible to staff  - Apply yellow socks and bracelet for high fall risk patients  - Consider moving patient to room near nurses station  Outcome: Progressing  Goal: Maintain or return to baseline ADL function  Description: INTERVENTIONS:  -  Assess patient's ability to carry out ADLs; assess patient's baseline for ADL function and identify physical deficits which impact ability to perform ADLs (bathing, care of mouth/teeth, toileting, grooming, dressing, etc )  - Assess/evaluate cause of self-care deficits   - Assess range of motion  - Assess patient's mobility; develop plan if impaired  - Assess patient's need for assistive devices and provide as appropriate  - Encourage maximum independence but intervene and supervise when necessary  - Involve family in performance of ADLs  - Assess for home care needs following discharge   - Consider OT consult to assist with ADL evaluation and planning for discharge  - Provide patient education as appropriate  Outcome: Progressing  Goal: Maintains/Returns to pre admission functional level  Description: INTERVENTIONS:  - Perform BMAT or MOVE assessment daily    - Set and communicate daily mobility goal to care team and patient/family/caregiver     - Collaborate with rehabilitation services on mobility goals if consulted  - Out of bed for toileting  - Record patient progress and toleration of activity level   Outcome: Progressing     Problem: DISCHARGE PLANNING  Goal: Discharge to home or other facility with appropriate resources  Description: INTERVENTIONS:  - Identify barriers to discharge w/patient and caregiver  - Arrange for needed discharge resources and transportation as appropriate  - Identify discharge learning needs (meds, wound care, etc )  - Arrange for interpretive services to assist at discharge as needed  - Refer to Case Management Department for coordinating discharge planning if the patient needs post-hospital services based on physician/advanced practitioner order or complex needs related to functional status, cognitive ability, or social support system  Outcome: Progressing     Problem: Knowledge Deficit  Goal: Patient/family/caregiver demonstrates understanding of disease process, treatment plan, medications, and discharge instructions  Description: Complete learning assessment and assess knowledge base  Interventions:  - Provide teaching at level of understanding  - Provide teaching via preferred learning methods  Outcome: Progressing     Problem: Nutrition/Hydration-ADULT  Goal: Nutrient/Hydration intake appropriate for improving, restoring or maintaining nutritional needs  Description: Monitor and assess patient's nutrition/hydration status for malnutrition  Collaborate with interdisciplinary team and initiate plan and interventions as ordered  Monitor patient's weight and dietary intake as ordered or per policy  Utilize nutrition screening tool and intervene as necessary  Determine patient's food preferences and provide high-protein, high-caloric foods as appropriate       INTERVENTIONS:  - Monitor oral intake, urinary output, labs, and treatment plans  - Assess nutrition and hydration status and recommend course of action  - Evaluate amount of meals eaten  - Assist patient with eating if necessary   - Allow adequate time for meals  - Recommend/ encourage appropriate diets, oral nutritional supplements, and vitamin/mineral supplements  - Order, calculate, and assess calorie counts as needed  - Recommend, monitor, and adjust tube feedings and TPN/PPN based on assessed needs  - Assess need for intravenous fluids  - Provide specific nutrition/hydration education as appropriate  - Include patient/family/caregiver in decisions related to nutrition  Outcome: Progressing

## 2021-08-11 NOTE — ASSESSMENT & PLAN NOTE
· MUNA without CKD secondary to recent outpatient diuretic use  · Baseline creatinine close to the 0 5 - 0 6  · Need to improve fluid overload and hence continue diuresis with Lasix   creatinine acceptable at 1 0    Results from last 7 days   Lab Units 08/11/21  0452 08/10/21  1448 08/10/21  0518 08/09/21  1452 08/09/21  0500 08/08/21  0523 08/07/21 2015   BUN mg/dL 15 18 20 23 23 32* 32*   CREATININE mg/dL 1 06 1 19 1 19 1 16 1 13 1 27 1 45*   EGFR ml/min/1 73sq m 54 47 47 48 50 43 37

## 2021-08-11 NOTE — PROGRESS NOTES
[facility]  Progress Note - Regine Olson 74 y o  female MRN: [id number]    Unit/Bed#: -01 Encounter: [id number]    Assessment/Plan:  Decompensated cirrhosis secondary to MOORE  Hepatic encephalopathy likely due to MUNA/hypokalemia  Ascites mild on imaging  H/o GI bleed d/t duondenal ulcer  Mild varices (7/18/21)  -MELD-Na 15  -mental status improved; renal function improving and potassium satisfactory; no evidence of infection, GI bleeding  -Doppler without evidence of portal vein thrombosis  - Continue diuretics and monitor renal function, electrolytes; sodium restricted diet; consider adding low dose of aldactone 50 mg daily if she will tolerate (will help prevent hypokalemia in setting of loop diuretic)  - Repeat US to re-assess ascites if worsening abdominal distention  -titrate lactulose dose with goal of 2-3 soft BM per day; no need to monitor ammonia level as this does not correlated with HE  -follow daily MELD labs           Subjective:   Patient seen and examined on rounds  She is being followed for cirrhosis and hepatic decompensation with new onset ascites and encephalopathy in the setting of MUNA/hypokalemia  Cultures are negative and Hgb is stable without evidence of active bleeding  Objective:     Vitals: Blood pressure 129/66, pulse 87, temperature 99 °F (37 2 °C), resp  rate 17, height 5' 2" (1 575 m), weight 121 kg (266 lb 9 6 oz), SpO2 90 %  ,Body mass index is 48 76 kg/m²  Intake/Output Summary (Last 24 hours) at 8/11/2021 1509  Last data filed at 8/11/2021 1326  Gross per 24 hour   Intake 1620 ml   Output 1000 ml   Net 620 ml       Review of Systems: as per HPI  Review of Systems   Constitutional: Negative for fever  Cardiovascular: Positive for leg swelling  Gastrointestinal: Negative for abdominal pain, diarrhea, nausea and vomiting  Abdomen feels more swollen   Neurological: Positive for weakness  Psychiatric/Behavioral: Negative for confusion  Physical Exam:     Physical Exam  Constitutional:       General: She is not in acute distress  Appearance: She is obese  She is not toxic-appearing  HENT:      Head: Normocephalic  Eyes:      General: No scleral icterus  Pulmonary:      Effort: Pulmonary effort is normal    Abdominal:      General: There is distension  Palpations: Abdomen is soft  Tenderness: There is no abdominal tenderness  There is no guarding or rebound  Comments: No appreciable fluid wave   Musculoskeletal:      Right lower leg: Edema present  Left lower leg: Edema present  Skin:     Coloration: Skin is not jaundiced  Comments: Spider angiomata noted on chest    Neurological:      Mental Status: She is oriented to person, place, and time  Comments: No asterixis   Psychiatric:         Mood and Affect: Mood normal            Invasive Devices     Peripheral Intravenous Line            Peripheral IV 08/10/21 Dorsal (posterior); Left Hand <1 day          Drain            External Urinary Catheter 3 days                      CBC:   No results found for: WBC, HGB, HCT, MCV, PLT, ADJUSTEDWBC, MCH, MCHC, RDW, MPV, NRBC,     CMP:   Lab Results   Component Value Date    K 3 5 08/11/2021     08/11/2021    CO2 30 08/11/2021    BUN 15 08/11/2021    CREATININE 1 06 08/11/2021    CALCIUM 8 1 (L) 08/11/2021    EGFR 54 08/11/2021   ,   Lipase:  No results found for: LIPASE,    PT/INR:   No results found for: PT, INR

## 2021-08-11 NOTE — PROGRESS NOTES
[facility]  Progress Note - Mark Barry 1955, 74 y o  female MRN: [id number]  Unit/Bed#: -[place] Encounter: [id number]  Primary Care Provider: Gelacio Bowen DO   Date and time admitted to hospital: 8/7/2021  7:57 PM    * Acute metabolic encephalopathy  Assessment & Plan  · Acute metabolic encephalopathy secondary to hepatic encephalopathy and MUNA  · No evidence of acute infection  Now clinically improving  · Recently found to have asymptomatic bacteriuria and antibiotics discontinued  · Urinalysis weekly positive  Discontinued zosyn for now  · MRI brain negative for acute pathology  · Continue lactulose  Ammonia level is decreasing  Results from last 7 days   Lab Units 08/11/21  0452 08/10/21  0518 08/09/21  0500 08/07/21 2016   AMMONIA umol/L 19 35 53* 49*       Hypokalemia  Assessment & Plan  · Secondary to diuresis  Being repleted daily  · Also noted to have severe hypomagnesemia with magnesium level of 1 1  Replace IV and oral magnesium in addition to the potassium  Magnesium level better at 1 7 today    Results from last 7 days   Lab Units 08/11/21  0452 08/10/21  1448 08/10/21  0518 08/09/21  1452 08/09/21  0500 08/08/21  0523 08/07/21 2015   POTASSIUM mmol/L 3 5 3 1* 2 8* 3 2* 2 5* 3 2* 3 5       MUNA (acute kidney injury) ([facility] [address] )  Assessment & Plan  · MUNA without CKD secondary to recent outpatient diuretic use  · Baseline creatinine close to the 0 5 - 0 6  · Need to improve fluid overload and hence continue diuresis with Lasix  creatinine acceptable at 1 0    Results from last 7 days   Lab Units 08/11/21  0452 08/10/21  1448 08/10/21  0518 08/09/21  1452 08/09/21  0500 08/08/21  0523 08/07/21 2015   BUN mg/dL 15 18 20 23 23 32* 32*   CREATININE mg/dL 1 06 1 19 1 19 1 16 1 13 1 27 1 45*   EGFR ml/min/1 73sq m 54 47 47 48 50 43 37       Acute on chronic anemia  Assessment & Plan  · Hemoglobin is trending down; recent hospitalization for duodenal bleeding   · Transfuse PRBC in case hemoglobin is less than 7 5  Results from last 7 days   Lab Units 08/10/21  0518 08/09/21  0500 08/08/21  0523 08/07/21 2015   HEMOGLOBIN g/dL 7 9* 7 5* 8 1* 9 2*       SIRS (systemic inflammatory response syndrome) (HCC)  Assessment & Plan  · No evidence of sepsis  No evidence of acute infection  · Elevated lactic acid secondary to liver disease and inability to clear  Stop further checks    Results from last 7 days   Lab Units 08/08/21  0523 08/08/21  0212 08/07/21  2339 08/07/21 2015   LACTIC ACID mmol/L 2 2* 2 7* 3 1* 3 4*       Bacteriuria  Assessment & Plan  · Asymptomatic bacteriuria with history of enterococcus and ESBL  · Will defer on further antibiotics for now as procal is negative    Morbid obesity (HCC)  Assessment & Plan  · Body mass index is 48 76 kg/m²  Kidney stones  Assessment & Plan  · Maintain on lasix  hold hctz    MELVIN on CPAP  Assessment & Plan  · Continue CPAP at     Type 2 diabetes mellitus without complication, without long-term current use of insulin [facility])  Assessment & Plan  Lab Results   Component Value Date    HGBA1C 7 7 (H) 01/25/2021     Recent Labs     08/10/21  1607 08/10/21  2152 08/11/21  0717 08/11/21  1116   POCGLU 183* 127 110 173*     · Holding metformin given lactic acidosis  Continue sliding scale  Cirrhosis of liver with ascites ([facility] [address] )  Assessment & Plan  · 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  · Did have outpatient ultrasound at outside facility   perihepatic ascites noted  does not need to be tapped  · Recently started on carvedilol to decrease portal pressure  · Fluid overload noted and weight is decreasing with diuresis  Baseline weight is around 260 lb  Today weight is 266 lb  Will give another dose of IV Lasix and try to diurese further and continue oral Lasix as well    Check daily weights and intake and output      VTE Pharmacologic Prophylaxis:   Pharmacologic: Pharmacologic VTE Prophylaxis contraindicated due to Recent GI bleed  Mechanical VTE Prophylaxis in Place: Yes    Patient Centered Rounds: I have performed bedside rounds with nursing staff today  Discussions with Specialists or Other Care Team Provider:  None    Education and Discussions with Family / Patient:  Discussed with patient and family    Time Spent for Care: 30 minutes  More than 50% of total time spent on counseling and coordination of care as described above  Current Length of Stay: 4 day(s)    Current Patient Status: Inpatient   Certification Statement: The patient will continue to require additional inpatient hospital stay due to Acute metabolic encephalopathy    Discharge Plan:  Pending progress  Family deciding between SNF for rehab versus home with home health    Code Status: Level 1 - Full Code      Subjective:   Patient denies any chest pain or shortness of breath or abdominal pain  Diarrhea is a little bit better today  More awake and alert and cooperative today  Does not want to go to rehab    Objective:     Vitals:   Temp (24hrs), Av °F (37 2 °C), Min:98 9 °F (37 2 °C), Max:99 °F (37 2 °C)    Temp:  [98 9 °F (37 2 °C)-99 °F (37 2 °C)] 99 °F (37 2 °C)  HR:  [87-96] 87  Resp:  [14-18] 17  BP: (125-129)/(61-68) 129/66  SpO2:  [90 %-92 %] 90 %  Body mass index is 48 76 kg/m²  Input and Output Summary (last 24 hours): Intake/Output Summary (Last 24 hours) at 2021 1227  Last data filed at 2021 0900  Gross per 24 hour   Intake 1260 ml   Output 1000 ml   Net 260 ml       Physical Exam:     Physical Exam  Vitals and nursing note reviewed  Constitutional:       Appearance: Normal appearance  HENT:      Head: Normocephalic and atraumatic  Right Ear: External ear normal       Left Ear: External ear normal       Nose: Nose normal       Mouth/Throat:      Pharynx: Oropharynx is clear  Eyes:      Pupils: Pupils are equal, round, and reactive to light     Cardiovascular:      Rate and Rhythm: Normal rate and regular rhythm  Heart sounds: Normal heart sounds  Pulmonary:      Effort: Pulmonary effort is normal       Breath sounds: Normal breath sounds  Abdominal:      General: Bowel sounds are normal  There is distension  Palpations: Abdomen is soft  Tenderness: There is no abdominal tenderness  Musculoskeletal:         General: Normal range of motion  Cervical back: Normal range of motion and neck supple  Right lower leg: Edema present  Left lower leg: Edema present  Skin:     General: Skin is warm and dry  Capillary Refill: Capillary refill takes less than 2 seconds  Neurological:      General: No focal deficit present  Mental Status: She is alert and oriented to person, place, and time     Psychiatric:         Mood and Affect: Mood normal            Additional Data:     Labs:    Results from last 7 days   Lab Units 08/10/21  0518 08/08/21  0523   WBC Thousand/uL 8 49 12 77*   HEMOGLOBIN g/dL 7 9* 8 1*   HEMATOCRIT % 24 8* 25 6*   PLATELETS Thousands/uL 115* 151   NEUTROS PCT %  --  58   LYMPHS PCT %  --  28   MONOS PCT %  --  11   EOS PCT %  --  2     Results from last 7 days   Lab Units 08/11/21  0452 08/10/21  0518   SODIUM mmol/L 144 145   POTASSIUM mmol/L 3 5 2 8*   CHLORIDE mmol/L 106 105   CO2 mmol/L 30 34*   BUN mg/dL 15 20   CREATININE mg/dL 1 06 1 19   ANION GAP mmol/L 8 6   CALCIUM mg/dL 8 1* 8 2*   ALBUMIN g/dL  --  2 8*   TOTAL BILIRUBIN mg/dL  --  1 76*   ALK PHOS U/L  --  60   ALT U/L  --  22   AST U/L  --  36   GLUCOSE RANDOM mg/dL 108 121     Results from last 7 days   Lab Units 08/10/21  0518   INR  1 73*     Results from last 7 days   Lab Units 08/11/21  1116 08/11/21  0717 08/10/21  2152 08/10/21  1607 08/10/21  1111 08/10/21  0720 08/09/21  2105 08/09/21  1611 08/09/21  1053 08/09/21  0836 08/08/21  2101 08/08/21  1644   POC GLUCOSE mg/dl 173* 110 127 183* 214* 129 166* 147* 166* 104 193* 144*         Results from last 7 days   Lab Units 08/09/21  0500 08/08/21  0523 08/08/21  0212 08/07/21  2339 08/07/21 2015   LACTIC ACID mmol/L  --  2 2* 2 7* 3 1* 3 4*   PROCALCITONIN ng/ml 0 08  --  <0 05  --  0 08           * I Have Reviewed All Lab Data Listed Above  * Additional Pertinent Lab Tests Reviewed: [facility] Admission Reviewed    Imaging:    Imaging Reports Reviewed Today Include:  Ultrasound abdomen  Imaging Personally Reviewed by Myself Includes:  None    Recent Cultures (last 7 days):     Results from last 7 days   Lab Units 08/07/21  2142 08/07/21  2021 08/07/21 2015   BLOOD CULTURE   --  No Growth at 48 hrs  No Growth at 48 hrs     URINE CULTURE  50,000-59,000 cfu/ml Morganella morganii*  50,000-59,000 cfu/ml Escherichia coli*  20,000-29,000 cfu/ml   --   --        Last 24 Hours Medication List:   Current Facility-Administered Medications   Medication Dose Route Frequency Provider Last Rate    ALPRAZolam  0 5 mg Oral HS PRN Bulah Arun, CRNP      bimatoprost  1 drop Both Eyes HS Bulah Arun, CRNP      carvedilol  6 25 mg Oral BID With Meals Bulah Arun, CRNP      citalopram  10 mg Oral HS Bulah Arun, CRNP      ferrous sulfate  325 mg Oral Daily With Breakfast Bulah Arun, CRNP      fluticasone  1 spray Nasal Daily Bulah Arun, CRNP      furosemide  20 mg Intravenous Once Chalo Bertrand MD      furosemide  40 mg Oral BID (diuretic) Chalo Bertrand MD      insulin lispro  2-12 Units Subcutaneous TID AC Bulah Arun, CRNP      insulin lispro  2-12 Units Subcutaneous HS Bulah Arun, CRNP      lactulose  20 g Oral BID Chalo Bertrand MD      magnesium oxide  400 mg Oral BID Chalo Bertrand MD      pantoprazole  40 mg Oral BID AC Bulah Arun, CRNP      potassium chloride  40 mEq Oral Daily Chalo Bertrand MD      traZODone  25 mg Oral HS Bulah Arun, CRNP          Today, Patient Was Seen By: Chalo Bertrand MD    ** Please Note: Dictation voice to text software may have been used in the creation of this document   **

## 2021-08-11 NOTE — ASSESSMENT & PLAN NOTE
· Hemoglobin is trending down; recent hospitalization for duodenal bleeding      · Transfuse PRBC in case hemoglobin is less than 7 5  Results from last 7 days   Lab Units 08/10/21  0518 08/09/21  0500 08/08/21  0523 08/07/21 2015   HEMOGLOBIN g/dL 7 9* 7 5* 8 1* 9 2*

## 2021-08-11 NOTE — PLAN OF CARE
Problem: PHYSICAL THERAPY ADULT  Goal: Performs mobility at highest level of function for planned discharge setting  See evaluation for individualized goals  Description: Treatment/Interventions: Functional transfer training, LE strengthening/ROM, Elevations, Therapeutic exercise, Endurance training, Patient/family training, Equipment eval/education, Bed mobility, Gait training, Compensatory technique education, Spoke to nursing, Spoke to case management, OT  Equipment Recommended:  (TBD by rehab)       See flowsheet documentation for full assessment, interventions and recommendations  Note: Prognosis: Good  Problem List: Decreased strength, Decreased endurance, Impaired balance, Decreased mobility, Impaired judgement, Decreased safety awareness, Obesity  Assessment:  Pt is a 74 y o  female seen for PT evaluation s/p admission to [address] [place] [address] on 8/7/2021 with Acute metabolic encephalopathy  Order placed for PT services    Upon evaluation: Pt is presenting with impaired functional mobility due to decreased strength, decreased endurance, impaired balance, gait deviations, decreased safety awareness, impaired judgment, fall risk and LE edema requiring minimal assistance for bed mobility, steadying to minimal assistance for transfers and minimal assistance for ambulation with RW  Pt's clinical presentation is currently unpredictable given the functional mobility deficits above, especially weakness, edema of extremities, decreased endurance, gait deviations, decreased activity tolerance, decreased functional mobility tolerance, decreased safety awareness and impaired judgement, coupled with fall risks as indicated by AM-PAC 6-Clicks: 17/55 as well as hx of falls, impaired balance, polypharmacy, impaired judgement and decreased safety awareness and combined with medical complications of abnormal H&H, abnormal WBCs, abnormal blood sugars, abnormal potassium values, abnormal CO2 values, multiple readmissions, fear/retreat, need for input for mobility technique/safety and abnormal lactic acid, increased ammonia  Pt's PMHx and comorbidities that may affect physical performance and progress include: frequent UTIs, non alcoholic liver cirrhosis, DM, portal HTN, obesity  Personal factors affecting pt at time of IE include: inaccessible home environment, step(s) to enter environment, multi-level environment, limited home support, inability to perform IADLs, inability to perform ADLs, inability to navigate level surfaces without external assistance, inability to ambulate household distances and recent fall(s)/fall history  Pt will benefit from continued skilled PT services to address deficits as defined above and to maximize level of functional mobility to facilitate return toward PLOF and improved QOL  From PT/mobility standpoint, recommendation at time of d/c would be Short term rehab pending progress in order to reduce fall risk and maximize pt's functional independence and consistency with mobility in order to facilitate return to PLOF  Recommend ther ex next 1-2 sessions  Barriers to Discharge: Decreased caregiver support, Inaccessible home environment  Barriers to Discharge Comments: requires increased assistance to complete mobility, spouse works during the day so patient would need to be able to care for self during the day  PT Discharge Recommendation: Post acute rehabilitation services     PT - OK to Discharge:  (when medically cleared to rehab)    See flowsheet documentation for full assessment

## 2021-08-11 NOTE — CASE MANAGEMENT
Case Management Progress Note    Patient name Jersey Dixon  Location /-01 MRN [id number]  : 1955 Date 2021       LOS (days): 4  Geometric Mean LOS (GMLOS) (days): 4 30  Days to GMLOS:0 7        BUNDLE:      OBJECTIVE:  Pt is a 74y o  year old /[employer], white or  [1], female with Rastaf[employer] preference of [address] admitted on 7148  7:57 PM  Pt is admitted to [facility] [id number] at [address] with complaints of Acute metabolic encephalopathy   Current admission status: Inpatient  Preferred Pharmacy:   [facility] [facility] [address] [address], [address] [facility]  [address] [place] [address]  Phone: [phone] Fax: [phone]    Primary Care Provider: Yifan Fragoso DO    Primary Insurance: MEDICARE  Secondary Insurance: COMMERCIAL MISCELLANEOUS    PROGRESS NOTE:    Discussed in rounds today-  MD reached out and updated the [facility]  They are aware recommendations are for STR- Per MD the Families only choice for STR at this time is [facility] ( [facility]) -- If she is not accepted there plan will be home with home care  (Noting [address] has been following patient in [facility]  Referral placed in [facility] and spoke to [place] admission liaison about this referral, she will review with medical director for acceptance  Await determination of [facility]

## 2021-08-11 NOTE — PLAN OF CARE
Problem: OCCUPATIONAL THERAPY ADULT  Goal: Performs self-care activities at highest level of function for planned discharge setting  See evaluation for individualized goals  Description: Treatment Interventions: ADL retraining, Functional transfer training, UE strengthening/ROM, Endurance training, Cognitive reorientation, Patient/family training, Equipment evaluation/education, Neuromuscular reeducation, Compensatory technique education, Continued evaluation, Energy conservation, Activityengagement          See flowsheet documentation for full assessment, interventions and recommendations  Note: Limitation: Decreased ADL status, Decreased UE strength, Decreased Safe judgement during ADL, Decreased cognition, Decreased endurance, Decreased self-care trans, Decreased high-level ADLs  Prognosis: Good  Assessment: Pt is a 73 y/o F admitted to [address] [place] [address] 8/7/2021 d/t experiencing weakness and AMS  Dx: acute metabolic encephalopathy  Pt with PMHx impacting performance during functional tasks including: DM, kidney stones, sleep apnea on CPAP, chronic anemia  Pt reports she was most recently at [facility] for rehab although prior to rehab stay, Pt was living at home with her  in a 2 story home with 1+1 BRIANNA  Pt has a 1/2 bath on main level although full bed/bath on 2nd floor  Pt completing ADLs and functional ambulation @ Mod I with RW  Pt has assistance for IADLs and community mobility  On evaluation, Pt A&Ox4  Pt completing supine>sit @ Min A  UB Dressing @ S  LB Dressing @ Mod A  Pt completing STS @ Steadying Assist and SPT @ Min A with use of RW for UB support  Pt's BUE ROM and MS WFL   Pt's barriers to d/c include: decrease activity tolerance, decrease standing balance, decrease sitting balance, decrease performance during ADL tasks, decrease cognition, decrease safety awareness , decrease UB MS, decrease generalized strength, decrease activity engagement, decrease performance during functional transfers, steps to enter home, limited family support and frequent falls  Pt would benefit from continued acute OT services to address deficits as well as post acute rehab upon d/c from [address] [place] [address]       OT Discharge Recommendation: Post acute rehabilitation services

## 2021-08-11 NOTE — ASSESSMENT & PLAN NOTE
· Secondary to diuresis  Being repleted daily  · Also noted to have severe hypomagnesemia with magnesium level of 1 1  Replace IV and oral magnesium in addition to the potassium    Magnesium level better at 1 7 today    Results from last 7 days   Lab Units 08/11/21  0452 08/10/21  1448 08/10/21  0518 08/09/21  1452 08/09/21  0500 08/08/21  0523 08/07/21 2015   POTASSIUM mmol/L 3 5 3 1* 2 8* 3 2* 2 5* 3 2* 3 5

## 2021-08-11 NOTE — ASSESSMENT & PLAN NOTE
· Acute metabolic encephalopathy secondary to hepatic encephalopathy and MUNA  · No evidence of acute infection  Now clinically improving  · Recently found to have asymptomatic bacteriuria and antibiotics discontinued  · Urinalysis weekly positive  Discontinued zosyn for now  · MRI brain negative for acute pathology  · Continue lactulose  Ammonia level is decreasing       Results from last 7 days   Lab Units 08/11/21  0452 08/10/21  0518 08/09/21  0500 08/07/21 2016   AMMONIA umol/L 19 35 53* 49*

## 2021-08-11 NOTE — PHYSICAL THERAPY NOTE
PHYSICAL THERAPY EVALUATION  NAME:  Slime Gates  DATE: 08/11/21    AGE:   74 y o  Mrn:   [id number]  ADMIT DX:  Lactic acidosis [E87 2]  Leukocytosis [D72 829]  Altered mental status [R41 82]  Weakness [R53 1]  Generalized weakness [R53 1]    Past Medical History:   Diagnosis Date    Diabetes mellitus ([facility] [address] )     Kidney stone      Length Of Stay: 4  Performed at least 2 patient identifiers during session: Name and Birthday  PHYSICAL THERAPY EVALUATION :      08/11/21 0734   PT Last Visit   PT Visit Date 08/11/21   Note Type   Note type Evaluation   Pain Assessment   Pain Assessment Tool 0-10   Pain Score No Pain   Home Living   Type of Home House  (1+1 BRIANNA)   Home Layout Two level;1/2 bath on main level;Bed/bath upstairs; Performs ADLs on one level  (FF L HR)   Bathroom Shower/Tub Walk-in shower   Bathroom Toilet Standard   Bathroom Equipment Shower chair   Home Equipment Walker;Cane   Additional Comments Reports living in a [facility] with 1+1 BRIANNA and FF with L HR to 2nd floor  Prior Function   Level of [place] Independent with ADLs and functional mobility   Lives With Spouse   Receives Help From Family   ADL Assistance Independent   IADLs Needs assistance   Falls in the last 6 months 1 to 4   Vocational Retired   Comments Reports beign at [facility] since admission in July 2021  Was progressing and ready to D/C home, but was readmitted  Prior to initial hospitalization in July 2021 for suspected cholecystits and develpment of GI bleed, patient was independent with mobility RW  Restrictions/Precautions   Other Precautions Chair Alarm; Bed Alarm; Fall Risk   Cognition   Orientation Level Oriented X4   Following Commands Follows one step commands without difficulty   Comments requires verbal cues for increased attention to task at times   RLE Assessment   RLE Assessment WFL  (hip flexion < 90 degrees; strength 3/5 except hip flex 2/5)   LLE Assessment   LLE Assessment WFL  (hipe flexion < 90 degrees; strength 3/5 except hip flex 2/5)   Coordination   Movements are Fluid and Coordinated 1   Light Touch   RLE Light Touch Grossly intact  (inc B LE edema > Right foot than left)   LLE Light Touch Grossly intact  (inc B LE edema > Right foot than left)   Bed Mobility   Rolling R 4  Minimal assistance   Additional items Assist x 1; Increased time required;Verbal cues  (trunk management)   Supine to Sit 4  Minimal assistance   Additional items Assist x 1; Increased time required;Verbal cues;LE management  (trunk management)   Additional Comments HOB flat without bedrail  mod verbal cues for technique and sequence requiring minAx1 to complete rolling and to ahcieve sitting EOB with increased time and effort  sitting EOB pt with increasing posterior lean requiring verbal and min maual cues to maintain uprihgt posture  Transfers   Sit to Stand   (steadying assistance)   Additional items Assist x 1; Increased time required;Verbal cues   Stand to Sit   (steadying assistance)   Additional items Assist x 1; Increased time required;Verbal cues   Stand pivot 4  Minimal assistance   Additional items Assist x 1; Increased time required;Verbal cues   Additional Comments use of RW  sit<>stand with steadying assistance with min verbal cues for hand placement and mod verbal cues for LE placement  spt with RW with miNAx1 with min manual cues for wt shifting and balance and verbal cues to turn completely prior to sitting   Ambulation/Elevation   Gait pattern Narrow CHANI;Scissoring; Excessively slow; Short stride;Decreased foot clearance; Improper Weight shift   Gait Assistance 4  Minimal assist   Additional items Assist x 1;Verbal cues   Assistive Device Rolling walker   Distance 12'x1 with RW with minAx1 with NBOS, increased LE adduction, decreased step length  verbal cues for increased step length and CHANI      Balance   Static Sitting Fair   Dynamic Sitting Poor +   Static Standing Fair -   Dynamic Standing Poor +   Ambulatory Poor +   Endurance Deficit   Endurance Deficit Yes   Endurance Deficit Description fatigue   Activity Tolerance   Activity Tolerance Patient limited by fatigue   Medical Staff Made Aware Mili GONZALEZ   Nurse Made Aware RN   Assessment   Prognosis Good   Problem List Decreased strength;Decreased endurance; Impaired balance;Decreased mobility; Impaired judgement;Decreased safety awareness; Obesity   Barriers to Discharge Decreased caregiver support; Inaccessible home environment   Barriers to Discharge Comments requires increased assistance to complete mobility, spouse works during the day so patient would need to be able to care for self during the day  Goals   Patient Goals "Go home"   [facility] Expiration Date 08/21/21   PT Treatment Day 1   Plan   Treatment/Interventions Functional transfer training;LE strengthening/ROM; Elevations; Therapeutic exercise; Endurance training;Patient/family training;Equipment eval/education; Bed mobility;Gait training; Compensatory technique education;Spoke to nursing;Spoke to case management;OT   PT Frequency Other (Comment)  (3-5x/week)   Recommendation   PT Discharge Recommendation Post acute rehabilitation services   Equipment Recommended   (TBD by rehab)   PT - OK to Discharge   (when medically cleared to rehab)   Additional Comments pt sitting in recliner chair with all needs wihtin reach and posey alarm on at end of session   Jovany 8 in Bed Without Bedrails 3   Lying on Back to Sitting on Edge of Flat Bed 3   Moving Bed to Chair 3   Standing Up From Chair 3   Walk in Room 3   Climb 3-5 Stairs 2   Basic Mobility Inpatient Raw Score 17   Basic Mobility Standardized Score 39 67     (Please find full objective findings from PT assessment regarding body systems outlined above)  Assessment: Pt is a 74 y o  female seen for PT evaluation s/p admission to [address] [place] [address] on 8/7/2021 with Acute metabolic encephalopathy  Order placed for PT services    Upon evaluation: Pt is presenting with impaired functional mobility due to decreased strength, decreased endurance, impaired balance, gait deviations, decreased safety awareness, impaired judgment, fall risk and LE edema requiring minimal assistance for bed mobility, steadying to minimal assistance for transfers and minimal assistance for ambulation with RW  Pt's clinical presentation is currently unpredictable given the functional mobility deficits above, especially weakness, edema of extremities, decreased endurance, gait deviations, decreased activity tolerance, decreased functional mobility tolerance, decreased safety awareness and impaired judgement, coupled with fall risks as indicated by AM-PAC 6-Clicks: 01/73 as well as hx of falls, impaired balance, polypharmacy, impaired judgement and decreased safety awareness and combined with medical complications of abnormal H&H, abnormal WBCs, abnormal blood sugars, abnormal potassium values, abnormal CO2 values, multiple readmissions, fear/retreat, need for input for mobility technique/safety and abnormal lactic acid, increased ammonia  Pt's PMHx and comorbidities that may affect physical performance and progress include: frequent UTIs, non alcoholic liver cirrhosis, DM, portal HTN, obesity  Personal factors affecting pt at time of IE include: inaccessible home environment, step(s) to enter environment, multi-level environment, limited home support, inability to perform IADLs, inability to perform ADLs, inability to navigate level surfaces without external assistance, inability to ambulate household distances and recent fall(s)/fall history  Pt will benefit from continued skilled PT services to address deficits as defined above and to maximize level of functional mobility to facilitate return toward PLOF and improved QOL   From PT/mobility standpoint, recommendation at time of d/c would be Short term rehab pending progress in order to reduce fall risk and maximize pt's functional independence and consistency with mobility in order to facilitate return to PLOF  Recommend ther ex next 1-2 sessions  The patient's AM-PAC Basic Mobility Inpatient Short Form Raw Score is 17, Standardized Score is 39 67  A standardized score less than 42 9 suggests the patient may benefit from discharge to post-acute rehabilitation services  Please also refer to the recommendation of the Physical Therapist for safe discharge planning  Goals: Pt will: Perform bed mobility tasks with modified I to reposition in bed and prepare for transfers  Pt will perform transfers with modified I to decrease burden of care and decrease risk for falls and prepare for ambulation  Pt will ambulate with RW for >/= 48' with  modified I  to increase Indep in home environment, decrease burden of care, decrease risk for falls, improve activity tolerance and improve gait quality and to access home environment  Pt will complete >/= 12 steps with with unilateral handrail with Supervision to decrease burden of care, return to home with BRIANNA, return to multilevel home, decrease risk for falls and improve activity tolerance  Pt will participate in objective balance assessment to determine baseline fall risk  Pt will increase B LE strength >/= 1/2 MMT grade to facilitate functional mobility  Wenceslao Bobo, PT,DPT     PHYSICAL THERAPY TREATMENT NOTE  Time LO:0140  Time Out:0803  Total Time: 8'      S:  "It feels good to sit up "  O:  Use of RW   Sit<>stand with steadying assistance  Min cues for hand placement  Spt with RW with minAx1  Min cues for turning completely and staying within CHANI of RW  Pt with increased time to respond to commands  Standing dynamic balance without UE support with steadying/minimal assistance for balance while reaching anteriorly outside CHANI  Ambulated 18'x1 with RW with minAx1 with slow atiya, decreased step length, NBOS, scissoring with turns  Min cues for increased CHANI and step length   Further distance limited by fatigue with patient appearing distant with increased time to respond  Discussed recommendation for continues post acute rehab, pt agreeable  A:  Pt requires min cues for safety with transfers and improved gait pattern with RW  She was able to ambulate slight increased distance, but is limited by fatigue, decreased strength, balance  She continues to require assistance for transfers and ambulation due to deficits above  She requires increased time to respond to commands  She will continue to benefit from PT services to maximize LOF  P:  Recommend LE strengthening, dynamic standing balance and sitting balance, endurance training, transfers and gait with RW, stairs when appropriate, bed mobility       Orysia Cheryl, PT, DPT

## 2021-08-11 NOTE — ASSESSMENT & PLAN NOTE
Lab Results   Component Value Date    HGBA1C 7 7 (H) 01/25/2021     Recent Labs     08/10/21  1607 08/10/21  2152 08/11/21  0717 08/11/21  1116   POCGLU 183* 127 110 173*     · Holding metformin given lactic acidosis  Continue sliding scale  Component Value Date    WBC 5.1 06/26/2019    HGB (L) 06/26/2019     10.1  HGB DECREASE CALLED TO ZEINA CIFUENTES RN ON 6/26/19 AT 0458 BY CHANDANA LEWIS CLS  RESULTS READ BACK      HCT 32.9 (L) 06/26/2019    .5 (H) 06/26/2019     06/26/2019     Lab Results   Component Value Date     06/26/2019    K 4.6 06/26/2019     06/26/2019    CO2 29 06/26/2019    BUN 19 06/26/2019    CREATININE 1.5 (H) 06/26/2019    GLUCOSE 118 (H) 06/26/2019    CALCIUM 8.1 (L) 06/26/2019    PROT 4.9 (L) 06/26/2019    LABALBU 2.8 (L) 06/26/2019    BILITOT 0.4 06/26/2019    ALKPHOS 49 06/26/2019    AST 23 06/26/2019    ALT 25 06/26/2019    LABGLOM 35 (L) 06/26/2019    GFRAA 42 (L) 06/26/2019       Assessment:     Patient Active Problem List:     Chest pain     Schizophrenia ([facility] [address]     Family history of coronary artery disease     CHF (congestive heart failure) (HCC)     HTN (hypertension)     GERD (gastroesophageal reflux disease)     Abnormal ECG     Ventral hernia, recurrent     SBO (small bowel obstruction) ([facility] [address]    Plan:       Small bowel obstruction is improving. We will keep NG tube for 1 more day. Patient needs incentive spirometer and to increase her activity. Will obtain chest x-ray to evaluate cough. Consult PT OT for ambulation.       Ana M Harry MD

## 2021-08-11 NOTE — OCCUPATIONAL THERAPY NOTE
Occupational Therapy Evaluation     Evaluation: 1317-6320  Treatment: 2693-8082    Patient Name: Adelina Bowman  [id number] Date: 8/11/2021  Problem List  Principal Problem:    Acute metabolic encephalopathy  Active Problems:    Cir[facility] of [facility] with ascites ([facility] )    Type 2 diabetes mellitus without complication, without long-term current use of insulin (HCC)    MELVIN on CPAP    Kidney stones    Hypokalemia    Morbid obesity (HCC)    Bacteriuria    SIRS (systemic inflammatory response syndrome) (HCC)    Acute on chronic anemia    MUNA (acute kidney injury) [facility])    Past Medical History  Past Medical History:   Diagnosis Date    Diabetes mellitus ([facility] )     Kidney stone      Past Surgical History  Past Surgical History:   Procedure Laterality Date    EYE SURGERY      cataract removal    JOINT REPLACEMENT      LITHOTRIPSY             08/11/21 7256   Note Type   Note type Evaluation   Restrictions/Precautions   Other Precautions Contact/isolation; Chair Alarm; Bed Alarm; Fall Risk   Pain Assessment   Pain Assessment Tool 0-10   Pain Score No Pain   Home Living   Type of Home House  (1+1 BRIANNA)   Home Layout Two level;1/2 bath on main level;Bed/bath upstairs  (FF L HR)   Bathroom Shower/Tub Walk-in shower   Bathroom Toilet Standard   Bathroom Equipment Shower chair   Home Equipment Walker;Cane   Additional Comments Pt reports living in a 2 story home with 1+1 BRIANNA  Pt reports ambulating with RW  Prior Function   Level of [place] Independent with ADLs and functional mobility   Lives With Spouse   Receives Help From Family   ADL Assistance Independent   IADLs Needs assistance   Falls in the last 6 months 1 to 4   Vocational Retired   Comments Pt most recently at [facility] for rehab and was going to be d/c'ed home but then Pt was admitted to hospital  Pt reprts prior to rehab stay, she was independent with ADLs and functional ambulation  Pt has assistance for IADLs and community mobility     ADL   Where Assessed Edge of bed   Grooming Assistance   ([address]  - standing at sinkside)   Grooming Deficit Steadying;Verbal cueing;Supervision/safety; Increased time to complete   UB Dressing Assistance 5  Supervision/Setup   UB Dressing Deficit Setup;Verbal cueing;Supervision/safety; Increased time to complete   LB Dressing Assistance 3  Moderate Assistance   LB Dressing Deficit Steadying; Requires assistive device for steadying;Verbal cueing;Supervision/safety; Increased time to complete; Don/doff R sock; Don/doff L sock; Thread RLE into pants; Thread LLE into pants;Pull up over hips   Toileting Assistance  4  Minimal Assistance   Toileting Deficit Steadying;Verbal cueing;Supervison/safety;Grab bar use;Clothing management down;Clothing management up;Perineal hygiene; Increased time to complete   Additional Comments Pt completing ADL tasks while seated at EOB  UB Dressing @ S after set-up  LB Dressing @ Mod A for donning socks/pants around feet and Min A for CM around wasit  Please refer to treatment note for performance during toileting and grooming tasks   Bed Mobility   Rolling R 4  Minimal assistance   Additional items Assist x 1; Increased time required;Verbal cues   Supine to Sit 4  Minimal assistance   Additional items Assist x 1; Increased time required;Verbal cues;LE management  (trunk management)   Additional Comments Pt completing supine>sit @ Min A for trunk management and safety with HOB flat and no bedrails   Transfers   Sit to Stand   [facility] Assist)   Additional items Increased time required;Verbal cues   Stand to Sit   (Steadying Assist)   Additional items Increased time required;Verbal cues   Stand pivot 4  Minimal assistance   Additional items Assist x 1; Increased time required;Verbal cues   Additional Comments Pt completing functional transfers with use of RW for UB support   Steadying Assist for STS and Min A for SPT with increased time and vc'ing for safety and technique PRN   Balance   Static Sitting Fair   Dynamic Sitting Fair -   Static Standing Fair -   Dynamic Standing Poor +   Activity Tolerance   Activity Tolerance Patient limited by fatigue   Medical Staff Made Aware Spoke with PT, Ana Rosa Irving   Nurse Made Aware Spoke with RN   RUE Assessment   RUE Assessment WFL   LUE Assessment   LUE Assessment WFL   Hand Function   Gross Motor Coordination Functional   Fine Motor Coordination Functional   Sensation   Light Touch No apparent deficits   Cognition   Overall Cognitive Status Impaired   Arousal/Participation Alert; Responsive; Cooperative   Attention Attends with cues to redirect   Orientation Level Oriented X4   Memory Within functional limits   Following Commands Follows one step commands without difficulty   Comments Pt lethargic at times, requiring vc'ing to remain attentive and for safety cues   Assessment   Limitation Decreased ADL status; Decreased UE strength;Decreased Safe judgement during ADL;Decreased cognition;Decreased endurance;Decreased self-care trans;Decreased high-level ADLs   Prognosis Good   Assessment Pt is a 75 y/o F admitted to [address] [place] [address] 8/7/2021 d/t experiencing weakness and AMS  Dx: acute metabolic encephalopathy  Pt with PMHx impacting performance during functional tasks including: DM, kidney stones, sleep apnea on CPAP, chronic anemia  Pt reports she was most recently at [facility] for rehab although prior to rehab stay, Pt was living at home with her  in a 2 story home with 1+1 BRIANNA  Pt has a 1/2 bath on main level although full bed/bath on 2nd floor  Pt completing ADLs and functional ambulation @ Mod I with RW  Pt has assistance for IADLs and community mobility  On evaluation, Pt A&Ox4  Pt completing supine>sit @ Min A  UB Dressing @ S  LB Dressing @ Mod A  Pt completing STS @ Steadying Assist and SPT @ Min A with use of RW for UB support  Pt's BUE ROM and MS WFL   Pt's barriers to d/c include: decrease activity tolerance, decrease standing balance, decrease sitting balance, decrease performance during ADL tasks, decrease cognition, decrease safety awareness , decrease UB MS, decrease generalized strength, decrease activity engagement, decrease performance during functional transfers, steps to enter home, limited family support and frequent falls  Pt would benefit from continued acute OT services to address deficits as well as post acute rehab upon d/c from [address] [place] [address]  Plan   Treatment Interventions ADL retraining;Functional transfer training;UE strengthening/ROM; Endurance training;Cognitive reorientation;Patient/family training;Equipment evaluation/education; Neuromuscular reeducation; Compensatory technique education;Continued evaluation; Energy conservation; Activityengagement   Goal Expiration Date 08/21/21   OT Treatment Day 1   OT Frequency 3-5x/wk   Additional Treatment Session   Start Time 7244   End Time 0804   Treatment Assessment Pt seen for treatment session #1 this date  Pt alert and agreeable to participate at this time  Pt completing short distance ambulation with use of RW for UB support @ Min A with increased time  Pt completing toileting tasks @ Min A for balance and safety during CM and pericare while standing  Pt completing STS from toilet @ [address]  Pt then completing hand hygiene while standing at sinkside @ [address]  with increased time and vc'ing ofr safety  Pt then retunring to recliner chair with call bell inr each, chair alarm intact and all needs met at this time      Additional Treatment Day 1   Recommendation   OT Discharge Recommendation Post acute rehabilitation services   AM-PAC Daily Activity Inpatient   Lower Body Dressing 2   Bathing 2   Toileting 3   Upper Body Dressing 3   Grooming 3   Eating 4   Daily Activity Raw Score 17   Daily Activity Standardized Score (Calc for Raw Score >=11) 37 26   AM-PAC Applied Cognition Inpatient   Following a Speech/Presentation 4   Understanding Ordinary Conversation 4   Taking Medications 2   Remembering Where Things Are Placed or Put Away 3   Remembering List of 4-5 Errands 3   Taking Care of Complicated Tasks 2   Applied Cognition Raw Score 18   Applied Cognition Standardized Score 38 07       The patient's raw score on the AM-PAC Daily Activity inpatient short form is 17, standardized score is 37 26, less than 39 4  Patients at this level are likely to benefit from DC to post-acute rehabilitation services  Please refer to the recommendation of the Occupational Therapist for safe DC planning  Pt goals to be met by 8/21/2021    1  Pt will demonstrate ability to complete supine<>sit @ Mod I in order to increase safety and independence during ADL tasks  2  Pt will demonstrate ability to complete LB dressing @ Mod I with AD PRN in order to increase safety and independence during meaningful tasks  3  Pt will demonstrate ability to shan/doff socks/shoes while sitting EOB @ Mod I with AD PRN in order to increase safety and independence during meaningful tasks  4  Pt will demonstrate ability to complete toileting tasks including CM and pericare @ Mod I in order to increase safety and independence during meaningful tasks  5  Pt will demonstrate ability to complete EOB, chair, toilet/commode transfers @ Mod I in order to increase performance and participation during functional tasks  6  Pt will demonstrate ability to stand for 6-7 minutes while maintaining G balance with use of RW for UB support PRN  7  Pt will demonstrate ability to tolerate 30-35 minute OT session with no vc'ing for deep breathing or use of energy conservation techniques in order to increase activity tolerance during functional tasks  8  Pt will demonstrate Good carryover of use of energy conservation/compensatory strategies during ADLs and functional tasks in order to increase safety and reduce risk for falls     9  Pt will demonstrate Good attention and participation in continued evaluation of functional ambulation house hold distances in order to assist with safe d/c planning  10  Pt will attend to continued cognitive assessments 100% of the time in order to provide most appropriate d/c recommendations  11  Pt will follow 100% simple 2-step commands and be A&O x4 consistently with environmental cues to increase participation in functional activities  12  Pt will identify 3 areas of interest/hobbies and 1 intervention on how to incorporate into daily life in order to increase interaction with environment and peers as well as increase participation in meaningful tasks  13  Pt will demonstrate 100% carryover of BUE HEP in order to increase BUE MS and increase performance during functional tasks upon d/c home      Lizzy Magana OTR/L

## 2021-08-11 NOTE — PLAN OF CARE
Problem: Potential for Falls  Goal: Patient will remain free of falls  Description: INTERVENTIONS:  - Educate patient/family on patient safety including physical limitations  - Instruct patient to call for assistance with activity   - Consult OT/PT to assist with strengthening/mobility   - Keep Call bell within reach  - Keep bed low and locked with side rails adjusted as appropriate  - Keep care items and personal belongings within reach  - Initiate and maintain comfort rounds  - Make Fall Risk Sign visible to staff  - Apply yellow socks and bracelet for high fall risk patients  - Consider moving patient to room near nurses station  8/11/2021 0312 by Monse Joyner RN  Outcome: Progressing  8/11/2021 0311 by Monse Joyner RN  Outcome: Progressing     Problem: MOBILITY - ADULT  Goal: Maintain or return to baseline ADL function  Description: INTERVENTIONS:  -  Assess patient's ability to carry out ADLs; assess patient's baseline for ADL function and identify physical deficits which impact ability to perform ADLs (bathing, care of mouth/teeth, toileting, grooming, dressing, etc )  - Assess/evaluate cause of self-care deficits   - Assess range of motion  - Assess patient's mobility; develop plan if impaired  - Assess patient's need for assistive devices and provide as appropriate  - Encourage maximum independence but intervene and supervise when necessary  - Involve family in performance of ADLs  - Assess for home care needs following discharge   - Consider OT consult to assist with ADL evaluation and planning for discharge  - Provide patient education as appropriate  8/11/2021 0312 by Monse Joyner RN  Outcome: Progressing  8/11/2021 0311 by Monse Joyner RN  Outcome: Progressing  Goal: Maintains/Returns to pre admission functional level  Description: INTERVENTIONS:  - Perform BMAT or MOVE assessment daily    - Set and communicate daily mobility goal to care team and patient/family/caregiver     - Collaborate with rehabilitation services on mobility goals if consulted  - Out of bed for toileting  - Record patient progress and toleration of activity level   8/11/2021 0312 by Ceci Rg RN  Outcome: Progressing  8/11/2021 0311 by Ceci Rg RN  Outcome: Progressing     Problem: Prexisting or High Potential for Compromised Skin Integrity  Goal: Skin integrity is maintained or improved  Description: INTERVENTIONS:  - Identify patients at risk for skin breakdown  - Assess and monitor skin integrity  - Assess and monitor nutrition and hydration status  - Monitor labs   - Assess for incontinence   - Turn and reposition patient  - Assist with mobility/ambulation  - Relieve pressure over bony prominences  - Avoid friction and shearing  - Provide appropriate hygiene as needed including keeping skin clean and dry  - Evaluate need for skin moisturizer/barrier cream  - Collaborate with interdisciplinary team   - Patient/family teaching  - Consider wound care consult   8/11/2021 0312 by Ceci Rg RN  Outcome: Progressing  8/11/2021 0311 by Ceci Rg RN  Outcome: Progressing     Problem: PAIN - ADULT  Goal: Verbalizes/displays adequate comfort level or baseline comfort level  Description: Interventions:  - Encourage patient to monitor pain and request assistance  - Assess pain using appropriate pain scale  - Administer analgesics based on type and severity of pain and evaluate response  - Implement non-pharmacological measures as appropriate and evaluate response  - Consider cultural and social influences on pain and pain management  - Notify physician/advanced practitioner if interventions unsuccessful or patient reports new pain  8/11/2021 0312 by Ceci Rg RN  Outcome: Progressing  8/11/2021 0311 by Ceci Rg RN  Outcome: Progressing     Problem: INFECTION - ADULT  Goal: Absence or prevention of progression during hospitalization  Description: INTERVENTIONS:  - Assess and monitor for signs and symptoms of infection  - Monitor lab/diagnostic results  - Monitor all insertion sites, i e  indwelling lines, tubes, and drains  - Monitor endotracheal if appropriate and nasal secretions for changes in amount and color  - [place] appropriate cooling/warming therapies per order  - Administer medications as ordered  - Instruct and encourage patient and family to use good hand hygiene technique  - Identify and instruct in appropriate isolation precautions for identified infection/condition  8/11/2021 0312 by Chidi Mcgrath RN  Outcome: Progressing  8/11/2021 0311 by Chidi Mcgrath RN  Outcome: Progressing  Goal: Absence of fever/infection during neutropenic period  Description: INTERVENTIONS:  - Monitor WBC    8/11/2021 0312 by Chidi Mcgrath RN  Outcome: Progressing  8/11/2021 0311 by Chidi Mcgrath RN  Outcome: Progressing     Problem: SAFETY ADULT  Goal: Patient will remain free of falls  Description: INTERVENTIONS:  - Educate patient/family on patient safety including physical limitations  - Instruct patient to call for assistance with activity   - Consult OT/PT to assist with strengthening/mobility   - Keep Call bell within reach  - Keep bed low and locked with side rails adjusted as appropriate  - Keep care items and personal belongings within reach  - Initiate and maintain comfort rounds  - Make Fall Risk Sign visible to staff  - Apply yellow socks and bracelet for high fall risk patients  - Consider moving patient to room near nurses station  8/11/2021 0312 by Chidi Mcgrath RN  Outcome: Progressing  8/11/2021 0311 by Chidi Mcgrath RN  Outcome: Progressing  Goal: Maintain or return to baseline ADL function  Description: INTERVENTIONS:  -  Assess patient's ability to carry out ADLs; assess patient's baseline for ADL function and identify physical deficits which impact ability to perform ADLs (bathing, care of mouth/teeth, toileting, grooming, dressing, etc )  - Assess/evaluate cause of self-care deficits   - Assess range of motion  - Assess patient's mobility; develop plan if impaired  - Assess patient's need for assistive devices and provide as appropriate  - Encourage maximum independence but intervene and supervise when necessary  - Involve family in performance of ADLs  - Assess for home care needs following discharge   - Consider OT consult to assist with ADL evaluation and planning for discharge  - Provide patient education as appropriate  8/11/2021 0312 by Jimena Pimentel RN  Outcome: Progressing  8/11/2021 0311 by Jimena Pimentel RN  Outcome: Progressing  Goal: Maintains/Returns to pre admission functional level  Description: INTERVENTIONS:  - Perform BMAT or MOVE assessment daily    - Set and communicate daily mobility goal to care team and patient/family/caregiver     - Collaborate with rehabilitation services on mobility goals if consulted  - Out of bed for toileting  - Record patient progress and toleration of activity level   8/11/2021 0312 by Jimena Pimentel RN  Outcome: Progressing  8/11/2021 0311 by Jimena Pimentel RN  Outcome: Progressing     Problem: DISCHARGE PLANNING  Goal: Discharge to home or other facility with appropriate resources  Description: INTERVENTIONS:  - Identify barriers to discharge w/patient and caregiver  - Arrange for needed discharge resources and transportation as appropriate  - Identify discharge learning needs (meds, wound care, etc )  - Arrange for interpretive services to assist at discharge as needed  - Refer to Case Management Department for coordinating discharge planning if the patient needs post-hospital services based on physician/advanced practitioner order or complex needs related to functional status, cognitive ability, or social support system  8/11/2021 0312 by Jimena Pimentel RN  Outcome: Progressing  8/11/2021 0311 by Jimena Pimentel RN  Outcome: Progressing     Problem: Knowledge Deficit  Goal: Patient/family/caregiver demonstrates understanding of disease process, treatment plan, medications, and discharge instructions  Description: Complete learning assessment and assess knowledge base  Interventions:  - Provide teaching at level of understanding  - Provide teaching via preferred learning methods  8/11/2021 0312 by Chidi Mcgrath RN  Outcome: Progressing  8/11/2021 0311 by Chidi Mcgrath RN  Outcome: Progressing     Problem: Nutrition/Hydration-ADULT  Goal: Nutrient/Hydration intake appropriate for improving, restoring or maintaining nutritional needs  Description: Monitor and assess patient's nutrition/hydration status for malnutrition  Collaborate with interdisciplinary team and initiate plan and interventions as ordered  Monitor patient's weight and dietary intake as ordered or per policy  Utilize nutrition screening tool and intervene as necessary  Determine patient's food preferences and provide high-protein, high-caloric foods as appropriate       INTERVENTIONS:  - Monitor oral intake, urinary output, labs, and treatment plans  - Assess nutrition and hydration status and recommend course of action  - Evaluate amount of meals eaten  - Assist patient with eating if necessary   - Allow adequate time for meals  - Recommend/ encourage appropriate diets, oral nutritional supplements, and vitamin/mineral supplements  - Order, calculate, and assess calorie counts as needed  - Recommend, monitor, and adjust tube feedings and TPN/PPN based on assessed needs  - Assess need for intravenous fluids  - Provide specific nutrition/hydration education as appropriate  - Include patient/family/caregiver in decisions related to nutrition  8/11/2021 0312 by Chidi Mcgrath RN  Outcome: Progressing  8/11/2021 0311 by Chidi Mcgrath RN  Outcome: Progressing

## 2021-08-12 ENCOUNTER — APPOINTMENT (INPATIENT)
Dept: RADIOLOGY | Facility: HOSPITAL | Age: 66
DRG: 871 | End: 2021-08-12
Payer: MEDICARE

## 2021-08-12 LAB
AMMONIA PLAS-SCNC: 47 UMOL/L (ref 11–35)
ANION GAP SERPL CALCULATED.3IONS-SCNC: 7 MMOL/L (ref 4–13)
ANION GAP SERPL CALCULATED.3IONS-SCNC: 8 MMOL/L (ref 4–13)
BACTERIA UR QL AUTO: ABNORMAL /HPF
BILIRUB UR QL STRIP: ABNORMAL
BUN SERPL-MCNC: 12 MG/DL (ref 5–25)
BUN SERPL-MCNC: 12 MG/DL (ref 5–25)
CALCIUM SERPL-MCNC: 7.7 MG/DL (ref 8.3–10.1)
CALCIUM SERPL-MCNC: 7.8 MG/DL (ref 8.3–10.1)
CHLORIDE SERPL-SCNC: 106 MMOL/L (ref 100–108)
CHLORIDE SERPL-SCNC: 106 MMOL/L (ref 100–108)
CLARITY UR: CLEAR
CO2 SERPL-SCNC: 29 MMOL/L (ref 21–32)
CO2 SERPL-SCNC: 30 MMOL/L (ref 21–32)
COLOR UR: YELLOW
CREAT SERPL-MCNC: 1.07 MG/DL (ref 0.6–1.3)
CREAT SERPL-MCNC: 1.12 MG/DL (ref 0.6–1.3)
ERYTHROCYTE [DISTWIDTH] IN BLOOD BY AUTOMATED COUNT: 18 % (ref 11.6–15.1)
GFR SERPL CREATININE-BSD FRML MDRD: 51 ML/MIN/1.73SQ M
GFR SERPL CREATININE-BSD FRML MDRD: 53 ML/MIN/1.73SQ M
GLUCOSE SERPL-MCNC: 102 MG/DL (ref 65–140)
GLUCOSE SERPL-MCNC: 107 MG/DL (ref 65–140)
GLUCOSE SERPL-MCNC: 120 MG/DL (ref 65–140)
GLUCOSE SERPL-MCNC: 136 MG/DL (ref 65–140)
GLUCOSE SERPL-MCNC: 139 MG/DL (ref 65–140)
GLUCOSE SERPL-MCNC: 185 MG/DL (ref 65–140)
GLUCOSE UR STRIP-MCNC: NEGATIVE MG/DL
HCT VFR BLD AUTO: 27.4 % (ref 34.8–46.1)
HGB BLD-MCNC: 8.7 G/DL (ref 11.5–15.4)
HGB UR QL STRIP.AUTO: NEGATIVE
HYALINE CASTS #/AREA URNS LPF: ABNORMAL /LPF
KETONES UR STRIP-MCNC: NEGATIVE MG/DL
LEUKOCYTE ESTERASE UR QL STRIP: ABNORMAL
MCH RBC QN AUTO: 30.6 PG (ref 26.8–34.3)
MCHC RBC AUTO-ENTMCNC: 31.8 G/DL (ref 31.4–37.4)
MCV RBC AUTO: 97 FL (ref 82–98)
NITRITE UR QL STRIP: NEGATIVE
NON-SQ EPI CELLS URNS QL MICRO: ABNORMAL /HPF
PH UR STRIP.AUTO: 6.5 [PH]
PLATELET # BLD AUTO: 135 THOUSANDS/UL (ref 149–390)
PMV BLD AUTO: 10.3 FL (ref 8.9–12.7)
POTASSIUM SERPL-SCNC: 3.4 MMOL/L (ref 3.5–5.3)
POTASSIUM SERPL-SCNC: 3.8 MMOL/L (ref 3.5–5.3)
PROCALCITONIN SERPL-MCNC: <0.05 NG/ML
PROT UR STRIP-MCNC: NEGATIVE MG/DL
RBC # BLD AUTO: 2.84 MILLION/UL (ref 3.81–5.12)
RBC #/AREA URNS AUTO: ABNORMAL /HPF
SARS-COV-2 RNA RESP QL NAA+PROBE: NEGATIVE
SODIUM SERPL-SCNC: 142 MMOL/L (ref 136–145)
SODIUM SERPL-SCNC: 144 MMOL/L (ref 136–145)
SP GR UR STRIP.AUTO: 1.01 (ref 1–1.03)
UROBILINOGEN UR QL STRIP.AUTO: 0.2 E.U./DL
WBC # BLD AUTO: 12.08 THOUSAND/UL (ref 4.31–10.16)
WBC #/AREA URNS AUTO: ABNORMAL /HPF

## 2021-08-12 PROCEDURE — 87040 BLOOD CULTURE FOR BACTERIA: CPT | Performed by: INTERNAL MEDICINE

## 2021-08-12 PROCEDURE — U0005 INFEC AGEN DETEC AMPLI PROBE: HCPCS | Performed by: FAMILY MEDICINE

## 2021-08-12 PROCEDURE — NC001 PR NO CHARGE: Performed by: INTERNAL MEDICINE

## 2021-08-12 PROCEDURE — 82140 ASSAY OF AMMONIA: CPT | Performed by: FAMILY MEDICINE

## 2021-08-12 PROCEDURE — 82948 REAGENT STRIP/BLOOD GLUCOSE: CPT

## 2021-08-12 PROCEDURE — 85027 COMPLETE CBC AUTOMATED: CPT | Performed by: FAMILY MEDICINE

## 2021-08-12 PROCEDURE — 87077 CULTURE AEROBIC IDENTIFY: CPT | Performed by: INTERNAL MEDICINE

## 2021-08-12 PROCEDURE — U0003 INFECTIOUS AGENT DETECTION BY NUCLEIC ACID (DNA OR RNA); SEVERE ACUTE RESPIRATORY SYNDROME CORONAVIRUS 2 (SARS-COV-2) (CORONAVIRUS DISEASE [COVID-19]), AMPLIFIED PROBE TECHNIQUE, MAKING USE OF HIGH THROUGHPUT TECHNOLOGIES AS DESCRIBED BY CMS-2020-01-R: HCPCS | Performed by: FAMILY MEDICINE

## 2021-08-12 PROCEDURE — 97116 GAIT TRAINING THERAPY: CPT

## 2021-08-12 PROCEDURE — 80048 BASIC METABOLIC PNL TOTAL CA: CPT | Performed by: FAMILY MEDICINE

## 2021-08-12 PROCEDURE — 87186 SC STD MICRODIL/AGAR DIL: CPT | Performed by: INTERNAL MEDICINE

## 2021-08-12 PROCEDURE — 99233 SBSQ HOSP IP/OBS HIGH 50: CPT | Performed by: FAMILY MEDICINE

## 2021-08-12 PROCEDURE — 97110 THERAPEUTIC EXERCISES: CPT

## 2021-08-12 PROCEDURE — 81001 URINALYSIS AUTO W/SCOPE: CPT | Performed by: INTERNAL MEDICINE

## 2021-08-12 PROCEDURE — 87086 URINE CULTURE/COLONY COUNT: CPT | Performed by: INTERNAL MEDICINE

## 2021-08-12 PROCEDURE — 84145 PROCALCITONIN (PCT): CPT | Performed by: FAMILY MEDICINE

## 2021-08-12 PROCEDURE — 71046 X-RAY EXAM CHEST 2 VIEWS: CPT

## 2021-08-12 RX ORDER — LACTULOSE 20 G/30ML
20 SOLUTION ORAL 3 TIMES DAILY
Status: DISCONTINUED | OUTPATIENT
Start: 2021-08-12 | End: 2021-08-16 | Stop reason: HOSPADM

## 2021-08-12 RX ORDER — POTASSIUM CHLORIDE 20MEQ/15ML
40 LIQUID (ML) ORAL DAILY
Status: DISCONTINUED | OUTPATIENT
Start: 2021-08-12 | End: 2021-08-13

## 2021-08-12 RX ORDER — ACETAMINOPHEN 325 MG/1
650 TABLET ORAL ONCE
Status: COMPLETED | OUTPATIENT
Start: 2021-08-12 | End: 2021-08-12

## 2021-08-12 RX ORDER — FUROSEMIDE 10 MG/ML
20 INJECTION INTRAMUSCULAR; INTRAVENOUS ONCE
Status: COMPLETED | OUTPATIENT
Start: 2021-08-12 | End: 2021-08-12

## 2021-08-12 RX ORDER — POTASSIUM CHLORIDE 20MEQ/15ML
60 LIQUID (ML) ORAL DAILY
Status: DISCONTINUED | OUTPATIENT
Start: 2021-08-12 | End: 2021-08-12 | Stop reason: CLARIF

## 2021-08-12 RX ORDER — POTASSIUM CHLORIDE 20MEQ/15ML
20 LIQUID (ML) ORAL ONCE
Status: COMPLETED | OUTPATIENT
Start: 2021-08-12 | End: 2021-08-12

## 2021-08-12 RX ORDER — CEFTRIAXONE 1 G/50ML
1000 INJECTION, SOLUTION INTRAVENOUS EVERY 24 HOURS
Status: DISCONTINUED | OUTPATIENT
Start: 2021-08-12 | End: 2021-08-15

## 2021-08-12 RX ADMIN — Medication 400 MG: at 08:57

## 2021-08-12 RX ADMIN — RIFAXIMIN 550 MG: 550 TABLET ORAL at 22:59

## 2021-08-12 RX ADMIN — ALPRAZOLAM 0.5 MG: 0.5 TABLET ORAL at 22:59

## 2021-08-12 RX ADMIN — BIMATOPROST 1 DROP: 0.1 SOLUTION/ DROPS OPHTHALMIC at 23:00

## 2021-08-12 RX ADMIN — INSULIN LISPRO 2 UNITS: 100 INJECTION, SOLUTION INTRAVENOUS; SUBCUTANEOUS at 16:19

## 2021-08-12 RX ADMIN — RIFAXIMIN 550 MG: 550 TABLET ORAL at 12:22

## 2021-08-12 RX ADMIN — FUROSEMIDE 20 MG: 10 INJECTION, SOLUTION INTRAMUSCULAR; INTRAVENOUS at 12:22

## 2021-08-12 RX ADMIN — TRAZODONE HYDROCHLORIDE 25 MG: 50 TABLET ORAL at 22:59

## 2021-08-12 RX ADMIN — CEFTRIAXONE 1000 MG: 1 INJECTION, SOLUTION INTRAVENOUS at 10:07

## 2021-08-12 RX ADMIN — PANTOPRAZOLE SODIUM 40 MG: 40 TABLET, DELAYED RELEASE ORAL at 16:01

## 2021-08-12 RX ADMIN — POTASSIUM CHLORIDE 40 MEQ: 20 SOLUTION ORAL at 10:06

## 2021-08-12 RX ADMIN — CITALOPRAM HYDROBROMIDE 10 MG: 10 TABLET ORAL at 23:00

## 2021-08-12 RX ADMIN — Medication 400 MG: at 17:58

## 2021-08-12 RX ADMIN — LACTULOSE 20 G: 10 SOLUTION ORAL at 08:57

## 2021-08-12 RX ADMIN — LACTULOSE 20 G: 10 SOLUTION ORAL at 12:22

## 2021-08-12 RX ADMIN — ACETAMINOPHEN 650 MG: 325 TABLET ORAL at 05:43

## 2021-08-12 RX ADMIN — PANTOPRAZOLE SODIUM 40 MG: 40 TABLET, DELAYED RELEASE ORAL at 06:46

## 2021-08-12 RX ADMIN — POTASSIUM CHLORIDE 20 MEQ: 20 SOLUTION ORAL at 12:25

## 2021-08-12 RX ADMIN — FERROUS SULFATE TAB 325 MG (65 MG ELEMENTAL FE) 325 MG: 325 (65 FE) TAB at 09:00

## 2021-08-12 RX ADMIN — LACTULOSE 20 G: 10 SOLUTION ORAL at 16:01

## 2021-08-12 RX ADMIN — FLUTICASONE PROPIONATE 1 SPRAY: 50 SPRAY, METERED NASAL at 09:00

## 2021-08-12 NOTE — ASSESSMENT & PLAN NOTE
· Secondary to diuresis  Being repleted daily  · Also noted to have severe hypomagnesemia with magnesium level of 1 1  Replace IV and oral magnesium in addition to the potassium    Magnesium level better at 1 7     Results from last 7 days   Lab Units 08/12/21  0453 08/11/21  0452 08/10/21  1448 08/10/21  0518 08/09/21  1452 08/09/21  0500 08/08/21  0523 08/07/21 2015   POTASSIUM mmol/L 3 4* 3 5 3 1* 2 8* 3 2* 2 5* 3 2* 3 5

## 2021-08-12 NOTE — CASE MANAGEMENT
Case Management Progress Note    Patient name Wiley Frazier  Location /-01 MRN [id number]  : 1955 Date 2021       LOS (days): 5  Geometric Mean LOS (GMLOS) (days): 4 30  Days to GMLOS:-0 4        Pt was accepted at [facility]  However patient is not ready for dc today  CM updated Liaison at [facility] that patient is not medically ready for dc today  CM will continue to follow---   DC plan is [facility]

## 2021-08-12 NOTE — PLAN OF CARE
Problem: Potential for Falls  Goal: Patient will remain free of falls  Description: INTERVENTIONS:  - Educate patient/family on patient safety including physical limitations  - Instruct patient to call for assistance with activity   - Consult OT/PT to assist with strengthening/mobility   - Keep Call bell within reach  - Keep bed low and locked with side rails adjusted as appropriate  - Keep care items and personal belongings within reach  - Initiate and maintain comfort rounds  - Make Fall Risk Sign visible to staff  - Offer Toileting every 2 Hours, in advance of need  - Initiate/Maintain bed/chair alarm  - Obtain necessary fall risk management equipment:   - Apply yellow socks and bracelet for high fall risk patients  - Consider moving patient to room near nurses station  Outcome: Progressing     Problem: MOBILITY - ADULT  Goal: Maintain or return to baseline ADL function  Description: INTERVENTIONS:  - Educate patient/family on patient safety including physical limitations  - Instruct patient to call for assistance with activity   - Consult OT/PT to assist with strengthening/mobility   - Keep Call bell within reach  - Keep bed low and locked with side rails adjusted as appropriate  - Keep care items and personal belongings within reach  - Initiate and maintain comfort rounds  - Make Fall Risk Sign visible to staff  - Offer Toileting every 2 Hours, in advance of need  - Initiate/Maintain bed/chair alarm  - Obtain necessary fall risk management equipment:   - Apply yellow socks and bracelet for high fall risk patients  - Consider moving patient to room near nurses station  Outcome: Progressing  Goal: Maintains/Returns to pre admission functional level  Description: INTERVENTIONS:  -  Assess patient's ability to carry out ADLs; assess patient's baseline for ADL function and identify physical deficits which impact ability to perform ADLs (bathing, care of mouth/teeth, toileting, grooming, dressing, etc )  - Assess/evaluate cause of self-care deficits   - Assess range of motion  - Assess patient's mobility; develop plan if impaired  - Assess patient's need for assistive devices and provide as appropriate  - Encourage maximum independence but intervene and supervise when necessary  - Involve family in performance of ADLs  - Assess for home care needs following discharge   - Consider OT consult to assist with ADL evaluation and planning for discharge  - Provide patient education as appropriate  Outcome: Progressing     Problem: Prexisting or High Potential for Compromised Skin Integrity  Goal: Skin integrity is maintained or improved  Description: INTERVENTIONS:  - Identify patients at risk for skin breakdown  - Assess and monitor skin integrity  - Assess and monitor nutrition and hydration status  - Monitor labs   - Assess for incontinence   - Turn and reposition patient  - Assist with mobility/ambulation  - Relieve pressure over bony prominences  - Avoid friction and shearing  - Provide appropriate hygiene as needed including keeping skin clean and dry  - Evaluate need for skin moisturizer/barrier cream  - Collaborate with interdisciplinary team   - Patient/family teaching  - Consider wound care consult   Outcome: Progressing     Problem: PAIN - ADULT  Goal: Verbalizes/displays adequate comfort level or baseline comfort level  Description: Interventions:  - Encourage patient to monitor pain and request assistance  - Assess pain using appropriate pain scale  - Administer analgesics based on type and severity of pain and evaluate response  - Implement non-pharmacological measures as appropriate and evaluate response  - Consider cultural and social influences on pain and pain management  - Notify physician/advanced practitioner if interventions unsuccessful or patient reports new pain  Outcome: Progressing     Problem: INFECTION - ADULT  Goal: Absence or prevention of progression during hospitalization  Description: INTERVENTIONS:  - Assess and monitor for signs and symptoms of infection  - Monitor lab/diagnostic results  - Monitor all insertion sites, i e  indwelling lines, tubes, and drains  - Monitor endotracheal if appropriate and nasal secretions for changes in amount and color  - [place] appropriate cooling/warming therapies per order  - Administer medications as ordered  - Instruct and encourage patient and family to use good hand hygiene technique  - Identify and instruct in appropriate isolation precautions for identified infection/condition  Outcome: Progressing  Goal: Absence of fever/infection during neutropenic period  Description: INTERVENTIONS:  - Monitor WBC    Outcome: Progressing     Problem: SAFETY ADULT  Goal: Patient will remain free of falls  Description: INTERVENTIONS:  - Educate patient/family on patient safety including physical limitations  - Instruct patient to call for assistance with activity   - Consult OT/PT to assist with strengthening/mobility   - Keep Call bell within reach  - Keep bed low and locked with side rails adjusted as appropriate  - Keep care items and personal belongings within reach  - Initiate and maintain comfort rounds  - Make Fall Risk Sign visible to staff  - Offer Toileting every 2 Hours, in advance of need  - Initiate/Maintain bed/chair alarm  - Obtain necessary fall risk management equipment:   - Apply yellow socks and bracelet for high fall risk patients  - Consider moving patient to room near nurses station  Outcome: Progressing  Goal: Maintain or return to baseline ADL function  Description: INTERVENTIONS:  - Educate patient/family on patient safety including physical limitations  - Instruct patient to call for assistance with activity   - Consult OT/PT to assist with strengthening/mobility   - Keep Call bell within reach  - Keep bed low and locked with side rails adjusted as appropriate  - Keep care items and personal belongings within reach  - Initiate and maintain comfort rounds  - Make Fall Risk Sign visible to staff  - Offer Toileting every 2 Hours, in advance of need  - Initiate/Maintain bed/chair alarm  - Obtain necessary fall risk management equipment:   - Apply yellow socks and bracelet for high fall risk patients  - Consider moving patient to room near nurses station  Outcome: Progressing  Goal: Maintains/Returns to pre admission functional level  Description: INTERVENTIONS:  -  Assess patient's ability to carry out ADLs; assess patient's baseline for ADL function and identify physical deficits which impact ability to perform ADLs (bathing, care of mouth/teeth, toileting, grooming, dressing, etc )  - Assess/evaluate cause of self-care deficits   - Assess range of motion  - Assess patient's mobility; develop plan if impaired  - Assess patient's need for assistive devices and provide as appropriate  - Encourage maximum independence but intervene and supervise when necessary  - Involve family in performance of ADLs  - Assess for home care needs following discharge   - Consider OT consult to assist with ADL evaluation and planning for discharge  - Provide patient education as appropriate  Outcome: Progressing     Problem: DISCHARGE PLANNING  Goal: Discharge to home or other facility with appropriate resources  Description: INTERVENTIONS:  - Identify barriers to discharge w/patient and caregiver  - Arrange for needed discharge resources and transportation as appropriate  - Identify discharge learning needs (meds, wound care, etc )  - Arrange for interpretive services to assist at discharge as needed  - Refer to Case Management Department for coordinating discharge planning if the patient needs post-hospital services based on physician/advanced practitioner order or complex needs related to functional status, cognitive ability, or social support system  Outcome: Progressing     Problem: Knowledge Deficit  Goal: Patient/family/caregiver demonstrates understanding of disease process, treatment plan, medications, and discharge instructions  Description: Complete learning assessment and assess knowledge base  Interventions:  - Provide teaching at level of understanding  - Provide teaching via preferred learning methods  Outcome: Progressing     Problem: Nutrition/Hydration-ADULT  Goal: Nutrient/Hydration intake appropriate for improving, restoring or maintaining nutritional needs  Description: Monitor and assess patient's nutrition/hydration status for malnutrition  Collaborate with interdisciplinary team and initiate plan and interventions as ordered  Monitor patient's weight and dietary intake as ordered or per policy  Utilize nutrition screening tool and intervene as necessary  Determine patient's food preferences and provide high-protein, high-caloric foods as appropriate       INTERVENTIONS:  - Monitor oral intake, urinary output, labs, and treatment plans  - Assess nutrition and hydration status and recommend course of action  - Evaluate amount of meals eaten  - Assist patient with eating if necessary   - Allow adequate time for meals  - Recommend/ encourage appropriate diets, oral nutritional supplements, and vitamin/mineral supplements  - Order, calculate, and assess calorie counts as needed  - Recommend, monitor, and adjust tube feedings and TPN/PPN based on assessed needs  - Assess need for intravenous fluids  - Provide specific nutrition/hydration education as appropriate  - Include patient/family/caregiver in decisions related to nutrition  Outcome: Progressing

## 2021-08-12 NOTE — ASSESSMENT & PLAN NOTE
· MUNA without CKD secondary to recent outpatient diuretic use  · Baseline creatinine close to the 0 5 - 0 6  · Need to improve fluid overload and hence continue diuresis with Lasix   creatinine acceptable at 1 0    Results from last 7 days   Lab Units 08/12/21  0453 08/11/21  0452 08/10/21  1448 08/10/21  0518 08/09/21  1452 08/09/21  0500 08/08/21  0523 08/07/21 2015   BUN mg/dL 12 15 18 20 23 23 32* 32*   CREATININE mg/dL 1 07 1 06 1 19 1 19 1 16 1 13 1 27 1 45*   EGFR ml/min/1 73sq m 53 54 47 47 48 50 43 37

## 2021-08-12 NOTE — PHYSICAL THERAPY NOTE
PHYSICAL THERAPY TREATMENT NOTE  NAME:  Adelina Bowman  DATE: 08/12/21    Length Of Stay: 5  Performed at least 2 patient identifiers during session: Name and Birthday  Treatment time: 0251-3385  Treatment length: 26 min       08/12/21 1201   Note Type   Note Type Treatment   Pain Assessment   Pain Assessment Tool 0-10   Pain Score No Pain   Restrictions/Precautions   Other Precautions Contact/isolation; Chair Alarm; Bed Alarm; Fall Risk   General   Chart Reviewed Yes   Response to Previous Treatment Patient with no complaints from previous session  Cognition   Overall Cognitive Status WFL   Orientation Level Oriented X4   Following Commands Follows one step commands without difficulty   Subjective   Subjective "I'd like to go home"   Bed Mobility   Supine to Sit 5  Supervision   Additional items Increased time required;Verbal cues;HOB elevated; Bedrails   Additional Comments Pt completed supine > sit with supervision, increased time with HOB elevated and bedrails   Transfers   Sit to Stand   (steadying assist)   Additional items Increased time required;Verbal cues   Stand to Sit   (steadying assist)   Additional items Increased time required;Verbal cues   Stand pivot   (steadying assist)   Additional items Increased time required;Verbal cues   Additional Comments Pt used RW for all transfers; incidental VC for safety and hand placement   Ambulation/Elevation   Gait pattern Narrow CHANI; Festenating; Inconsistent atiya; Short stride; Excessively slow   Gait Assistance   (steadying assist)   Additional items Verbal cues   Assistive Device Rolling walker   Distance 15' x 2 Pt with short stride with min VC for increased step length   Balance   Static Sitting Fair +   Dynamic Sitting Fair   Static Standing Fair   Dynamic Standing Fair -   Ambulatory Fair -   Endurance Deficit   Endurance Deficit Yes   Endurance Deficit Description fatigue   Activity Tolerance   Activity Tolerance Patient limited by fatigue   Nurse Made Aware RNSmita   Exercises   Glute Sets Sitting;AROM; Bilateral;20 reps   Hip Flexion Sitting;20 reps;AROM; Bilateral   Hip Abduction Sitting;20 reps;AROM; Bilateral   Knee AROM Long Arc Quad Sitting;20 reps;AROM; Bilateral   Assessment   Prognosis Good   Problem List Decreased strength;Decreased endurance; Impaired balance;Decreased mobility; Impaired judgement;Decreased safety awareness; Obesity   Barriers to Discharge Inaccessible home environment;Decreased caregiver support   Barriers to Discharge Comments Pt requires increased assistance for mobility; spouse works during day   Goals   PT Treatment Day 2   Plan   Treatment/Interventions Functional transfer training;LE strengthening/ROM; Elevations; Therapeutic exercise; Endurance training;Patient/family training;Equipment eval/education; Bed mobility;Gait training; Compensatory technique education;Spoke to nursing   Progress Slow progress, decreased activity tolerance   PT Frequency Other (Comment)  (3-5x/wk)   Recommendation   PT Discharge Recommendation Post acute rehabilitation services   Equipment Recommended   (TBD by rehab)   PT - OK to Discharge   (when medically clear to rehab)   Additional Comments At end of session pt seated in recliner with all needs in reach and chair alarm engaged   Jovany 8 in Bed Without Bedrails 4   Lying on Back to Sitting on Edge of Flat Bed 3   Moving Bed to Chair 3   Standing Up From Chair 3   Walk in Room 3   Climb 3-5 Stairs 2   Basic Mobility Inpatient Raw Score 18   Basic Mobility Standardized Score 41 05     The patient's AM-PAC Basic Mobility Inpatient Short Form Raw Score is 18, Standardized Score is 41 05  A standardized score less than 42 9 suggests the patient may benefit from discharge to post-acute rehabilitation services  Please also refer to the recommendation of the Physical Therapist for safe discharge planning      Assessment: Pt seen for PT treatment session this date with interventions consisting of gait training w/ emphasis on improving pt's ability to ambulate level surfaces x 15' x 2 with steadying assist provided by therapist with RW and Therapeutic exercise consisting of: AROM 20 reps B LE in sitting position  Pt agreeable to PT treatment session upon arrival, pt found supine in bed w/ HOB elevated, in no apparent distress  In comparison to previous session, pt with improvements in activity tolerance as pt is able to ambulate increased distance with decreased assistance provided in addition to improved balance sitting, standing, and ambulatory  Post session: pt returned back to recliner, chair alarm engaged, all needs in reach and RN notified of session findings/recommendations Continue to recommend STR at time of d/c in order to maximize pt's functional independence and safety w/ mobility  Pt continues to be functioning below baseline level, and remains limited 2* factors listed above and including decreased strength, balance, mobility, and activity tolerance  PT will continue to see pt while here in order to address the deficits listed above and provide interventions consistent w/ POC in effort to achieve STGs       Eusebio Rollins, PT,DPT

## 2021-08-12 NOTE — ASSESSMENT & PLAN NOTE
· Acute metabolic encephalopathy secondary to hepatic encephalopathy and MUNA  · Recently found to have acute cystitis however it seemed to be a contamination/colonization and I had held off on antibiotics but she has a white count again today and low-grade fevers I will restart her on Rocephin based on sensitivity  Consult placed to Infectious Disease  · Urinalysis weekly positive  Discontinued zosyn for now  · MRI brain negative for acute pathology    · Increase lactulose as ammonia level is increasing    Results from last 7 days   Lab Units 08/12/21  1109 08/11/21  0452 08/10/21  0518 08/09/21  0500 08/07/21 2016   AMMONIA umol/L 47* 19 35 53* 49*

## 2021-08-12 NOTE — CONSULTS
REQUIRED DOCUMENTATION:   1  This service was provided via Telemedicine  2  Provider located at [address] [place] [address]  3  TeleMed provider: Porsche Whitfield DO   4  Identify all parties in room with patient during tele consult: TSERING Escobar  5  After connecting through [employer], patient was identified by name and date of birth and assistant checked wristband  Patient was then informed that this was a Telemedicine visit and that the exam was being conducted confidentially over secure lines  My office door was closed  No one else was in the room  Patient acknowledged consent and understanding of privacy and security of the Telemedicine visit, and gave us permission to have the assistant stay in the room in order to assist with the history and to conduct the exam   I informed the patient that I have reviewed their record in Epic and presented the opportunity for them to ask any questions regarding the visit today  The patient agreed to participate  TeleConsultation - Infectious Disease   Adelina Bowman 74 y o  female MRN: [id number]  Unit/Bed#: -01 Encounter: [id number]    IMPRESSION/RECOMMENDATIONS:  1  Sepsis: with manifestations of fever, tachycardia, leukocytosis  Possible source of sepsis is UTI/cystitis  Pt was confused this am and also hypotenstive  WBC is rising since acutely today  Patient appears to be more alert at this time  ? Follow blood cultures  ? CXR PA/Lat  ? Antibiotic therapy as below  ? CBC, LFTs in am  ? Monitor clinical response, temperature curve  2  Acute cystitis: Sxs include fever, confusion  Patient appears to be more alert now  Pt denies dysuria  UA is not consistent with infection  Urine cx with growth of Morganella, E coli (< 100 K each)  ? Continue ceftriaxone IV x3 day course for cystitis  ? Monitor clinical response and mental status  3  Acute encephalopathy, likely multifactorial including hepatic encephalopathy, UTI/cystitis  ?  Monitor clinical response and mental status  4  Diabetes mellitus type 2 with hyperglycemia: noted HbA1C of 7 7 on 1/25/21  This is a risk factor for infection  ? Glycemic control per primary team  5  Morbid obesity with BMI of 48    Extensive review of the medical records in Epic including review of the notes, radiographs, and laboratory results  My recommendations were discussed with the patient in detail who verbalized understanding  My recommendations were securely texted to Dr Dieter Serrano, from the primary service  Thank you for allowing me to participate in the care of this patient  The ID service will follow  HISTORY OF PRESENT ILLNESS:  Reason for Consult: bacteruria  CC: weakness, confusion  HPI: Gamaliel Cope is a 74y o  year old woman with a PMH of DM2, obesity, hepatic cirrhosis, who was admitted on 08/07/2021 with generalized weakness and confusion  Patient was sent from rehab due to intermittent confusion, weakness, nausea and dry heaves  Her symptoms had reportedly had a gradual onset  She was initially noted to meet criteria for sepsis with leukocytosis and elevation of lactic acid level  Blood cultures were sent initially  Patient had received ceftriaxone in the emergency room which was broadened empirically to Zosyn IV  UA was noted to be weakly positive and antibiotics were stopped the following day  Today, pt was noted to have low-grade fever of 100 8 F and persistent leukocytosis  Ceftriaxone IV was started and ID service was consulted  RN reports pt was hypotensive this am and patient's Lasix and BP meds were held  Pt reports feeling better since this am  Pt admits that she is intermittently confused and has trouble word finding at times  She also reports hallucinations at night  REVIEW OF SYSTEMS:  Constitutional: +fever, chills  HENT: +nasal congestion with mucus when blowing her nose   Negative for runny nose, sore throat  Eyes: Negative for visual disturbance  Respiratory: +mild dyspnea with exertion, cough  Cardiovascular: Negative for chest pain, palpitations   Gastrointestinal: +abd pain, distention, nausea, diarrhea from lactulose  Negative for vomiting  Genitourinary: Negative for dysuria, hematuria  Musculoskeletal: +chronic arthritis of knees  Neurological: Negative for speech difficulty, seizure  Skin: Negative for rash, itching  Psychiatric/Behavioral: +intermittent confusion, reports hallucinations at night    PAST MEDICAL HISTORY:  Past Medical History:   Diagnosis Date    Diabetes mellitus ([address] )     Kidney stone    COVID-19 (Nov 2020)  Morbid obesity  Fatty liver    Past Surgical History:   Procedure Laterality Date    EYE SURGERY      cataract removal    JOINT REPLACEMENT      LITHOTRIPSY       FAMILY HISTORY: Negative for recurrent infection    SOCIAL HISTORY:  Social History   Social History     Substance and Sexual Activity   Alcohol Use Not Currently     Social History     Substance and Sexual Activity   Drug Use Never     Social History     Tobacco Use   Smoking Status Never Smoker   Smokeless Tobacco Never Used     ALLERGIES:  Allergies   Allergen Reactions    Nitrofurantoin Other (See Comments)     Paresthesias in hands     MEDICATIONS:  All current active medications have been reviewed    Scheduled Meds:  Current Facility-Administered Medications   Medication Dose Route Frequency Provider Last Rate    ALPRAZolam  0 5 mg Oral HS PRN Claudia TANISHA Mo      bimatoprost  1 drop Both Eyes HS Claudiaaster Mo, TANISHA      carvedilol  6 25 mg Oral BID With Meals Claudia TANISHA Mo      cefTRIAXone  1,000 mg Intravenous Q24H Jordana Grove MD 1,000 mg (08/12/21 1007)    citalopram  10 mg Oral HS Claudia TANISHA Mo      ferrous sulfate  325 mg Oral Daily With Breakfast TANISHA Helm      fluticasone  1 spray Nasal Daily Claudia TANISHA Mo      furosemide  40 mg Oral BID (diuretic) Jordana Grove MD      insulin lispro  2-12 Units Subcutaneous TID AC TANISHA Helm  insulin lispro  2-12 Units Subcutaneous HS TANISHA Quintanilla      lactulose  20 g Oral TID Vasile Soto MD      magnesium oxide  400 mg Oral BID Vasile Soto MD      pantoprazole  40 mg Oral BID AC TANISHA Quintanilla      potassium chloride  40 mEq Oral Daily Vasile Soto MD      rifaximin  550 mg Oral Q12H [facility] Vasile Soto MD      traZODone  25 mg Oral HS TANISHA Quintanilla       Continuous Infusions:   PRN Meds:   ALPRAZolam    PHYSICAL EXAM:  Temp:  [98 4 °F (36 9 °C)-100 8 °F (38 2 °C)] 98 4 °F (36 9 °C)  HR:  [] 85  Resp:  [13-16] 16  BP: (100-132)/(50-70) 106/57  SpO2:  [89 %-94 %] 94 %  Temp (24hrs), Av 2 °F (37 3 °C), Min:98 4 °F (36 9 °C), Max:100 8 °F (38 2 °C)  Current: Temperature: 98 4 °F (36 9 °C)    Intake/Output Summary (Last 24 hours) at 2021 1634  Last data filed at 2021 1930  Gross per 24 hour   Intake 120 ml   Output 600 ml   Net -480 ml     Limited exam due to telehealth visit which was mostly done by the patient's nurse  General Appearance:  Nontoxic, in no acute distress   Head:  Normocephalic, without obvious abnormality, atraumatic   Eyes:  EOMI   Nose: Nares normal, mucosa normal   Throat: Oropharynx moist   Neck: Supple per RN   Lungs:   Crackles in lung bases bilateral, respirations unlabored per RN   Heart:  S1, S2, regular rate/rhythm per RN   Abdomen:   Rounded, distended, slight firm, LLQ tenderness per RN   : No Ott catheter present per RN   Extremities: 2-3+ distal leg edema bilateral per RN   Skin: No rash per RN   Neurologic: Alert and oriented to person, surroundings, conversant, fluent speech      LABS, IMAGING, & OTHER STUDIES:  Lab Results:  I have personally reviewed pertinent labs    Results from last 7 days   Lab Units 21  0453 08/10/21  0518 21  0500   WBC Thousand/uL 12 08* 8 49 8 67   HEMOGLOBIN g/dL 8 7* 7 9* 7 5*   PLATELETS Thousands/uL 135* 115* 120*     Results from last 7 days   Lab Units 21  9620 08/10/21  0518 08/09/21  0500 08/08/21  0523   POTASSIUM mmol/L 3 4* 2 8* 2 5* 3 2*   CHLORIDE mmol/L 106 105 104 105   CO2 mmol/L 30 34* 33* 29   BUN mg/dL 12 20 23 32*   CREATININE mg/dL 1 07 1 19 1 13 1 27   EGFR ml/min/1 73sq m 53 47 50 43   CALCIUM mg/dL 7 7* 8 2* 8 5 8 5   AST U/L  --  36 31 38   ALT U/L  --  22 21 25   ALK PHOS U/L  --  60 53 70     Results from last 7 days   Lab Units 08/07/21  2142 08/07/21  2021 08/07/21 2015   BLOOD CULTURE   --  No Growth After 4 Days  No Growth After 4 Days  URINE CULTURE  50,000-59,000 cfu/ml Morganella morganii*  50,000-59,000 cfu/ml Escherichia coli*  20,000-29,000 cfu/ml   --   --      Results from last 7 days   Lab Units 08/12/21  0933 08/09/21  0500 08/08/21  0212 08/07/21 2015   PROCALCITONIN ng/ml <0 05 0 08 <0 05 0 08     Imaging Studies:   8/9/21 US abd:   Cirrhosis with small volume abdominal pelvic ascites, mild splenomegaly, gallbladder sludge with mild wall thickening  I have personally reviewed pertinent imaging study reports  VIRTUAL VISIT DISCLAIMER  The patient has consented to an online visit or consultation  The patient understands that the online visit is based solely on information provided by them, and that, in the absence of a face-to-face physical evaluation by the physician, the diagnosis they receive are both limited and provisional in terms of accuracy and completeness  This is not intended to replace a full medical face-to-face evaluation by the physician

## 2021-08-12 NOTE — ASSESSMENT & PLAN NOTE
Lab Results   Component Value Date    HGBA1C 7 7 (H) 01/25/2021     Recent Labs     08/11/21  1637 08/11/21  2139 08/12/21  0719 08/12/21  1047   POCGLU 151* 142* 107 136     · Holding metformin given lactic acidosis  Continue sliding scale

## 2021-08-12 NOTE — ASSESSMENT & PLAN NOTE
· Hemoglobin is trending down; recent hospitalization for duodenal bleeding   · Transfuse PRBC in case hemoglobin is less than 7 5    Hemoglobin today is 8 7  Results from last 7 days   Lab Units 08/12/21  0453 08/10/21  0518 08/09/21  0500 08/08/21  0523 08/07/21 2015   HEMOGLOBIN g/dL 8 7* 7 9* 7 5* 8 1* 9 2*

## 2021-08-12 NOTE — PROGRESS NOTES
[address]  Progress Note - Wiley Freddie 1955, 74 y o  female MRN: [id number]  Unit/Bed#: -Krystal Encounter: [id number]  Primary Care Provider: Judson Acosta DO   Date and time admitted to hospital: 8/7/2021  7:57 PM    * Acute metabolic encephalopathy  Assessment & Plan  · Acute metabolic encephalopathy secondary to hepatic encephalopathy and MUNA  · Recently found to have acute cystitis however it seemed to be a contamination/colonization and I had held off on antibiotics but she has a white count again today and low-grade fevers I will restart her on Rocephin based on sensitivity  Consult placed to Infectious Disease  · Urinalysis weekly positive  Discontinued zosyn for now  · MRI brain negative for acute pathology  · Increase lactulose as ammonia level is increasing    Results from last 7 days   Lab Units 08/12/21  1109 08/11/21  0452 08/10/21  0518 08/09/21  0500 08/07/21 2016   AMMONIA umol/L 47* 19 35 53* 49*       Hypokalemia  Assessment & Plan  · Secondary to diuresis  Being repleted daily  · Also noted to have severe hypomagnesemia with magnesium level of 1 1  Replace IV and oral magnesium in addition to the potassium  Magnesium level better at 1 7     Results from last 7 days   Lab Units 08/12/21  0453 08/11/21  0452 08/10/21  1448 08/10/21  0518 08/09/21  1452 08/09/21  0500 08/08/21  0523 08/07/21 2015   POTASSIUM mmol/L 3 4* 3 5 3 1* 2 8* 3 2* 2 5* 3 2* 3 5       MUNA (acute kidney injury) ([facility] [address] )  Assessment & Plan  · MUNA without CKD secondary to recent outpatient diuretic use  · Baseline creatinine close to the 0 5 - 0 6  · Need to improve fluid overload and hence continue diuresis with Lasix   creatinine acceptable at 1 0    Results from last 7 days   Lab Units 08/12/21  0453 08/11/21  0452 08/10/21  1448 08/10/21  0518 08/09/21  1452 08/09/21  0500 08/08/21  0523 08/07/21 2015   BUN mg/dL 12 15 18 20 23 23 32* 32*   CREATININE mg/dL 1 07 1 06 1 19 1 19 1  16 1 13 1 27 1 45*   EGFR ml/min/1 73sq m 53 54 47 47 48 50 43 37       Acute on chronic anemia  Assessment & Plan  · Hemoglobin is trending down; recent hospitalization for duodenal bleeding   · Transfuse PRBC in case hemoglobin is less than 7 5  Hemoglobin today is 8 7  Results from last 7 days   Lab Units 08/12/21  0453 08/10/21  0518 08/09/21  0500 08/08/21  0523 08/07/21 2015   HEMOGLOBIN g/dL 8 7* 7 9* 7 5* 8 1* 9 2*       Morbid obesity (HCC)  Assessment & Plan  · Body mass index is 48 91 kg/m²  MELVIN on CPAP  Assessment & Plan  · Continue CPAP at     Type 2 diabetes mellitus without complication, without long-term current use of insulin [facility])  Assessment & Plan  Lab Results   Component Value Date    HGBA1C 7 7 (H) 01/25/2021     Recent Labs     08/11/21  1637 08/11/21  2139 08/12/21  0719 08/12/21  1047   POCGLU 151* 142* 107 136     · Holding metformin given lactic acidosis  Continue sliding scale  Cirrhosis of liver with ascites (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  · Did have outpatient ultrasound at outside facility   perihepatic ascites noted  does not need to be tapped  · Recently started on carvedilol to decrease portal pressure  · Fluid overload noted and weight is decreasing with diuresis  Baseline weight is around 260 lb  Today weight is 266 lb  Will give another dose of IV Lasix and try to diurese further and continue oral Lasix as well  Check daily weights and intake and output      VTE Pharmacologic Prophylaxis:   Pharmacologic: Pharmacologic VTE Prophylaxis contraindicated due to Low platelets and recent GI bleed  Mechanical VTE Prophylaxis in Place: Yes    Patient Centered Rounds: I have performed bedside rounds with nursing staff today  Discussions with Specialists or Other Care Team Provider:  Discussed with GI and will discuss with Infectious Disease    Education and Discussions with Family / Patient: Will discuss with family  Updated patient    Time Spent for Care: 30 minutes  More than 50% of total time spent on counseling and coordination of care as described above  Current Length of Stay: 5 day(s)    Current Patient Status: Inpatient   Certification Statement: The patient will continue to require additional inpatient hospital stay due to Acute hepatic encephalopathy    Discharge Plan:  Pending progress  Once medically cleared will transition to [place] [id number] rehab    Code Status: Level 1 - Full Code      Subjective:   Patient denies any chest pain or shortness of breath complains of some abdominal discomfort low-grade fever and increasing confusion and fatigue today  No diarrhea reported    Objective:     Vitals:   Temp (24hrs), Av 5 °F (37 5 °C), Min:98 4 °F (36 9 °C), Max:100 8 °F (38 2 °C)    Temp:  [98 4 °F (36 9 °C)-100 8 °F (38 2 °C)] 98 4 °F (36 9 °C)  HR:  [] 101  Resp:  [13-18] 15  BP: (100-134)/(50-70) 100/50  SpO2:  [89 %-95 %] 90 %  Body mass index is 48 91 kg/m²  Input and Output Summary (last 24 hours): Intake/Output Summary (Last 24 hours) at 2021 1204  Last data filed at 2021 1930  Gross per 24 hour   Intake 480 ml   Output 900 ml   Net -420 ml       Physical Exam:     Physical Exam  Vitals and nursing note reviewed  Constitutional:       Comments: Lethargy noted   HENT:      Head: Normocephalic and atraumatic  Right Ear: External ear normal       Left Ear: External ear normal       Nose: Nose normal       Mouth/Throat:      Pharynx: Oropharynx is clear  Eyes:      Pupils: Pupils are equal, round, and reactive to light  Cardiovascular:      Rate and Rhythm: Normal rate and regular rhythm  Heart sounds: Normal heart sounds  Pulmonary:      Effort: Pulmonary effort is normal       Breath sounds: Normal breath sounds  Abdominal:      General: Bowel sounds are normal       Palpations: Abdomen is soft  Tenderness: There is abdominal tenderness     Musculoskeletal: General: Normal range of motion  Cervical back: Normal range of motion and neck supple  Skin:     General: Skin is warm and dry  Capillary Refill: Capillary refill takes less than 2 seconds  Neurological:      General: No focal deficit present  Mental Status: She is alert  Comments: Oriented to person place   Psychiatric:         Mood and Affect: Mood normal            Additional Data:     Labs:    Results from last 7 days   Lab Units 08/12/21  0453 08/08/21  0523   WBC Thousand/uL 12 08* 12 77*   HEMOGLOBIN g/dL 8 7* 8 1*   HEMATOCRIT % 27 4* 25 6*   PLATELETS Thousands/uL 135* 151   NEUTROS PCT %  --  58   LYMPHS PCT %  --  28   MONOS PCT %  --  11   EOS PCT %  --  2     Results from last 7 days   Lab Units 08/12/21  0453 08/10/21  0518   SODIUM mmol/L 144 145   POTASSIUM mmol/L 3 4* 2 8*   CHLORIDE mmol/L 106 105   CO2 mmol/L 30 34*   BUN mg/dL 12 20   CREATININE mg/dL 1 07 1 19   ANION GAP mmol/L 8 6   CALCIUM mg/dL 7 7* 8 2*   ALBUMIN g/dL  --  2 8*   TOTAL BILIRUBIN mg/dL  --  1 76*   ALK PHOS U/L  --  60   ALT U/L  --  22   AST U/L  --  36   GLUCOSE RANDOM mg/dL 102 121     Results from last 7 days   Lab Units 08/10/21  0518   INR  1 73*     Results from last 7 days   Lab Units 08/12/21  1047 08/12/21  0719 08/11/21  2139 08/11/21  1637 08/11/21  1116 08/11/21  0717 08/10/21  2152 08/10/21  1607 08/10/21  1111 08/10/21  0720 08/09/21  2105 08/09/21  1611   POC GLUCOSE mg/dl 136 107 142* 151* 173* 110 127 183* 214* 129 166* 147*         Results from last 7 days   Lab Units 08/09/21  0500 08/08/21  0523 08/08/21  0212 08/07/21  2339 08/07/21 2015   LACTIC ACID mmol/L  --  2 2* 2 7* 3 1* 3 4*   PROCALCITONIN ng/ml 0 08  --  <0 05  --  0 08           * I Have Reviewed All Lab Data Listed Above  * Additional Pertinent Lab Tests Reviewed:  All Labs For Current Hospital Admission Reviewed    Imaging:    Imaging Reports Reviewed Today Include:  None  Imaging Personally Reviewed by Myself Includes:  None    Recent Cultures (last 7 days):     Results from last 7 days   Lab Units 08/07/21 2142 08/07/21 2021 08/07/21 2015   BLOOD CULTURE   --  No Growth at 72 hrs  No Growth at 72 hrs  URINE CULTURE  50,000-59,000 cfu/ml Morganella morganii*  50,000-59,000 cfu/ml Escherichia coli*  20,000-29,000 cfu/ml   --   --        Last 24 Hours Medication List:   Current Facility-Administered Medications   Medication Dose Route Frequency Provider Last Rate    ALPRAZolam  0 5 mg Oral HS PRN TANISHA Dupree      bimatoprost  1 drop Both Eyes HS TANISHA Dupree      carvedilol  6 25 mg Oral BID With Meals TANISHA Dupree      cefTRIAXone  1,000 mg Intravenous Q24H Manjeet Sanchez MD 1,000 mg (08/12/21 1007)    citalopram  10 mg Oral HS TANISHA Dupree      ferrous sulfate  325 mg Oral Daily With Breakfast TANISHA Dupree      fluticasone  1 spray Nasal Daily TANISHA Dupree      furosemide  20 mg Intravenous Once Manjeet Sanchez MD      furosemide  40 mg Oral BID (diuretic) Manjeet Sanchez MD      insulin lispro  2-12 Units Subcutaneous TID AC TANISHA Dupree      insulin lispro  2-12 Units Subcutaneous HS TANISHA Dupree      lactulose  20 g Oral TID Manjeet Sanchez MD      magnesium oxide  400 mg Oral BID Manjeet Sanchez MD      pantoprazole  40 mg Oral BID AC TANISHA Dupree      potassium chloride  20 mEq Oral Once Manjeet Sanchez MD      potassium chloride  40 mEq Oral Daily Manjeet Sanchez MD      rifaximin  550 mg Oral Q12H [facility] Manjeet Sanchez MD      traZODone  25 mg Oral HS TANISHA Dupree          Today, Patient Was Seen By: Manjeet Sanchez MD    ** Please Note: Dictation voice to text software may have been used in the creation of this document   **

## 2021-08-12 NOTE — PLAN OF CARE
Problem: PHYSICAL THERAPY ADULT  Goal: Performs mobility at highest level of function for planned discharge setting  See evaluation for individualized goals  Description: Treatment/Interventions: Functional transfer training, LE strengthening/ROM, Elevations, Therapeutic exercise, Endurance training, Patient/family training, Equipment eval/education, Bed mobility, Gait training, Compensatory technique education, Spoke to nursing, Spoke to case management, OT  Equipment Recommended:  (TBD by rehab)       See flowsheet documentation for full assessment, interventions and recommendations  Outcome: Progressing  Note: Prognosis: Good  Problem List: Decreased strength, Decreased endurance, Impaired balance, Decreased mobility, Impaired judgement, Decreased safety awareness, Obesity  Assessment: Pt seen for PT treatment session this date with interventions consisting of gait training w/ emphasis on improving pt's ability to ambulate level surfaces x 15' x 2 with steadying assist provided by therapist with RW and Therapeutic exercise consisting of: AROM 20 reps B LE in sitting position  Pt agreeable to PT treatment session upon arrival, pt found supine in bed w/ HOB elevated, in no apparent distress  In comparison to previous session, pt with improvements in activity tolerance as pt is able to ambulate increased distance with decreased assistance provided in addition to improved balance sitting, standing, and ambulatory  Post session: pt returned back to recliner, chair alarm engaged, all needs in reach and RN notified of session findings/recommendations Continue to recommend STR at time of d/c in order to maximize pt's functional independence and safety w/ mobility  Pt continues to be functioning below baseline level, and remains limited 2* factors listed above and including decreased strength, balance, mobility, and activity tolerance   PT will continue to see pt while here in order to address the deficits listed above and provide interventions consistent w/ POC in effort to achieve STGs  Barriers to Discharge: Inaccessible home environment, Decreased caregiver support  Barriers to Discharge Comments: Pt requires increased assistance for mobility; spouse works during day     PT Discharge Recommendation: Post acute rehabilitation services     PT - OK to Discharge:  (when medically clear to rehab)    See flowsheet documentation for full assessment

## 2021-08-12 NOTE — PLAN OF CARE
Problem: Potential for Falls  Goal: Patient will remain free of falls  Description: INTERVENTIONS:  - Educate patient/family on patient safety including physical limitations  - Instruct patient to call for assistance with activity   - Consult OT/PT to assist with strengthening/mobility   - Keep Call bell within reach  - Keep bed low and locked with side rails adjusted as appropriate  - Keep care items and personal belongings within reach  - Initiate and maintain comfort rounds  - Make Fall Risk Sign visible to staff  - Apply yellow socks and bracelet for high fall risk patients  - Consider moving patient to room near nurses station  Outcome: Progressing     Problem: MOBILITY - ADULT  Goal: Maintain or return to baseline ADL function  Description: INTERVENTIONS:  -  Assess patient's ability to carry out ADLs; assess patient's baseline for ADL function and identify physical deficits which impact ability to perform ADLs (bathing, care of mouth/teeth, toileting, grooming, dressing, etc )  - Assess/evaluate cause of self-care deficits   - Assess range of motion  - Assess patient's mobility; develop plan if impaired  - Assess patient's need for assistive devices and provide as appropriate  - Encourage maximum independence but intervene and supervise when necessary  - Involve family in performance of ADLs  - Assess for home care needs following discharge   - Consider OT consult to assist with ADL evaluation and planning for discharge  - Provide patient education as appropriate  Outcome: Progressing  Goal: Maintains/Returns to pre admission functional level  Description: INTERVENTIONS:  - Perform BMAT or MOVE assessment daily    - Set and communicate daily mobility goal to care team and patient/family/caregiver     - Collaborate with rehabilitation services on mobility goals if consulted  - Out of bed for toileting  - Record patient progress and toleration of activity level   Outcome: Progressing     Problem: Prexisting or High Potential for Compromised Skin Integrity  Goal: Skin integrity is maintained or improved  Description: INTERVENTIONS:  - Identify patients at risk for skin breakdown  - Assess and monitor skin integrity  - Assess and monitor nutrition and hydration status  - Monitor labs   - Assess for incontinence   - Turn and reposition patient  - Assist with mobility/ambulation  - Relieve pressure over bony prominences  - Avoid friction and shearing  - Provide appropriate hygiene as needed including keeping skin clean and dry  - Evaluate need for skin moisturizer/barrier cream  - Collaborate with interdisciplinary team   - Patient/family teaching  - Consider wound care consult   Outcome: Progressing     Problem: PAIN - ADULT  Goal: Verbalizes/displays adequate comfort level or baseline comfort level  Description: Interventions:  - Encourage patient to monitor pain and request assistance  - Assess pain using appropriate pain scale  - Administer analgesics based on type and severity of pain and evaluate response  - Implement non-pharmacological measures as appropriate and evaluate response  - Consider cultural and social influences on pain and pain management  - Notify physician/advanced practitioner if interventions unsuccessful or patient reports new pain  Outcome: Progressing     Problem: INFECTION - ADULT  Goal: Absence or prevention of progression during hospitalization  Description: INTERVENTIONS:  - Assess and monitor for signs and symptoms of infection  - Monitor lab/diagnostic results  - Monitor all insertion sites, i e  indwelling lines, tubes, and drains  - Monitor endotracheal if appropriate and nasal secretions for changes in amount and color  - [place] appropriate cooling/warming therapies per order  - Administer medications as ordered  - Instruct and encourage patient and family to use good hand hygiene technique  - Identify and instruct in appropriate isolation precautions for identified infection/condition  Outcome: Progressing  Goal: Absence of fever/infection during neutropenic period  Description: INTERVENTIONS:  - Monitor WBC    Outcome: Progressing     Problem: SAFETY ADULT  Goal: Patient will remain free of falls  Description: INTERVENTIONS:  - Educate patient/family on patient safety including physical limitations  - Instruct patient to call for assistance with activity   - Consult OT/PT to assist with strengthening/mobility   - Keep Call bell within reach  - Keep bed low and locked with side rails adjusted as appropriate  - Keep care items and personal belongings within reach  - Initiate and maintain comfort rounds  - Make Fall Risk Sign visible to staff  - Apply yellow socks and bracelet for high fall risk patients  - Consider moving patient to room near nurses station  Outcome: Progressing  Goal: Maintain or return to baseline ADL function  Description: INTERVENTIONS:  -  Assess patient's ability to carry out ADLs; assess patient's baseline for ADL function and identify physical deficits which impact ability to perform ADLs (bathing, care of mouth/teeth, toileting, grooming, dressing, etc )  - Assess/evaluate cause of self-care deficits   - Assess range of motion  - Assess patient's mobility; develop plan if impaired  - Assess patient's need for assistive devices and provide as appropriate  - Encourage maximum independence but intervene and supervise when necessary  - Involve family in performance of ADLs  - Assess for home care needs following discharge   - Consider OT consult to assist with ADL evaluation and planning for discharge  - Provide patient education as appropriate  Outcome: Progressing  Goal: Maintains/Returns to pre admission functional level  Description: INTERVENTIONS:  - Perform BMAT or MOVE assessment daily    - Set and communicate daily mobility goal to care team and patient/family/caregiver     - Collaborate with rehabilitation services on mobility goals if consulted  - Out of bed for toileting  - Record patient progress and toleration of activity level   Outcome: Progressing     Problem: DISCHARGE PLANNING  Goal: Discharge to home or other facility with appropriate resources  Description: INTERVENTIONS:  - Identify barriers to discharge w/patient and caregiver  - Arrange for needed discharge resources and transportation as appropriate  - Identify discharge learning needs (meds, wound care, etc )  - Arrange for interpretive services to assist at discharge as needed  - Refer to Case Management Department for coordinating discharge planning if the patient needs post-hospital services based on physician/advanced practitioner order or complex needs related to functional status, cognitive ability, or social support system  Outcome: Progressing     Problem: Knowledge Deficit  Goal: Patient/family/caregiver demonstrates understanding of disease process, treatment plan, medications, and discharge instructions  Description: Complete learning assessment and assess knowledge base  Interventions:  - Provide teaching at level of understanding  - Provide teaching via preferred learning methods  Outcome: Progressing     Problem: Nutrition/Hydration-ADULT  Goal: Nutrient/Hydration intake appropriate for improving, restoring or maintaining nutritional needs  Description: Monitor and assess patient's nutrition/hydration status for malnutrition  Collaborate with interdisciplinary team and initiate plan and interventions as ordered  Monitor patient's weight and dietary intake as ordered or per policy  Utilize nutrition screening tool and intervene as necessary  Determine patient's food preferences and provide high-protein, high-caloric foods as appropriate       INTERVENTIONS:  - Monitor oral intake, urinary output, labs, and treatment plans  - Assess nutrition and hydration status and recommend course of action  - Evaluate amount of meals eaten  - Assist patient with eating if necessary   - Allow adequate time for meals  - Recommend/ encourage appropriate diets, oral nutritional supplements, and vitamin/mineral supplements  - Order, calculate, and assess calorie counts as needed  - Recommend, monitor, and adjust tube feedings and TPN/PPN based on assessed needs  - Assess need for intravenous fluids  - Provide specific nutrition/hydration education as appropriate  - Include patient/family/caregiver in decisions related to nutrition  Outcome: Progressing

## 2021-08-13 ENCOUNTER — APPOINTMENT (INPATIENT)
Dept: CT IMAGING | Facility: HOSPITAL | Age: 66
DRG: 871 | End: 2021-08-13
Payer: MEDICARE

## 2021-08-13 LAB
ALBUMIN SERPL BCP-MCNC: 2.7 G/DL (ref 3.5–5)
ALP SERPL-CCNC: 61 U/L (ref 46–116)
ALT SERPL W P-5'-P-CCNC: 22 U/L (ref 12–78)
AMMONIA PLAS-SCNC: 25 UMOL/L (ref 11–35)
ANION GAP SERPL CALCULATED.3IONS-SCNC: 7 MMOL/L (ref 4–13)
AST SERPL W P-5'-P-CCNC: 41 U/L (ref 5–45)
ATRIAL RATE: 91 BPM
BACTERIA BLD CULT: NORMAL
BACTERIA BLD CULT: NORMAL
BILIRUB DIRECT SERPL-MCNC: 0.73 MG/DL (ref 0–0.2)
BILIRUB SERPL-MCNC: 1.96 MG/DL (ref 0.2–1)
BUN SERPL-MCNC: 9 MG/DL (ref 5–25)
CALCIUM SERPL-MCNC: 7.7 MG/DL (ref 8.3–10.1)
CHLORIDE SERPL-SCNC: 105 MMOL/L (ref 100–108)
CO2 SERPL-SCNC: 29 MMOL/L (ref 21–32)
CREAT SERPL-MCNC: 1 MG/DL (ref 0.6–1.3)
ERYTHROCYTE [DISTWIDTH] IN BLOOD BY AUTOMATED COUNT: 18.1 % (ref 11.6–15.1)
GFR SERPL CREATININE-BSD FRML MDRD: 58 ML/MIN/1.73SQ M
GLUCOSE SERPL-MCNC: 102 MG/DL (ref 65–140)
GLUCOSE SERPL-MCNC: 113 MG/DL (ref 65–140)
GLUCOSE SERPL-MCNC: 160 MG/DL (ref 65–140)
GLUCOSE SERPL-MCNC: 177 MG/DL (ref 65–140)
GLUCOSE SERPL-MCNC: 186 MG/DL (ref 65–140)
HCT VFR BLD AUTO: 26.9 % (ref 34.8–46.1)
HGB BLD-MCNC: 8.4 G/DL (ref 11.5–15.4)
INR PPP: 1.71 (ref 0.84–1.19)
MAGNESIUM SERPL-MCNC: 1.8 MG/DL (ref 1.6–2.6)
MCH RBC QN AUTO: 30.3 PG (ref 26.8–34.3)
MCHC RBC AUTO-ENTMCNC: 31.2 G/DL (ref 31.4–37.4)
MCV RBC AUTO: 97 FL (ref 82–98)
PLATELET # BLD AUTO: 142 THOUSANDS/UL (ref 149–390)
PMV BLD AUTO: 10.8 FL (ref 8.9–12.7)
POTASSIUM SERPL-SCNC: 3.2 MMOL/L (ref 3.5–5.3)
PR INTERVAL: 128 MS
PROCALCITONIN SERPL-MCNC: <0.05 NG/ML
PROT SERPL-MCNC: 6.1 G/DL (ref 6.4–8.2)
PROTHROMBIN TIME: 19.7 SECONDS (ref 11.6–14.5)
QRS AXIS: 70 DEGREES
QRSD INTERVAL: 66 MS
QT INTERVAL: 324 MS
QTC INTERVAL: 413 MS
RBC # BLD AUTO: 2.77 MILLION/UL (ref 3.81–5.12)
SODIUM SERPL-SCNC: 141 MMOL/L (ref 136–145)
T WAVE AXIS: 130 DEGREES
VENTRICULAR RATE: 98 BPM
WBC # BLD AUTO: 12.06 THOUSAND/UL (ref 4.31–10.16)

## 2021-08-13 PROCEDURE — 80048 BASIC METABOLIC PNL TOTAL CA: CPT | Performed by: FAMILY MEDICINE

## 2021-08-13 PROCEDURE — 85610 PROTHROMBIN TIME: CPT | Performed by: INTERNAL MEDICINE

## 2021-08-13 PROCEDURE — 83735 ASSAY OF MAGNESIUM: CPT | Performed by: FAMILY MEDICINE

## 2021-08-13 PROCEDURE — 82140 ASSAY OF AMMONIA: CPT | Performed by: FAMILY MEDICINE

## 2021-08-13 PROCEDURE — 85027 COMPLETE CBC AUTOMATED: CPT | Performed by: FAMILY MEDICINE

## 2021-08-13 PROCEDURE — 71250 CT THORAX DX C-: CPT

## 2021-08-13 PROCEDURE — G1004 CDSM NDSC: HCPCS

## 2021-08-13 PROCEDURE — 99232 SBSQ HOSP IP/OBS MODERATE 35: CPT | Performed by: FAMILY MEDICINE

## 2021-08-13 PROCEDURE — NC001 PR NO CHARGE: Performed by: INTERNAL MEDICINE

## 2021-08-13 PROCEDURE — 84145 PROCALCITONIN (PCT): CPT | Performed by: FAMILY MEDICINE

## 2021-08-13 PROCEDURE — 82948 REAGENT STRIP/BLOOD GLUCOSE: CPT

## 2021-08-13 PROCEDURE — 80076 HEPATIC FUNCTION PANEL: CPT | Performed by: INTERNAL MEDICINE

## 2021-08-13 RX ORDER — POTASSIUM CHLORIDE 20MEQ/15ML
40 LIQUID (ML) ORAL 2 TIMES DAILY
Status: DISCONTINUED | OUTPATIENT
Start: 2021-08-13 | End: 2021-08-15

## 2021-08-13 RX ORDER — POTASSIUM CHLORIDE 20MEQ/15ML
20 LIQUID (ML) ORAL ONCE
Status: COMPLETED | OUTPATIENT
Start: 2021-08-13 | End: 2021-08-13

## 2021-08-13 RX ORDER — HYDROXYZINE HYDROCHLORIDE 10 MG/1
10 TABLET, FILM COATED ORAL EVERY 6 HOURS PRN
Status: DISCONTINUED | OUTPATIENT
Start: 2021-08-13 | End: 2021-08-16 | Stop reason: HOSPADM

## 2021-08-13 RX ADMIN — CARVEDILOL 6.25 MG: 6.25 TABLET, FILM COATED ORAL at 08:19

## 2021-08-13 RX ADMIN — INSULIN LISPRO 2 UNITS: 100 INJECTION, SOLUTION INTRAVENOUS; SUBCUTANEOUS at 21:31

## 2021-08-13 RX ADMIN — INSULIN LISPRO 2 UNITS: 100 INJECTION, SOLUTION INTRAVENOUS; SUBCUTANEOUS at 11:05

## 2021-08-13 RX ADMIN — Medication 400 MG: at 16:34

## 2021-08-13 RX ADMIN — TRAZODONE HYDROCHLORIDE 25 MG: 50 TABLET ORAL at 21:32

## 2021-08-13 RX ADMIN — LACTULOSE 20 G: 10 SOLUTION ORAL at 11:04

## 2021-08-13 RX ADMIN — ALPRAZOLAM 0.5 MG: 0.5 TABLET ORAL at 21:32

## 2021-08-13 RX ADMIN — POTASSIUM CHLORIDE 40 MEQ: 20 SOLUTION ORAL at 16:34

## 2021-08-13 RX ADMIN — POTASSIUM CHLORIDE 20 MEQ: 20 SOLUTION ORAL at 09:35

## 2021-08-13 RX ADMIN — INSULIN LISPRO 2 UNITS: 100 INJECTION, SOLUTION INTRAVENOUS; SUBCUTANEOUS at 16:36

## 2021-08-13 RX ADMIN — LACTULOSE 20 G: 10 SOLUTION ORAL at 08:19

## 2021-08-13 RX ADMIN — CITALOPRAM HYDROBROMIDE 10 MG: 10 TABLET ORAL at 21:32

## 2021-08-13 RX ADMIN — Medication 400 MG: at 08:19

## 2021-08-13 RX ADMIN — LACTULOSE 20 G: 10 SOLUTION ORAL at 16:34

## 2021-08-13 RX ADMIN — PANTOPRAZOLE SODIUM 40 MG: 40 TABLET, DELAYED RELEASE ORAL at 16:34

## 2021-08-13 RX ADMIN — PANTOPRAZOLE SODIUM 40 MG: 40 TABLET, DELAYED RELEASE ORAL at 06:10

## 2021-08-13 RX ADMIN — FLUTICASONE PROPIONATE 1 SPRAY: 50 SPRAY, METERED NASAL at 08:20

## 2021-08-13 RX ADMIN — FUROSEMIDE 40 MG: 40 TABLET ORAL at 16:34

## 2021-08-13 RX ADMIN — HYDROXYZINE HYDROCHLORIDE 10 MG: 10 TABLET, FILM COATED ORAL at 12:28

## 2021-08-13 RX ADMIN — CEFTRIAXONE 1000 MG: 1 INJECTION, SOLUTION INTRAVENOUS at 08:19

## 2021-08-13 RX ADMIN — CARVEDILOL 6.25 MG: 6.25 TABLET, FILM COATED ORAL at 16:35

## 2021-08-13 RX ADMIN — BIMATOPROST 1 DROP: 0.1 SOLUTION/ DROPS OPHTHALMIC at 21:31

## 2021-08-13 RX ADMIN — FUROSEMIDE 40 MG: 40 TABLET ORAL at 08:19

## 2021-08-13 RX ADMIN — FERROUS SULFATE TAB 325 MG (65 MG ELEMENTAL FE) 325 MG: 325 (65 FE) TAB at 08:19

## 2021-08-13 RX ADMIN — POTASSIUM CHLORIDE 40 MEQ: 20 SOLUTION ORAL at 08:19

## 2021-08-13 NOTE — CASE MANAGEMENT
Case Management Progress Note    Patient name Wiley Frazier  Location /-01 MRN [id number]  : 1955 Date 2021       LOS (days): 6  Geometric Mean LOS (GMLOS) (days): 4 30  Days to GMLOS:-1 3        BUNDLE:      OBJECTIVE:  Pt is a 74y o  year old /[employer], white or  [1], female with Zoroastrian preference of [address] admitted on 6512  7:57 PM  Pt is admitted to [facility] [id number] at [address] with complaints of Acute metabolic encephalopathy   Current admission status: Inpatient  Preferred Pharmacy:   [address] [facility]  [address] [place] [address]  Phone: [phone] Fax: [phone]    Primary Care Provider: Judson Acosta DO    Primary Insurance: MEDICARE  Secondary Insurance: COMMERCIAL MISCELLANEOUS    PROGRESS NOTE:  Discussed in AM mary kay, not ready for dc at this time  DC plan is [employer], CM left a message for [employer] Connie Jean Baptiste letting her know patient is not ready for dc  Transportation will be WC transport when medically stable  CM will continue to follow

## 2021-08-13 NOTE — ASSESSMENT & PLAN NOTE
· MUNA without CKD secondary to recent outpatient diuretic use  · Baseline creatinine close to the 0 5 - 0 6  · Need to improve fluid overload and hence continue diuresis with Lasix   creatinine acceptable at 1 0    Results from last 7 days   Lab Units 08/13/21  0517 08/12/21  2307 08/12/21  0453 08/11/21  0452 08/10/21  1448 08/10/21  0518 08/09/21  1452 08/09/21  0500 08/08/21  0523 08/07/21 2015   BUN mg/dL 9 12 12 15 18 20 23 23 32* 32*   CREATININE mg/dL 1 00 1 12 1 07 1 06 1 19 1 19 1 16 1 13 1 27 1 45*   EGFR ml/min/1 73sq m 58 51 53 54 47 47 48 50 43 37

## 2021-08-13 NOTE — ASSESSMENT & PLAN NOTE
· 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  · Did have outpatient ultrasound at outside facility   perihepatic ascites noted  Not enough fluid available for paracentesis to be performed  · Recently started on carvedilol to decrease portal pressure  · Fluid overload noted and weight is decreasing with diuresis  Baseline weight is around 260 lb  Today weight is 265 lb  Will give another dose of IV Lasix and try to diurese further and continue oral Lasix as well    Check daily weights and intake and output

## 2021-08-13 NOTE — PROGRESS NOTES
[facility]  Progress Note - Cesar Chiang 74 y o  female MRN: [id number]    Unit/Bed#: -Krystal Encounter: [id number]    Assessment/Plan:  Decompensated cirrhosis secondary to MOORE  Hepatic stage I  Ascites  Acute on chronic liver failure  -MELD-Na 15  -ultrasound with Dopplers without evidence of ascites, liver lesion and normal hepatic vasculature  -minimal ascites  -titrate lactulose to 3 bowel movements per day  -no asterixis on exam today, but still with grade 1 hepatic encephalopathy  -trend MELD labs  -low Na diet (2g)  -consider combination of spironolactone and furosemide to help manage hypokalemia issues  -replete electrolytes as per primary team    Sepsis  -appreciate ID consultation  -currently being treated for UTI  -this may have caused her to decompensate  -no available fluid pockets on ultrasound to assess for SBP    Subjective:   Patient seen examined  Complaining of abdominal pain and distension although ultrasound did not show significant ascites  Objective:     Vitals: Blood pressure 123/69, pulse 91, temperature 99 3 °F (37 4 °C), resp  rate 18, height 5' 2" (1 575 m), weight 121 kg (265 lb 14 oz), SpO2 94 %  ,Body mass index is 48 63 kg/m²  Intake/Output Summary (Last 24 hours) at 8/13/2021 1700  Last data filed at 8/13/2021 1106  Gross per 24 hour   Intake 560 ml   Output --   Net 560 ml       Review of Systems: as per HPI  Review of Systems    Physical Exam:     Physical Exam  Constitutional:       Appearance: She is obese  She is ill-appearing  HENT:      Head: Atraumatic  Eyes:      General: No scleral icterus  Cardiovascular:      Rate and Rhythm: Normal rate  Pulmonary:      Effort: Pulmonary effort is normal    Abdominal:      Palpations: Abdomen is soft  Tenderness: There is no abdominal tenderness  Musculoskeletal:      Cervical back: Neck supple  Right lower leg: Edema present  Left lower leg: Edema present     Skin:     Coloration: Skin is not jaundiced  Neurological:      Comments: No asterixis but grade 1 hepatic encephalopathy given her slow responses to questions   Psychiatric:         Mood and Affect: Mood normal            Invasive Devices     Peripheral Intravenous Line            Peripheral IV 08/10/21 Dorsal (posterior); Left Hand 2 days                        CBC:   Lab Results   Component Value Date    WBC 12 06 (H) 08/13/2021    HGB 8 4 (L) 08/13/2021    HCT 26 9 (L) 08/13/2021    MCV 97 08/13/2021     (L) 08/13/2021    MCH 30 3 08/13/2021    MCHC 31 2 (L) 08/13/2021    RDW 18 1 (H) 08/13/2021    MPV 10 8 08/13/2021   ,   CMP:   Lab Results   Component Value Date    K 3 2 (L) 08/13/2021     08/13/2021    CO2 29 08/13/2021    BUN 9 08/13/2021    CREATININE 1 00 08/13/2021    CALCIUM 7 7 (L) 08/13/2021    AST 41 08/13/2021    ALT 22 08/13/2021    ALKPHOS 61 08/13/2021    EGFR 58 08/13/2021   ,   Lipase: No results found for: LIPASE,  PT/INR:   Lab Results   Component Value Date    INR 1 71 (H) 08/13/2021

## 2021-08-13 NOTE — ASSESSMENT & PLAN NOTE
· Secondary to diuresis  Being repleted daily  · Also noted to have severe hypomagnesemia with magnesium level of 1 1  Replace IV and oral magnesium in addition to the potassium    Magnesium level better at 1 7     Results from last 7 days   Lab Units 08/13/21  0517 08/12/21  2307 08/12/21  0453 08/11/21  0452 08/10/21  1448 08/10/21  0518 08/09/21  1452 08/09/21  0500 08/08/21  0523 08/07/21 2015   POTASSIUM mmol/L 3 2* 3 8 3 4* 3 5 3 1* 2 8* 3 2* 2 5* 3 2* 3 5

## 2021-08-13 NOTE — PROGRESS NOTES
[address]  Progress Note - Luda Chick 1955, 74 y o  female MRN: [id number]  Unit/Bed#: MS B[facility]-Krystal Encounter: [id number]  Primary Care Provider: Vladimir Chambers DO   Date and time admitted to hospital: 8/7/2021  7:57 PM    * Acute metabolic encephalopathy  Assessment & Plan  · Acute metabolic encephalopathy secondary to hepatic encephalopathy and MUNA  · Recently found to have acute cystitis however it seemed to be a contamination/colonization and I had held off on antibiotics but she has a white count again today and low-grade fevers I will restart her on Rocephin based on sensitivity  Consult placed to Infectious Disease  · cxray shows concern for pneumonia  Will do CT chest for further evaluation  · MRI brain negative for acute pathology  · Ammonia level has decreased with increasing lactulose dose  · Back to her baseline mental status    Results from last 7 days   Lab Units 08/13/21  0517 08/12/21  1109 08/11/21  0452 08/10/21  0518 08/09/21  0500 08/07/21 2016   AMMONIA umol/L 25 47* 19 35 53* 49*       Hypokalemia  Assessment & Plan  · Secondary to diuresis  Being repleted daily  · Also noted to have severe hypomagnesemia with magnesium level of 1 1  Replace IV and oral magnesium in addition to the potassium  Magnesium level better at 1 7     Results from last 7 days   Lab Units 08/13/21  0517 08/12/21  2307 08/12/21  0453 08/11/21  0452 08/10/21  1448 08/10/21  0518 08/09/21  1452 08/09/21  0500 08/08/21  0523 08/07/21 2015   POTASSIUM mmol/L 3 2* 3 8 3 4* 3 5 3 1* 2 8* 3 2* 2 5* 3 2* 3 5       MUNA (acute kidney injury) ([facility] [address] )  Assessment & Plan  · MUNA without CKD secondary to recent outpatient diuretic use  · Baseline creatinine close to the 0 5 - 0 6  · Need to improve fluid overload and hence continue diuresis with Lasix   creatinine acceptable at 1 0    Results from last 7 days   Lab Units 08/13/21  0517 08/12/21  2307 08/12/21  0453 08/11/21  0452 08/10/21  1448 08/10/21  0518 08/09/21  1452 08/09/21  0500 08/08/21  0523 08/07/21 2015   BUN mg/dL 9 12 12 15 18 20 23 23 32* 32*   CREATININE mg/dL 1 00 1 12 1 07 1 06 1 19 1 19 1 16 1 13 1 27 1 45*   EGFR ml/min/1 73sq m 58 51 53 54 47 47 48 50 43 37       Acute on chronic anemia  Assessment & Plan  · Hemoglobin is trending down; recent hospitalization for duodenal bleeding   · Transfuse PRBC in case hemoglobin is less than 7 5  Hemoglobin today is 8 4  · No active bleeding noted at this time  Results from last 7 days   Lab Units 08/13/21  0517 08/12/21  0453 08/10/21  0518 08/09/21  0500 08/08/21  0523 08/07/21 2015   HEMOGLOBIN g/dL 8 4* 8 7* 7 9* 7 5* 8 1* 9 2*       Kidney stones  Assessment & Plan  · Maintain on lasix  hold hctz    MELVIN on CPAP  Assessment & Plan  · Continue CPAP at hs    Type 2 diabetes mellitus without complication, without long-term current use of insulin [facility])  Assessment & Plan  Lab Results   Component Value Date    HGBA1C 7 7 (H) 01/25/2021     Recent Labs     08/12/21  1047 08/12/21  1559 08/12/21  2139 08/13/21  0746   POCGLU 136 185* 139 113     · Holding metformin given lactic acidosis  Continue sliding scale  Cirrhosis of liver with ascites ([facility] [address] )  Assessment & Plan  · 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  · Did have outpatient ultrasound at outside facility   perihepatic ascites noted  Not enough fluid available for paracentesis to be performed  · Recently started on carvedilol to decrease portal pressure  · Fluid overload noted and weight is decreasing with diuresis  Baseline weight is around 260 lb  Today weight is 265 lb  Will give another dose of IV Lasix and try to diurese further and continue oral Lasix as well  Check daily weights and intake and output      VTE Pharmacologic Prophylaxis:   Pharmacologic: Pharmacologic VTE Prophylaxis contraindicated due to Thrombocytopenia  Mechanical VTE Prophylaxis in Place: Yes    Patient Centered Rounds:  I have performed bedside rounds with nursing staff today  Discussions with Specialists or Other Care Team Provider:  Discussed with Infectious Disease  Education and Discussions with Family / Patient:  Discussed with patient at bedside will update family    Time Spent for Care: 30 minutes  More than 50% of total time spent on counseling and coordination of care as described above  Current Length of Stay: 6 day(s)    Current Patient Status: Inpatient   Certification Statement: The patient will continue to require additional inpatient hospital stay due to Metabolic encephalopathy    Discharge Plan:  Pending progress  Anticipate discharge to rehab tomorrow    Code Status: Level 1 - Full Code      Subjective:   Patient denies any chest pain or shortness of breath or abdominal pain today at rest but does get a little winded with activity  Complaining of feeling itchy  Denies any fever or chills and is more awake and alert today and slept better last night but just feels tired    Objective:     Vitals:   Temp (24hrs), Av °F (37 2 °C), Min:98 4 °F (36 9 °C), Max:99 5 °F (37 5 °C)    Temp:  [98 4 °F (36 9 °C)-99 5 °F (37 5 °C)] 99 5 °F (37 5 °C)  HR:  [] 100  Resp:  [13-19] 19  BP: (102-126)/(57-70) 126/70  SpO2:  [90 %-96 %] 90 %  Body mass index is 48 63 kg/m²  Input and Output Summary (last 24 hours): Intake/Output Summary (Last 24 hours) at 2021 1056  Last data filed at 2021 1900  Gross per 24 hour   Intake 360 ml   Output --   Net 360 ml       Physical Exam:     Physical Exam  Vitals and nursing note reviewed  Constitutional:       Appearance: Normal appearance  HENT:      Head: Normocephalic and atraumatic  Right Ear: External ear normal       Left Ear: External ear normal       Nose: Nose normal       Mouth/Throat:      Pharynx: Oropharynx is clear  Eyes:      Pupils: Pupils are equal, round, and reactive to light     Cardiovascular:      Rate and Rhythm: Normal rate and regular rhythm  Heart sounds: Normal heart sounds  Pulmonary:      Effort: Pulmonary effort is normal       Comments: Moderate air entry bilaterally with diminished breath sounds bilateral bases  Abdominal:      General: Bowel sounds are normal  There is distension  Palpations: Abdomen is soft  Tenderness: There is no abdominal tenderness  Musculoskeletal:         General: Normal range of motion  Cervical back: Normal range of motion and neck supple  Right lower leg: Edema present  Left lower leg: Edema present  Skin:     General: Skin is warm and dry  Capillary Refill: Capillary refill takes less than 2 seconds  Neurological:      General: No focal deficit present  Mental Status: She is alert and oriented to person, place, and time     Psychiatric:         Mood and Affect: Mood normal            Additional Data:     Labs:    Results from last 7 days   Lab Units 08/13/21  0517 08/08/21  0523   WBC Thousand/uL 12 06* 12 77*   HEMOGLOBIN g/dL 8 4* 8 1*   HEMATOCRIT % 26 9* 25 6*   PLATELETS Thousands/uL 142* 151   NEUTROS PCT %  --  58   LYMPHS PCT %  --  28   MONOS PCT %  --  11   EOS PCT %  --  2     Results from last 7 days   Lab Units 08/13/21  0517   SODIUM mmol/L 141   POTASSIUM mmol/L 3 2*   CHLORIDE mmol/L 105   CO2 mmol/L 29   BUN mg/dL 9   CREATININE mg/dL 1 00   ANION GAP mmol/L 7   CALCIUM mg/dL 7 7*   ALBUMIN g/dL 2 7*   TOTAL BILIRUBIN mg/dL 1 96*   ALK PHOS U/L 61   ALT U/L 22   AST U/L 41   GLUCOSE RANDOM mg/dL 102     Results from last 7 days   Lab Units 08/13/21  0517   INR  1 71*     Results from last 7 days   Lab Units 08/13/21  0746 08/12/21  2139 08/12/21  1559 08/12/21  1047 08/12/21  0719 08/11/21  2139 08/11/21  1637 08/11/21  1116 08/11/21  0717 08/10/21  2152 08/10/21  1607 08/10/21  1111   POC GLUCOSE mg/dl 113 139 185* 136 107 142* 151* 173* 110 127 183* 214*         Results from last 7 days   Lab Units 08/12/21  0933 08/09/21  0500 08/08/21  0523 08/08/21  0212 08/07/21  2339 08/07/21 2015   LACTIC ACID mmol/L  --   --  2 2* 2 7* 3 1* 3 4*   PROCALCITONIN ng/ml <0 05 0 08  --  <0 05  --  0 08           * I Have Reviewed All Lab Data Listed Above  * Additional Pertinent Lab Tests Reviewed: [facility] Admission Reviewed    Imaging:    Imaging Reports Reviewed Today Include:  Chest x-ray  Imaging Personally Reviewed by Myself Includes:  Chest x-ray    Recent Cultures (last 7 days):     Results from last 7 days   Lab Units 08/12/21  1617 08/12/21  1616 08/07/21  2142 08/07/21  2021 08/07/21 2015   BLOOD CULTURE  Received in Microbiology Lab  Culture in Progress  Received in Microbiology Lab  Culture in Progress  --  No Growth After 4 Days  No Growth After 4 Days     URINE CULTURE   --   --  50,000-59,000 cfu/ml Morganella morganii*  50,000-59,000 cfu/ml Escherichia coli*  20,000-29,000 cfu/ml   --   --        Last 24 Hours Medication List:   Current Facility-Administered Medications   Medication Dose Route Frequency Provider Last Rate    ALPRAZolam  0 5 mg Oral HS PRN TANISHA Vivas      bimatoprost  1 drop Both Eyes HS TANISHA Vivas      carvedilol  6 25 mg Oral BID With Meals TANISHA Vivas      cefTRIAXone  1,000 mg Intravenous Q24H Brian Steinberg MD 1,000 mg (08/13/21 0819)    citalopram  10 mg Oral HS TANISHA Vivas      ferrous sulfate  325 mg Oral Daily With Breakfast TANISHA Vivas      fluticasone  1 spray Nasal Daily TANISHA Vivas      furosemide  40 mg Oral BID (diuretic) Brian Steinberg MD      insulin lispro  2-12 Units Subcutaneous TID AC TANISHA Vivas      insulin lispro  2-12 Units Subcutaneous HS TANISHA Vivas      lactulose  20 g Oral TID Brian Steinberg MD      magnesium oxide  400 mg Oral BID Brian Steinberg MD      pantoprazole  40 mg Oral BID AC TANISHA Vivas      potassium chloride  40 mEq Oral BID Brian Steinberg MD      traZODone 25 mg Oral HS TANISHA Lo          Today, Patient Was Seen By: Hakan Martin MD    ** Please Note: Dictation voice to text software may have been used in the creation of this document   **

## 2021-08-13 NOTE — ASSESSMENT & PLAN NOTE
Lab Results   Component Value Date    HGBA1C 7 7 (H) 01/25/2021     Recent Labs     08/12/21  1047 08/12/21  1559 08/12/21  2139 08/13/21  0746   POCGLU 136 185* 139 113     · Holding metformin given lactic acidosis  Continue sliding scale

## 2021-08-13 NOTE — PLAN OF CARE
Problem: Potential for Falls  Goal: Patient will remain free of falls  Description: INTERVENTIONS:  - Educate patient/family on patient safety including physical limitations  - Instruct patient to call for assistance with activity   - Consult OT/PT to assist with strengthening/mobility   - Keep Call bell within reach  - Keep bed low and locked with side rails adjusted as appropriate  - Keep care items and personal belongings within reach  - Initiate and maintain comfort rounds  - Make Fall Risk Sign visible to staff  - Apply yellow socks and bracelet for high fall risk patients  - Consider moving patient to room near nurses station  Outcome: Progressing     Problem: MOBILITY - ADULT  Goal: Maintain or return to baseline ADL function  Description: INTERVENTIONS:  -  Assess patient's ability to carry out ADLs; assess patient's baseline for ADL function and identify physical deficits which impact ability to perform ADLs (bathing, care of mouth/teeth, toileting, grooming, dressing, etc )  - Assess/evaluate cause of self-care deficits   - Assess range of motion  - Assess patient's mobility; develop plan if impaired  - Assess patient's need for assistive devices and provide as appropriate  - Encourage maximum independence but intervene and supervise when necessary  - Involve family in performance of ADLs  - Assess for home care needs following discharge   - Consider OT consult to assist with ADL evaluation and planning for discharge  - Provide patient education as appropriate  Outcome: Progressing  Goal: Maintains/Returns to pre admission functional level  Description: INTERVENTIONS:  - Perform BMAT or MOVE assessment daily    - Set and communicate daily mobility goal to care team and patient/family/caregiver     - Collaborate with rehabilitation services on mobility goals if consulted  - Out of bed for toileting  - Record patient progress and toleration of activity level   Outcome: Progressing     Problem: Prexisting or High Potential for Compromised Skin Integrity  Goal: Skin integrity is maintained or improved  Description: INTERVENTIONS:  - Identify patients at risk for skin breakdown  - Assess and monitor skin integrity  - Assess and monitor nutrition and hydration status  - Monitor labs   - Assess for incontinence   - Turn and reposition patient  - Assist with mobility/ambulation  - Relieve pressure over bony prominences  - Avoid friction and shearing  - Provide appropriate hygiene as needed including keeping skin clean and dry  - Evaluate need for skin moisturizer/barrier cream  - Collaborate with interdisciplinary team   - Patient/family teaching  - Consider wound care consult   Outcome: Progressing     Problem: PAIN - ADULT  Goal: Verbalizes/displays adequate comfort level or baseline comfort level  Description: Interventions:  - Encourage patient to monitor pain and request assistance  - Assess pain using appropriate pain scale  - Administer analgesics based on type and severity of pain and evaluate response  - Implement non-pharmacological measures as appropriate and evaluate response  - Consider cultural and social influences on pain and pain management  - Notify physician/advanced practitioner if interventions unsuccessful or patient reports new pain  Outcome: Progressing     Problem: INFECTION - ADULT  Goal: Absence or prevention of progression during hospitalization  Description: INTERVENTIONS:  - Assess and monitor for signs and symptoms of infection  - Monitor lab/diagnostic results  - Monitor all insertion sites, i e  indwelling lines, tubes, and drains  - Monitor endotracheal if appropriate and nasal secretions for changes in amount and color  - [place] appropriate cooling/warming therapies per order  - Administer medications as ordered  - Instruct and encourage patient and family to use good hand hygiene technique  - Identify and instruct in appropriate isolation precautions for identified infection/condition  Outcome: Progressing  Goal: Absence of fever/infection during neutropenic period  Description: INTERVENTIONS:  - Monitor WBC    Outcome: Progressing     Problem: SAFETY ADULT  Goal: Patient will remain free of falls  Description: INTERVENTIONS:  - Educate patient/family on patient safety including physical limitations  - Instruct patient to call for assistance with activity   - Consult OT/PT to assist with strengthening/mobility   - Keep Call bell within reach  - Keep bed low and locked with side rails adjusted as appropriate  - Keep care items and personal belongings within reach  - Initiate and maintain comfort rounds  - Make Fall Risk Sign visible to staff  - Apply yellow socks and bracelet for high fall risk patients  - Consider moving patient to room near nurses station  Outcome: Progressing  Goal: Maintain or return to baseline ADL function  Description: INTERVENTIONS:  -  Assess patient's ability to carry out ADLs; assess patient's baseline for ADL function and identify physical deficits which impact ability to perform ADLs (bathing, care of mouth/teeth, toileting, grooming, dressing, etc )  - Assess/evaluate cause of self-care deficits   - Assess range of motion  - Assess patient's mobility; develop plan if impaired  - Assess patient's need for assistive devices and provide as appropriate  - Encourage maximum independence but intervene and supervise when necessary  - Involve family in performance of ADLs  - Assess for home care needs following discharge   - Consider OT consult to assist with ADL evaluation and planning for discharge  - Provide patient education as appropriate  Outcome: Progressing  Goal: Maintains/Returns to pre admission functional level  Description: INTERVENTIONS:  - Perform BMAT or MOVE assessment daily    - Set and communicate daily mobility goal to care team and patient/family/caregiver     - Collaborate with rehabilitation services on mobility goals if consulte  - Out of bed for toileting  - Record patient progress and toleration of activity level   Outcome: Progressing     Problem: DISCHARGE PLANNING  Goal: Discharge to home or other facility with appropriate resources  Description: INTERVENTIONS:  - Identify barriers to discharge w/patient and caregiver  - Arrange for needed discharge resources and transportation as appropriate  - Identify discharge learning needs (meds, wound care, etc )  - Arrange for interpretive services to assist at discharge as needed  - Refer to Case Management Department for coordinating discharge planning if the patient needs post-hospital services based on physician/advanced practitioner order or complex needs related to functional status, cognitive ability, or social support system  Outcome: Progressing     Problem: Knowledge Deficit  Goal: Patient/family/caregiver demonstrates understanding of disease process, treatment plan, medications, and discharge instructions  Description: Complete learning assessment and assess knowledge base  Interventions:  - Provide teaching at level of understanding  - Provide teaching via preferred learning methods  Outcome: Progressing     Problem: Nutrition/Hydration-ADULT  Goal: Nutrient/Hydration intake appropriate for improving, restoring or maintaining nutritional needs  Description: Monitor and assess patient's nutrition/hydration status for malnutrition  Collaborate with interdisciplinary team and initiate plan and interventions as ordered  Monitor patient's weight and dietary intake as ordered or per policy  Utilize nutrition screening tool and intervene as necessary  Determine patient's food preferences and provide high-protein, high-caloric foods as appropriate       INTERVENTIONS:  - Monitor oral intake, urinary output, labs, and treatment plans  - Assess nutrition and hydration status and recommend course of action  - Evaluate amount of meals eaten  - Assist patient with eating if necessary   - Allow adequate time for meals  - Recommend/ encourage appropriate diets, oral nutritional supplements, and vitamin/mineral supplements  - Order, calculate, and assess calorie counts as needed  - Recommend, monitor, and adjust tube feedings and TPN/PPN based on assessed needs  - Assess need for intravenous fluids  - Provide specific nutrition/hydration education as appropriate  - Include patient/family/caregiver in decisions related to nutrition  Outcome: Progressing

## 2021-08-13 NOTE — PROGRESS NOTES
Progress Note - Infectious Disease   Diana Noriega 74 y o  female MRN: [id number]  Unit/Bed#: -01 Encounter: [id number]    REQUIRED DOCUMENTATION:   1  This service was provided via Telemedicine  2  Provider located at [facility]  3  TeleMed provider: Pooja Guzman DO   4  Identify all parties in room with patient during tele consult: MARIA DE JESUS Leroy  5  After connecting through OANDA, patient was identified by name and date of birth and assistant checked wristband  Patient was then informed that this was a Telemedicine visit and that the exam was being conducted confidentially over secure lines  My office door was closed  Patient acknowledged consent and understanding of privacy and security of the Telemedicine visit, and gave us permission to have the assistant stay in the room in order to assist with the history and to conduct the exam   I informed the patient that I have reviewed their record in Epic and presented the opportunity for them to ask any questions regarding the visit today  The patient agreed to participate  Impression/Recommendations:   1  Sepsis: with manifestations of fever, tachycardia, leukocytosis  Possible source of sepsis is UTI/cystitis  WBC is elevated but stable  Temperature curve improving  Patient appears to be more alert at this time  Procalcitonin is undetecatable  ? Follow blood cultures: ngtd  ? Antibiotic therapy as below  ? Repeat CBC in am  ? Monitor clinical response, temperature curve     2  Acute cystitis: Sxs include fever, confusion  Patient appears to be more alert now  Pt denies dysuria  UA is not consistent with infection  Urine cx with growth of Morganella, E coli (< 100 K each)  ? Continue ceftriaxone IV x 3 day course for cystitis  ? Monitor clinical response and mental status  3  Acute encephalopathy, likely multifactorial including hepatic encephalopathy, UTI/cystitis  Mental status is improving   Pt is alert this am   ? Monitor clinical response and mental status  4  Abnormal CXR: demonstrates vascular congestion on my view  I do not suspect PNA clinically: pt is not dyspneic, respiratory status is stable and she is currently satting well on room air  Aspiration is also a consideration  ? Monitor clinical response, temperature curve     5  Diabetes mellitus type 2 with hyperglycemia: noted HbA1C of 7 7 on 1/25/21  This is a risk factor for infection  ? Glycemic control per primary team  6  Morbid obesity with BMI of 48     Extensive review of the medical records in Epic including review of the notes, radiographs, and laboratory results  My recommendations were discussed with the patient in detail who verbalized understanding  My recommendations were securely texted to Dr Anselmo Eason, from the primary service  Thank you for allowing me to participate in the care of this patient  ID service will formally re-evaluate this patient next week  Please contact ID attending on call with questions this weekend  History   Subjective: Pt feels a bit better today  She says she slept well last night  She reports occasional chills  She denies fevers  Patient denies nausea, vomiting  Pt reports chronic diarrhea due to lactulose use  She reports diffuse abd pain last night which was 7/10 intensity and abdominal distention  She also reports itching of her legs and hip, but denies rash  She also reports itching of her abdominal pannus      Antibiotics: ceftriaxone started 8/12  Scheduled Meds:  Current Facility-Administered Medications   Medication Dose Route Frequency Provider Last Rate    ALPRAZolam  0 5 mg Oral HS PRN TANISHA Sebastian      bimatoprost  1 drop Both Eyes HS TANISHA Sebastian      carvedilol  6 25 mg Oral BID With Meals TANISHA Sebastian      cefTRIAXone  1,000 mg Intravenous Q24H Kiya Galvez MD 1,000 mg (08/13/21 0819)    citalopram  10 mg Oral HS TANISHA Sebastian      ferrous sulfate  325 mg Oral Daily With Breakfast Alanna Pancake, CRNP      fluticasone  1 spray Nasal Daily Alanna Pancake, CRNP      furosemide  40 mg Oral BID (diuretic) Carolann Fan MD      insulin lispro  2-12 Units Subcutaneous TID AC Alanna Pancake, CRNP      insulin lispro  2-12 Units Subcutaneous HS Alanna Pancake, CRNP      lactulose  20 g Oral TID Carolann Fan MD      magnesium oxide  400 mg Oral BID Carolann Fan MD      pantoprazole  40 mg Oral BID AC Alanna Pancake, CRNP      potassium chloride  40 mEq Oral BID Carolann Fan MD      traZODone  25 mg Oral HS Alanna Pancake, CRNP       Continuous Infusions:   PRN Meds:   ALPRAZolam  Physical Exam   Temp:  [98 4 °F (36 9 °C)-99 5 °F (37 5 °C)] 99 5 °F (37 5 °C)  HR:  [] 100  Resp:  [13-19] 19  BP: (102-126)/(57-70) 126/70  SpO2:  [90 %-96 %] 90 %  Temp (24hrs), Av °F (37 2 °C), Min:98 4 °F (36 9 °C), Max:99 5 °F (37 5 °C)  Current: Temperature: 99 5 °F (37 5 °C)    Intake/Output Summary (Last 24 hours) at 2021 1057  Last data filed at 2021 1900  Gross per 24 hour   Intake 360 ml   Output --   Net 360 ml     Limited exam due to tele health visit  Partial exam done that was corroborated with patient's RN  General Appearance:  Appearing well, nontoxic, and in no distress   Head:  Normocephalic, atraumatic   Eyes:  EOMI   Nose: Nares normal, mucosa normal   Throat: Oropharynx moist    Lungs:   Respirations nonlabored, CTA b/l per RN   Heart:  Regular rate and rhythm, S1, S2 per RN   Abdomen:   Rounded, distended, soft, non-tender per RN    No Ott catheter present per RN   Extremities: 3+ distal leg edema b/l per RN   Skin: No rash per RN   Neurologic: Alert and oriented x3, conversant, fluent speech     Invasive Devices:   Peripheral IV 08/10/21 Dorsal (posterior); Left Hand (Active)   Site Assessment WDL; Clean;Dry; Intact 21 0836   Dressing Type Transparent 21   Line Status Flushed;Saline locked 21   Dressing Status Clean;Dry; Intact 08/13/21 0836   Dressing Change Due 08/14/21 08/12/21 2257   Reason Not Rotated Not due 08/12/21 2257     Labs, Imaging, & Other Studies   Lab Results: I have personally reviewed pertinent labs  Results from last 7 days   Lab Units 08/13/21  0517 08/12/21  0453 08/10/21  0518   WBC Thousand/uL 12 06* 12 08* 8 49   HEMOGLOBIN g/dL 8 4* 8 7* 7 9*   PLATELETS Thousands/uL 142* 135* 115*     Results from last 7 days   Lab Units 08/13/21  0517 08/10/21  0518 08/09/21  0500   POTASSIUM mmol/L 3 2* 2 8* 2 5*   CHLORIDE mmol/L 105 105 104   CO2 mmol/L 29 34* 33*   BUN mg/dL 9 20 23   CREATININE mg/dL 1 00 1 19 1 13   EGFR ml/min/1 73sq m 58 47 50   CALCIUM mg/dL 7 7* 8 2* 8 5   AST U/L 41 36 31   ALT U/L 22 22 21   ALK PHOS U/L 61 60 53     Results from last 7 days   Lab Units 08/12/21  1617 08/12/21  1616 08/07/21  2142 08/07/21  2021 08/07/21 2015   BLOOD CULTURE  Received in Microbiology Lab  Culture in Progress  Received in Microbiology Lab  Culture in Progress  --  No Growth After 4 Days  No Growth After 4 Days  URINE CULTURE   --   --  50,000-59,000 cfu/ml Morganella morganii*  50,000-59,000 cfu/ml Escherichia coli*  20,000-29,000 cfu/ml   --   --      Imaging Studies:   8/12/21 CXR PA/Lat: New patchy increased interstitial opacities bilaterally more extensive on the left may be related to interstitial pneumonia  I have personally reviewed pertinent imaging study reports and PACS images  Counseling/Coordination of care: Total 25 minutes encounter including direct communication with the patient via telehealth, chart review and coordination of care  Labs, medical tests and imaging studies were independently reviewed by me as noted above  VIRTUAL VISIT DISCLAIMER  The patient has consented to an online visit or consultation   The patient understands that the online visit is based solely on information provided by them, and that, in the absence of a face-to-face physical evaluation by the physician, the diagnosis they receive are both limited and provisional in terms of accuracy and completeness  This is not intended to replace a full medical face-to-face evaluation by the physician  The patient understands and accepts these terms

## 2021-08-13 NOTE — ASSESSMENT & PLAN NOTE
· Acute metabolic encephalopathy secondary to hepatic encephalopathy and MUNA  · Recently found to have acute cystitis however it seemed to be a contamination/colonization and I had held off on antibiotics but she has a white count again today and low-grade fevers I will restart her on Rocephin based on sensitivity  Consult placed to Infectious Disease  · cxray shows concern for pneumonia  Will do CT chest for further evaluation  · MRI brain negative for acute pathology    · Ammonia level has decreased with increasing lactulose dose  · Back to her baseline mental status    Results from last 7 days   Lab Units 08/13/21  0517 08/12/21  1109 08/11/21  0452 08/10/21  0518 08/09/21  0500 08/07/21 2016   AMMONIA umol/L 25 47* 19 35 53* 49*

## 2021-08-14 LAB
ALBUMIN FLD-MCNC: 0.7 G/DL
ANION GAP SERPL CALCULATED.3IONS-SCNC: 10 MMOL/L (ref 4–13)
APPEARANCE FLD: CLEAR
BASOPHILS NFR FLD MANUAL: 1 %
BUN SERPL-MCNC: 11 MG/DL (ref 5–25)
CALCIUM SERPL-MCNC: 7.8 MG/DL (ref 8.3–10.1)
CHLORIDE SERPL-SCNC: 106 MMOL/L (ref 100–108)
CO2 SERPL-SCNC: 28 MMOL/L (ref 21–32)
COLOR FLD: YELLOW
CREAT SERPL-MCNC: 1.03 MG/DL (ref 0.6–1.3)
ERYTHROCYTE [DISTWIDTH] IN BLOOD BY AUTOMATED COUNT: 18.6 % (ref 11.6–15.1)
GFR SERPL CREATININE-BSD FRML MDRD: 56 ML/MIN/1.73SQ M
GLUCOSE SERPL-MCNC: 105 MG/DL (ref 65–140)
GLUCOSE SERPL-MCNC: 123 MG/DL (ref 65–140)
GLUCOSE SERPL-MCNC: 151 MG/DL (ref 65–140)
GLUCOSE SERPL-MCNC: 158 MG/DL (ref 65–140)
GLUCOSE SERPL-MCNC: 176 MG/DL (ref 65–140)
HCT VFR BLD AUTO: 27 % (ref 34.8–46.1)
HGB BLD-MCNC: 8.6 G/DL (ref 11.5–15.4)
LDH FLD L TO P-CCNC: 79 U/L
LYMPHOCYTES NFR BLD AUTO: 61 %
MCH RBC QN AUTO: 30.5 PG (ref 26.8–34.3)
MCHC RBC AUTO-ENTMCNC: 31.9 G/DL (ref 31.4–37.4)
MCV RBC AUTO: 96 FL (ref 82–98)
MONO+MESO NFR FLD MANUAL: 13 %
MONOCYTES NFR BLD AUTO: 15 %
NEUTS SEG NFR BLD AUTO: 10 %
PLATELET # BLD AUTO: 151 THOUSANDS/UL (ref 149–390)
PMV BLD AUTO: 10.6 FL (ref 8.9–12.7)
POTASSIUM SERPL-SCNC: 3.9 MMOL/L (ref 3.5–5.3)
PROT FLD-MCNC: 1.3 G/DL
RBC # BLD AUTO: 2.82 MILLION/UL (ref 3.81–5.12)
SITE: NORMAL
SODIUM SERPL-SCNC: 144 MMOL/L (ref 136–145)
TOTAL CELLS COUNTED SPEC: 100
WBC # BLD AUTO: 12.15 THOUSAND/UL (ref 4.31–10.16)
WBC # FLD MANUAL: 497 /UL

## 2021-08-14 PROCEDURE — 82042 OTHER SOURCE ALBUMIN QUAN EA: CPT | Performed by: FAMILY MEDICINE

## 2021-08-14 PROCEDURE — 89051 BODY FLUID CELL COUNT: CPT | Performed by: FAMILY MEDICINE

## 2021-08-14 PROCEDURE — 87493 C DIFF AMPLIFIED PROBE: CPT | Performed by: FAMILY MEDICINE

## 2021-08-14 PROCEDURE — NC001 PR NO CHARGE: Performed by: ANESTHESIOLOGY

## 2021-08-14 PROCEDURE — 0W9G3ZZ DRAINAGE OF PERITONEAL CAVITY, PERCUTANEOUS APPROACH: ICD-10-PCS | Performed by: ANESTHESIOLOGY

## 2021-08-14 PROCEDURE — 85027 COMPLETE CBC AUTOMATED: CPT | Performed by: FAMILY MEDICINE

## 2021-08-14 PROCEDURE — 49083 ABD PARACENTESIS W/IMAGING: CPT | Performed by: ANESTHESIOLOGY

## 2021-08-14 PROCEDURE — 94660 CPAP INITIATION&MGMT: CPT

## 2021-08-14 PROCEDURE — 82948 REAGENT STRIP/BLOOD GLUCOSE: CPT

## 2021-08-14 PROCEDURE — 83615 LACTATE (LD) (LDH) ENZYME: CPT | Performed by: FAMILY MEDICINE

## 2021-08-14 PROCEDURE — 99232 SBSQ HOSP IP/OBS MODERATE 35: CPT | Performed by: FAMILY MEDICINE

## 2021-08-14 PROCEDURE — 80048 BASIC METABOLIC PNL TOTAL CA: CPT | Performed by: FAMILY MEDICINE

## 2021-08-14 PROCEDURE — 94760 N-INVAS EAR/PLS OXIMETRY 1: CPT

## 2021-08-14 PROCEDURE — 87205 SMEAR GRAM STAIN: CPT | Performed by: FAMILY MEDICINE

## 2021-08-14 PROCEDURE — 84157 ASSAY OF PROTEIN OTHER: CPT | Performed by: FAMILY MEDICINE

## 2021-08-14 PROCEDURE — 87070 CULTURE OTHR SPECIMN AEROBIC: CPT | Performed by: FAMILY MEDICINE

## 2021-08-14 RX ORDER — SPIRONOLACTONE 25 MG/1
25 TABLET ORAL DAILY
Status: DISCONTINUED | OUTPATIENT
Start: 2021-08-14 | End: 2021-08-15

## 2021-08-14 RX ORDER — LIDOCAINE HYDROCHLORIDE 10 MG/ML
5 INJECTION, SOLUTION EPIDURAL; INFILTRATION; INTRACAUDAL; PERINEURAL ONCE
Status: COMPLETED | OUTPATIENT
Start: 2021-08-14 | End: 2021-08-14

## 2021-08-14 RX ADMIN — ALPRAZOLAM 0.5 MG: 0.5 TABLET ORAL at 22:03

## 2021-08-14 RX ADMIN — PANTOPRAZOLE SODIUM 40 MG: 40 TABLET, DELAYED RELEASE ORAL at 07:44

## 2021-08-14 RX ADMIN — POTASSIUM CHLORIDE 40 MEQ: 20 SOLUTION ORAL at 07:44

## 2021-08-14 RX ADMIN — POTASSIUM CHLORIDE 40 MEQ: 20 SOLUTION ORAL at 15:47

## 2021-08-14 RX ADMIN — Medication 400 MG: at 15:47

## 2021-08-14 RX ADMIN — FLUTICASONE PROPIONATE 1 SPRAY: 50 SPRAY, METERED NASAL at 07:49

## 2021-08-14 RX ADMIN — INSULIN LISPRO 2 UNITS: 100 INJECTION, SOLUTION INTRAVENOUS; SUBCUTANEOUS at 22:03

## 2021-08-14 RX ADMIN — BIMATOPROST 1 DROP: 0.1 SOLUTION/ DROPS OPHTHALMIC at 22:03

## 2021-08-14 RX ADMIN — PANTOPRAZOLE SODIUM 40 MG: 40 TABLET, DELAYED RELEASE ORAL at 04:45

## 2021-08-14 RX ADMIN — INSULIN LISPRO 2 UNITS: 100 INJECTION, SOLUTION INTRAVENOUS; SUBCUTANEOUS at 15:46

## 2021-08-14 RX ADMIN — CARVEDILOL 6.25 MG: 6.25 TABLET, FILM COATED ORAL at 15:48

## 2021-08-14 RX ADMIN — LACTULOSE 20 G: 10 SOLUTION ORAL at 15:47

## 2021-08-14 RX ADMIN — CARVEDILOL 6.25 MG: 6.25 TABLET, FILM COATED ORAL at 07:44

## 2021-08-14 RX ADMIN — CEFTRIAXONE 1000 MG: 1 INJECTION, SOLUTION INTRAVENOUS at 07:49

## 2021-08-14 RX ADMIN — FUROSEMIDE 40 MG: 40 TABLET ORAL at 15:47

## 2021-08-14 RX ADMIN — SPIRONOLACTONE 25 MG: 25 TABLET, FILM COATED ORAL at 15:47

## 2021-08-14 RX ADMIN — Medication 400 MG: at 07:44

## 2021-08-14 RX ADMIN — FERROUS SULFATE TAB 325 MG (65 MG ELEMENTAL FE) 325 MG: 325 (65 FE) TAB at 07:44

## 2021-08-14 RX ADMIN — LACTULOSE 20 G: 10 SOLUTION ORAL at 11:02

## 2021-08-14 RX ADMIN — PANTOPRAZOLE SODIUM 40 MG: 40 TABLET, DELAYED RELEASE ORAL at 15:47

## 2021-08-14 RX ADMIN — TRAZODONE HYDROCHLORIDE 25 MG: 50 TABLET ORAL at 22:03

## 2021-08-14 RX ADMIN — FUROSEMIDE 40 MG: 40 TABLET ORAL at 07:44

## 2021-08-14 RX ADMIN — INSULIN LISPRO 2 UNITS: 100 INJECTION, SOLUTION INTRAVENOUS; SUBCUTANEOUS at 11:02

## 2021-08-14 RX ADMIN — CITALOPRAM HYDROBROMIDE 10 MG: 10 TABLET ORAL at 22:03

## 2021-08-14 RX ADMIN — LACTULOSE 20 G: 10 SOLUTION ORAL at 07:44

## 2021-08-14 RX ADMIN — LIDOCAINE HYDROCHLORIDE 5 ML: 10 INJECTION, SOLUTION EPIDURAL; INFILTRATION; INTRACAUDAL; PERINEURAL at 15:34

## 2021-08-14 NOTE — ASSESSMENT & PLAN NOTE
· MUNA without CKD secondary to recent outpatient diuretic use  · Baseline creatinine close to the 0 5 - 0 6  · Need to improve fluid overload and hence continue diuresis with Lasix  creatinine acceptable at 1 0    Also added Aldactone 25 mg daily    Results from last 7 days   Lab Units 08/14/21  0821 08/13/21  0517 08/12/21  2307 08/12/21  0453 08/11/21  0452 08/10/21  1448 08/10/21  0518 08/09/21  1452 08/09/21  0500 08/08/21  0523   BUN mg/dL 11 9 12 12 15 18 20 23 23 32*   CREATININE mg/dL 1 03 1 00 1 12 1 07 1 06 1 19 1 19 1 16 1 13 1 27   EGFR ml/min/1 73sq m 56 58 51 53 54 47 47 48 50 43

## 2021-08-14 NOTE — PROGRESS NOTES
[address]  Progress Note - Pepe Peak 1955, 74 y o  female MRN: [id number]  Unit/Bed#: -01 Encounter: [id number]  Primary Care Provider: Kerrie Neal DO   Date and time admitted to hospital: 8/7/2021  7:57 PM    * Acute metabolic encephalopathy  Assessment & Plan  · Acute metabolic encephalopathy secondary to hepatic encephalopathy and MUNA  · Recently found to have acute cystitis however it seemed to be a contamination/colonization and I had held off on antibiotics but she has a white count again  and low-grade fevers and Rocephin was restarted for total of 3 days  Discussed with Infectious Disease  No pneumonia on CT chest   · MRI brain negative for acute pathology  · Ammonia level has decreased with increasing lactulose dose  · Continues to have level 1 hepatic encephalopathy  · Will plan for diagnostic paracentesis today due to persistence of leukocytosis  Results from last 7 days   Lab Units 08/13/21  0517 08/12/21  1109 08/11/21  0452 08/10/21  0518 08/09/21  0500 08/07/21 2016   AMMONIA umol/L 25 47* 19 35 53* 49*       Hypokalemia  Assessment & Plan  · Secondary to diuresis  Being repleted daily along with magnesium supplementation    Results from last 7 days   Lab Units 08/14/21  0821 08/13/21  0517 08/12/21  2307 08/12/21  0453 08/11/21  0452 08/10/21  1448 08/10/21  0518 08/09/21  1452 08/09/21  0500 08/08/21  0523 08/07/21 2015   POTASSIUM mmol/L 3 9 3 2* 3 8 3 4* 3 5 3 1* 2 8* 3 2* 2 5* 3 2* 3 5       MUNA (acute kidney injury) (HCC)  Assessment & Plan  · MUNA without CKD secondary to recent outpatient diuretic use  · Baseline creatinine close to the 0 5 - 0 6  · Need to improve fluid overload and hence continue diuresis with Lasix  creatinine acceptable at 1 0    Also added Aldactone 25 mg daily    Results from last 7 days   Lab Units 08/14/21  0821 08/13/21  0517 08/12/21  2307 08/12/21  0453 08/11/21  0452 08/10/21  1448 08/10/21  0518 08/09/21  1452 08/09/21  0500 08/08/21  0523   BUN mg/dL 11 9 12 12 15 18 20 23 23 32*   CREATININE mg/dL 1 03 1 00 1 12 1 07 1 06 1 19 1 19 1 16 1 13 1 27   EGFR ml/min/1 73sq m 56 58 51 53 54 47 47 48 50 43       Acute on chronic anemia  Assessment & Plan  · recent hospitalization for duodenal bleeding   · Transfuse PRBC in case hemoglobin is less than 7 5  Hemoglobin today is 8 4-8 7  · No active bleeding noted at this time  Results from last 7 days   Lab Units 08/14/21  0821 08/13/21  0517 08/12/21  0453 08/10/21  0518 08/09/21  0500 08/08/21  0523 08/07/21 2015   HEMOGLOBIN g/dL 8 6* 8 4* 8 7* 7 9* 7 5* 8 1* 9 2*       Type 2 diabetes mellitus without complication, without long-term current use of insulin [facility])  Assessment & Plan  Lab Results   Component Value Date    HGBA1C 7 7 (H) 01/25/2021     Recent Labs     08/13/21  1616 08/13/21  2055 08/14/21  0646 08/14/21  1101   POCGLU 160* 186* 105 158*     · Holding metformin given lactic acidosis  Continue sliding scale  Cirrhosis of liver with ascites ([address] )  Assessment & Plan  · 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  · Did have outpatient ultrasound at outside facility   perihepatic ascites noted  Not enough fluid available for paracentesis to be performed previously but will re attempt today  · Recently started on carvedilol to decrease portal pressure  · Fluid overload noted and continue Lasix and added Aldactone  Baseline weight is around 260 lb  Today weight is 268 lb  Check daily weights and intake and output    Sepsis present on admission:  Probably secondary to acute cystitis without hematuria  Continue IV Rocephin for now and observe  VTE Pharmacologic Prophylaxis:   Pharmacologic: Pharmacologic VTE Prophylaxis contraindicated due to Recent bleeding duodenal ulcer  Mechanical VTE Prophylaxis in Place: Yes    Patient Centered Rounds: I have performed bedside rounds with nursing staff today      Discussions with Specialists or Other Care Team Provider:  Discussed with critical care    Education and Discussions with Family / Patient:  Discussed with patient at bedside    Time Spent for Care: 30 minutes  More than 50% of total time spent on counseling and coordination of care as described above  Current Length of Stay: 7 day(s)    Current Patient Status: Inpatient   Certification Statement: The patient will continue to require additional inpatient hospital stay due to Acute metabolic encephalopathy    Discharge Plan:  Pending progress    Code Status: Level 1 - Full Code      Subjective:   Patient denies any chest pain or shortness of breath   Noted to have some increasing abdominal distension today  Fortunately she is still near her baseline mental status  Objective:     Vitals:   Temp (24hrs), Av 1 °F (37 3 °C), Min:98 9 °F (37 2 °C), Max:99 3 °F (37 4 °C)    Temp:  [98 9 °F (37 2 °C)-99 3 °F (37 4 °C)] 98 9 °F (37 2 °C)  HR:  [91-95] 95  Resp:  [17-18] 17  BP: (121-123)/(69-70) 122/70  SpO2:  [93 %-95 %] 93 %  Body mass index is 49 19 kg/m²  Input and Output Summary (last 24 hours): Intake/Output Summary (Last 24 hours) at 2021 1438  Last data filed at 2021 1236  Gross per 24 hour   Intake 180 ml   Output 200 ml   Net -20 ml       Physical Exam:     Physical Exam  Vitals and nursing note reviewed  Constitutional:       Appearance: She is ill-appearing  HENT:      Head: Normocephalic and atraumatic  Right Ear: External ear normal       Left Ear: External ear normal       Nose: Nose normal       Mouth/Throat:      Pharynx: Oropharynx is clear  Eyes:      Pupils: Pupils are equal, round, and reactive to light  Cardiovascular:      Rate and Rhythm: Normal rate and regular rhythm  Heart sounds: Normal heart sounds  Pulmonary:      Effort: Pulmonary effort is normal       Breath sounds: Normal breath sounds  Abdominal:      General: Bowel sounds are normal  There is distension  Palpations: Abdomen is soft  Tenderness: There is no abdominal tenderness  Musculoskeletal:         General: Normal range of motion  Cervical back: Normal range of motion and neck supple  Right lower leg: Edema present  Left lower leg: Edema present  Comments: Non pitting edema bilateral lower extremity   Skin:     General: Skin is warm and dry  Capillary Refill: Capillary refill takes less than 2 seconds  Neurological:      General: No focal deficit present  Mental Status: She is alert and oriented to person, place, and time     Psychiatric:         Mood and Affect: Mood normal            Additional Data:     Labs:    Results from last 7 days   Lab Units 08/14/21  0821 08/08/21  0523   WBC Thousand/uL 12 15* 12 77*   HEMOGLOBIN g/dL 8 6* 8 1*   HEMATOCRIT % 27 0* 25 6*   PLATELETS Thousands/uL 151 151   NEUTROS PCT %  --  58   LYMPHS PCT %  --  28   MONOS PCT %  --  11   EOS PCT %  --  2     Results from last 7 days   Lab Units 08/14/21  0821 08/13/21  0517   SODIUM mmol/L 144 141   POTASSIUM mmol/L 3 9 3 2*   CHLORIDE mmol/L 106 105   CO2 mmol/L 28 29   BUN mg/dL 11 9   CREATININE mg/dL 1 03 1 00   ANION GAP mmol/L 10 7   CALCIUM mg/dL 7 8* 7 7*   ALBUMIN g/dL  --  2 7*   TOTAL BILIRUBIN mg/dL  --  1 96*   ALK PHOS U/L  --  61   ALT U/L  --  22   AST U/L  --  41   GLUCOSE RANDOM mg/dL 123 102     Results from last 7 days   Lab Units 08/13/21  0517   INR  1 71*     Results from last 7 days   Lab Units 08/14/21  1101 08/14/21  0646 08/13/21  2055 08/13/21  1616 08/13/21  1059 08/13/21  0746 08/12/21  2139 08/12/21  1559 08/12/21  1047 08/12/21  0719 08/11/21  2139 08/11/21  1637   POC GLUCOSE mg/dl 158* 105 186* 160* 177* 113 139 185* 136 107 142* 151*         Results from last 7 days   Lab Units 08/13/21  0517 08/12/21  0933 08/09/21  0500 08/08/21  0523 08/08/21  0212 08/07/21  2339 08/07/21 2015   LACTIC ACID mmol/L  --   --   --  2 2* 2 7* 3 1* 3 4*   PROCALCITONIN ng/ml <0 05 <0 05 0 08  --  <0 05  --  0 08           * I Have Reviewed All Lab Data Listed Above  * Additional Pertinent Lab Tests Reviewed: Jeff [address] Admission Reviewed    Imaging:    Imaging Reports Reviewed Today Include:  CT chest  Imaging Personally Reviewed by Myself Includes:  CT chest    Recent Cultures (last 7 days):     Results from last 7 days   Lab Units 08/12/21  2321 08/12/21  1617 08/12/21  1616 08/07/21  2142 08/07/21 2021 08/07/21 2015   BLOOD CULTURE   --  No Growth at 24 hrs  No Growth at 24 hrs   --  No Growth After 5 Days  No Growth After 5 Days     URINE CULTURE  40,000-49,000 cfu/ml Alpha Hemolytic Streptococcus*  --   --  50,000-59,000 cfu/ml Morganella morganii*  50,000-59,000 cfu/ml Escherichia coli*  20,000-29,000 cfu/ml   --   --        Last 24 Hours Medication List:   Current Facility-Administered Medications   Medication Dose Route Frequency Provider Last Rate    ALPRAZolam  0 5 mg Oral HS PRN Khai Ode, CRNP      bimatoprost  1 drop Both Eyes HS Khai Ode, CRNP      carvedilol  6 25 mg Oral BID With Meals Khai Ode, CRNP      cefTRIAXone  1,000 mg Intravenous Q24H Km Sorensen MD 1,000 mg (08/14/21 0749)    citalopram  10 mg Oral HS Khai Ode, CRNP      ferrous sulfate  325 mg Oral Daily With Breakfast Khai Ode, CRNP      fluticasone  1 spray Nasal Daily Khai Ode, CRNP      furosemide  40 mg Oral BID (diuretic) Km Sorensen MD      hydrOXYzine HCL  10 mg Oral Q6H PRN Km Sorensen MD      insulin lispro  2-12 Units Subcutaneous TID AC Khai Ode, CRNP      insulin lispro  2-12 Units Subcutaneous HS Khai Ode, CRNP      lactulose  20 g Oral TID Km Sorensen MD      lidocaine (PF)  5 mL Infiltration Once TANISHA Blanco      magnesium oxide  400 mg Oral BID Km Sorensen MD      pantoprazole  40 mg Oral BID AC Khai Ode, CRNP      potassium chloride  40 mEq Oral BID MD Rachel Weller spironolactone  25 mg Oral Daily Carolann Fan MD      traZODone  25 mg Oral  TANISHA Villanueva          Today, Patient Was Seen By: Carolann Fan MD    ** Please Note: Dictation voice to text software may have been used in the creation of this document   **

## 2021-08-14 NOTE — PROCEDURES
Paracentesis    Date/Time: 8/14/2021 3:05 PM  Performed by: Macie Coyle MD  Authorized by: Macie Coyle MD     Patient location:  Bedside  Consent:     Consent obtained:  Written    Consent given by:  Patient    Risks discussed:  Bleeding, bowel perforation, infection and pain    Alternatives discussed:  No treatment  Universal protocol:     Procedure explained and questions answered to patient or proxy's satisfaction: yes      Relevant documents present and verified: yes      Test results available and properly labeled: yes      Site/side marked: yes      Immediately prior to procedure a time out was called: yes      Patient identity confirmed:  Verbally with patient  Pre-procedure details:     Initial or Subsequent Exam:  Initial    Procedure purpose:  Diagnostic    Indications: secondary bacterial peritonitis      Preparation: Patient was prepped and draped in usual sterile fashion    Anesthesia (see MAR for exact dosages): Anesthesia method:  Local infiltration    Local anesthetic:  Lidocaine 1% w/o epi  Procedure details:     Needle gauge:  20    Equipment: Paracentesis kit used      Ultrasound guidance: yes      Ultrasound image availability:  Not saved    Sterile ultrasound techniques: Sterile gel and sterile probe covers were used      Approach:  Percutaneous    Puncture site:  R lower quadrant    Fluid appearance:  Yellow and serous    Dressing:  Adhesive bandage  Post-procedure details:     Patient tolerance of procedure: Tolerated well, no immediate complications  Post-procedure medication (see MAR for dosing):      Albumin replacement: Yes

## 2021-08-14 NOTE — ASSESSMENT & PLAN NOTE
· Acute metabolic encephalopathy secondary to hepatic encephalopathy and MUNA  · Recently found to have acute cystitis however it seemed to be a contamination/colonization and I had held off on antibiotics but she has a white count again  and low-grade fevers and Rocephin was restarted for total of 3 days  Discussed with Infectious Disease  No pneumonia on CT chest   · MRI brain negative for acute pathology  · Ammonia level has decreased with increasing lactulose dose  · Continues to have level 1 hepatic encephalopathy  · Will plan for diagnostic paracentesis today due to persistence of leukocytosis      Results from last 7 days   Lab Units 08/13/21  0517 08/12/21  1109 08/11/21  0452 08/10/21  0518 08/09/21  0500 08/07/21 2016   AMMONIA umol/L 25 47* 19 35 53* 49*

## 2021-08-14 NOTE — ASSESSMENT & PLAN NOTE
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

## 2021-08-14 NOTE — ASSESSMENT & PLAN NOTE
Lab Results   Component Value Date    HGBA1C 7 7 (H) 01/25/2021     Recent Labs     08/13/21  1616 08/13/21 2055 08/14/21  0646 08/14/21  1101   POCGLU 160* 186* 105 158*     · Holding metformin given lactic acidosis  Continue sliding scale

## 2021-08-14 NOTE — ASSESSMENT & PLAN NOTE
· Secondary to diuresis    Being repleted daily along with magnesium supplementation    Results from last 7 days   Lab Units 08/14/21  0821 08/13/21  0517 08/12/21  2307 08/12/21  0453 08/11/21  0452 08/10/21  1448 08/10/21  0518 08/09/21  1452 08/09/21  0500 08/08/21  0523 08/07/21 2015   POTASSIUM mmol/L 3 9 3 2* 3 8 3 4* 3 5 3 1* 2 8* 3 2* 2 5* 3 2* 3 5

## 2021-08-14 NOTE — ASSESSMENT & PLAN NOTE
· recent hospitalization for duodenal bleeding   · Transfuse PRBC in case hemoglobin is less than 7 5    Hemoglobin today is 8 4-8 7  · No active bleeding noted at this time  Results from last 7 days   Lab Units 08/14/21  0821 08/13/21  0517 08/12/21  0453 08/10/21  0518 08/09/21  0500 08/08/21  0523 08/07/21 2015   HEMOGLOBIN g/dL 8 6* 8 4* 8 7* 7 9* 7 5* 8 1* 9 2*

## 2021-08-15 LAB
ANION GAP SERPL CALCULATED.3IONS-SCNC: 12 MMOL/L (ref 4–13)
BACTERIA UR CULT: ABNORMAL
BUN SERPL-MCNC: 11 MG/DL (ref 5–25)
C DIFF TOX B TCDB STL QL NAA+PROBE: NEGATIVE
CALCIUM SERPL-MCNC: 8 MG/DL (ref 8.3–10.1)
CHLORIDE SERPL-SCNC: 108 MMOL/L (ref 100–108)
CO2 SERPL-SCNC: 21 MMOL/L (ref 21–32)
CREAT SERPL-MCNC: 1.16 MG/DL (ref 0.6–1.3)
ERYTHROCYTE [DISTWIDTH] IN BLOOD BY AUTOMATED COUNT: 18.8 % (ref 11.6–15.1)
GFR SERPL CREATININE-BSD FRML MDRD: 48 ML/MIN/1.73SQ M
GLUCOSE SERPL-MCNC: 173 MG/DL (ref 65–140)
GLUCOSE SERPL-MCNC: 182 MG/DL (ref 65–140)
GLUCOSE SERPL-MCNC: 196 MG/DL (ref 65–140)
GLUCOSE SERPL-MCNC: 96 MG/DL (ref 65–140)
HCT VFR BLD AUTO: 29.1 % (ref 34.8–46.1)
HGB BLD-MCNC: 8.9 G/DL (ref 11.5–15.4)
MCH RBC QN AUTO: 30.4 PG (ref 26.8–34.3)
MCHC RBC AUTO-ENTMCNC: 30.6 G/DL (ref 31.4–37.4)
MCV RBC AUTO: 99 FL (ref 82–98)
PLATELET # BLD AUTO: 106 THOUSANDS/UL (ref 149–390)
PMV BLD AUTO: 11.3 FL (ref 8.9–12.7)
POTASSIUM SERPL-SCNC: 4.6 MMOL/L (ref 3.5–5.3)
RBC # BLD AUTO: 2.93 MILLION/UL (ref 3.81–5.12)
SODIUM SERPL-SCNC: 141 MMOL/L (ref 136–145)
WBC # BLD AUTO: 11.28 THOUSAND/UL (ref 4.31–10.16)

## 2021-08-15 PROCEDURE — 99233 SBSQ HOSP IP/OBS HIGH 50: CPT | Performed by: FAMILY MEDICINE

## 2021-08-15 PROCEDURE — 80048 BASIC METABOLIC PNL TOTAL CA: CPT | Performed by: FAMILY MEDICINE

## 2021-08-15 PROCEDURE — 82948 REAGENT STRIP/BLOOD GLUCOSE: CPT

## 2021-08-15 PROCEDURE — 85027 COMPLETE CBC AUTOMATED: CPT | Performed by: FAMILY MEDICINE

## 2021-08-15 RX ORDER — GLIPIZIDE 2.5 MG/1
2.5 TABLET, EXTENDED RELEASE ORAL
Status: DISCONTINUED | OUTPATIENT
Start: 2021-08-15 | End: 2021-08-16 | Stop reason: HOSPADM

## 2021-08-15 RX ORDER — SPIRONOLACTONE 25 MG/1
50 TABLET ORAL DAILY
Status: DISCONTINUED | OUTPATIENT
Start: 2021-08-16 | End: 2021-08-16 | Stop reason: HOSPADM

## 2021-08-15 RX ORDER — LINEZOLID 600 MG/1
600 TABLET, FILM COATED ORAL EVERY 12 HOURS SCHEDULED
Status: DISCONTINUED | OUTPATIENT
Start: 2021-08-15 | End: 2021-08-16 | Stop reason: HOSPADM

## 2021-08-15 RX ORDER — POTASSIUM CHLORIDE 20MEQ/15ML
40 LIQUID (ML) ORAL DAILY
Status: DISCONTINUED | OUTPATIENT
Start: 2021-08-16 | End: 2021-08-16 | Stop reason: HOSPADM

## 2021-08-15 RX ADMIN — BIMATOPROST 1 DROP: 0.1 SOLUTION/ DROPS OPHTHALMIC at 23:21

## 2021-08-15 RX ADMIN — CARVEDILOL 6.25 MG: 6.25 TABLET, FILM COATED ORAL at 07:57

## 2021-08-15 RX ADMIN — POTASSIUM CHLORIDE 40 MEQ: 20 SOLUTION ORAL at 07:57

## 2021-08-15 RX ADMIN — LACTULOSE 20 G: 10 SOLUTION ORAL at 07:57

## 2021-08-15 RX ADMIN — INSULIN LISPRO 2 UNITS: 100 INJECTION, SOLUTION INTRAVENOUS; SUBCUTANEOUS at 11:15

## 2021-08-15 RX ADMIN — FUROSEMIDE 40 MG: 40 TABLET ORAL at 16:53

## 2021-08-15 RX ADMIN — LACTULOSE 20 G: 10 SOLUTION ORAL at 11:15

## 2021-08-15 RX ADMIN — ALPRAZOLAM 0.5 MG: 0.5 TABLET ORAL at 23:57

## 2021-08-15 RX ADMIN — LACTULOSE 20 G: 10 SOLUTION ORAL at 16:53

## 2021-08-15 RX ADMIN — FUROSEMIDE 40 MG: 40 TABLET ORAL at 07:57

## 2021-08-15 RX ADMIN — PANTOPRAZOLE SODIUM 40 MG: 40 TABLET, DELAYED RELEASE ORAL at 16:53

## 2021-08-15 RX ADMIN — SPIRONOLACTONE 25 MG: 25 TABLET, FILM COATED ORAL at 07:57

## 2021-08-15 RX ADMIN — CEFTRIAXONE 1000 MG: 1 INJECTION, SOLUTION INTRAVENOUS at 08:02

## 2021-08-15 RX ADMIN — Medication 400 MG: at 07:57

## 2021-08-15 RX ADMIN — PANTOPRAZOLE SODIUM 40 MG: 40 TABLET, DELAYED RELEASE ORAL at 06:24

## 2021-08-15 RX ADMIN — CARVEDILOL 6.25 MG: 6.25 TABLET, FILM COATED ORAL at 16:53

## 2021-08-15 RX ADMIN — FERROUS SULFATE TAB 325 MG (65 MG ELEMENTAL FE) 325 MG: 325 (65 FE) TAB at 07:57

## 2021-08-15 RX ADMIN — LINEZOLID 600 MG: 600 TABLET, FILM COATED ORAL at 12:07

## 2021-08-15 RX ADMIN — LINEZOLID 600 MG: 600 TABLET, FILM COATED ORAL at 23:21

## 2021-08-15 NOTE — PROGRESS NOTES
[address]  Progress Note - Glenn Estevez 1955, 74 y o  female MRN: [id number]  Unit/Bed#: -Krystal Encounter: [id number]  Primary Care Provider: Michelet Funes DO   Date and time admitted to hospital: 8/7/2021  7:57 PM    * Acute metabolic encephalopathy  Assessment & Plan  · Acute metabolic encephalopathy secondary to hepatic encephalopathy and MUNA  · Recently found to have acute cystitis however it seemed to be a contamination/colonization and I had held off on antibiotics but she has a white count again  and low-grade fevers and Rocephin was restarted for total of 3 days  No pneumonia on CT chest   Urine culture has finalized as VRE and hence will discontinue Rocephin and place on linezolid and follow-up with Infectious Disease  · MRI brain negative for acute pathology  · Ammonia level has decreased with increasing lactulose dose  · Continues to have level 1 hepatic encephalopathy and back to baseline    Results from last 7 days   Lab Units 08/13/21  0517 08/12/21  1109 08/11/21  0452 08/10/21  0518 08/09/21  0500   AMMONIA umol/L 25 47* 19 35 53*       Hypokalemia  Assessment & Plan  · Secondary to diuresis  Being repleted daily along with magnesium supplementation  Now resolved    Results from last 7 days   Lab Units 08/15/21  1001 08/14/21  0821 08/13/21  0517 08/12/21  2307 08/12/21  0453 08/11/21  0452 08/10/21  1448 08/10/21  0518 08/09/21  1452 08/09/21  0500   POTASSIUM mmol/L 4 6 3 9 3 2* 3 8 3 4* 3 5 3 1* 2 8* 3 2* 2 5*       MUNA (acute kidney injury) ([facility] [address] )  Assessment & Plan  · MUNA without CKD secondary to recent outpatient diuretic use  · Baseline creatinine close to the 0 5 - 0 6  · Need to improve fluid overload and hence continue diuresis with Lasix  creatinine acceptable at 1 0  Also added Aldactone 50 mg daily    · Patient had diagnostic paracentesis on 08/14 and 900 cc removed    Results from last 7 days   Lab Units 08/15/21  1001 08/14/21  5906 08/13/21  0517 08/12/21  2307 08/12/21  0453 08/11/21  0452 08/10/21  1448 08/10/21  0518 08/09/21  1452 08/09/21  0500   BUN mg/dL 11 11 9 12 12 15 18 20 23 23   CREATININE mg/dL 1 16 1 03 1 00 1 12 1 07 1 06 1 19 1 19 1 16 1 13   EGFR ml/min/1 73sq m 48 56 58 51 53 54 47 47 48 50       Acute on chronic anemia  Assessment & Plan  · recent hospitalization for duodenal bleeding   · Transfuse PRBC in case hemoglobin is less than 7 5  Hemoglobin today is 8 4-8 9  · No active bleeding noted at this time  Results from last 7 days   Lab Units 08/15/21  1001 08/14/21  0821 08/13/21  0517 08/12/21  0453 08/10/21  0518 08/09/21  0500   HEMOGLOBIN g/dL 8 9* 8 6* 8 4* 8 7* 7 9* 7 5*       Acute cystitis without hematuria  Assessment & Plan  Patient found to have acute cystitis without hematuria  Urine culture positive for VRE  Discontinue Rocephin and placed on linezolid for now and will discuss with Infectious Disease tomorrow    Morbid obesity ([facility] [address] )  Assessment & Plan  · Body mass index is 48 29 kg/m²  · Encourage diet exercise and weight loss    MELVIN on CPAP  Assessment & Plan  · Continue CPAP at hs    Type 2 diabetes mellitus without complication, without long-term current use of insulin [facility])  Assessment & Plan  Lab Results   Component Value Date    HGBA1C 7 7 (H) 01/25/2021     Recent Labs     08/14/21  1543 08/14/21  2056 08/15/21  0730 08/15/21  1115   POCGLU 151* 176* 96 182*     · Holding metformin given lactic acidosis  Continue sliding scale  Cirrhosis of liver with ascites ([facility] [address] )  Assessment & Plan  · 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  · Diagnostic paracentesis done on 8/14 and shows transudative fluid  · Recently started on carvedilol to decrease portal pressure  · Fluid overload noted and continue Lasix and added Aldactone  Baseline weight is around 260 lb  Today weight is 264 lb    Check daily weights and intake and output      VTE Pharmacologic Prophylaxis:   Pharmacologic: Pharmacologic VTE Prophylaxis contraindicated due to Recent duodenal ulcer  Mechanical VTE Prophylaxis in Place: Yes    Patient Centered Rounds: I have performed bedside rounds with nursing staff today  Discussions with Specialists or Other Care Team Provider:  Will discuss with Infectious Disease tomorrow  Discussed with GI yesterday    Education and Discussions with Family / Patient:  Discussed with patient bedside will update family    Time Spent for Care: 30 minutes  More than 50% of total time spent on counseling and coordination of care as described above  Current Length of Stay: 8 day(s)    Current Patient Status: Inpatient   Certification Statement: The patient will continue to require additional inpatient hospital stay due to Acute hepatic encephalopathy    Discharge Plan:  Anticipate discharge to rehab tomorrow    Code Status: Level 1 - Full Code      Subjective:   Patient denies any chest pain or shortness of breath or abdominal pain  No fevers or chills reported today  Having 3 bowel movements a day awake and alert today  Objective:     Vitals:   Temp (24hrs), Av 1 °F (37 3 °C), Min:98 6 °F (37 °C), Max:99 8 °F (37 7 °C)    Temp:  [98 6 °F (37 °C)-99 8 °F (37 7 °C)] 98 8 °F (37 1 °C)  HR:  [82-89] 82  Resp:  [18-19] 19  BP: (122-125)/(69-71) 125/71  SpO2:  [92 %-94 %] 92 %  Body mass index is 48 29 kg/m²  Input and Output Summary (last 24 hours): Intake/Output Summary (Last 24 hours) at 8/15/2021 1143  Last data filed at 8/15/2021 0757  Gross per 24 hour   Intake 960 ml   Output 1100 ml   Net -140 ml       Physical Exam:     Physical Exam  Vitals and nursing note reviewed  Constitutional:       Appearance: Normal appearance  She is obese  HENT:      Head: Normocephalic and atraumatic  Right Ear: External ear normal       Left Ear: External ear normal       Nose: Nose normal       Mouth/Throat:      Pharynx: Oropharynx is clear     Eyes:      Pupils: Pupils are equal, round, and reactive to light  Cardiovascular:      Rate and Rhythm: Normal rate and regular rhythm  Heart sounds: Normal heart sounds  Pulmonary:      Effort: Pulmonary effort is normal       Breath sounds: Normal breath sounds  Abdominal:      General: Bowel sounds are normal  There is distension  Palpations: Abdomen is soft  Tenderness: There is no abdominal tenderness  Musculoskeletal:         General: Normal range of motion  Cervical back: Normal range of motion and neck supple  Skin:     General: Skin is warm and dry  Capillary Refill: Capillary refill takes less than 2 seconds  Neurological:      General: No focal deficit present  Mental Status: She is alert and oriented to person, place, and time     Psychiatric:         Mood and Affect: Mood normal            Additional Data:     Labs:    Results from last 7 days   Lab Units 08/15/21  1001   WBC Thousand/uL 11 28*   HEMOGLOBIN g/dL 8 9*   HEMATOCRIT % 29 1*   PLATELETS Thousands/uL 106*     Results from last 7 days   Lab Units 08/15/21  1001 08/13/21  0517   SODIUM mmol/L 141 141   POTASSIUM mmol/L 4 6 3 2*   CHLORIDE mmol/L 108 105   CO2 mmol/L 21 29   BUN mg/dL 11 9   CREATININE mg/dL 1 16 1 00   ANION GAP mmol/L 12 7   CALCIUM mg/dL 8 0* 7 7*   ALBUMIN g/dL  --  2 7*   TOTAL BILIRUBIN mg/dL  --  1 96*   ALK PHOS U/L  --  61   ALT U/L  --  22   AST U/L  --  41   GLUCOSE RANDOM mg/dL 196* 102     Results from last 7 days   Lab Units 08/13/21  0517   INR  1 71*     Results from last 7 days   Lab Units 08/15/21  1115 08/15/21  0730 08/14/21  2056 08/14/21  1543 08/14/21  1101 08/14/21  0646 08/13/21  2055 08/13/21  1616 08/13/21  1059 08/13/21  0746 08/12/21  2139 08/12/21  1559   POC GLUCOSE mg/dl 182* 96 176* 151* 158* 105 186* 160* 177* 113 139 185*         Results from last 7 days   Lab Units 08/13/21  0517 08/12/21  0933 08/09/21  0500   PROCALCITONIN ng/ml <0 05 <0 05 0 08           * I Have Reviewed All Lab Data Listed Above  * Additional Pertinent Lab Tests Reviewed: Jeff [address] Admission Reviewed    Imaging:    Imaging Reports Reviewed Today Include:  None  Imaging Personally Reviewed by Myself Includes:  None    Recent Cultures (last 7 days):     Results from last 7 days   Lab Units 08/14/21  0938 08/12/21  2321 08/12/21  1617 08/12/21  1616   BLOOD CULTURE   --   --  No Growth at 48 hrs  No Growth at 48 hrs  URINE CULTURE   --  40,000-49,000 cfu/ml Enterococcus  faecium VRE*  --   --    C DIFF TOXIN B BY PCR  Negative  --   --   --        Last 24 Hours Medication List:   Current Facility-Administered Medications   Medication Dose Route Frequency Provider Last Rate    ALPRAZolam  0 5 mg Oral HS PRN Jackalyn Pleva, CRNP      bimatoprost  1 drop Both Eyes HS Jackalyn Pleva, CRNP      carvedilol  6 25 mg Oral BID With Meals Jackalyn Pleva, CRNP      citalopram  10 mg Oral HS Jackalyn Pleva, CRNP      ferrous sulfate  325 mg Oral Daily With Breakfast Jackalyn Pleva, CRNP      fluticasone  1 spray Nasal Daily Jackalyn Pleva, CRNP      furosemide  40 mg Oral BID (diuretic) Jeremias Snyder MD      hydrOXYzine HCL  10 mg Oral Q6H PRN Jeremias Snyder MD      insulin lispro  2-12 Units Subcutaneous TID AC Jackalyn Pleva, CRNP      insulin lispro  2-12 Units Subcutaneous HS Jackalyn Pleva, CRNP      lactulose  20 g Oral TID Jeremias Snyder MD      linezolid  600 mg Oral Q12H [facility] Jeremias Snyder MD      [START ON 8/16/2021] magnesium oxide  400 mg Oral Daily Jeremias Snyder MD      pantoprazole  40 mg Oral BID AC HervealyLING MayNP      [START ON 8/16/2021] potassium chloride  40 mEq Oral Daily Jeremias Snyder MD     [facility] ON 8/16/2021] spironolactone  50 mg Oral Daily Jeremias Snyder MD          Today, Patient Was Seen By: Jeremias Snyder MD    ** Please Note: Dictation voice to text software may have been used in the creation of this document   **

## 2021-08-15 NOTE — ASSESSMENT & PLAN NOTE
· MUNA without CKD secondary to recent outpatient diuretic use  · Baseline creatinine close to the 0 5 - 0 6  · Need to improve fluid overload and hence continue diuresis with Lasix  creatinine acceptable at 1 0  Also added Aldactone 50 mg daily    · Patient had diagnostic paracentesis on 08/14 and 900 cc removed    Results from last 7 days   Lab Units 08/15/21  1001 08/14/21  0821 08/13/21  0517 08/12/21  2307 08/12/21  0453 08/11/21  0452 08/10/21  1448 08/10/21  0518 08/09/21  1452 08/09/21  0500   BUN mg/dL 11 11 9 12 12 15 18 20 23 23   CREATININE mg/dL 1 16 1 03 1 00 1 12 1 07 1 06 1 19 1 19 1 16 1 13   EGFR ml/min/1 73sq m 48 56 58 51 53 54 47 47 48 50

## 2021-08-15 NOTE — ASSESSMENT & PLAN NOTE
· recent hospitalization for duodenal bleeding   · Transfuse PRBC in case hemoglobin is less than 7 5    Hemoglobin today is 8 4-8 9  · No active bleeding noted at this time  Results from last 7 days   Lab Units 08/15/21  1001 08/14/21  0821 08/13/21  0517 08/12/21  0453 08/10/21  0518 08/09/21  0500   HEMOGLOBIN g/dL 8 9* 8 6* 8 4* 8 7* 7 9* 7 5*

## 2021-08-15 NOTE — ASSESSMENT & PLAN NOTE
Lab Results   Component Value Date    HGBA1C 7 7 (H) 01/25/2021     Recent Labs     08/14/21  1543 08/14/21  2056 08/15/21  0730 08/15/21  1115   POCGLU 151* 176* 96 182*     · Holding metformin given lactic acidosis  Continue sliding scale

## 2021-08-15 NOTE — ASSESSMENT & PLAN NOTE
· Secondary to diuresis  Being repleted daily along with magnesium supplementation    Now resolved    Results from last 7 days   Lab Units 08/15/21  1001 08/14/21  0821 08/13/21  0517 08/12/21  2307 08/12/21  0453 08/11/21  0452 08/10/21  1448 08/10/21  0518 08/09/21  1452 08/09/21  0500   POTASSIUM mmol/L 4 6 3 9 3 2* 3 8 3 4* 3 5 3 1* 2 8* 3 2* 2 5*

## 2021-08-15 NOTE — ASSESSMENT & PLAN NOTE
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

## 2021-08-15 NOTE — ASSESSMENT & PLAN NOTE
Patient found to have acute cystitis without hematuria    Urine culture positive for VRE  Discontinue Rocephin and placed on linezolid for now and will discuss with Infectious Disease tomorrow

## 2021-08-15 NOTE — ASSESSMENT & PLAN NOTE
· Acute metabolic encephalopathy secondary to hepatic encephalopathy and MUNA  · Recently found to have acute cystitis however it seemed to be a contamination/colonization and I had held off on antibiotics but she has a white count again  and low-grade fevers and Rocephin was restarted for total of 3 days  No pneumonia on CT chest   Urine culture has finalized as VRE and hence will discontinue Rocephin and place on linezolid and follow-up with Infectious Disease  · MRI brain negative for acute pathology    · Ammonia level has decreased with increasing lactulose dose  · Continues to have level 1 hepatic encephalopathy and back to baseline    Results from last 7 days   Lab Units 08/13/21  0517 08/12/21  1109 08/11/21  0452 08/10/21  0518 08/09/21  0500   AMMONIA umol/L 25 47* 19 35 53*

## 2021-08-16 VITALS
WEIGHT: 263.4 LBS | DIASTOLIC BLOOD PRESSURE: 70 MMHG | HEIGHT: 62 IN | HEART RATE: 84 BPM | RESPIRATION RATE: 17 BRPM | OXYGEN SATURATION: 91 % | BODY MASS INDEX: 48.47 KG/M2 | SYSTOLIC BLOOD PRESSURE: 123 MMHG | TEMPERATURE: 98.1 F

## 2021-08-16 LAB
ANION GAP SERPL CALCULATED.3IONS-SCNC: 8 MMOL/L (ref 4–13)
BUN SERPL-MCNC: 11 MG/DL (ref 5–25)
CALCIUM SERPL-MCNC: 8.1 MG/DL (ref 8.3–10.1)
CHLORIDE SERPL-SCNC: 109 MMOL/L (ref 100–108)
CO2 SERPL-SCNC: 26 MMOL/L (ref 21–32)
CREAT SERPL-MCNC: 1.08 MG/DL (ref 0.6–1.3)
ERYTHROCYTE [DISTWIDTH] IN BLOOD BY AUTOMATED COUNT: 18.6 % (ref 11.6–15.1)
GFR SERPL CREATININE-BSD FRML MDRD: 53 ML/MIN/1.73SQ M
GLUCOSE SERPL-MCNC: 103 MG/DL (ref 65–140)
HCT VFR BLD AUTO: 27.5 % (ref 34.8–46.1)
HGB BLD-MCNC: 8.8 G/DL (ref 11.5–15.4)
MCH RBC QN AUTO: 30.8 PG (ref 26.8–34.3)
MCHC RBC AUTO-ENTMCNC: 32 G/DL (ref 31.4–37.4)
MCV RBC AUTO: 96 FL (ref 82–98)
PLATELET # BLD AUTO: 160 THOUSANDS/UL (ref 149–390)
PMV BLD AUTO: 11.1 FL (ref 8.9–12.7)
POTASSIUM SERPL-SCNC: 3.9 MMOL/L (ref 3.5–5.3)
RBC # BLD AUTO: 2.86 MILLION/UL (ref 3.81–5.12)
SARS-COV-2 RNA RESP QL NAA+PROBE: NEGATIVE
SODIUM SERPL-SCNC: 143 MMOL/L (ref 136–145)
WBC # BLD AUTO: 11.09 THOUSAND/UL (ref 4.31–10.16)

## 2021-08-16 PROCEDURE — U0005 INFEC AGEN DETEC AMPLI PROBE: HCPCS | Performed by: FAMILY MEDICINE

## 2021-08-16 PROCEDURE — U0003 INFECTIOUS AGENT DETECTION BY NUCLEIC ACID (DNA OR RNA); SEVERE ACUTE RESPIRATORY SYNDROME CORONAVIRUS 2 (SARS-COV-2) (CORONAVIRUS DISEASE [COVID-19]), AMPLIFIED PROBE TECHNIQUE, MAKING USE OF HIGH THROUGHPUT TECHNOLOGIES AS DESCRIBED BY CMS-2020-01-R: HCPCS | Performed by: FAMILY MEDICINE

## 2021-08-16 PROCEDURE — 94660 CPAP INITIATION&MGMT: CPT

## 2021-08-16 PROCEDURE — 85027 COMPLETE CBC AUTOMATED: CPT | Performed by: FAMILY MEDICINE

## 2021-08-16 PROCEDURE — 99239 HOSP IP/OBS DSCHRG MGMT >30: CPT | Performed by: FAMILY MEDICINE

## 2021-08-16 PROCEDURE — 80048 BASIC METABOLIC PNL TOTAL CA: CPT | Performed by: FAMILY MEDICINE

## 2021-08-16 RX ORDER — LACTULOSE 20 G/30ML
20 SOLUTION ORAL 3 TIMES DAILY
Refills: 0
Start: 2021-08-16

## 2021-08-16 RX ORDER — HYDROMORPHONE HCL/PF 1 MG/ML
0.2 SYRINGE (ML) INJECTION ONCE
Status: DISCONTINUED | OUTPATIENT
Start: 2021-08-16 | End: 2021-08-16 | Stop reason: HOSPADM

## 2021-08-16 RX ORDER — HYDROXYZINE HYDROCHLORIDE 10 MG/1
10 TABLET, FILM COATED ORAL EVERY 6 HOURS PRN
Qty: 30 TABLET | Refills: 0
Start: 2021-08-16

## 2021-08-16 RX ORDER — GLIPIZIDE 5 MG/1
5 TABLET, FILM COATED, EXTENDED RELEASE ORAL
Status: ON HOLD
Start: 2021-08-17 | End: 2022-05-30 | Stop reason: SDUPTHER

## 2021-08-16 RX ORDER — SPIRONOLACTONE 50 MG/1
50 TABLET, FILM COATED ORAL DAILY
Refills: 0 | Status: ON HOLD
Start: 2021-08-17 | End: 2022-05-30 | Stop reason: CLARIF

## 2021-08-16 RX ORDER — POTASSIUM CHLORIDE 20MEQ/15ML
20 LIQUID (ML) ORAL DAILY
Refills: 0
Start: 2021-08-17

## 2021-08-16 RX ORDER — ALPRAZOLAM 0.5 MG/1
0.25 TABLET ORAL
Qty: 5 TABLET | Refills: 0 | Status: SHIPPED | OUTPATIENT
Start: 2021-08-16 | End: 2021-08-26

## 2021-08-16 RX ORDER — FUROSEMIDE 40 MG/1
40 TABLET ORAL 2 TIMES DAILY
Refills: 0
Start: 2021-08-16

## 2021-08-16 RX ADMIN — LINEZOLID 600 MG: 600 TABLET, FILM COATED ORAL at 09:20

## 2021-08-16 RX ADMIN — GLIPIZIDE 2.5 MG: 2.5 TABLET, EXTENDED RELEASE ORAL at 09:22

## 2021-08-16 RX ADMIN — LACTULOSE 20 G: 10 SOLUTION ORAL at 12:23

## 2021-08-16 RX ADMIN — CARVEDILOL 6.25 MG: 6.25 TABLET, FILM COATED ORAL at 09:20

## 2021-08-16 RX ADMIN — FLUTICASONE PROPIONATE 1 SPRAY: 50 SPRAY, METERED NASAL at 09:23

## 2021-08-16 RX ADMIN — Medication 400 MG: at 09:20

## 2021-08-16 RX ADMIN — FUROSEMIDE 40 MG: 40 TABLET ORAL at 09:20

## 2021-08-16 RX ADMIN — PANTOPRAZOLE SODIUM 40 MG: 40 TABLET, DELAYED RELEASE ORAL at 06:09

## 2021-08-16 RX ADMIN — LACTULOSE 20 G: 10 SOLUTION ORAL at 09:20

## 2021-08-16 RX ADMIN — FERROUS SULFATE TAB 325 MG (65 MG ELEMENTAL FE) 325 MG: 325 (65 FE) TAB at 09:20

## 2021-08-16 RX ADMIN — POTASSIUM CHLORIDE 40 MEQ: 20 SOLUTION ORAL at 09:19

## 2021-08-16 RX ADMIN — SPIRONOLACTONE 50 MG: 25 TABLET, FILM COATED ORAL at 09:20

## 2021-08-16 NOTE — ASSESSMENT & PLAN NOTE
· Acute metabolic encephalopathy secondary to hepatic encephalopathy and MUNA  · Recently found to have acute cystitis however it seemed to be a contamination/colonization and I had held off on antibiotics but she has a white count again  and low-grade fevers and Rocephin was restarted for total of 3 days  No pneumonia on CT chest   Urine culture has finalized as VRE and hence will discontinue Rocephin and place on linezolid and follow-up with Infectious Disease  · MRI brain negative for acute pathology    · Ammonia level has decreased with increasing lactulose dose  · Continues to have level 1 hepatic encephalopathy and back to baseline    Results from last 7 days   Lab Units 08/13/21  0517 08/12/21  1109 08/11/21  0452 08/10/21  0518   AMMONIA umol/L 25 47* 19 35

## 2021-08-16 NOTE — ASSESSMENT & PLAN NOTE
· MUNA without CKD secondary to recent outpatient diuretic use  · Baseline creatinine close to the 0 5 - 0 6  · Need to improve fluid overload and hence continue diuresis with Lasix  creatinine acceptable at 1 0  Also added Aldactone 50 mg daily    · Patient had diagnostic paracentesis on 08/14 and 900 cc removed    Results from last 7 days   Lab Units 08/16/21  0622 08/15/21  1001 08/14/21  0821 08/13/21  0517 08/12/21  2307 08/12/21  0453 08/11/21  0452 08/10/21  1448 08/10/21  0518 08/09/21  1452   BUN mg/dL 11 11 11 9 12 12 15 18 20 23   CREATININE mg/dL 1 08 1 16 1 03 1 00 1 12 1 07 1 06 1 19 1 19 1 16   EGFR ml/min/1 73sq m 53 48 56 58 51 53 54 47 47 48

## 2021-08-16 NOTE — ASSESSMENT & PLAN NOTE
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

## 2021-08-16 NOTE — DISCHARGE INSTR - AVS FIRST PAGE
PLEASE FOLLOW-UP WITH DR [facility] AS SCHEDULED  Please check your weight every morning and if your weight goes up by 5 lb to notify attending physician

## 2021-08-16 NOTE — PLAN OF CARE
Problem: PAIN - ADULT  Goal: Verbalizes/displays adequate comfort level or baseline comfort level  Description: Interventions:  - Encourage patient to monitor pain and request assistance  - Assess pain using appropriate pain scale  - Administer analgesics based on type and severity of pain and evaluate response  - Implement non-pharmacological measures as appropriate and evaluate response  - Consider cultural and social influences on pain and pain management  - Notify physician/advanced practitioner if interventions unsuccessful or patient reports new pain  Outcome: Progressing     Problem: INFECTION - ADULT  Goal: Absence or prevention of progression during hospitalization  Description: INTERVENTIONS:  - Assess and monitor for signs and symptoms of infection  - Monitor lab/diagnostic results  - Monitor all insertion sites, i e  indwelling lines, tubes, and drains  - Monitor endotracheal if appropriate and nasal secretions for changes in amount and color  - [place] appropriate cooling/warming therapies per order  - Administer medications as ordered  - Instruct and encourage patient and family to use good hand hygiene technique  - Identify and instruct in appropriate isolation precautions for identified infection/condition  Outcome: Progressing

## 2021-08-16 NOTE — NURSING NOTE
Patient discharged to [facility] today; patient to be transported by  to facility  Report called to [facility] and records faxed; patient transported to exit via wheelchair accompanied by PCA

## 2021-08-16 NOTE — DISCHARGE SUMMARY
[address]  Discharge- Ric Navarrete 1955, 74 y o  female MRN: [id number]  Unit/Bed#: -01 Encounter: [id number]  Primary Care Provider: Claudia Portillo DO   Date and time admitted to hospital: 8/7/2021  7:57 PM    * Acute metabolic encephalopathy  Assessment & Plan  · Acute metabolic encephalopathy secondary to hepatic encephalopathy and MUNA  · Recently found to have acute cystitis however it seemed to be a contamination/colonization and I had held off on antibiotics but she has a white count again  and low-grade fevers and Rocephin was restarted for total of 3 days  No pneumonia on CT chest   Urine culture has finalized as VRE and hence will discontinue Rocephin and place on linezolid and follow-up with Infectious Disease  · MRI brain negative for acute pathology  · Ammonia level has decreased with increasing lactulose dose  · Continues to have level 1 hepatic encephalopathy and back to baseline    Results from last 7 days   Lab Units 08/13/21  0517 08/12/21  1109 08/11/21  0452 08/10/21  0518   AMMONIA umol/L 25 47* 19 35       Hypokalemia  Assessment & Plan  · Secondary to diuresis  Being repleted daily along with magnesium and potassium supplementation  Now resolved  · Decreased to potassium 20 mEq daily  Need to monitor BMPs weekly to make sure that patient is neither hypokalemic or hyperkalemic as patient was recently also started on Aldactone and will be continuing the Lasix    Results from last 7 days   Lab Units 08/16/21  0622 08/15/21  1001 08/14/21  0821 08/13/21  0517 08/12/21  2307 08/12/21  0453 08/11/21  0452 08/10/21  1448 08/10/21  0518 08/09/21  1452   POTASSIUM mmol/L 3 9 4 6 3 9 3 2* 3 8 3 4* 3 5 3 1* 2 8* 3 2*       MUNA (acute kidney injury) ([facility] [address] )  Assessment & Plan  · MUNA without CKD secondary to recent outpatient diuretic use  · Baseline creatinine close to the 0 5 - 0 6  · Need to improve fluid overload and hence continue diuresis with Lasix  creatinine acceptable at 1 0  Also added Aldactone 50 mg daily  · Patient had diagnostic paracentesis on 08/14 and 900 cc removed    Results from last 7 days   Lab Units 08/16/21  0622 08/15/21  1001 08/14/21  0821 08/13/21  0517 08/12/21  2307 08/12/21  0453 08/11/21  0452 08/10/21  1448 08/10/21  0518 08/09/21  1452   BUN mg/dL 11 11 11 9 12 12 15 18 20 23   CREATININE mg/dL 1 08 1 16 1 03 1 00 1 12 1 07 1 06 1 19 1 19 1 16   EGFR ml/min/1 73sq m 53 48 56 58 51 53 54 47 47 48       Acute on chronic anemia  Assessment & Plan  · recent hospitalization for duodenal bleeding   · Transfuse PRBC in case hemoglobin is less than 7 5  Hemoglobin today is 8 4-8 9  · No active bleeding noted at this time  Results from last 7 days   Lab Units 08/16/21  0622 08/15/21  1001 08/14/21  0821 08/13/21  0517 08/12/21  0453 08/10/21  0518   HEMOGLOBIN g/dL 8 8* 8 9* 8 6* 8 4* 8 7* 7 9*       Acute cystitis without hematuria  Assessment & Plan  Patient found to have acute cystitis without hematuria  Urine culture positive for VRE  No need for any further antibiotics as per Infectious Disease  Completed a 5 day course of Rocephin while inpatient  MELVIN on CPAP  Assessment & Plan  · Continue CPAP at hs    Type 2 diabetes mellitus without complication, without long-term current use of insulin [facility])  Assessment & Plan  Lab Results   Component Value Date    HGBA1C 7 7 (H) 01/25/2021     Recent Labs     08/14/21  2056 08/15/21  0730 08/15/21  1115 08/15/21  1615   POCGLU 176* 96 182* 173*     · Holding metformin given lactic acidosis  Continue sliding scale  Placed on glipizide instead    Cirrhosis of liver with ascites (HCC)  Assessment & Plan  · 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  · Diagnostic paracentesis done on 8/14 and shows transudative fluid  · Recently started on carvedilol to decrease portal pressure  · Fluid overload noted and continue Lasix and added Aldactone  Baseline weight is  263-264 lb    Check daily weights and intake and output and if it increases by 5 lb to notify md      Discharging Physician / Practitioner: Juanito Esquivel MD  PCP: Thalia Etienne DO  Admission Date:   Admission Orders (From admission, onward)     Ordered        08/07/21 2203  Inpatient Admission  Once                   Discharge Date: 08/16/21    Medical Problems     Resolved Problems  Date Reviewed: 8/16/2021    None                Consultations During Hospital Stay:  · GI and Infectious Disease  · Critical care    Procedures Performed:   · Diagnostic paracentesis done on 08/14/2021 for 900 cc of transudative fluid    Significant Findings / Test Results:   XR chest 1 view portable    Result Date: 8/9/2021  Impression: No acute cardiopulmonary disease  Workstation performed: [id number]     XR chest pa & lateral    Result Date: 8/13/2021  Impression: New patchy increased interstitial opacities bilaterally more extensive on the left may be related to interstitial pneumonia  The study was marked in EPIC for significant notification  Workstation performed: [id number]     MRI brain wo contrast    Result Date: 8/9/2021  Impression: White matter changes suggestive of chronic microangiopathy  No acute intracranial pathology  Workstation performed: [id number]     US abdomen complete    Result Date: 8/9/2021  Impression: Cirrhosis with small volume abdominopelvic ascites and mild splenomegaly  Gallbladder sludge with mild wall thickening, which is nonspecific in the setting of liver disease  No sonographic Sanon's sign  Normal caliber common bile duct  Workstation performed: [id number]     [address] [facility] liver doppler only    Result Date: 8/10/2021  Impression: Normal Doppler ultrasound of the hepatic vasculature  Workstation performed: [id number]     Incidental Findings:   · None     Test Results Pending at Discharge (will require follow up):    · None     Outpatient Tests Requested:  · Outpatient CBC BMP and magnesium level on Friday every week for 2 weeks    Complications:  None    Reason for Admission:  Confusion    Hospital Course:     Felicita Morales is a 74 y o  female patient who originally presented to the hospital on 8/7/2021 due to confusion  Found to have acute metabolic/hepatic encephalopathy secondary to hyperammonemia  Received lactulose following which symptoms started to improve  Also found to have fluid overload due to decompensated liver cirrhosis  Placed on Lasix and added Aldactone as well  Also had diagnostic paracentesis performed bed 900 cc were removed  Must maintain lactulose 20 g t i d  Otherwise patient's ammonia level started to climb up again  Refusing to use Xifaxan  Electrolytes replaced and monitor potassium level closely along with magnesium level  Did have UTI for which she was treated with 5 day course of Rocephin  Repeat UA is positive for VRE however patient is asymptomatic at this time and discussed with Infectious Disease and no further antibiotics for now  Recommend outpatient follow-up with GI and being sent to rehab for PT OT and eventually to transition home with some home health services and home care        Please see above list of diagnoses and related plan for additional information  Condition at Discharge: fair     Discharge Day Visit / Exam:     Subjective:  Patient denies any chest pain or shortness of breath or abdominal pain or distension today  She appears to be close to her baseline  Vitals: Blood Pressure: 123/70 (08/16/21 0738)  Pulse: 84 (08/16/21 0738)  Temperature: 98 1 °F (36 7 °C) (08/16/21 0738)  Temp Source: Temporal (08/07/21 2001)  Respirations: 17 (08/16/21 0738)  Height: 5' 2" (157 5 cm) (08/09/21 1449)  Weight - Scale: 119 kg (263 lb 6 4 oz) (08/16/21 0600)  SpO2: 91 % (08/16/21 0738)  Exam:   Physical Exam  Vitals and nursing note reviewed  Constitutional:       Appearance: Normal appearance  HENT:      Head: Normocephalic and atraumatic        Right Ear: External ear normal  Left Ear: External ear normal       Nose: Nose normal       Mouth/Throat:      Pharynx: Oropharynx is clear  Cardiovascular:      Rate and Rhythm: Normal rate and regular rhythm  Heart sounds: Normal heart sounds  Pulmonary:      Effort: Pulmonary effort is normal       Breath sounds: Normal breath sounds  Abdominal:      General: Bowel sounds are normal       Palpations: Abdomen is soft  Tenderness: There is no abdominal tenderness  Musculoskeletal:         General: Normal range of motion  Cervical back: Normal range of motion and neck supple  Right lower leg: Edema present  Left lower leg: Edema present  Skin:     General: Skin is warm and dry  Capillary Refill: Capillary refill takes less than 2 seconds  Neurological:      General: No focal deficit present  Mental Status: She is alert and oriented to person, place, and time  Psychiatric:         Mood and Affect: Mood normal          Discussion with Family:  Discussed with  at bedside    Discharge instructions/Information to patient and family:   See after visit summary for information provided to patient and family  Provisions for Follow-Up Care:  See after visit summary for information related to follow-up care and any pertinent home health orders  Disposition:     Other [place] [facility]    For Discharges to   [facility]:   · Not Applicable to this Patient - Not Applicable to this Patient    Planned Readmission:  None     Discharge Statement:  I spent 35 minutes discharging the patient  This time was spent on the day of discharge  I had direct contact with the patient on the day of discharge  Greater than 50% of the total time was spent examining patient, answering all patient questions, arranging and discussing plan of care with patient as well as directly providing post-discharge instructions    Additional time then spent on discharge activities  Discharge Medications:  See after visit summary for reconciled discharge medications provided to patient and family        ** Please Note: This note has been constructed using a voice recognition system **

## 2021-08-16 NOTE — ASSESSMENT & PLAN NOTE
Lab Results   Component Value Date    HGBA1C 7 7 (H) 01/25/2021     Recent Labs     08/14/21  2056 08/15/21  0730 08/15/21  1115 08/15/21  1615   POCGLU 176* 96 182* 173*     · Holding metformin given lactic acidosis  Continue sliding scale    Placed on glipizide instead

## 2021-08-16 NOTE — ASSESSMENT & PLAN NOTE
Patient found to have acute cystitis without hematuria  Urine culture positive for VRE  No need for any further antibiotics as per Infectious Disease  Completed a 5 day course of Rocephin while inpatient

## 2021-08-16 NOTE — ASSESSMENT & PLAN NOTE
· recent hospitalization for duodenal bleeding   · Transfuse PRBC in case hemoglobin is less than 7 5    Hemoglobin today is 8 4-8 9  · No active bleeding noted at this time  Results from last 7 days   Lab Units 08/16/21  0622 08/15/21  1001 08/14/21  0821 08/13/21  0517 08/12/21  0453 08/10/21  0518   HEMOGLOBIN g/dL 8 8* 8 9* 8 6* 8 4* 8 7* 7 9*

## 2021-08-16 NOTE — ASSESSMENT & PLAN NOTE
· Secondary to diuresis  Being repleted daily along with magnesium and potassium supplementation  Now resolved  · Decreased to potassium 20 mEq daily    Need to monitor BMPs weekly to make sure that patient is neither hypokalemic or hyperkalemic as patient was recently also started on Aldactone and will be continuing the Lasix    Results from last 7 days   Lab Units 08/16/21  0622 08/15/21  1001 08/14/21  0821 08/13/21  0517 08/12/21  2307 08/12/21  0453 08/11/21  0452 08/10/21  1448 08/10/21  0518 08/09/21  1452   POTASSIUM mmol/L 3 9 4 6 3 9 3 2* 3 8 3 4* 3 5 3 1* 2 8* 3 2*

## 2021-08-16 NOTE — CASE MANAGEMENT
Case Management Progress Note    Patient name Ric Navarrete  Location /-01 MRN [id number]  : 1955 Date 2021       LOS (days): 9  Geometric Mean LOS (GMLOS) (days): 4 80  Days to GMLOS:-3 8        BUNDLE:      OBJECTIVE:  Pt is a 74y o  year old /[employer], white or  [1], female with Confucianist preference of [address] admitted on 3/3/3137  7:57 PM  Pt is admitted to [id number] at [address] with complaints of Acute metabolic encephalopathy   Current admission status: Inpatient  Preferred Pharmacy:   [facility] [employer] [facility] [address] [facility]  [address] [place] [address]  Phone: [phone] Fax: [phone]    Primary Care Provider: Claudia Portillo DO    Primary Insurance: MEDICARE  Secondary Insurance: COMMERCIAL MISCELLANEOUS    PROGRESS NOTE:    Pt is medically cleared for dc  Spoke to Mr and Mrs Jb Wagoner at the bedside, Dr Jb Wagoner would like to transport his wife to the facility  MD abdullahi and this was reviewed with the Facility  COVID screen is back, negative and reviewed with facility that patient will be leaving here shortly

## 2021-08-17 LAB
BACTERIA SPEC BFLD CULT: NO GROWTH
GRAM STN SPEC: NORMAL
GRAM STN SPEC: NORMAL

## 2021-08-18 LAB
BACTERIA BLD CULT: NORMAL
BACTERIA BLD CULT: NORMAL

## 2021-12-10 ENCOUNTER — DOCTOR'S OFFICE (OUTPATIENT)
Dept: URBAN - NONMETROPOLITAN AREA CLINIC 1 | Facility: CLINIC | Age: 66
Setting detail: OPHTHALMOLOGY
End: 2021-12-10
Payer: COMMERCIAL

## 2021-12-10 DIAGNOSIS — H26.491: ICD-10-CM

## 2021-12-10 DIAGNOSIS — Z96.1: ICD-10-CM

## 2021-12-10 DIAGNOSIS — H26.492: ICD-10-CM

## 2021-12-10 DIAGNOSIS — E11.9: ICD-10-CM

## 2021-12-10 DIAGNOSIS — H04.121: ICD-10-CM

## 2021-12-10 DIAGNOSIS — H40.033: ICD-10-CM

## 2021-12-10 DIAGNOSIS — H40.023: ICD-10-CM

## 2021-12-10 DIAGNOSIS — H35.362: ICD-10-CM

## 2021-12-10 DIAGNOSIS — H35.361: ICD-10-CM

## 2021-12-10 DIAGNOSIS — H04.122: ICD-10-CM

## 2021-12-10 PROCEDURE — 76514 ECHO EXAM OF EYE THICKNESS: CPT | Performed by: OPHTHALMOLOGY

## 2021-12-10 PROCEDURE — 99213 OFFICE O/P EST LOW 20 MIN: CPT | Performed by: OPHTHALMOLOGY

## 2021-12-10 PROCEDURE — 92083 EXTENDED VISUAL FIELD XM: CPT | Performed by: OPHTHALMOLOGY

## 2021-12-10 PROCEDURE — 92134 CPTRZ OPH DX IMG PST SGM RTA: CPT | Performed by: OPHTHALMOLOGY

## 2021-12-10 ASSESSMENT — TONOMETRY
OD_IOP_MMHG: 11
OS_IOP_MMHG: 11

## 2021-12-10 ASSESSMENT — REFRACTION_AUTOREFRACTION
OS_SPHERE: -0.25
OS_CYLINDER: -0.50
OD_SPHERE: -1.50
OD_AXIS: 079
OS_AXIS: 100
OD_CYLINDER: -1.50

## 2021-12-10 ASSESSMENT — REFRACTION_CURRENTRX
OS_OVR_VA: 20/
OD_OVR_VA: 20/
OD_AXIS: 101
OS_CYLINDER: -0.50
OS_VPRISM_DIRECTION: SV
OS_AXIS: 099
OD_SPHERE: -2.00
OD_VPRISM_DIRECTION: SV
OD_CYLINDER: -0.50
OS_SPHERE: PLANO

## 2021-12-10 ASSESSMENT — SPHEQUIV_DERIVED
OD_SPHEQUIV: -2.75
OS_SPHEQUIV: -0.125
OS_SPHEQUIV: -0.5
OD_SPHEQUIV: -2.25

## 2021-12-10 ASSESSMENT — REFRACTION_MANIFEST
OS_AXIS: 110
OS_VA1: 20/25-2
OS_ADD: +2.50
OD_CYLINDER: -1.00
OS_SPHERE: +0.25
OD_VA1: 20/30+2
OD_SPHERE: -2.25
OD_VA2: 20/30+2
OS_VA2: 20/25-2
OD_AXIS: 090
OD_ADD: +2.50
OS_CYLINDER: -0.75

## 2021-12-10 ASSESSMENT — PACHYMETRY
OD_CT_CORRECTION: 1
OS_CT_UM: 546
OD_CT_UM: 536
OS_CT_CORRECTION: 0

## 2021-12-10 ASSESSMENT — VISUAL ACUITY
OS_BCVA: 20/150
OD_BCVA: 20/20-2

## 2021-12-10 ASSESSMENT — CONFRONTATIONAL VISUAL FIELD TEST (CVF)
OD_FINDINGS: FULL
OS_FINDINGS: FULL

## 2022-03-11 ENCOUNTER — RX ONLY (RX ONLY)
Age: 67
End: 2022-03-11

## 2022-03-11 ENCOUNTER — DOCTOR'S OFFICE (OUTPATIENT)
Dept: URBAN - NONMETROPOLITAN AREA CLINIC 1 | Facility: CLINIC | Age: 67
Setting detail: OPHTHALMOLOGY
End: 2022-03-11
Payer: COMMERCIAL

## 2022-03-11 DIAGNOSIS — E11.9: ICD-10-CM

## 2022-03-11 DIAGNOSIS — H35.362: ICD-10-CM

## 2022-03-11 DIAGNOSIS — H26.492: ICD-10-CM

## 2022-03-11 DIAGNOSIS — H04.121: ICD-10-CM

## 2022-03-11 DIAGNOSIS — H40.023: ICD-10-CM

## 2022-03-11 DIAGNOSIS — H04.122: ICD-10-CM

## 2022-03-11 DIAGNOSIS — H35.361: ICD-10-CM

## 2022-03-11 DIAGNOSIS — Z96.1: ICD-10-CM

## 2022-03-11 DIAGNOSIS — H40.033: ICD-10-CM

## 2022-03-11 DIAGNOSIS — H26.491: ICD-10-CM

## 2022-03-11 PROCEDURE — 92133 CPTRZD OPH DX IMG PST SGM ON: CPT | Performed by: OPHTHALMOLOGY

## 2022-03-11 PROCEDURE — 92014 COMPRE OPH EXAM EST PT 1/>: CPT | Performed by: OPHTHALMOLOGY

## 2022-03-11 ASSESSMENT — PACHYMETRY
OD_CT_CORRECTION: 1
OD_CT_UM: 536
OS_CT_UM: 546
OS_CT_CORRECTION: 0

## 2022-03-11 ASSESSMENT — REFRACTION_CURRENTRX
OS_AXIS: 099
OD_CYLINDER: -0.50
OS_OVR_VA: 20/
OD_VPRISM_DIRECTION: SV
OD_AXIS: 101
OD_SPHERE: -2.00
OS_VPRISM_DIRECTION: SV
OS_CYLINDER: -0.50
OS_SPHERE: PLANO
OD_OVR_VA: 20/

## 2022-03-11 ASSESSMENT — REFRACTION_MANIFEST
OD_ADD: +2.50
OD_VA1: 20/30+2
OS_VA1: 20/25-2
OS_SPHERE: +0.25
OD_AXIS: 090
OD_SPHERE: -2.25
OS_AXIS: 110
OS_VA2: 20/25-2
OD_VA2: 20/30+2
OS_ADD: +2.50
OD_CYLINDER: -1.00
OS_CYLINDER: -0.75

## 2022-03-11 ASSESSMENT — REFRACTION_AUTOREFRACTION
OD_SPHERE: -2.00
OS_SPHERE: +0.25
OD_CYLINDER: -1.25
OS_CYLINDER: -1.00
OD_AXIS: 082
OS_AXIS: 088

## 2022-03-11 ASSESSMENT — CONFRONTATIONAL VISUAL FIELD TEST (CVF)
OS_FINDINGS: FULL
OD_FINDINGS: FULL

## 2022-03-11 ASSESSMENT — SPHEQUIV_DERIVED
OD_SPHEQUIV: -2.75
OD_SPHEQUIV: -2.625
OS_SPHEQUIV: -0.125
OS_SPHEQUIV: -0.25

## 2022-03-11 ASSESSMENT — TONOMETRY
OS_IOP_MMHG: 10
OD_IOP_MMHG: 14

## 2022-03-11 ASSESSMENT — VISUAL ACUITY
OS_BCVA: 20/150-2
OD_BCVA: 20/25-1

## 2022-03-25 ENCOUNTER — RX ONLY (RX ONLY)
Age: 67
End: 2022-03-25

## 2022-03-25 ENCOUNTER — DOCTOR'S OFFICE (OUTPATIENT)
Dept: URBAN - NONMETROPOLITAN AREA CLINIC 1 | Facility: CLINIC | Age: 67
Setting detail: OPHTHALMOLOGY
End: 2022-03-25
Payer: COMMERCIAL

## 2022-03-25 VITALS — HEIGHT: 55 IN

## 2022-03-25 DIAGNOSIS — H04.122: ICD-10-CM

## 2022-03-25 DIAGNOSIS — H04.121: ICD-10-CM

## 2022-03-25 DIAGNOSIS — H40.023: ICD-10-CM

## 2022-03-25 DIAGNOSIS — Z96.1: ICD-10-CM

## 2022-03-25 DIAGNOSIS — H35.361: ICD-10-CM

## 2022-03-25 DIAGNOSIS — H26.492: ICD-10-CM

## 2022-03-25 DIAGNOSIS — E11.9: ICD-10-CM

## 2022-03-25 DIAGNOSIS — H26.491: ICD-10-CM

## 2022-03-25 DIAGNOSIS — H35.362: ICD-10-CM

## 2022-03-25 PROBLEM — H40.033: Status: RESOLVED | Noted: 2017-06-14 | Resolved: 2022-03-25

## 2022-03-25 PROCEDURE — 92012 INTRM OPH EXAM EST PATIENT: CPT | Performed by: OPHTHALMOLOGY

## 2022-03-25 ASSESSMENT — REFRACTION_MANIFEST
OS_ADD: +2.50
OD_VA2: 20/30+2
OS_VA1: 20/25-2
OD_CYLINDER: -1.00
OD_VA1: 20/30+2
OS_CYLINDER: -0.75
OS_AXIS: 110
OD_SPHERE: -2.25
OS_SPHERE: +0.25
OD_ADD: +2.50
OS_VA2: 20/25-2
OD_AXIS: 090

## 2022-03-25 ASSESSMENT — CONFRONTATIONAL VISUAL FIELD TEST (CVF)
OD_FINDINGS: FULL
OS_FINDINGS: FULL

## 2022-03-25 ASSESSMENT — REFRACTION_AUTOREFRACTION
OS_SPHERE: +0.75
OD_SPHERE: -2.25
OD_AXIS: 79
OS_AXIS: 82
OS_CYLINDER: -0.75
OD_CYLINDER: -1.00

## 2022-03-25 ASSESSMENT — REFRACTION_CURRENTRX
OD_AXIS: 101
OS_OVR_VA: 20/
OS_VPRISM_DIRECTION: SV
OD_OVR_VA: 20/
OS_AXIS: 099
OS_SPHERE: PLANO
OD_VPRISM_DIRECTION: SV
OD_CYLINDER: -0.50
OS_CYLINDER: -0.50
OD_SPHERE: -2.00

## 2022-03-25 ASSESSMENT — SPHEQUIV_DERIVED
OD_SPHEQUIV: -2.75
OS_SPHEQUIV: 0.375
OS_SPHEQUIV: -0.125
OD_SPHEQUIV: -2.75

## 2022-03-25 ASSESSMENT — TONOMETRY
OS_IOP_MMHG: 11
OD_IOP_MMHG: 10

## 2022-03-25 ASSESSMENT — PACHYMETRY
OS_CT_UM: 546
OD_CT_UM: 536
OS_CT_CORRECTION: 0
OD_CT_CORRECTION: 1

## 2022-03-25 ASSESSMENT — VISUAL ACUITY
OS_BCVA: 20/400
OD_BCVA: 20/25-1

## 2022-03-31 ENCOUNTER — RX ONLY (RX ONLY)
Age: 67
End: 2022-03-31

## 2022-03-31 RX ORDER — BIMATOPROST 0.1 MG/ML
SOLUTION/ DROPS OPHTHALMIC
Qty: 3 | Refills: 6 | Status: ACTIVE | OUTPATIENT

## 2022-05-28 ENCOUNTER — APPOINTMENT (INPATIENT)
Dept: MRI IMAGING | Facility: HOSPITAL | Age: 67
DRG: 442 | End: 2022-05-28
Payer: MEDICARE

## 2022-05-28 ENCOUNTER — APPOINTMENT (EMERGENCY)
Dept: CT IMAGING | Facility: HOSPITAL | Age: 67
DRG: 442 | End: 2022-05-28
Payer: MEDICARE

## 2022-05-28 ENCOUNTER — APPOINTMENT (EMERGENCY)
Dept: RADIOLOGY | Facility: HOSPITAL | Age: 67
DRG: 442 | End: 2022-05-28
Payer: MEDICARE

## 2022-05-28 ENCOUNTER — APPOINTMENT (INPATIENT)
Dept: CT IMAGING | Facility: HOSPITAL | Age: 67
DRG: 442 | End: 2022-05-28
Payer: MEDICARE

## 2022-05-28 ENCOUNTER — HOSPITAL ENCOUNTER (INPATIENT)
Facility: HOSPITAL | Age: 67
LOS: 2 days | Discharge: HOME HEALTH | DRG: 442 | End: 2022-05-30
Attending: EMERGENCY MEDICINE | Admitting: STUDENT IN AN ORGANIZED HEALTH CARE EDUCATION/TRAINING PROGRAM
Payer: MEDICARE

## 2022-05-28 DIAGNOSIS — K74.60 CIRRHOSIS OF LIVER WITH ASCITES, UNSPECIFIED HEPATIC CIRRHOSIS TYPE (HCC): ICD-10-CM

## 2022-05-28 DIAGNOSIS — E11.9 TYPE 2 DIABETES MELLITUS WITHOUT COMPLICATION, WITHOUT LONG-TERM CURRENT USE OF INSULIN (HCC): ICD-10-CM

## 2022-05-28 DIAGNOSIS — R18.8 CIRRHOSIS OF LIVER WITH ASCITES, UNSPECIFIED HEPATIC CIRRHOSIS TYPE (HCC): ICD-10-CM

## 2022-05-28 DIAGNOSIS — K72.90 HEPATIC ENCEPHALOPATHY (HCC): ICD-10-CM

## 2022-05-28 DIAGNOSIS — R41.82 ALTERED MENTAL STATUS, UNSPECIFIED ALTERED MENTAL STATUS TYPE: Primary | ICD-10-CM

## 2022-05-28 PROBLEM — K76.82 HEPATIC ENCEPHALOPATHY (HCC): Status: ACTIVE | Noted: 2022-05-28

## 2022-05-28 LAB
2HR DELTA HS TROPONIN: -2 NG/L
ALBUMIN SERPL BCP-MCNC: 3.2 G/DL (ref 3.5–5)
ALP SERPL-CCNC: 87 U/L (ref 46–116)
ALT SERPL W P-5'-P-CCNC: 26 U/L (ref 12–78)
AMMONIA PLAS-SCNC: 141 UMOL/L (ref 11–35)
AMPHETAMINES SERPL QL SCN: NEGATIVE
ANION GAP SERPL CALCULATED.3IONS-SCNC: 10 MMOL/L (ref 4–13)
APTT PPP: 45 SECONDS (ref 23–37)
AST SERPL W P-5'-P-CCNC: 39 U/L (ref 5–45)
BACTERIA UR QL AUTO: ABNORMAL /HPF
BARBITURATES UR QL: NEGATIVE
BASE EX.OXY STD BLDV CALC-SCNC: 88.6 % (ref 60–80)
BASE EXCESS BLDV CALC-SCNC: -0.9 MMOL/L
BENZODIAZ UR QL: NEGATIVE
BILIRUB DIRECT SERPL-MCNC: 0.49 MG/DL (ref 0–0.2)
BILIRUB SERPL-MCNC: 1.94 MG/DL (ref 0.2–1)
BILIRUB UR QL STRIP: NEGATIVE
BUN SERPL-MCNC: 20 MG/DL (ref 5–25)
CALCIUM SERPL-MCNC: 9.9 MG/DL (ref 8.3–10.1)
CARDIAC TROPONIN I PNL SERPL HS: 11 NG/L
CARDIAC TROPONIN I PNL SERPL HS: 9 NG/L
CHLORIDE SERPL-SCNC: 104 MMOL/L (ref 100–108)
CLARITY UR: CLEAR
CO2 SERPL-SCNC: 23 MMOL/L (ref 21–32)
COCAINE UR QL: NEGATIVE
COLOR UR: YELLOW
CREAT SERPL-MCNC: 1.18 MG/DL (ref 0.6–1.3)
ERYTHROCYTE [DISTWIDTH] IN BLOOD BY AUTOMATED COUNT: 16.1 % (ref 11.6–15.1)
ETHANOL SERPL-MCNC: <3 MG/DL (ref 0–3)
FLUAV RNA RESP QL NAA+PROBE: NEGATIVE
FLUBV RNA RESP QL NAA+PROBE: NEGATIVE
GFR SERPL CREATININE-BSD FRML MDRD: 47 ML/MIN/1.73SQ M
GLUCOSE SERPL-MCNC: 106 MG/DL (ref 65–140)
GLUCOSE SERPL-MCNC: 176 MG/DL (ref 65–140)
GLUCOSE SERPL-MCNC: 182 MG/DL (ref 65–140)
GLUCOSE SERPL-MCNC: 192 MG/DL (ref 65–140)
GLUCOSE UR STRIP-MCNC: NEGATIVE MG/DL
HCO3 BLDV-SCNC: 20.8 MMOL/L (ref 24–30)
HCT VFR BLD AUTO: 42.7 % (ref 34.8–46.1)
HGB BLD-MCNC: 14.5 G/DL (ref 11.5–15.4)
HGB UR QL STRIP.AUTO: ABNORMAL
INR PPP: 1.15 (ref 0.84–1.19)
KETONES UR STRIP-MCNC: NEGATIVE MG/DL
LACTATE SERPL-SCNC: 2.2 MMOL/L (ref 0.5–2)
LACTATE SERPL-SCNC: 2.2 MMOL/L (ref 0.5–2)
LEUKOCYTE ESTERASE UR QL STRIP: NEGATIVE
MAGNESIUM SERPL-MCNC: 2.1 MG/DL (ref 1.6–2.6)
MCH RBC QN AUTO: 33.3 PG (ref 26.8–34.3)
MCHC RBC AUTO-ENTMCNC: 34 G/DL (ref 31.4–37.4)
MCV RBC AUTO: 98 FL (ref 82–98)
METHADONE UR QL: NEGATIVE
NITRITE UR QL STRIP: NEGATIVE
NON-SQ EPI CELLS URNS QL MICRO: ABNORMAL /HPF
O2 CT BLDV-SCNC: 17.9 ML/DL
OPIATES UR QL SCN: NEGATIVE
OXYCODONE+OXYMORPHONE UR QL SCN: NEGATIVE
PCO2 BLDV: 27 MM HG (ref 42–50)
PCP UR QL: NEGATIVE
PH BLDV: 7.5 [PH] (ref 7.3–7.4)
PH UR STRIP.AUTO: 7 [PH]
PHOSPHATE SERPL-MCNC: 3.3 MG/DL (ref 2.3–4.1)
PLATELET # BLD AUTO: 147 THOUSANDS/UL (ref 149–390)
PMV BLD AUTO: 11.6 FL (ref 8.9–12.7)
PO2 BLDV: 56.6 MM HG (ref 35–45)
POTASSIUM SERPL-SCNC: 4.5 MMOL/L (ref 3.5–5.3)
PROT SERPL-MCNC: 7.4 G/DL (ref 6.4–8.2)
PROT UR STRIP-MCNC: NEGATIVE MG/DL
PROTHROMBIN TIME: 14.6 SECONDS (ref 11.6–14.5)
RBC # BLD AUTO: 4.36 MILLION/UL (ref 3.81–5.12)
RBC #/AREA URNS AUTO: ABNORMAL /HPF
RSV RNA RESP QL NAA+PROBE: NEGATIVE
SARS-COV-2 RNA RESP QL NAA+PROBE: NEGATIVE
SODIUM SERPL-SCNC: 137 MMOL/L (ref 136–145)
SP GR UR STRIP.AUTO: 1.01 (ref 1–1.03)
THC UR QL: NEGATIVE
UROBILINOGEN UR QL STRIP.AUTO: 0.2 E.U./DL
WBC # BLD AUTO: 8.17 THOUSAND/UL (ref 4.31–10.16)
WBC #/AREA URNS AUTO: ABNORMAL /HPF

## 2022-05-28 PROCEDURE — 82077 ASSAY SPEC XCP UR&BREATH IA: CPT | Performed by: EMERGENCY MEDICINE

## 2022-05-28 PROCEDURE — 87077 CULTURE AEROBIC IDENTIFY: CPT | Performed by: EMERGENCY MEDICINE

## 2022-05-28 PROCEDURE — 99223 1ST HOSP IP/OBS HIGH 75: CPT | Performed by: STUDENT IN AN ORGANIZED HEALTH CARE EDUCATION/TRAINING PROGRAM

## 2022-05-28 PROCEDURE — 36415 COLL VENOUS BLD VENIPUNCTURE: CPT | Performed by: EMERGENCY MEDICINE

## 2022-05-28 PROCEDURE — 87186 SC STD MICRODIL/AGAR DIL: CPT | Performed by: EMERGENCY MEDICINE

## 2022-05-28 PROCEDURE — 96360 HYDRATION IV INFUSION INIT: CPT

## 2022-05-28 PROCEDURE — 83605 ASSAY OF LACTIC ACID: CPT | Performed by: EMERGENCY MEDICINE

## 2022-05-28 PROCEDURE — 70498 CT ANGIOGRAPHY NECK: CPT

## 2022-05-28 PROCEDURE — 80076 HEPATIC FUNCTION PANEL: CPT | Performed by: EMERGENCY MEDICINE

## 2022-05-28 PROCEDURE — 83735 ASSAY OF MAGNESIUM: CPT | Performed by: EMERGENCY MEDICINE

## 2022-05-28 PROCEDURE — 80048 BASIC METABOLIC PNL TOTAL CA: CPT | Performed by: EMERGENCY MEDICINE

## 2022-05-28 PROCEDURE — 87086 URINE CULTURE/COLONY COUNT: CPT | Performed by: EMERGENCY MEDICINE

## 2022-05-28 PROCEDURE — 0241U HB NFCT DS VIR RESP RNA 4 TRGT: CPT | Performed by: EMERGENCY MEDICINE

## 2022-05-28 PROCEDURE — 87040 BLOOD CULTURE FOR BACTERIA: CPT | Performed by: EMERGENCY MEDICINE

## 2022-05-28 PROCEDURE — 84484 ASSAY OF TROPONIN QUANT: CPT | Performed by: EMERGENCY MEDICINE

## 2022-05-28 PROCEDURE — 81001 URINALYSIS AUTO W/SCOPE: CPT | Performed by: EMERGENCY MEDICINE

## 2022-05-28 PROCEDURE — 71046 X-RAY EXAM CHEST 2 VIEWS: CPT

## 2022-05-28 PROCEDURE — G1004 CDSM NDSC: HCPCS

## 2022-05-28 PROCEDURE — 96365 THER/PROPH/DIAG IV INF INIT: CPT

## 2022-05-28 PROCEDURE — 70551 MRI BRAIN STEM W/O DYE: CPT

## 2022-05-28 PROCEDURE — 84100 ASSAY OF PHOSPHORUS: CPT | Performed by: EMERGENCY MEDICINE

## 2022-05-28 PROCEDURE — 82140 ASSAY OF AMMONIA: CPT | Performed by: EMERGENCY MEDICINE

## 2022-05-28 PROCEDURE — 85027 COMPLETE CBC AUTOMATED: CPT | Performed by: EMERGENCY MEDICINE

## 2022-05-28 PROCEDURE — 82948 REAGENT STRIP/BLOOD GLUCOSE: CPT

## 2022-05-28 PROCEDURE — 85730 THROMBOPLASTIN TIME PARTIAL: CPT | Performed by: EMERGENCY MEDICINE

## 2022-05-28 PROCEDURE — 82805 BLOOD GASES W/O2 SATURATION: CPT | Performed by: EMERGENCY MEDICINE

## 2022-05-28 PROCEDURE — 85610 PROTHROMBIN TIME: CPT | Performed by: EMERGENCY MEDICINE

## 2022-05-28 PROCEDURE — 99285 EMERGENCY DEPT VISIT HI MDM: CPT | Performed by: EMERGENCY MEDICINE

## 2022-05-28 PROCEDURE — 93005 ELECTROCARDIOGRAM TRACING: CPT

## 2022-05-28 PROCEDURE — 99285 EMERGENCY DEPT VISIT HI MDM: CPT

## 2022-05-28 PROCEDURE — 70496 CT ANGIOGRAPHY HEAD: CPT

## 2022-05-28 PROCEDURE — 74176 CT ABD & PELVIS W/O CONTRAST: CPT

## 2022-05-28 PROCEDURE — 80307 DRUG TEST PRSMV CHEM ANLYZR: CPT | Performed by: EMERGENCY MEDICINE

## 2022-05-28 RX ORDER — LACTULOSE 20 G/30ML
20 SOLUTION ORAL 3 TIMES DAILY
Status: DISCONTINUED | OUTPATIENT
Start: 2022-05-28 | End: 2022-05-30 | Stop reason: HOSPADM

## 2022-05-28 RX ORDER — FERROUS SULFATE 325(65) MG
325 TABLET ORAL
Status: DISCONTINUED | OUTPATIENT
Start: 2022-05-29 | End: 2022-05-30 | Stop reason: HOSPADM

## 2022-05-28 RX ORDER — CEFTRIAXONE 1 G/50ML
1000 INJECTION, SOLUTION INTRAVENOUS ONCE
Status: COMPLETED | OUTPATIENT
Start: 2022-05-28 | End: 2022-05-28

## 2022-05-28 RX ORDER — ALLOPURINOL 100 MG/1
100 TABLET ORAL DAILY
Status: DISCONTINUED | OUTPATIENT
Start: 2022-05-29 | End: 2022-05-30 | Stop reason: HOSPADM

## 2022-05-28 RX ORDER — ENOXAPARIN SODIUM 100 MG/ML
40 INJECTION SUBCUTANEOUS DAILY
Status: DISCONTINUED | OUTPATIENT
Start: 2022-05-29 | End: 2022-05-30 | Stop reason: HOSPADM

## 2022-05-28 RX ORDER — FUROSEMIDE 40 MG/1
40 TABLET ORAL
Status: DISCONTINUED | OUTPATIENT
Start: 2022-05-29 | End: 2022-05-30 | Stop reason: HOSPADM

## 2022-05-28 RX ORDER — ATORVASTATIN CALCIUM 10 MG/1
10 TABLET, FILM COATED ORAL
Status: DISCONTINUED | OUTPATIENT
Start: 2022-05-28 | End: 2022-05-30 | Stop reason: HOSPADM

## 2022-05-28 RX ORDER — PANTOPRAZOLE SODIUM 40 MG/1
40 TABLET, DELAYED RELEASE ORAL
Status: DISCONTINUED | OUTPATIENT
Start: 2022-05-28 | End: 2022-05-30 | Stop reason: HOSPADM

## 2022-05-28 RX ORDER — INSULIN LISPRO 100 [IU]/ML
1-6 INJECTION, SOLUTION INTRAVENOUS; SUBCUTANEOUS
Status: DISCONTINUED | OUTPATIENT
Start: 2022-05-28 | End: 2022-05-30 | Stop reason: HOSPADM

## 2022-05-28 RX ORDER — ASPIRIN 81 MG/1
81 TABLET, CHEWABLE ORAL DAILY
Status: DISCONTINUED | OUTPATIENT
Start: 2022-05-29 | End: 2022-05-30 | Stop reason: HOSPADM

## 2022-05-28 RX ORDER — SPIRONOLACTONE 25 MG/1
50 TABLET ORAL DAILY
Status: DISCONTINUED | OUTPATIENT
Start: 2022-05-29 | End: 2022-05-30 | Stop reason: HOSPADM

## 2022-05-28 RX ORDER — BRIMONIDINE TARTRATE 2 MG/ML
1 SOLUTION/ DROPS OPHTHALMIC 3 TIMES DAILY
Status: DISCONTINUED | OUTPATIENT
Start: 2022-05-28 | End: 2022-05-30 | Stop reason: HOSPADM

## 2022-05-28 RX ORDER — LACTULOSE 20 G/30ML
20 SOLUTION ORAL ONCE
Status: COMPLETED | OUTPATIENT
Start: 2022-05-28 | End: 2022-05-28

## 2022-05-28 RX ADMIN — BRIMONIDINE TARTRATE 1 DROP: 2 SOLUTION/ DROPS OPHTHALMIC at 21:33

## 2022-05-28 RX ADMIN — LACTULOSE 20 G: 10 SOLUTION ORAL at 14:07

## 2022-05-28 RX ADMIN — ATORVASTATIN CALCIUM 10 MG: 10 TABLET, FILM COATED ORAL at 17:17

## 2022-05-28 RX ADMIN — LACTULOSE 20 G: 10 SOLUTION ORAL at 17:17

## 2022-05-28 RX ADMIN — PANTOPRAZOLE SODIUM 40 MG: 40 TABLET, DELAYED RELEASE ORAL at 17:17

## 2022-05-28 RX ADMIN — SODIUM CHLORIDE 1000 ML: 0.9 INJECTION, SOLUTION INTRAVENOUS at 12:26

## 2022-05-28 RX ADMIN — LACTULOSE 20 G: 10 SOLUTION ORAL at 21:33

## 2022-05-28 RX ADMIN — BRIMONIDINE TARTRATE 1 DROP: 2 SOLUTION/ DROPS OPHTHALMIC at 17:18

## 2022-05-28 RX ADMIN — CEFTRIAXONE 1000 MG: 1 INJECTION, SOLUTION INTRAVENOUS at 12:11

## 2022-05-28 RX ADMIN — IOHEXOL 70 ML: 350 INJECTION, SOLUTION INTRAVENOUS at 16:10

## 2022-05-28 RX ADMIN — BIMATOPROST 1 DROP: 0.1 SOLUTION/ DROPS OPHTHALMIC at 21:33

## 2022-05-28 NOTE — ASSESSMENT & PLAN NOTE
Lab Results   Component Value Date    HGBA1C 7 6 (H) 12/03/2021       Recent Labs     05/28/22  1131   POCGLU 182*       Blood Sugar Average: Last 72 hrs:  (P) 182     Home regimen: Glipizide  Inpatient regimen: Sliding scale

## 2022-05-28 NOTE — QUICK NOTE
Stroke alert: called at 11:34 am     Patient is 70 y/o with history of liver cirrhosis and DM II who presents for confusion noted this morning upon awakening at 0900 am  ED attending noted that she was just disoriented, but otherwise not aphasic  CT head showed no acute findings  Ammonia was elevated  Etiology was deemed to be hepatic encephalopathy, however we decided to proceed with stroke pathway for now      tPA not given: outside window, LKW 11 pm yesterday

## 2022-05-28 NOTE — ED PROVIDER NOTES
History  Chief Complaint   Patient presents with    Altered Mental Status     Since 930 this morning new onset of confusion, staring, dropping things, denies pain/numbness of extremities     20-year-old female with past medical history pertinent for cirrhosis, diabetes, chronic UTIs, hypertension, liver disease, previous sepsis who presents with  and son for evaluation of altered mental status that started this morning upon awakening around 9:30 a m    states that when she went to sleep last night around 11:00 p m  She was completely at her baseline, sharp and oriented  Patient denies any complaints yesterday but  does state that in the past she has had UTIs and sepsis that progressed just like this  They state that patient has been staring, dropping things and not complain of anything  No fevers or chills at home  Patient here is a poor historian but reports no urinary symptoms, no pain, no headache, and denies any abdominal pain or chest pain  Unknown cause of why patient gets frequent UTIs but has been does report that she did white back to front recently  Also reports that patient does have a history of kidney stones that cause infections  No other symptoms reported  Prior to Admission Medications   Prescriptions Last Dose Informant Patient Reported? Taking?    ALPRAZolam (XANAX) 0 5 mg tablet   No No   Sig: Take 0 5 tablets (0 25 mg total) by mouth daily at bedtime as needed for anxiety for up to 10 days   FLUTICASONE PROPIONATE, NASAL, NA   Yes No   Sig: into each nostril   LUMIGAN 0 01 % ophthalmic drops   Yes No   Sig: INSTILL 1 DROP INTO BOTH EYES AT NIGHT   allopurinol (ZYLOPRIM) 100 mg tablet   Yes No   atorvastatin (LIPITOR) 10 mg tablet   Yes No   brimonidine (ALPHAGAN P) 0 15 % ophthalmic solution  Self Yes No   Si drop 3 (three) times a day   carvedilol (COREG) 6 25 mg tablet   Yes No   Sig: Take 6 25 mg by mouth 2 (two) times a day with meals   citalopram (CeleXA) 10 mg tablet  Self Yes No   Sig: Take 10 mg by mouth daily at bedtime   ferrous sulfate 325 (65 Fe) mg tablet   Yes No   Sig: Take 325 mg by mouth daily with breakfast   furosemide (LASIX) 40 mg tablet   No No   Sig: Take 1 tablet (40 mg total) by mouth 2 (two) times a day   glipiZIDE (GLUCOTROL XL) 5 mg 24 hr tablet   No No   Sig: Take 1 tablet (5 mg total) by mouth daily with breakfast   hydrOXYzine HCL (ATARAX) 10 mg tablet   No No   Sig: Take 1 tablet (10 mg total) by mouth every 6 (six) hours as needed for itching   lactulose 20 g/30 mL   No No   Sig: Take 30 mL (20 g total) by mouth 3 (three) times a day   magnesium oxide (MAG-OX) 400 mg   No No   Sig: Take 1 tablet (400 mg total) by mouth 2 (two) times a day   pantoprazole (PROTONIX) 40 mg tablet   No No   Sig: Take 1 tablet (40 mg total) by mouth 2 (two) times a day before meals   potassium chloride 10% oral solution   No No   Sig: Take 15 mL (20 mEq total) by mouth daily   spironolactone (ALDACTONE) 50 mg tablet   No No   Sig: Take 1 tablet (50 mg total) by mouth daily      Facility-Administered Medications: None       Past Medical History:   Diagnosis Date    Chronic UTI     Cirrhosis, non-alcoholic (HCC)     Diabetes mellitus (HCC)     High cholesterol     Hypertension     Kidney stone     Liver disease     Sepsis ([facility] [address] )        Past Surgical History:   Procedure Laterality Date     SECTION      EYE SURGERY      cataract removal    JOINT REPLACEMENT      LITHOTRIPSY         History reviewed  No pertinent family history  I have reviewed and agree with the history as documented      E-Cigarette/Vaping    E-Cigarette Use Never User      E-Cigarette/Vaping Substances    Nicotine No     THC No     CBD No     Flavoring No     Other No     Unknown No      Social History     Tobacco Use    Smoking status: Never Smoker    Smokeless tobacco: Never Used   Vaping Use    Vaping Use: Never used   Substance Use Topics    Alcohol use: Not Currently    Drug use: Never       Review of Systems   Unable to perform ROS: Mental status change   Constitutional: Negative for activity change, appetite change, chills and fever  HENT: Negative for congestion, rhinorrhea and sore throat  Eyes: Negative for visual disturbance  Respiratory: Negative for chest tightness, shortness of breath and wheezing  Cardiovascular: Negative for chest pain, palpitations and leg swelling  Gastrointestinal: Negative for abdominal pain, constipation, diarrhea, nausea and vomiting  Genitourinary: Negative for dysuria, flank pain and pelvic pain  Musculoskeletal: Negative for back pain and neck pain  Skin: Negative for rash  Neurological: Negative for dizziness, tremors, seizures, syncope, facial asymmetry, speech difficulty, weakness, light-headedness, numbness and headaches  Psychiatric/Behavioral: Positive for confusion  Negative for behavioral problems, decreased concentration and hallucinations  The patient is not nervous/anxious  Physical Exam  Physical Exam  Vitals and nursing note reviewed  Constitutional:       General: She is not in acute distress  Appearance: She is well-developed  She is not diaphoretic  HENT:      Head: Normocephalic and atraumatic  Right Ear: External ear normal       Left Ear: External ear normal       Nose: Nose normal    Eyes:      Pupils: Pupils are equal, round, and reactive to light  Cardiovascular:      Rate and Rhythm: Normal rate and regular rhythm  Heart sounds: Normal heart sounds  Pulmonary:      Effort: Pulmonary effort is normal  No respiratory distress  Breath sounds: Normal breath sounds  No wheezing or rales  Abdominal:      General: Bowel sounds are normal       Palpations: Abdomen is soft  Tenderness: There is no abdominal tenderness  Musculoskeletal:         General: No tenderness or deformity  Normal range of motion        Cervical back: Normal range of motion and neck supple  Skin:     General: Skin is warm and dry  Capillary Refill: Capillary refill takes less than 2 seconds  Neurological:      Mental Status: She is alert  She is confused  GCS: GCS eye subscore is 4  GCS verbal subscore is 4  GCS motor subscore is 6  Cranial Nerves: No cranial nerve deficit  Sensory: No sensory deficit  Motor: No weakness or abnormal muscle tone  Comments: Patient is awake and alert and oriented x 1 (did not know where she was or the year, thought it was 1926 - was able to recognize  and son though)  No apparent deficits of memory or concentration - did not know why she was here though  Speech is fluent  Fund of knowledge is appropriate mostly  CN II - No field cuts by confrontation  CN III, IV, VI - pupils are 3 mm and briskly reactive  EOMs are full with no nystagmus  CN V - facial sensation is intact  CN VII - no facial asymmetry  CN VIII - auditory acuity is grossly intact to finger rub bilaterally  CN IX - palate rises symmetrically  CN XI - SCM and trapezius muscles are intact  CN XII - tongue protrudes midline  Sensory exam in intact to light touch, pinprick in all dermatomes tested  Coordination is grossly intact for finger-to-nose  5/5 strength in all extremities           Vital Signs  ED Triage Vitals [05/28/22 1130]   Temperature Pulse Respirations Blood Pressure SpO2   (!) 97 3 °F (36 3 °C) 84 (!) 23 116/57 96 %      Temp Source Heart Rate Source Patient Position - Orthostatic VS BP Location FiO2 (%)   Temporal Monitor Lying Left arm --      Pain Score       --           Vitals:    05/28/22 1300 05/28/22 1315 05/28/22 1330 05/28/22 1345   BP: 130/78 134/66 134/60 139/68   Pulse: 77 75 79 81   Patient Position - Orthostatic VS:    Lying         Visual Acuity  Visual Acuity    Flowsheet Row Most Recent Value   L Pupil Size (mm) 3   R Pupil Size (mm) 3          ED Medications  Medications   lactulose oral solution 20 g (has no administration in time range)   cefTRIAXone (ROCEPHIN) IVPB (premix in dextrose) 1,000 mg 50 mL (0 mg Intravenous Stopped 5/28/22 1241)   sodium chloride 0 9 % bolus 1,000 mL (0 mL Intravenous Stopped 5/28/22 1326)       Diagnostic Studies  Results Reviewed     Procedure Component Value Units Date/Time    Lactic acid 2 Hours [[id number]] Collected: 05/28/22 1348    Lab Status: In process Specimen: Blood from Arm, Left Updated: 05/28/22 1351    HS Troponin I 2hr [[id number]] Collected: 05/28/22 1348    Lab Status: In process Specimen: Blood from Arm, Left Updated: 05/28/22 1351    Rapid drug screen, urine [[id number]]  (Normal) Collected: 05/28/22 1205    Lab Status: Final result Specimen: Urine, Straight Cath Updated: 05/28/22 1324     Amph/Meth UR Negative     Barbiturate Ur Negative     Benzodiazepine Urine Negative     Cocaine Urine Negative     Methadone Urine Negative     Opiate Urine Negative     PCP Ur Negative     THC Urine Negative     Oxycodone Urine Negative    Narrative:      FOR MEDICAL PURPOSES ONLY  IF CONFIRMATION NEEDED PLEASE CONTACT THE LAB WITHIN 5 DAYS      Drug Screen Cutoff Levels:  AMPHETAMINE/METHAMPHETAMINES  1000 ng/mL  BARBITURATES     200 ng/mL  BENZODIAZEPINES     200 ng/mL  COCAINE      300 ng/mL  METHADONE      300 ng/mL  OPIATES      300 ng/mL  PHENCYCLIDINE     25 ng/mL  THC       50 ng/mL  OXYCODONE      100 ng/mL    Urinalysis with microscopic [[id number]]  (Abnormal) Collected: 05/28/22 1205    Lab Status: Final result Specimen: Urine, Straight Cath Updated: 05/28/22 1319     Clarity, UA Clear     Color, UA Yellow     Specific Gravity, UA 1 010     pH, UA 7 0     Glucose, UA Negative mg/dl      Ketones, UA Negative mg/dl      Blood, UA Trace-Intact     Protein, UA Negative mg/dl      Nitrite, UA Negative     Bilirubin, UA Negative     Urobilinogen, UA 0 2 E U /dl      Leukocytes, UA Negative     WBC, UA None Seen /hpf      RBC, UA 4-10 /hpf      Bacteria, UA Occasional /hpf Epithelial Cells None Seen /hpf     COVID/FLU/RSV - 2 hour TAT [[id number]]  (Normal) Collected: 05/28/22 1205    Lab Status: Final result Specimen: Nares from Nose Updated: 05/28/22 1253     SARS-CoV-2 Negative     INFLUENZA A PCR Negative     INFLUENZA B PCR Negative     RSV PCR Negative    Narrative:      FOR PEDIATRIC PATIENTS - copy/paste COVID Guidelines URL to browser: [email or web address]  [employer]    SARS-CoV-2 assay is a Nucleic Acid Amplification assay intended for the  qualitative detection of nucleic acid from SARS-CoV-2 in nasopharyngeal  swabs  Results are for the presumptive identification of SARS-CoV-2 RNA  Positive results are indicative of infection with SARS-CoV-2, the virus  causing COVID-19, but do not rule out bacterial infection or co-infection  with other viruses  Laboratories within the [place] and its  territories are required to report all positive results to the appropriate  public health authorities  Negative results do not preclude SARS-CoV-2  infection and should not be used as the sole basis for treatment or other  patient management decisions  Negative results must be combined with  clinical observations, patient history, and epidemiological information  This test has not been FDA cleared or approved  This test has been authorized by FDA under an Emergency Use Authorization  (EUA)  This test is only authorized for the duration of time the  declaration that circumstances exist justifying the authorization of the  emergency use of an in vitro diagnostic tests for detection of SARS-CoV-2  virus and/or diagnosis of COVID-19 infection under section 564(b)(1) of  the Act, 21 U  S C  397JQB-7(U)(7), unless the authorization is terminated  or revoked sooner  The test has been validated but independent review by FDA  and CLIA is pending  Test performed using [employer]:  This RT-PCR assay targets N2,  a region unique to SARS-CoV-2  A conserved region in the E-gene was chosen  for pan-Sarbecovirus detection which includes SARS-CoV-2  Ethanol [[id number]]  (Normal) Collected: 05/28/22 1205    Lab Status: Final result Specimen: Blood from Arm, Left Updated: 05/28/22 1246     Ethanol Lvl <3 mg/dL     Lactic acid, plasma [[id number]]  (Abnormal) Collected: 05/28/22 1206    Lab Status: Final result Specimen: Blood from Arm, Left Updated: 05/28/22 1240     LACTIC ACID 2 2 mmol/L     Narrative:      Result may be elevated if tourniquet was used during collection  Urine culture [[id number]] Collected: 05/28/22 1206    Lab Status:  In process Specimen: Urine, Straight Cath Updated: 05/28/22 1238    Ammonia [[id number]]  (Abnormal) Collected: 05/28/22 1205    Lab Status: Final result Specimen: Blood from Arm, Left Updated: 05/28/22 1235     Ammonia 141 umol/L     HS Troponin I 4hr [[id number]]     Lab Status: No result Specimen: Blood     HS Troponin 0hr (reflex protocol) [[id number]]  (Normal) Collected: 05/28/22 1144    Lab Status: Final result Specimen: Blood from Line, Venous Updated: 05/28/22 1217     hs TnI 0hr 11 ng/L     Blood gas, venous [[id number]]  (Abnormal) Collected: 05/28/22 1205    Lab Status: Final result Specimen: Blood from Arm, Left Updated: 05/28/22 1215     pH, Mart 7 504     pCO2, Mart 27 0 mm Hg      pO2, Mart 56 6 mm Hg      HCO3, Mart 20 8 mmol/L      Base Excess, Mart -0 9 mmol/L      O2 Content, Mart 17 9 ml/dL      O2 HGB, VENOUS 88 6 %     Basic metabolic panel [[id number]]  (Abnormal) Collected: 05/28/22 1144    Lab Status: Final result Specimen: Blood from Line, Venous Updated: 05/28/22 1208     Sodium 137 mmol/L      Potassium 4 5 mmol/L      Chloride 104 mmol/L      CO2 23 mmol/L      ANION GAP 10 mmol/L      BUN 20 mg/dL      Creatinine 1 18 mg/dL      Glucose 176 mg/dL      Calcium 9 9 mg/dL      eGFR 47 ml/min/1 73sq m     Narrative:      [place] guidelines for Chronic Kidney Disease (CKD):   Stage 1 with normal or high GFR (GFR > 90 mL/min/1 73 square meters)    Stage 2 Mild CKD (GFR = 60-89 mL/min/1 73 square meters)    Stage 3A Moderate CKD (GFR = 45-59 mL/min/1 73 square meters)    Stage 3B Moderate CKD (GFR = 30-44 mL/min/1 73 square meters)    Stage 4 Severe CKD (GFR = 15-29 mL/min/1 73 square meters)    Stage 5 End Stage CKD (GFR <15 mL/min/1 73 square meters)  Note: GFR calculation is accurate only with a steady state creatinine    Magnesium [[id number]]  (Normal) Collected: 05/28/22 1144    Lab Status: Final result Specimen: Blood from Line, Venous Updated: 05/28/22 1208     Magnesium 2 1 mg/dL     Phosphorus [[id number]]  (Normal) Collected: 05/28/22 1144    Lab Status: Final result Specimen: Blood from Line, Venous Updated: 05/28/22 1208     Phosphorus 3 3 mg/dL     Hepatic function panel [[id number]]  (Abnormal) Collected: 05/28/22 1144    Lab Status: Final result Specimen: Blood from Line, Venous Updated: 05/28/22 1208     Total Bilirubin 1 94 mg/dL      Bilirubin, Direct 0 49 mg/dL      Alkaline Phosphatase 87 U/L      AST 39 U/L      ALT 26 U/L      Total Protein 7 4 g/dL      Albumin 3 2 g/dL     Protime-INR [[id number]]  (Abnormal) Collected: 05/28/22 1144    Lab Status: Final result Specimen: Blood from Line, Venous Updated: 05/28/22 1205     Protime 14 6 seconds      INR 1 15    APTT [[id number]]  (Abnormal) Collected: 05/28/22 1144    Lab Status: Final result Specimen: Blood from Line, Venous Updated: 05/28/22 1205     PTT 45 seconds     Blood culture #1 [[id number]] Collected: 05/28/22 1150    Lab Status: In process Specimen: Blood from Arm, Left Updated: 05/28/22 1153    Blood culture #2 [[id number]] Collected: 05/28/22 1148    Lab Status:  In process Specimen: Blood from Arm, Right Updated: 05/28/22 1153    CBC and Platelet [[id number]]  (Abnormal) Collected: 05/28/22 1144    Lab Status: Final result Specimen: Blood from Line, Venous Updated: 05/28/22 1151     WBC 8 17 Thousand/uL      RBC 4 36 Million/uL      Hemoglobin 14 5 g/dL      Hematocrit 42 7 %      MCV 98 fL      MCH 33 3 pg      MCHC 34 0 g/dL      RDW 16 1 %      Platelets 408 Thousands/uL      MPV 11 6 fL     Fingerstick Glucose (POCT) [[id number]]  (Abnormal) Collected: 05/28/22 1131    Lab Status: Final result Updated: 05/28/22 1135     POC Glucose 182 mg/dl                  CT renal stone study abdomen pelvis wo contrast   Final Result by Mitali Young MD (05/28 1212)      Bilateral nephrolithiasis is present with no evidence of hydronephrosis  Cirrhotic appearance of the liver with mild ascites, splenomegaly and intra-abdominal varices  Cholelithiasis  Workstation performed: [id number]         CT stroke alert brain   Final Result by Daniel Frank MD (05/28 1207)      1  No acute intracranial CT abnormality  2   Nonspecific white matter change suggesting microangiopathy  Report was given to Dr Jose Davenport on 5/28/2022 at 11:58 AM                 Workstation performed: [id number]         XR chest 2 views    (Results Pending)   CTA head and neck with and without contrast    (Results Pending)              Procedures  ECG 12 Lead Documentation Only    Date/Time: 5/28/2022 12:10 PM  Performed by: Jacy John MD  Authorized by: Jacy John MD     ECG reviewed by me, the ED Provider: yes    Patient location:  ED  Previous ECG:     Previous ECG:  Compared to current    Similarity:  No change  Interpretation:     Interpretation: normal    Rate:     ECG rate:  82    ECG rate assessment: normal    Rhythm:     Rhythm: sinus rhythm    Ectopy:     Ectopy: none    QRS:     QRS axis:  Normal  Conduction:     Conduction: normal    ST segments:     ST segments:  Normal  T waves:     T waves: normal               ED Course        RESULTS:  Results Reviewed     Procedure Component Value Units Date/Time    Lactic acid 2 Hours [[id number]] Collected: 05/28/22 1348    Lab Status:  In process Specimen: Blood from Arm, Left Updated: 05/28/22 1351    HS Troponin I 2hr [[id number]] Collected: 05/28/22 1348    Lab Status: In process Specimen: Blood from Arm, Left Updated: 05/28/22 1351    Rapid drug screen, urine [[id number]]  (Normal) Collected: 05/28/22 1205    Lab Status: Final result Specimen: Urine, Straight Cath Updated: 05/28/22 1324     Amph/Meth UR Negative     Barbiturate Ur Negative     Benzodiazepine Urine Negative     Cocaine Urine Negative     Methadone Urine Negative     Opiate Urine Negative     PCP Ur Negative     THC Urine Negative     Oxycodone Urine Negative    Narrative:      FOR MEDICAL PURPOSES ONLY  IF CONFIRMATION NEEDED PLEASE CONTACT THE LAB WITHIN 5 DAYS      Drug Screen Cutoff Levels:  AMPHETAMINE/METHAMPHETAMINES  1000 ng/mL  BARBITURATES     200 ng/mL  BENZODIAZEPINES     200 ng/mL  COCAINE      300 ng/mL  METHADONE      300 ng/mL  OPIATES      300 ng/mL  PHENCYCLIDINE     25 ng/mL  THC       50 ng/mL  OXYCODONE      100 ng/mL    Urinalysis with microscopic [[id number]]  (Abnormal) Collected: 05/28/22 1205    Lab Status: Final result Specimen: Urine, Straight Cath Updated: 05/28/22 1319     Clarity, UA Clear     Color, UA Yellow     Specific Gravity, UA 1 010     pH, UA 7 0     Glucose, UA Negative mg/dl      Ketones, UA Negative mg/dl      Blood, UA Trace-Intact     Protein, UA Negative mg/dl      Nitrite, UA Negative     Bilirubin, UA Negative     Urobilinogen, UA 0 2 E U /dl      Leukocytes, UA Negative     WBC, UA None Seen /hpf      RBC, UA 4-10 /hpf      Bacteria, UA Occasional /hpf      Epithelial Cells None Seen /hpf     COVID/FLU/RSV - 2 hour TAT [[id number]]  (Normal) Collected: 05/28/22 1205    Lab Status: Final result Specimen: Nares from Nose Updated: 05/28/22 1253     SARS-CoV-2 Negative     INFLUENZA A PCR Negative     INFLUENZA B PCR Negative     RSV PCR Negative    Narrative:      FOR PEDIATRIC PATIENTS - copy/paste COVID Guidelines URL to browser: [email or web address]    SARS-CoV-2 assay is a Nucleic Acid Amplification assay intended for the  qualitative detection of nucleic acid from SARS-CoV-2 in nasopharyngeal  swabs  Results are for the presumptive identification of SARS-CoV-2 RNA  Positive results are indicative of infection with SARS-CoV-2, the virus  causing COVID-19, but do not rule out bacterial infection or co-infection  with other viruses  Laboratories within the [place] and its  territories are required to report all positive results to the appropriate  public health authorities  Negative results do not preclude SARS-CoV-2  infection and should not be used as the sole basis for treatment or other  patient management decisions  Negative results must be combined with  clinical observations, patient history, and epidemiological information  This test has not been FDA cleared or approved  This test has been authorized by FDA under an Emergency Use Authorization  (EUA)  This test is only authorized for the duration of time the  declaration that circumstances exist justifying the authorization of the  emergency use of an in vitro diagnostic tests for detection of SARS-CoV-2  virus and/or diagnosis of COVID-19 infection under section 564(b)(1) of  the Act, 21 U  [place] C  065LTS-4(E)(5), unless the authorization is terminated  or revoked sooner  The test has been validated but independent review by FDA  and CLIA is pending  Test performed using [employer]pert: This RT-PCR assay targets N2,  a region unique to SARS-CoV-2  A conserved region in the E-gene was chosen  for pan-Sarbecovirus detection which includes SARS-CoV-2      Ethanol [[id number]]  (Normal) Collected: 05/28/22 1205    Lab Status: Final result Specimen: Blood from Arm, Left Updated: 05/28/22 1246     Ethanol Lvl <3 mg/dL     Lactic acid, plasma [[id number]]  (Abnormal) Collected: 05/28/22 1206    Lab Status: Final result Specimen: Blood from Arm, Left Updated: 05/28/22 1240     LACTIC ACID 2 2 mmol/L     Narrative:      Result may be elevated if tourniquet was used during collection  Urine culture [[id number]] Collected: 05/28/22 1206    Lab Status:  In process Specimen: Urine, Straight Cath Updated: 05/28/22 1238    Ammonia [[id number]]  (Abnormal) Collected: 05/28/22 1205    Lab Status: Final result Specimen: Blood from Arm, Left Updated: 05/28/22 1235     Ammonia 141 umol/L     HS Troponin I 4hr [[id number]]     Lab Status: No result Specimen: Blood     HS Troponin 0hr (reflex protocol) [[id number]]  (Normal) Collected: 05/28/22 1144    Lab Status: Final result Specimen: Blood from Line, Venous Updated: 05/28/22 1217     hs TnI 0hr 11 ng/L     Blood gas, venous [[id number]]  (Abnormal) Collected: 05/28/22 1205    Lab Status: Final result Specimen: Blood from Arm, Left Updated: 05/28/22 1215     pH, Mart 7 504     pCO2, Mart 27 0 mm Hg      pO2, Mart 56 6 mm Hg      HCO3, Mart 20 8 mmol/L      Base Excess, Mart -0 9 mmol/L      O2 Content, Mart 17 9 ml/dL      O2 HGB, VENOUS 88 6 %     Basic metabolic panel [[id number]]  (Abnormal) Collected: 05/28/22 1144    Lab Status: Final result Specimen: Blood from Line, Venous Updated: 05/28/22 1208     Sodium 137 mmol/L      Potassium 4 5 mmol/L      Chloride 104 mmol/L      CO2 23 mmol/L      ANION GAP 10 mmol/L      BUN 20 mg/dL      Creatinine 1 18 mg/dL      Glucose 176 mg/dL      Calcium 9 9 mg/dL      eGFR 47 ml/min/1 73sq m     Narrative:      [place] guidelines for Chronic Kidney Disease (CKD):     Stage 1 with normal or high GFR (GFR > 90 mL/min/1 73 square meters)    Stage 2 Mild CKD (GFR = 60-89 mL/min/1 73 square meters)    Stage 3A Moderate CKD (GFR = 45-59 mL/min/1 73 square meters)    Stage 3B Moderate CKD (GFR = 30-44 mL/min/1 73 square meters)    Stage 4 Severe CKD (GFR = 15-29 mL/min/1 73 square meters)    Stage 5 End Stage CKD (GFR <15 mL/min/1 73 square meters)  Note: GFR calculation is accurate only with a steady state creatinine    Magnesium [[id number]]  (Normal) Collected: 05/28/22 1144    Lab Status: Final result Specimen: Blood from Line, Venous Updated: 05/28/22 1208     Magnesium 2 1 mg/dL     Phosphorus [[id number]]  (Normal) Collected: 05/28/22 1144    Lab Status: Final result Specimen: Blood from Line, Venous Updated: 05/28/22 1208     Phosphorus 3 3 mg/dL     Hepatic function panel [[id number]]  (Abnormal) Collected: 05/28/22 1144    Lab Status: Final result Specimen: Blood from Line, Venous Updated: 05/28/22 1208     Total Bilirubin 1 94 mg/dL      Bilirubin, Direct 0 49 mg/dL      Alkaline Phosphatase 87 U/L      AST 39 U/L      ALT 26 U/L      Total Protein 7 4 g/dL      Albumin 3 2 g/dL     Protime-INR [[id number]]  (Abnormal) Collected: 05/28/22 1144    Lab Status: Final result Specimen: Blood from Line, Venous Updated: 05/28/22 1205     Protime 14 6 seconds      INR 1 13    APTT [[id number]]  (Abnormal) Collected: 05/28/22 1144    Lab Status: Final result Specimen: Blood from Line, Venous Updated: 05/28/22 1205     PTT 45 seconds     Blood culture #1 [[id number]] Collected: 05/28/22 1150    Lab Status: In process Specimen: Blood from Arm, Left Updated: 05/28/22 1153    Blood culture #2 [[id number]] Collected: 05/28/22 1148    Lab Status:  In process Specimen: Blood from Arm, Right Updated: 05/28/22 1153    CBC and Platelet [[id number]]  (Abnormal) Collected: 05/28/22 1144    Lab Status: Final result Specimen: Blood from Line, Venous Updated: 05/28/22 1151     WBC 8 17 Thousand/uL      RBC 4 36 Million/uL      Hemoglobin 14 5 g/dL      Hematocrit 42 7 %      MCV 98 fL      MCH 33 3 pg      MCHC 34 0 g/dL      RDW 16 1 %      Platelets 451 Thousands/uL      MPV 11 6 fL     Fingerstick Glucose (POCT) [[id number]]  (Abnormal) Collected: 05/28/22 1131    Lab Status: Final result Updated: 05/28/22 1135     POC Glucose 182 mg/dl           CT renal stone study abdomen pelvis wo contrast   Final Result      Bilateral nephrolithiasis is present with no evidence of hydronephrosis  Cirrhotic appearance of the liver with mild ascites, splenomegaly and intra-abdominal varices  Cholelithiasis  Workstation performed: [id number]         CT stroke alert brain   Final Result      1  No acute intracranial CT abnormality  2   Nonspecific white matter change suggesting microangiopathy  Report was given to Dr Denver Overly on 5/28/2022 at 11:58 AM                 Workstation performed: [id number]         XR chest 2 views    (Results Pending)   CTA head and neck with and without contrast    (Results Pending)       Vitals:    05/28/22 1300 05/28/22 1315 05/28/22 1330 05/28/22 1345   BP: 130/78 134/66 134/60 139/68   TempSrc:       Pulse: 77 75 79 81   Resp: 18 19 16 16   Patient Position - Orthostatic VS:    Lying   Temp:             MDM  Number of Diagnoses or Management Options  Altered mental status, unspecified altered mental status type  Diagnosis management comments: 73-year-old female with past medical history pertinent for cirrhosis, diabetes, chronic UTIs, hypertension, liver disease, previous sepsis who presents with  and son for evaluation of altered mental status that started this morning upon awakening around 9:30 a m     Last known normal at 11:00 p m  EKG obtained on arrival here was the any acute signs of ischemia  Glucose noted to be in the 180s on arrival   Vital signs on arrival here were not concerning  Patient was afebrile  Patient was confused on exam   See exam section above  Patient was made a stroke alert given concerns for possible causes of altered mental status  CT returned showing no acute abnormalities  Patient was discussed with neurologist, Dr Alize Trevino, who agreed with not completing CTA at this time unless other workup was unremarkable  Advised holding off on tPA as patient is outside the timeline   Workup ordered to assess for possible sepsis as  and son report that patient does have previous occurrences such as this with UTIs  Patient was also ordered CT abdomen pelvis without contrast to assess for kidney stones  Patient was started on Rocephin, IV fluids after blood cultures obtained  Chest x-ray returned showing no obvious pneumonia  CT abdomen pelvis returned showing nephrolithiasis without any signs of hydronephrosis  Urine returned showing no obvious UTI but did show trace amount of blood  Lab work returned showing no leukocytosis, baseline creatinine an elevated ammonia at 141  Patient on reassessment reported no change  Discussed again with neurology, Dr Malick Luis, who stated that if patient has no focal deficits, can clear the stroke pathway and patient can be admitted to the hospitalist   Patient was advised to be admitted for further evaluation  Patient discussed with our hospitalist, Dr Cecile Saini, who advised stated he would see the patient  During his evaluation he noted that patient did have some dysarthria with certain words which was not present on initial exam   Advised that we continue the stroke pathway  Discussed with neurology on-call who advised restarting stroke pathway, obtaining CTA head and neck and if normal, will need to proceed with MRA  Restarted stroke pathway, re-ordered CTA head and neck and MRA  Did reassess patient again after and she no longer was dysarthric but was still confused  Ordered lactulose and discussed plan with  and son who were agreeable to CTA and if need transfer for MRA, were agreeable  Was notified by radiology that MRI could occur here  Patient was discussed with hospitalist who is agreeable to admission  I order placed order for diet at his request   CT and MRI pending at the time of admission  Informed family         Amount and/or Complexity of Data Reviewed  Clinical lab tests: ordered and reviewed  Tests in the radiology section of CPT®: ordered and reviewed  Tests in the medicine section of CPT®: ordered and reviewed  Discussion of test results with the performing providers: yes  Decide to obtain previous medical records or to obtain history from someone other than the patient: yes  Obtain history from someone other than the patient: yes  Review and summarize past medical records: yes  Discuss the patient with other providers: yes  Independent visualization of images, tracings, or specimens: yes    Risk of Complications, Morbidity, and/or Mortality  Presenting problems: high  Diagnostic procedures: high  Management options: high    Patient Progress  Patient progress: stable      Disposition  Final diagnoses: Altered mental status, unspecified altered mental status type     Time reflects when diagnosis was documented in both MDM as applicable and the Disposition within this note     Time User Action Codes Description Comment    5/28/2022 11:42 AM Giuliano Albarran Add [R41 [id number]] Altered mental status, unspecified altered mental status type       ED Disposition     None      Follow-up Information    None         Patient's Medications   Discharge Prescriptions    No medications on file       No discharge procedures on file      PDMP Review       Value Time User    PDMP Reviewed  Yes 1/29/2021  3:55 PM Darryl Wagner DO          ED Provider  Electronically Signed by           Marita Gaviria MD  05/28/22 1662

## 2022-05-28 NOTE — H&P
[address]  H&P- Edward Hoover 1955, 69 y o  female MRN: [id number]  Unit/Bed#: -01 Encounter: [id number]  Primary Care Provider: Yani Skelton DO   Date and time admitted to hospital: 5/28/2022 11:23 AM    * Hepatic encephalopathy [facility])  Assessment & Plan  69year old female presented with altered mental status and confusion possibly secondary to hepatic encephalopathy  No evidence of sepsis at this time  Urinalysis showing negative LE / Nitrites  - Continue lactulose  - GI evaluation  - Initially admitted due to concerns of a stroke  Imaging findings do not show any evidence of acute abnormalities  CTA head and neck pending  Neurology evaluation pending  Type 2 diabetes mellitus without complication, without long-term current use of insulin [facility])  Assessment & Plan  Lab Results   Component Value Date    HGBA1C 7 6 (H) 12/03/2021       Recent Labs     05/28/22  1131   POCGLU 182*       Blood Sugar Average: Last 72 hrs:  (P) 182     Home regimen: Glipizide  Inpatient regimen: Sliding scale    Thrombocytopenia (HCC)  Assessment & Plan  Due to cirrhosis    Recent Labs     05/28/22  1144   *         Cirrhosis of liver with ascites (HCC)  Assessment & Plan  Continue lasix / spironolactone      VTE Prophylaxis: Enoxaparin (Lovenox)  / sequential compression device   Code Status: full codeDiscussion with family: spouse / son    Anticipated Length of Stay:  Patient will be admitted on an Inpatient basis with an anticipated length of stay of  > 2 midnights  Justification for Hospital Stay: stroke pathway, hepatic encephalopathy    Total Time for Visit, including Counseling / Coordination of Care: 70 minutes  Greater than 50% of this total time spent on direct patient counseling and coordination of care  Chief Complaint:   Altered mental status    History of Present Illness:    Edward Hoover is a 69 y o  female who presents with confusion      Patient is a 26-year-old female with cirrhosis, diabetes mellitus type 2, and hypertension  She presented to our institution due to altered mental status around 930 in the morning  Patient was in her usual state of health last night  At baseline, patient is oriented x3  This morning, she was noted to be "not her usual self" and has been staring  Persistence of symptoms prompted her family to take her to the ED for further evaluation  In the ED, initially concerned for a stroke given word-finding difficulty  Imaging findings are not suggestive of a stroke at this time  Urinalysis negative for UTI  She will be admitted for stroke pathway and hepatic encephalopathy  Review of Systems:    Review of Systems   Constitutional: Negative for chills and fever  HENT: Negative for congestion  Eyes: Negative for pain  Respiratory: Negative for shortness of breath and wheezing  Cardiovascular: Negative for chest pain, palpitations and leg swelling  Gastrointestinal: Negative for abdominal pain and nausea  Endocrine: Negative for polyuria  Genitourinary: Negative for dysuria  Musculoskeletal: Negative for gait problem  Skin: Negative for color change and pallor  Neurological: Positive for weakness  Negative for headaches  Psychiatric/Behavioral: Positive for confusion  Negative for agitation  Past Medical and Surgical History:     Past Medical History:   Diagnosis Date    Chronic UTI     Cirrhosis, non-alcoholic (HCC)     Diabetes mellitus ([facility] [address] )     High cholesterol     Hypertension     Kidney stone     Liver disease     Sepsis ([facility] [address] )        Past Surgical History:   Procedure Laterality Date     SECTION      EYE SURGERY      cataract removal    JOINT REPLACEMENT      LITHOTRIPSY         Meds/Allergies:    Prior to Admission medications    Medication Sig Start Date End Date Taking?  Authorizing Provider   allopurinol (ZYLOPRIM) 100 mg tablet  20   Historical Provider, MD   ALPRAZoshantel Vo) 0 5 mg tablet Take 0 5 tablets (0 25 mg total) by mouth daily at bedtime as needed for anxiety for up to 10 days 8/16/21 8/26/21  Nkechi Zayas MD   atorvastatin (LIPITOR) 10 mg tablet  1/22/20   Historical Provider, MD   brimonidine (ALPHAGAN P) 0 15 % ophthalmic solution 1 drop 3 (three) times a day    Historical Provider, MD   carvedilol (COREG) 6 25 mg tablet Take 6 25 mg by mouth 2 (two) times a day with meals    Historical Provider, MD   citalopram (CeleXA) 10 mg tablet Take 10 mg by mouth daily at bedtime    Historical Provider, MD   ferrous sulfate 325 (65 Fe) mg tablet Take 325 mg by mouth daily with breakfast    Historical Provider, MD   FLUTICASONE PROPIONATE, NASAL, NA into each nostril    Historical Provider, MD   furosemide (LASIX) 40 mg tablet Take 1 tablet (40 mg total) by mouth 2 (two) times a day 8/16/21   Nkechi Zayas MD   glipiZIDE (GLUCOTROL XL) 5 mg 24 hr tablet Take 1 tablet (5 mg total) by mouth daily with breakfast 8/17/21   Nkechi Zayas MD   hydrOXYzine HCL (ATARAX) 10 mg tablet Take 1 tablet (10 mg total) by mouth every 6 (six) hours as needed for itching 8/16/21   Nkechi Zayas MD   lactulose 20 g/30 mL Take 30 mL (20 g total) by mouth 3 (three) times a day 8/16/21   Nkechi Zayas MD   LUMIGAN 0 01 % ophthalmic drops INSTILL 1 DROP INTO BOTH EYES AT NIGHT 2/21/20   Historical Provider, MD   magnesium oxide (MAG-OX) 400 mg Take 1 tablet (400 mg total) by mouth 2 (two) times a day 8/16/21   Nkechi Zayas MD   pantoprazole (PROTONIX) 40 mg tablet Take 1 tablet (40 mg total) by mouth 2 (two) times a day before meals 7/23/21   Trip Sánchez MD   potassium chloride 10% oral solution Take 15 mL (20 mEq total) by mouth daily 8/17/21   Nkechi Zayas MD   spironolactone (ALDACTONE) 50 mg tablet Take 1 tablet (50 mg total) by mouth daily 8/17/21   Nkechi Zayas MD     I have reviewed home medications with patient family member  Allergies:    Allergies   Allergen Reactions    Nitrofurantoin Other (See Comments)     Paresthesias in hands       Social History:     Marital Status: /Civil Union   Substance Use History:   Social History     Substance and Sexual Activity   Alcohol Use Not Currently     Social History     Tobacco Use   Smoking Status Never Smoker   Smokeless Tobacco Never Used     Social History     Substance and Sexual Activity   Drug Use Never       Family History:    History reviewed  No pertinent family history  Physical Exam:     Vitals:   Blood Pressure: 150/82 (05/28/22 1617)  Pulse: 79 (05/28/22 1617)  Temperature: 98 6 °F (37 °C) (05/28/22 1617)  Temp Source: Temporal (05/28/22 1130)  Respirations: 18 (05/28/22 1617)  Height: 5' 2" (157 5 cm) (05/28/22 1617)  Weight - Scale: 104 kg (229 lb 15 oz) (05/28/22 1617)  SpO2: 98 % (05/28/22 1617)    Physical Exam  Vitals reviewed  Constitutional:       General: She is not in acute distress  HENT:      Head: Normocephalic  Nose: Nose normal       Mouth/Throat:      Mouth: Mucous membranes are moist    Eyes:      General: No scleral icterus  Cardiovascular:      Rate and Rhythm: Normal rate  Pulmonary:      Effort: Pulmonary effort is normal  No respiratory distress  Abdominal:      General: There is no distension  Palpations: Abdomen is soft  Tenderness: There is no abdominal tenderness  Musculoskeletal:      Right lower leg: No edema  Left lower leg: No edema  Skin:     General: Skin is warm  Neurological:      Mental Status: She is alert  She is disoriented  Cranial Nerves: No cranial nerve deficit  Sensory: No sensory deficit  Motor: No weakness  Comments: Word finding difficulty could not say "hospital"   Psychiatric:         Mood and Affect: Mood normal          Behavior: Behavior normal        Additional Data:     Lab Results: I have personally reviewed pertinent reports        Results from last 7 days   Lab Units 05/28/22  1144   WBC Thousand/uL 8 17   HEMOGLOBIN g/dL 14 5 HEMATOCRIT % 42 7   PLATELETS Thousands/uL 147*     Results from last 7 days   Lab Units 05/28/22  1144   SODIUM mmol/L 137   POTASSIUM mmol/L 4 5   CHLORIDE mmol/L 104   CO2 mmol/L 23   BUN mg/dL 20   CREATININE mg/dL 1 18   ANION GAP mmol/L 10   CALCIUM mg/dL 9 9   ALBUMIN g/dL 3 2*   TOTAL BILIRUBIN mg/dL 1 94*   ALK PHOS U/L 87   ALT U/L 26   AST U/L 39   GLUCOSE RANDOM mg/dL 176*     Results from last 7 days   Lab Units 05/28/22  1144   INR  1 15     Results from last 7 days   Lab Units 05/28/22  1131   POC GLUCOSE mg/dl 182*         Results from last 7 days   Lab Units 05/28/22  1348 05/28/22  1206   LACTIC ACID mmol/L 2 2* 2 2*       Imaging: I have personally reviewed pertinent reports  CTA head and neck with and without contrast   Final Result by Jannette Gonzalez MD (05/28 1645)      No evidence of acute intracranial hemorrhage  No evidence of hemodynamic significant stenosis, aneurysm or dissection  Partially visualized cardiomegaly  Workstation performed: [id number]         MRI brain wo contrast   Final Result by Jannette Gonzalez MD (05/28 1527)      No MR evidence of acute ischemia  Workstation performed: [id number]         XR chest 2 views   Final Result by Jean Carlos Brady MD (05/28 1501)      Mild interstitial edema  Workstation performed: [id number]         CT renal stone study abdomen pelvis wo contrast   Final Result by Edna Crouch MD (05/28 1212)      Bilateral nephrolithiasis is present with no evidence of hydronephrosis  Cirrhotic appearance of the liver with mild ascites, splenomegaly and intra-abdominal varices  Cholelithiasis  Workstation performed: [id number]         CT stroke alert brain   Final Result by Zane Abebe MD (05/28 1207)      1  No acute intracranial CT abnormality  2   Nonspecific white matter change suggesting microangiopathy                       Report was given to Dr New Duque on 5/28/2022 at 11:58 AM  Workstation performed: [id number]             EKG, Pathology, and Other Studies Reviewed on Admission:   · EKG: nsr without any acute ischemic changes    Allscripts / Epic Records Reviewed: Yes     ** Please Note: This note has been constructed using a voice recognition system   **

## 2022-05-28 NOTE — PLAN OF CARE
Problem: Neurological Deficit  Goal: Neurological status is stable or improving  Description: Interventions:  - Monitor and assess patient's level of consciousness, motor function, sensory function, and level of assistance needed for ADLs  - Monitor and report changes from baseline  Collaborate with interdisciplinary team to initiate plan and implement interventions as ordered  - Provide and maintain a safe environment  - Consider fall precautions  - Consider aspiration precautions  - Consider bleeding precautions  Outcome: Progressing     Problem: Communication Impairment  Goal: Ability to express needs and understand communication  Description: Assess patient's communication skills and ability to understand information  Patient will demonstrate use of effective communication techniques, alternative methods of communication and understanding even if not able to speak  - Encourage communication and provide alternate methods of communication as needed  - Collaborate with case management/ for discharge needs  - Include patient/family/caregiver in decisions related to communication  Outcome: Progressing      Problem: Potential for Aspiration  Goal: Ventilated patient's risk of aspiration is minimized  Description: Assess and monitor vital signs, respiratory status, airway cuff pressure, and labs (WBC)  Monitor for signs of aspiration (tachypnea, cough, rales, wheezing, cyanosis, fever)  - Elevate head of bed 30 degrees if patient has tube feeding   - Monitor tube feeding    Outcome: Progressing     Problem: PAIN - ADULT  Goal: Verbalizes/displays adequate comfort level or baseline comfort level  Description: Interventions:  - Encourage patient to monitor pain and request assistance  - Assess pain using appropriate pain scale  - Administer analgesics based on type and severity of pain and evaluate response  - Implement non-pharmacological measures as appropriate and evaluate response  - Consider cultural and social influences on pain and pain management  - Notify physician/advanced practitioner if interventions unsuccessful or patient reports new pain  Outcome: Progressing     Problem: INFECTION - ADULT  Goal: Absence or prevention of progression during hospitalization  Description: INTERVENTIONS:  - Assess and monitor for signs and symptoms of infection  - Monitor lab/diagnostic results  - Monitor all insertion sites, i e  indwelling lines, tubes, and drains  - Monitor endotracheal if appropriate and nasal secretions for changes in amount and color  - [place] appropriate cooling/warming therapies per order  - Administer medications as ordered  - Instruct and encourage patient and family to use good hand hygiene technique  - Identify and instruct in appropriate isolation precautions for identified infection/condition  Outcome: Progressing     Problem: DISCHARGE PLANNING  Goal: Discharge to home or other facility with appropriate resources  Description: INTERVENTIONS:  - Identify barriers to discharge w/patient and caregiver  - Arrange for needed discharge resources and transportation as appropriate  - Identify discharge learning needs (meds, wound care, etc )  - Arrange for interpretive services to assist at discharge as needed  - Refer to Case Management Department for coordinating discharge planning if the patient needs post-hospital services based on physician/advanced practitioner order or complex needs related to functional status, cognitive ability, or social support system  Outcome: Progressing     Problem: Knowledge Deficit  Goal: Patient/family/caregiver demonstrates understanding of disease process, treatment plan, medications, and discharge instructions  Description: Complete learning assessment and assess knowledge base    Interventions:  - Provide teaching at level of understanding  - Provide teaching via preferred learning methods  Outcome: Progressing     Problem: SAFETY ADULT  Goal: Patient will remain free of falls  Description: INTERVENTIONS:  - Educate patient/family on patient safety including physical limitations  - Instruct patient to call for assistance with activity   - Consult OT/PT to assist with strengthening/mobility   - Keep Call bell within reach  - Keep bed low and locked with side rails adjusted as appropriate  - Keep care items and personal belongings within reach  - Initiate and maintain comfort rounds  - Make Fall Risk Sign visible to staff  - Offer Toileting every 2 Hours, in advance of need  - Initiate/Maintain bed alarm  - Apply yellow socks and bracelet for high fall risk patients  - Consider moving patient to room near nurses station  Outcome: Progressing  Goal: Maintain or return to baseline ADL function  Description: INTERVENTIONS:  -  Assess patient's ability to carry out ADLs; assess patient's baseline for ADL function and identify physical deficits which impact ability to perform ADLs (bathing, care of mouth/teeth, toileting, grooming, dressing, etc )  - Assess/evaluate cause of self-care deficits   - Assess range of motion  - Assess patient's mobility; develop plan if impaired  - Assess patient's need for assistive devices and provide as appropriate  - Encourage maximum independence but intervene and supervise when necessary  - Involve family in performance of ADLs  - Assess for home care needs following discharge   - Consider OT consult to assist with ADL evaluation and planning for discharge  - Provide patient education as appropriate  Outcome: Progressing  Goal: Maintains/Returns to pre admission functional level  Description: INTERVENTIONS:  - Perform BMAT or MOVE assessment daily    - Set and communicate daily mobility goal to care team and patient/family/caregiver     - Collaborate with rehabilitation services on mobility goals if consulted  - Out of bed for toileting  - Record patient progress and toleration of activity level   Outcome: Progressing

## 2022-05-28 NOTE — ASSESSMENT & PLAN NOTE
69year old female presented with altered mental status and confusion possibly secondary to hepatic encephalopathy  No evidence of sepsis at this time  Urinalysis showing negative LE / Nitrites  - Continue lactulose  - GI evaluation  - Initially admitted due to concerns of a stroke  Imaging findings do not show any evidence of acute abnormalities  CTA head and neck pending  Neurology evaluation pending

## 2022-05-29 PROBLEM — E72.20 HYPERAMMONEMIA (HCC): Status: ACTIVE | Noted: 2022-05-29

## 2022-05-29 LAB
ALBUMIN SERPL BCP-MCNC: 2.6 G/DL (ref 3.5–5)
ALP SERPL-CCNC: 76 U/L (ref 46–116)
ALT SERPL W P-5'-P-CCNC: 26 U/L (ref 12–78)
ANION GAP SERPL CALCULATED.3IONS-SCNC: 8 MMOL/L (ref 4–13)
AST SERPL W P-5'-P-CCNC: 34 U/L (ref 5–45)
BASOPHILS # BLD AUTO: 0.07 THOUSANDS/ΜL (ref 0–0.1)
BASOPHILS NFR BLD AUTO: 1 % (ref 0–1)
BILIRUB SERPL-MCNC: 1.5 MG/DL (ref 0.2–1)
BUN SERPL-MCNC: 17 MG/DL (ref 5–25)
CALCIUM ALBUM COR SERPL-MCNC: 10 MG/DL (ref 8.3–10.1)
CALCIUM SERPL-MCNC: 8.9 MG/DL (ref 8.3–10.1)
CHLORIDE SERPL-SCNC: 109 MMOL/L (ref 100–108)
CHOLEST SERPL-MCNC: 157 MG/DL
CO2 SERPL-SCNC: 21 MMOL/L (ref 21–32)
CREAT SERPL-MCNC: 0.96 MG/DL (ref 0.6–1.3)
EOSINOPHIL # BLD AUTO: 0.25 THOUSAND/ΜL (ref 0–0.61)
EOSINOPHIL NFR BLD AUTO: 3 % (ref 0–6)
ERYTHROCYTE [DISTWIDTH] IN BLOOD BY AUTOMATED COUNT: 16.2 % (ref 11.6–15.1)
EST. AVERAGE GLUCOSE BLD GHB EST-MCNC: 128 MG/DL
GFR SERPL CREATININE-BSD FRML MDRD: 60 ML/MIN/1.73SQ M
GLUCOSE SERPL-MCNC: 103 MG/DL (ref 65–140)
GLUCOSE SERPL-MCNC: 112 MG/DL (ref 65–140)
GLUCOSE SERPL-MCNC: 118 MG/DL (ref 65–140)
GLUCOSE SERPL-MCNC: 168 MG/DL (ref 65–140)
GLUCOSE SERPL-MCNC: 209 MG/DL (ref 65–140)
HBA1C MFR BLD: 6.1 %
HCT VFR BLD AUTO: 35.9 % (ref 34.8–46.1)
HDLC SERPL-MCNC: 66 MG/DL
HGB BLD-MCNC: 12.4 G/DL (ref 11.5–15.4)
IMM GRANULOCYTES # BLD AUTO: 0.04 THOUSAND/UL (ref 0–0.2)
IMM GRANULOCYTES NFR BLD AUTO: 1 % (ref 0–2)
LDLC SERPL CALC-MCNC: 79 MG/DL (ref 0–100)
LYMPHOCYTES # BLD AUTO: 2.65 THOUSANDS/ΜL (ref 0.6–4.47)
LYMPHOCYTES NFR BLD AUTO: 32 % (ref 14–44)
MAGNESIUM SERPL-MCNC: 1.8 MG/DL (ref 1.6–2.6)
MCH RBC QN AUTO: 33.6 PG (ref 26.8–34.3)
MCHC RBC AUTO-ENTMCNC: 34.5 G/DL (ref 31.4–37.4)
MCV RBC AUTO: 97 FL (ref 82–98)
MONOCYTES # BLD AUTO: 0.9 THOUSAND/ΜL (ref 0.17–1.22)
MONOCYTES NFR BLD AUTO: 11 % (ref 4–12)
NEUTROPHILS # BLD AUTO: 4.48 THOUSANDS/ΜL (ref 1.85–7.62)
NEUTS SEG NFR BLD AUTO: 52 % (ref 43–75)
NRBC BLD AUTO-RTO: 0 /100 WBCS
PHOSPHATE SERPL-MCNC: 3.4 MG/DL (ref 2.3–4.1)
PLATELET # BLD AUTO: 140 THOUSANDS/UL (ref 149–390)
PMV BLD AUTO: 11.6 FL (ref 8.9–12.7)
POTASSIUM SERPL-SCNC: 4 MMOL/L (ref 3.5–5.3)
PROT SERPL-MCNC: 6.1 G/DL (ref 6.4–8.2)
RBC # BLD AUTO: 3.69 MILLION/UL (ref 3.81–5.12)
SODIUM SERPL-SCNC: 138 MMOL/L (ref 136–145)
TRIGL SERPL-MCNC: 60 MG/DL
WBC # BLD AUTO: 8.39 THOUSAND/UL (ref 4.31–10.16)

## 2022-05-29 PROCEDURE — 84100 ASSAY OF PHOSPHORUS: CPT | Performed by: STUDENT IN AN ORGANIZED HEALTH CARE EDUCATION/TRAINING PROGRAM

## 2022-05-29 PROCEDURE — NC001 PR NO CHARGE: Performed by: PSYCHIATRY & NEUROLOGY

## 2022-05-29 PROCEDURE — 80053 COMPREHEN METABOLIC PANEL: CPT | Performed by: STUDENT IN AN ORGANIZED HEALTH CARE EDUCATION/TRAINING PROGRAM

## 2022-05-29 PROCEDURE — 82948 REAGENT STRIP/BLOOD GLUCOSE: CPT

## 2022-05-29 PROCEDURE — 85025 COMPLETE CBC W/AUTO DIFF WBC: CPT | Performed by: STUDENT IN AN ORGANIZED HEALTH CARE EDUCATION/TRAINING PROGRAM

## 2022-05-29 PROCEDURE — 83735 ASSAY OF MAGNESIUM: CPT | Performed by: STUDENT IN AN ORGANIZED HEALTH CARE EDUCATION/TRAINING PROGRAM

## 2022-05-29 PROCEDURE — 97162 PT EVAL MOD COMPLEX 30 MIN: CPT

## 2022-05-29 PROCEDURE — 99232 SBSQ HOSP IP/OBS MODERATE 35: CPT | Performed by: STUDENT IN AN ORGANIZED HEALTH CARE EDUCATION/TRAINING PROGRAM

## 2022-05-29 PROCEDURE — 80061 LIPID PANEL: CPT | Performed by: STUDENT IN AN ORGANIZED HEALTH CARE EDUCATION/TRAINING PROGRAM

## 2022-05-29 PROCEDURE — 83036 HEMOGLOBIN GLYCOSYLATED A1C: CPT | Performed by: STUDENT IN AN ORGANIZED HEALTH CARE EDUCATION/TRAINING PROGRAM

## 2022-05-29 RX ADMIN — ALLOPURINOL 100 MG: 100 TABLET ORAL at 08:36

## 2022-05-29 RX ADMIN — PANTOPRAZOLE SODIUM 40 MG: 40 TABLET, DELAYED RELEASE ORAL at 06:00

## 2022-05-29 RX ADMIN — LACTULOSE 20 G: 10 SOLUTION ORAL at 08:37

## 2022-05-29 RX ADMIN — BRIMONIDINE TARTRATE 1 DROP: 2 SOLUTION/ DROPS OPHTHALMIC at 16:35

## 2022-05-29 RX ADMIN — SPIRONOLACTONE 50 MG: 25 TABLET ORAL at 08:36

## 2022-05-29 RX ADMIN — BRIMONIDINE TARTRATE 1 DROP: 2 SOLUTION/ DROPS OPHTHALMIC at 20:15

## 2022-05-29 RX ADMIN — LACTULOSE 20 G: 10 SOLUTION ORAL at 20:15

## 2022-05-29 RX ADMIN — ATORVASTATIN CALCIUM 10 MG: 10 TABLET, FILM COATED ORAL at 16:35

## 2022-05-29 RX ADMIN — FERROUS SULFATE TAB 325 MG (65 MG ELEMENTAL FE) 325 MG: 325 (65 FE) TAB at 08:36

## 2022-05-29 RX ADMIN — INSULIN LISPRO 1 UNITS: 100 INJECTION, SOLUTION INTRAVENOUS; SUBCUTANEOUS at 11:46

## 2022-05-29 RX ADMIN — BRIMONIDINE TARTRATE 1 DROP: 2 SOLUTION/ DROPS OPHTHALMIC at 08:37

## 2022-05-29 RX ADMIN — FUROSEMIDE 40 MG: 40 TABLET ORAL at 08:36

## 2022-05-29 RX ADMIN — LACTULOSE 20 G: 10 SOLUTION ORAL at 16:35

## 2022-05-29 RX ADMIN — ENOXAPARIN SODIUM 40 MG: 40 INJECTION SUBCUTANEOUS at 08:36

## 2022-05-29 RX ADMIN — PANTOPRAZOLE SODIUM 40 MG: 40 TABLET, DELAYED RELEASE ORAL at 16:35

## 2022-05-29 RX ADMIN — BIMATOPROST 1 DROP: 0.1 SOLUTION/ DROPS OPHTHALMIC at 20:24

## 2022-05-29 NOTE — PLAN OF CARE
Problem: Neurological Deficit  Goal: Neurological status is stable or improving  Description: Interventions:  - Monitor and assess patient's level of consciousness, motor function, sensory function, and level of assistance needed for ADLs  - Monitor and report changes from baseline  Collaborate with interdisciplinary team to initiate plan and implement interventions as ordered  - Provide and maintain a safe environment  - Consider seizure precautions  - Consider fall precautions  - Consider aspiration precautions  - Consider bleeding precautions  Outcome: Progressing     Problem: Communication Impairment  Goal: Ability to express needs and understand communication  Description: Assess patient's communication skills and ability to understand information  Patient will demonstrate use of effective communication techniques, alternative methods of communication and understanding even if not able to speak  - Encourage communication and provide alternate methods of communication as needed  - Collaborate with case management/ for discharge needs  - Include patient/family/caregiver in decisions related to communication  Outcome: Progressing     Problem: Potential for Aspiration  Goal: Ventilated patient's risk of aspiration is minimized  Description: Assess and monitor vital signs, respiratory status, airway cuff pressure, and labs (WBC)  Monitor for signs of aspiration (tachypnea, cough, rales, wheezing, cyanosis, fever)  - Elevate head of bed 30 degrees if patient has tube feeding   - Monitor tube feeding    Outcome: Progressing     Problem: PAIN - ADULT  Goal: Verbalizes/displays adequate comfort level or baseline comfort level  Description: Interventions:  - Encourage patient to monitor pain and request assistance  - Assess pain using appropriate pain scale  - Administer analgesics based on type and severity of pain and evaluate response  - Implement non-pharmacological measures as appropriate and evaluate response  - Consider cultural and social influences on pain and pain management  - Notify physician/advanced practitioner if interventions unsuccessful or patient reports new pain  Outcome: Progressing     Problem: INFECTION - ADULT  Goal: Absence or prevention of progression during hospitalization  Description: INTERVENTIONS:  - Assess and monitor for signs and symptoms of infection  - Monitor lab/diagnostic results  - Monitor all insertion sites, i e  indwelling lines, tubes, and drains  - Monitor endotracheal if appropriate and nasal secretions for changes in amount and color  - [place] appropriate cooling/warming therapies per order  - Administer medications as ordered  - Instruct and encourage patient and family to use good hand hygiene technique  - Identify and instruct in appropriate isolation precautions for identified infection/condition  Outcome: Progressing     Problem: SAFETY ADULT  Goal: Patient will remain free of falls  Description: INTERVENTIONS:  - Educate patient/family on patient safety including physical limitations  - Instruct patient to call for assistance with activity   - Consult OT/PT to assist with strengthening/mobility   - Keep Call bell within reach  - Keep bed low and locked with side rails adjusted as appropriate  - Keep care items and personal belongings within reach  - Initiate and maintain comfort rounds  - Make Fall Risk Sign visible to staff    - Apply yellow socks and bracelet for high fall risk patients  - Consider moving patient to room near nurses station  Outcome: Progressing  Goal: Maintain or return to baseline ADL function  Description: INTERVENTIONS:  -  Assess patient's ability to carry out ADLs; assess patient's baseline for ADL function and identify physical deficits which impact ability to perform ADLs (bathing, care of mouth/teeth, toileting, grooming, dressing, etc )  - Assess/evaluate cause of self-care deficits   - Assess range of motion  - Assess patient's mobility; develop plan if impaired  - Assess patient's need for assistive devices and provide as appropriate  - Encourage maximum independence but intervene and supervise when necessary  - Involve family in performance of ADLs  - Assess for home care needs following discharge   - Consider OT consult to assist with ADL evaluation and planning for discharge  - Provide patient education as appropriate  Outcome: Progressing  Goal: Maintains/Returns to pre admission functional level  Description: INTERVENTIONS:  - Perform BMAT or MOVE assessment daily    - Set and communicate daily mobility goal to care team and patient/family/caregiver  - Collaborate with rehabilitation services on mobility goals if consulted    - Out of bed for toileting  - Record patient progress and toleration of activity level   Outcome: Progressing     Problem: DISCHARGE PLANNING  Goal: Discharge to home or other facility with appropriate resources  Description: INTERVENTIONS:  - Identify barriers to discharge w/patient and caregiver  - Arrange for needed discharge resources and transportation as appropriate  - Identify discharge learning needs (meds, wound care, etc )  - Arrange for interpretive services to assist at discharge as needed  - Refer to Case Management Department for coordinating discharge planning if the patient needs post-hospital services based on physician/advanced practitioner order or complex needs related to functional status, cognitive ability, or social support system  Outcome: Progressing     Problem: Knowledge Deficit  Goal: Patient/family/caregiver demonstrates understanding of disease process, treatment plan, medications, and discharge instructions  Description: Complete learning assessment and assess knowledge base    Interventions:  - Provide teaching at level of understanding  - Provide teaching via preferred learning methods  Outcome: Progressing     Problem: MOBILITY - ADULT  Goal: Maintain or return to baseline ADL function  Description: INTERVENTIONS:  -  Assess patient's ability to carry out ADLs; assess patient's baseline for ADL function and identify physical deficits which impact ability to perform ADLs (bathing, care of mouth/teeth, toileting, grooming, dressing, etc )  - Assess/evaluate cause of self-care deficits   - Assess range of motion  - Assess patient's mobility; develop plan if impaired  - Assess patient's need for assistive devices and provide as appropriate  - Encourage maximum independence but intervene and supervise when necessary  - Involve family in performance of ADLs  - Assess for home care needs following discharge   - Consider OT consult to assist with ADL evaluation and planning for discharge  - Provide patient education as appropriate  Outcome: Progressing  Goal: Maintains/Returns to pre admission functional level  Description: INTERVENTIONS:  - Perform BMAT or MOVE assessment daily    - Set and communicate daily mobility goal to care team and patient/family/caregiver     - Collaborate with rehabilitation services on mobility goals if consulted    - Out of bed for toileting  - Record patient progress and toleration of activity level   Outcome: Progressing     Problem: Potential for Falls  Goal: Patient will remain free of falls  Description: INTERVENTIONS:  - Educate patient/family on patient safety including physical limitations  - Instruct patient to call for assistance with activity   - Consult OT/PT to assist with strengthening/mobility   - Keep Call bell within reach  - Keep bed low and locked with side rails adjusted as appropriate  - Keep care items and personal belongings within reach  - Initiate and maintain comfort rounds  - Make Fall Risk Sign visible to staff    - Apply yellow socks and bracelet for high fall risk patients  - Consider moving patient to room near nurses station  Outcome: Progressing     Problem: Prexisting or High Potential for Compromised Skin Integrity  Goal: Skin integrity is maintained or improved  Description: INTERVENTIONS:  - Identify patients at risk for skin breakdown  - Assess and monitor skin integrity  - Assess and monitor nutrition and hydration status  - Monitor labs   - Assess for incontinence   - Turn and reposition patient  - Assist with mobility/ambulation  - Relieve pressure over bony prominences  - Avoid friction and shearing  - Provide appropriate hygiene as needed including keeping skin clean and dry  - Evaluate need for skin moisturizer/barrier cream  - Collaborate with interdisciplinary team   - Patient/family teaching  - Consider wound care consult   Outcome: Progressing

## 2022-05-29 NOTE — ASSESSMENT & PLAN NOTE
Lab Results   Component Value Date    HGBA1C 7 6 (H) 12/03/2021       Recent Labs     05/28/22  1713 05/28/22  2045 05/29/22  0713 05/29/22  1059   POCGLU 106 192* 103 168*       Blood Sugar Average: Last 72 hrs:  (P) 150 2     Home regimen: Glipizide  Inpatient regimen: Sliding scale

## 2022-05-29 NOTE — PLAN OF CARE
Problem: PHYSICAL THERAPY ADULT  Goal: Performs mobility at highest level of function for planned discharge setting  See evaluation for individualized goals  Description: Treatment/Interventions: Functional transfer training, LE strengthening/ROM, Therapeutic exercise, Endurance training, Bed mobility, Gait training          See flowsheet documentation for full assessment, interventions and recommendations  Note:    Problem List: Decreased endurance, Impaired balance, Pain  Assessment: Patient is a 69 y o  female evaluated by Physical Therapy s/p admit to [address] [place] [address] on 5/28/2022 with admitting diagnosis of: Altered mental status, Altered mental status, unspecified altered mental status type and principal problem of: Hepatic encephalopathy  PT was consulted to assess patient's functional mobility and discharge needs  Comorbidities affecting patient's physical performance at time of assessment include: cirrhosis of liver, T2DM, hepatic encephalopathy  Personal factors affecting the patient at time of IE include: step(s) to enter home, inability to navigate community distances and history of fall(s)  Please locate objective findings from PT assessment regarding body systems outlined above  Pt seen for PT IE and agreeable to participation  She tranfers at supervision level from bedside recliner and toilet  Pt ambulated 95' at supervision level with RW, no LOB noted with ambulation  Pt demonstrates good safety awareness while transferring and ambulating in busy environments  The patient's AM-PAC Basic Mobility Inpatient Short Form Raw Score is 20  A Raw score of greater than 16 suggests the patient may benefit from discharge to home with HHPT  Please also refer to the recommendation of the Physical Therapist for safe discharge planning   Pt will benefit from continued PT intervention during LOS to address current deficits, increase LOF, and facilitate safe d/c to next level of care when medically appropriate  D/c recommendation at this time is home with HHPT  PT Discharge Recommendation: Home with home health rehabilitation          See flowsheet documentation for full assessment

## 2022-05-29 NOTE — ASSESSMENT & PLAN NOTE
Hyperammonemia, POA, due to cirrhosis evidenced by Ammonia 141 with hepatic encephalopathy treated with Lactulose

## 2022-05-29 NOTE — PHYSICAL THERAPY NOTE
Physical Therapy Evaluation    Patient Name: Andrew Colmenares    PLLCN'C Date: 2022     Problem List  Principal Problem:    Hepatic encephalopathy ([facility] [address] )  Active Problems:    Cirrhosis of liver with ascites ([facility] [address] )    Thrombocytopenia ([facility] [address] )    Type 2 diabetes mellitus without complication, without long-term current use of insulin [facility])       Past Medical History  Past Medical History:   Diagnosis Date    Chronic UTI     Cirrhosis, non-alcoholic ([facility] [address] )     Diabetes mellitus ([facility] [address] )     High cholesterol     Hypertension     Kidney stone     Liver disease     Sepsis ([facility] )         Past Surgical History  Past Surgical History:   Procedure Laterality Date     SECTION      EYE SURGERY      cataract removal    JOINT REPLACEMENT      LITHOTRIPSY             22 09   PT Last Visit   PT Visit Date 22   Note Type   Note type Evaluation   Pain Assessment   Pain Assessment Tool 0-10   Pain Score 3   Pain Location/Orientation Orientation: Right;Location: Buttocks   Restrictions/Precautions   Weight Bearing Precautions Per Order No   Home Living   Type of [address] [place] [address] Ave Two level;1/2 bath on main level;Performs ADLs on one level;Bed/bath upstairs  (FF to upstairs c L HR)   Bathroom Shower/Tub Walk-in shower   Bathroom Toilet Standard   Bathroom Equipment Shower chair   Home Equipment Walker;Cane   Additional Comments Pt resides in a 2  with 1+1 BRIANNA  Does not typically use AD at baseline but used the walker inconsistently more recently   Prior Function   Level of [place] Independent with ADLs and functional mobility; Needs assistance with IADLs   Lives With Spouse   Receives Help From Family   ADL Assistance Independent   IADLs Needs assistance   Falls in the last 6 months 1 to 4   Vocational Retired   Comments Pt reports being I for ADLs with some supervision from     performs IADLs   Cognition   Overall Cognitive Status [facility]   Arousal/Participation Alert   Orientation Level Oriented X4   Following Commands Follows one step commands with increased time or repetition   RLE Assessment   RLE Assessment WFL  (Gross MMT 4-/5)   LLE Assessment   LLE Assessment WFL  (Gross MMT 4-/5)   Bed Mobility   Additional Comments Pt OOB in recliner at beginning of session   Transfers   Sit to Stand 5  Supervision   Stand to Sit 5  Supervision   Toilet transfer 5  Supervision   Additional Comments Pt performs transfers at supervision level   Ambulation/Elevation   Gait pattern Step through pattern; Forward Flexion   Gait Assistance 5  Supervision   Additional items Verbal cues   Assistive Device Rolling walker   Distance 95'   Ambulation/Elevation Additional Comments Pt ambulated 95' at supervision level with RW  She demonstrates a fairly steady gait, PT recommended use of RW when returning home   Balance   Static Sitting Normal   Dynamic Sitting Good   Static Standing Fair +   Dynamic Standing Fair   Ambulatory Fair   Activity Tolerance   Activity Tolerance Patient tolerated treatment well   Nurse Made Aware Yes RNShante   Assessment   Problem List Decreased endurance; Impaired balance;Pain   Assessment Patient is a 69 y o  female evaluated by Physical Therapy s/p admit to [address] [place] [address] on 5/28/2022 with admitting diagnosis of: Altered mental status, Altered mental status, unspecified altered mental status type and principal problem of: Hepatic encephalopathy  PT was consulted to assess patient's functional mobility and discharge needs  Comorbidities affecting patient's physical performance at time of assessment include: cirrhosis of liver, T2DM, hepatic encephalopathy  Personal factors affecting the patient at time of IE include: step(s) to enter home, inability to navigate community distances and history of fall(s)  Please locate objective findings from PT assessment regarding body systems outlined above   Pt seen for PT IE and agreeable to participation  She tranfers at supervision level from bedside recliner and toilet  Pt ambulated 95' at supervision level with RW, no LOB noted with ambulation  Pt demonstrates good safety awareness while transferring and ambulating in busy environments  The patient's [facility] Basic Mobility Inpatient Short Form Raw Score is 20  A Raw score of greater than 16 suggests the patient may benefit from discharge to home with HHPT  Please also refer to the recommendation of the Physical Therapist for safe discharge planning  Pt will benefit from continued PT intervention during LOS to address current deficits, increase LOF, and facilitate safe d/c to next level of care when medically appropriate  D/c recommendation at this time is home with HHPT  Goals   Patient Goals "return home"   STG Expiration Date 06/12/22   Short Term Goal #1 Pt to participate in LE strengthening program in order to improve bed mobility and transfers when returning home   Short Term Goal #2 Pt to perform bed mobility and tranfers I  in order to maximize independence when returning home   LTG Expiration Date 06/12/22   Long Term Goal #1 Pt to ambulate with RW at least 200'  without AD  in order to safely ambulate around home and in community   Long Term Goal #2 Pt to perform at least 5 steps with L HR at supervision level in order to enter home and go upstairs   Plan   Treatment/Interventions Functional transfer training;LE strengthening/ROM; Therapeutic exercise; Endurance training;Bed mobility;Gait training   PT Frequency 3-5x/wk   Recommendation   PT Discharge Recommendation Home with home health rehabilitation   -[facility] Basic Mobility Inpatient   Turning in Bed Without Bedrails 4   Lying on Back to Sitting on Edge of Flat Bed 4   Moving Bed to Chair 3   Standing Up From Chair 3   Walk in Room 3   Climb 3-5 Stairs 3   Basic Mobility Inpatient Raw Score 20   Basic Mobility Standardized Score 43 99   Highest Level Of Mobility   [facility] Goal 6: Walk 10 steps or more   JH-HLM Achieved 7: Walk 25 feet or more   End of Consult   Patient Position at End of Consult Bedside chair; All needs within reach;Bed/Chair alarm activated     Pt returned to recliner at bedside with all needs in reach and alarm set  PT d/c recommendation at this time is home with HHPT      Lance Nur, PT

## 2022-05-29 NOTE — PLAN OF CARE
Problem: Neurological Deficit  Goal: Neurological status is stable or improving  Description: Interventions:  - Monitor and assess patient's level of consciousness, motor function, sensory function, and level of assistance needed for ADLs  - Monitor and report changes from baseline  Collaborate with interdisciplinary team to initiate plan and implement interventions as ordered  - Provide and maintain a safe environment  - Consider seizure precautions  - Consider fall precautions  - Consider aspiration precautions  - Consider bleeding precautions  Outcome: Adequate for Discharge     Problem: Communication Impairment  Goal: Ability to express needs and understand communication  Description: Assess patient's communication skills and ability to understand information  Patient will demonstrate use of effective communication techniques, alternative methods of communication and understanding even if not able to speak  - Encourage communication and provide alternate methods of communication as needed  - Collaborate with case management/ for discharge needs  - Include patient/family/caregiver in decisions related to communication  Outcome: Adequate for Discharge     Problem: Potential for Aspiration  Goal: Ventilated patient's risk of aspiration is minimized  Description: Assess and monitor vital signs, respiratory status, airway cuff pressure, and labs (WBC)  Monitor for signs of aspiration (tachypnea, cough, rales, wheezing, cyanosis, fever)  - Elevate head of bed 30 degrees if patient has tube feeding   - Monitor tube feeding    Outcome: Adequate for Discharge     Problem: PAIN - ADULT  Goal: Verbalizes/displays adequate comfort level or baseline comfort level  Description: Interventions:  - Encourage patient to monitor pain and request assistance  - Assess pain using appropriate pain scale  - Administer analgesics based on type and severity of pain and evaluate response  - Implement non-pharmacological measures as appropriate and evaluate response  - Consider cultural and social influences on pain and pain management  - Notify physician/advanced practitioner if interventions unsuccessful or patient reports new pain  Outcome: Adequate for Discharge     Problem: INFECTION - ADULT  Goal: Absence or prevention of progression during hospitalization  Description: INTERVENTIONS:  - Assess and monitor for signs and symptoms of infection  - Monitor lab/diagnostic results  - Monitor all insertion sites, i e  indwelling lines, tubes, and drains  - Monitor endotracheal if appropriate and nasal secretions for changes in amount and color  - [place] appropriate cooling/warming therapies per order  - Administer medications as ordered  - Instruct and encourage patient and family to use good hand hygiene technique  - Identify and instruct in appropriate isolation precautions for identified infection/condition  Outcome: Progressing     Problem: SAFETY ADULT  Goal: Patient will remain free of falls  Description: INTERVENTIONS:  - Educate patient/family on patient safety including physical limitations  - Instruct patient to call for assistance with activity   - Consult OT/PT to assist with strengthening/mobility   - Keep Call bell within reach  - Keep bed low and locked with side rails adjusted as appropriate  - Keep care items and personal belongings within reach  - Initiate and maintain comfort rounds  - Make Fall Risk Sign visible to staff  - Offer Toileting every 2 Hours, in advance of need  - Initiate/Maintain bed alarm  - Apply yellow socks and bracelet for high fall risk patients  - Consider moving patient to room near nurses station  Outcome: Progressing  Goal: Maintain or return to baseline ADL function  Description: INTERVENTIONS:  -  Assess patient's ability to carry out ADLs; assess patient's baseline for ADL function and identify physical deficits which impact ability to perform ADLs (bathing, care of mouth/teeth, toileting, grooming, dressing, etc )  - Assess/evaluate cause of self-care deficits   - Assess range of motion  - Assess patient's mobility; develop plan if impaired  - Assess patient's need for assistive devices and provide as appropriate  - Encourage maximum independence but intervene and supervise when necessary  - Involve family in performance of ADLs  - Assess for home care needs following discharge   - Consider OT consult to assist with ADL evaluation and planning for discharge  - Provide patient education as appropriate  Outcome: Progressing  Goal: Maintains/Returns to pre admission functional level  Description: INTERVENTIONS:  - Perform BMAT or MOVE assessment daily    - Set and communicate daily mobility goal to care team and patient/family/caregiver     - Collaborate with rehabilitation services on mobility goals if consulted  - Out of bed for toileting  - Record patient progress and toleration of activity level   Outcome: Progressing     Problem: DISCHARGE PLANNING  Goal: Discharge to home or other facility with appropriate resources  Description: INTERVENTIONS:  - Identify barriers to discharge w/patient and caregiver  - Arrange for needed discharge resources and transportation as appropriate  - Identify discharge learning needs (meds, wound care, etc )  - Arrange for interpretive services to assist at discharge as needed  - Refer to Case Management Department for coordinating discharge planning if the patient needs post-hospital services based on physician/advanced practitioner order or complex needs related to functional status, cognitive ability, or social support system  Outcome: Progressing     Problem: MOBILITY - ADULT  Goal: Maintain or return to baseline ADL function  Description: INTERVENTIONS:  -  Assess patient's ability to carry out ADLs; assess patient's baseline for ADL function and identify physical deficits which impact ability to perform ADLs (bathing, care of mouth/teeth, toileting, grooming, dressing, etc )  - Assess/evaluate cause of self-care deficits   - Assess range of motion  - Assess patient's mobility; develop plan if impaired  - Assess patient's need for assistive devices and provide as appropriate  - Encourage maximum independence but intervene and supervise when necessary  - Involve family in performance of ADLs  - Assess for home care needs following discharge   - Consider OT consult to assist with ADL evaluation and planning for discharge  - Provide patient education as appropriate  Outcome: Progressing  Goal: Maintains/Returns to pre admission functional level  Description: INTERVENTIONS:  - Perform BMAT or MOVE assessment daily    - Set and communicate daily mobility goal to care team and patient/family/caregiver     - Collaborate with rehabilitation services on mobility goals if consulted  - Out of bed for toileting  - Record patient progress and toleration of activity level   Outcome: Progressing

## 2022-05-29 NOTE — PROGRESS NOTES
[address]  Progress Note - Destiny Carbone 1955, 69 y o  female MRN: [id number]  Unit/Bed#: -[facility] Encounter: [id number]  Primary Care Provider: Kev Jonas DO   Date and time admitted to hospital: 5/28/2022 11:23 AM    * Hepatic encephalopathy [facility])  Assessment & Plan  69year old female presented with altered mental status and confusion possibly secondary to hepatic encephalopathy  No evidence of sepsis at this time  Urinalysis showing negative LE / Nitrites  Improved today  Despite the word findings difficulties, her imaging findings do not suggest a stroke   Her presentation is more consistent with hepatic encephalopathy    - Continue lactulose goal of 2-3 bowel movements per day  - Neurology recommendations appreciated  - Anticipate discharge in 24 hours as long as she continues to improve    Hyperammonemia (HCC)  Assessment & Plan  Hyperammonemia, POA, due to cirrhosis evidenced by Ammonia 141 with hepatic encephalopathy treated with Lactulose    Morbid obesity ([facility] [address] )  Assessment & Plan  Morbid obesity due to excess calories, POA, evidenced by BMI 42 06 treated with diet and education    Type 2 diabetes mellitus without complication, without long-term current use of insulin [facility])  Assessment & Plan  Lab Results   Component Value Date    HGBA1C 7 6 (H) 12/03/2021       Recent Labs     05/28/22  1713 05/28/22  2045 05/29/22  0713 05/29/22  1059   POCGLU 106 192* 103 168*       Blood Sugar Average: Last 72 hrs:  (P) 150 2     Home regimen: Glipizide  Inpatient regimen: Sliding scale    Thrombocytopenia (HCC)  Assessment & Plan  Due to cirrhosis    Recent Labs     05/28/22  1144 05/29/22  0541   * 140*         Cirrhosis of liver with ascites (HCC)  Assessment & Plan  Continue lasix / spironolactone      VTE Pharmacologic Prophylaxis:   Pharmacologic: Enoxaparin (Lovenox)  Mechanical VTE Prophylaxis in Place: Yes    Discussions with Specialists or Other Care Team Provider: nursing    Education and Discussions with Family / Patient: patient, spouse    Time Spent for Care: 30 minutes  More than 50% of total time spent on counseling and coordination of care as described above  Current Length of Stay: 1 day(s)    Current Patient Status: Inpatient   Certification Statement: The patient will continue to require additional inpatient hospital stay due to lactulose, hepatic encephalopathy    Discharge Plan: 24 hours    Code Status: Level 1 - Full Code      Subjective:   Patient seen and examined at bedside  She seems to be doing much better today compared to yesterday  She is now oriented x3  I updated her spouse over the phone that we are anticipating discharge tomorrow if she continues to improve  Objective:     Vitals:   Temp (24hrs), Av 5 °F (36 9 °C), Min:98 1 °F (36 7 °C), Max:98 9 °F (37 2 °C)    Temp:  [98 1 °F (36 7 °C)-98 9 °F (37 2 °C)] 98 8 °F (37 1 °C)  HR:  [68-86] 79  Resp:  [16-24] 18  BP: ()/(54-82) 94/54  SpO2:  [93 %-99 %] 94 %  Body mass index is 41 7 kg/m²  Input and Output Summary (last 24 hours): Intake/Output Summary (Last 24 hours) at 2022 1225  Last data filed at 2022 0900  Gross per 24 hour   Intake 2126 ml   Output 500 ml   Net 1626 ml       Physical Exam:     Physical Exam  Vitals reviewed  Constitutional:       Appearance: She is obese  HENT:      Head: Normocephalic  Nose: Nose normal       Mouth/Throat:      Mouth: Mucous membranes are moist    Eyes:      General: No scleral icterus  Cardiovascular:      Rate and Rhythm: Normal rate  Pulmonary:      Effort: Pulmonary effort is normal  No respiratory distress  Abdominal:      General: There is no distension  Palpations: Abdomen is soft  Tenderness: There is no abdominal tenderness  Skin:     General: Skin is warm  Neurological:      Mental Status: She is alert and oriented to person, place, and time     Psychiatric:         Mood and Affect: Mood normal          Behavior: Behavior normal        Additional Data:     Labs:    Results from last 7 days   Lab Units 05/29/22  0541   WBC Thousand/uL 8 39   HEMOGLOBIN g/dL 12 4   HEMATOCRIT % 35 9   PLATELETS Thousands/uL 140*   NEUTROS PCT % 52   LYMPHS PCT % 32   MONOS PCT % 11   EOS PCT % 3     Results from last 7 days   Lab Units 05/29/22  0541   SODIUM mmol/L 138   POTASSIUM mmol/L 4 0   CHLORIDE mmol/L 109*   CO2 mmol/L 21   BUN mg/dL 17   CREATININE mg/dL 0 96   ANION GAP mmol/L 8   CALCIUM mg/dL 8 9   ALBUMIN g/dL 2 6*   TOTAL BILIRUBIN mg/dL 1 50*   ALK PHOS U/L 76   ALT U/L 26   AST U/L 34   GLUCOSE RANDOM mg/dL 112     Results from last 7 days   Lab Units 05/28/22  1144   INR  1 15     Results from last 7 days   Lab Units 05/29/22  1059 05/29/22  0713 05/28/22  2045 05/28/22  1713 05/28/22  1131   POC GLUCOSE mg/dl 168* 103 192* 106 182*         Results from last 7 days   Lab Units 05/28/22  1348 05/28/22  1206   LACTIC ACID mmol/L 2 2* 2 2*           * I Have Reviewed All Lab Data Listed Above  * Additional Pertinent Lab Tests Reviewed: Jeff 64 Admission Reviewed    Imaging:    Imaging Reports Reviewed Today Include: mri brain, cta head and neck    Recent Cultures (last 7 days):     Results from last 7 days   Lab Units 05/28/22  1150 05/28/22  1148   BLOOD CULTURE  Received in Microbiology Lab  Culture in Progress  Received in Microbiology Lab  Culture in Progress         Last 24 Hours Medication List:   Current Facility-Administered Medications   Medication Dose Route Frequency Provider Last Rate    allopurinol  100 mg Oral Daily Ayo Carranza MD      aspirin  81 mg Oral Daily Ayo Carranza MD      atorvastatin  10 mg Oral Daily With Paco Po, MD      bimatoprost  1 drop Both Eyes HS Ayo Carranza MD      brimonidine tartrate  1 drop Both Eyes TID Ayo Carranza MD      enoxaparin  40 mg Subcutaneous Daily Ayo Carranza MD      ferrous sulfate  325 mg Oral Daily With Breakfast Elicia Lovelace MD      furosemide  40 mg Oral BID (diuretic) Elicia Lovelace MD      insulin lispro  1-6 Units Subcutaneous TID [facility] Elicia Lovelace MD      lactulose  20 g Oral TID Elicia Lovelace MD      pantoprazole  40 mg Oral BID AC Elicia Lovelace MD      spironolactone  50 mg Oral Daily Elicia Lovelace MD          Today, Patient Was Seen By: Hitesh Li MD    ** Please Note: Dictation voice to text software may have been used in the creation of this document   **

## 2022-05-29 NOTE — CONSULTS
Neurology   Haresh Coelho 69 y o  female MRN: [id number]   Encounter: [id number]      Assessment/Plan   Altered mental status:  No ischemia on brain MRI  CTA shows no stenosis as well  Impression is hepatic encephalopathy  No further neurological work-up  Treatment as per primary team     History of Present Illness       HPI: Haresh Coelho is a 69 y o  Stroke alert: called at 11:34 am yesterday      Patient is a 70 y/o with history of liver cirrhosis and DM II who presents for confusion noted this morning upon awakening at 0900 am  ED attending noted that she was just disoriented, but otherwise not aphasic  CT head showed no acute findings  Ammonia was elevated          Inpatient consult to Neurology  Consult performed by: Tobi Sharma MD  Consult ordered by: Mookie Swann MD    Inpatient consult to Neurology  Consult performed by:  Tobi Sharma MD  Consult ordered by: Mookie Swann MD          Review of Systems

## 2022-05-29 NOTE — ASSESSMENT & PLAN NOTE
69year old female presented with altered mental status and confusion possibly secondary to hepatic encephalopathy  No evidence of sepsis at this time  Urinalysis showing negative LE / Nitrites  Improved today  Despite the word findings difficulties, her imaging findings do not suggest a stroke   Her presentation is more consistent with hepatic encephalopathy    - Continue lactulose goal of 2-3 bowel movements per day  - Neurology recommendations appreciated  - Anticipate discharge in 24 hours as long as she continues to improve

## 2022-05-30 VITALS
HEART RATE: 80 BPM | RESPIRATION RATE: 18 BRPM | HEIGHT: 62 IN | DIASTOLIC BLOOD PRESSURE: 59 MMHG | BODY MASS INDEX: 41.77 KG/M2 | TEMPERATURE: 98.8 F | OXYGEN SATURATION: 96 % | WEIGHT: 227 LBS | SYSTOLIC BLOOD PRESSURE: 120 MMHG

## 2022-05-30 LAB
ANION GAP SERPL CALCULATED.3IONS-SCNC: 9 MMOL/L (ref 4–13)
ATRIAL RATE: 82 BPM
ATRIAL RATE: 82 BPM
BACTERIA UR CULT: ABNORMAL
BUN SERPL-MCNC: 16 MG/DL (ref 5–25)
CALCIUM SERPL-MCNC: 8.7 MG/DL (ref 8.3–10.1)
CHLORIDE SERPL-SCNC: 105 MMOL/L (ref 100–108)
CO2 SERPL-SCNC: 21 MMOL/L (ref 21–32)
CREAT SERPL-MCNC: 0.97 MG/DL (ref 0.6–1.3)
GFR SERPL CREATININE-BSD FRML MDRD: 59 ML/MIN/1.73SQ M
GLUCOSE SERPL-MCNC: 103 MG/DL (ref 65–140)
GLUCOSE SERPL-MCNC: 103 MG/DL (ref 65–140)
GLUCOSE SERPL-MCNC: 196 MG/DL (ref 65–140)
MAGNESIUM SERPL-MCNC: 1.8 MG/DL (ref 1.6–2.6)
P AXIS: 55 DEGREES
P AXIS: 64 DEGREES
POTASSIUM SERPL-SCNC: 3.8 MMOL/L (ref 3.5–5.3)
PR INTERVAL: 170 MS
PR INTERVAL: 170 MS
QRS AXIS: 86 DEGREES
QRS AXIS: 87 DEGREES
QRSD INTERVAL: 68 MS
QRSD INTERVAL: 72 MS
QT INTERVAL: 406 MS
QT INTERVAL: 422 MS
QTC INTERVAL: 474 MS
QTC INTERVAL: 493 MS
SODIUM SERPL-SCNC: 135 MMOL/L (ref 136–145)
T WAVE AXIS: 29 DEGREES
T WAVE AXIS: 40 DEGREES
VENTRICULAR RATE: 82 BPM
VENTRICULAR RATE: 82 BPM

## 2022-05-30 PROCEDURE — 83735 ASSAY OF MAGNESIUM: CPT | Performed by: STUDENT IN AN ORGANIZED HEALTH CARE EDUCATION/TRAINING PROGRAM

## 2022-05-30 PROCEDURE — 82948 REAGENT STRIP/BLOOD GLUCOSE: CPT

## 2022-05-30 PROCEDURE — 99239 HOSP IP/OBS DSCHRG MGMT >30: CPT | Performed by: STUDENT IN AN ORGANIZED HEALTH CARE EDUCATION/TRAINING PROGRAM

## 2022-05-30 PROCEDURE — 97167 OT EVAL HIGH COMPLEX 60 MIN: CPT

## 2022-05-30 PROCEDURE — 80048 BASIC METABOLIC PNL TOTAL CA: CPT | Performed by: STUDENT IN AN ORGANIZED HEALTH CARE EDUCATION/TRAINING PROGRAM

## 2022-05-30 RX ORDER — GLIPIZIDE 2.5 MG/1
2.5 TABLET, EXTENDED RELEASE ORAL
Status: CANCELLED
Start: 2022-05-30

## 2022-05-30 RX ORDER — SPIRONOLACTONE 100 MG/1
100 TABLET, FILM COATED ORAL DAILY
COMMUNITY

## 2022-05-30 RX ORDER — GLIPIZIDE 2.5 MG/1
2.5 TABLET, EXTENDED RELEASE ORAL
Start: 2022-05-30 | End: 2022-05-30

## 2022-05-30 RX ORDER — GLIPIZIDE 2.5 MG/1
2.5 TABLET, EXTENDED RELEASE ORAL DAILY
COMMUNITY

## 2022-05-30 RX ORDER — SPIRONOLACTONE 50 MG/1
100 TABLET, FILM COATED ORAL DAILY
Status: CANCELLED
Start: 2022-05-30

## 2022-05-30 RX ADMIN — BRIMONIDINE TARTRATE 1 DROP: 2 SOLUTION/ DROPS OPHTHALMIC at 07:56

## 2022-05-30 RX ADMIN — FUROSEMIDE 40 MG: 40 TABLET ORAL at 07:54

## 2022-05-30 RX ADMIN — SPIRONOLACTONE 50 MG: 25 TABLET ORAL at 07:54

## 2022-05-30 RX ADMIN — ENOXAPARIN SODIUM 40 MG: 40 INJECTION SUBCUTANEOUS at 07:54

## 2022-05-30 RX ADMIN — ALLOPURINOL 100 MG: 100 TABLET ORAL at 07:54

## 2022-05-30 RX ADMIN — LACTULOSE 20 G: 10 SOLUTION ORAL at 07:54

## 2022-05-30 RX ADMIN — FERROUS SULFATE TAB 325 MG (65 MG ELEMENTAL FE) 325 MG: 325 (65 FE) TAB at 07:54

## 2022-05-30 RX ADMIN — PANTOPRAZOLE SODIUM 40 MG: 40 TABLET, DELAYED RELEASE ORAL at 07:54

## 2022-05-30 NOTE — ASSESSMENT & PLAN NOTE
69year old female presented with altered mental status and confusion possibly secondary to hepatic encephalopathy  No evidence of sepsis at this time  Urinalysis showing negative LE / Nitrites  Improved today  Despite the word findings difficulties, her imaging findings do not suggest a stroke  Her presentation is more consistent with hepatic encephalopathy  This resolved after she had two bowel movements overnight  She admits to occasional lactulose non-compliance  She and her family understood the need for it despite the inconvenience  - Discharge today   Outpatient pcp follow-up

## 2022-05-30 NOTE — OCCUPATIONAL THERAPY NOTE
Occupational Therapy Evaluation     Patient Name: Charly Juares  Today's Date: 2022  Problem List  Principal Problem:    Hepatic encephalopathy ([facility] )  Active Problems:    Cirrhosis of liver with ascites ([facility] )    Thrombocytopenia ([facility] )    Type 2 diabetes mellitus without complication, without long-term current use of insulin ([facility] )    Morbid obesity ([facility] )    Hyperammonemia (HCC)    Past Medical History  Past Medical History:   Diagnosis Date    Chronic UTI     Cirrhosis, non-alcoholic (HCC)     Diabetes mellitus ([facility] )     High cholesterol     Hypertension     Kidney stone     Liver disease     Sepsis [facility])      Past Surgical History  Past Surgical History:   Procedure Laterality Date     SECTION      EYE SURGERY      cataract removal    JOINT REPLACEMENT      LITHOTRIPSY           22 1008   Note Type   Note type Evaluation   Restrictions/Precautions   Other Precautions Contact/isolation; Chair Alarm; Fall Risk   Pain Assessment   Pain Assessment Tool 0-10   Pain Score No Pain   Home Living   Type of Home House  (1+1 BRIANNA)   Home Layout Two level;1/2 bath on main level;Bed/bath upstairs  (FF L HR)   Bathroom Shower/Tub Walk-in shower   Bathroom Toilet Standard   Bathroom Equipment Shower chair   Home Equipment Walker;Cane   Additional Comments Pt reports living in a 2 story home with 1+1 BRIANNA  Pt ambulates with RW PRN   Prior Function   Level of [place] Independent with ADLs and functional mobility   Lives With Spouse   Receives Help From Family   ADL Assistance Independent   IADLs Needs assistance   Falls in the last 6 months 1 to 4   Vocational Retired   Comments Pt reports completing ADLs, light IADLs, and functional ambulation @ MOd I  Pt has assistance from IADLs and community mobility from family PRN   ADL   Where Assessed Chair   Grooming Assistance 5  Supervision/Setup   Grooming Deficit Supervision/safety; Increased time to complete;Standing with assistive device   UB Dressing Assistance 5  Supervision/Setup   UB Dressing Deficit Verbal cueing;Supervision/safety; Increased time to complete; Thread RUE; Thread LUE;Pull over head;Pull around back   LB Dressing Assistance 4  Minimal Assistance   LB Dressing Deficit Requires assistive device for steadying;Verbal cueing;Supervision/safety; Increased time to complete; Don/doff R sock; Don/doff L sock; Thread RLE into pants; Thread LLE into pants;Pull up over hips   Additional Comments Pt completing ADL tasks while seated OOB in recliner chair  UB Dressing @ S after set-up  LB Dressing @ Min A for donning B socks  Pt reports she sits on a lower surface at home and can bring leg up onto bed/couch, although right now "my ankles/legs are swellen"  pt reports she can have assistance for donning socks at home if needed  Pt completing grooming tasks while standing at sinkside @ S with UB supported from RW PRN   Bed Mobility   Additional Comments Pt seated OOB at start and end of session with call bell in reach, chair alarm intact and all needs met at htis time   Transfers   Sit to Stand 5  Supervision   Additional items Increased time required;Verbal cues   Stand to Sit 5  Supervision   Additional items Increased time required;Verbal cues   Stand pivot 5  Supervision   Additional items Increased time required;Verbal cues   Additional Comments pt completing functional transfers with use of RW for U Bsupport  S for STS and SPT with increased time and occasional cues for safety/technique   no LOB noted during task   Balance   Static Sitting Normal   Dynamic Sitting Good   Static Standing Fair +   Dynamic Standing Fair   Activity Tolerance   Activity Tolerance Patient tolerated treatment well   Nurse Made Aware Spoke with RN, Jluis HOOVER Assessment   RUE Assessment WFL   LUE Assessment   LUE Assessment WFL   Hand Function   Gross Motor Coordination Functional   Fine Motor Coordination Functional   Sensation   Light Touch No apparent deficits   Cognition   Overall Cognitive Status WFL   Arousal/Participation Alert; Responsive; Cooperative   Attention Within functional limits   Orientation Level Oriented X4   Memory Within functional limits   Following Commands Follows one step commands without difficulty   Assessment   Limitation Decreased ADL status; Decreased UE strength;Decreased Safe judgement during ADL;Decreased endurance;Decreased high-level ADLs; Decreased self-care trans   Prognosis Good   Assessment Pt is a 69 y o  female, admitted to [address] [place] [address] 5/28/2022 d/t experiencing confusion  Dx: hepatic encephalopathy  Pt with PMHx impacting their performance during ADL tasks, including: cirrhosis, DM, HTN, chronic UTI, high cholesterol, kidney stone, liver disease, sepsis  Prior to admission to the hospital Pt was performing ADLs without physical assistance  IADLs without physical assistance  Functional transfers/ambulation with physical assistance  Cognitive status was PTA was intact  OT order placed to assess Pt's ADLs, cognitive status, and performance during functional tasks in order to maximize safety and independence while making most appropriate d/c recommendations  Pt's clinical presentation is currently unstable/unpredictable given new onset deficits that effect Pt's occupational performance and ability to safely return to [facility] including decrease activity tolerance, decrease standing balance, decrease performance during ADL tasks, decrease UB MS, decrease generalized strength, decrease activity engagement, decrease performance during functional transfers and high fall risk combined with medical complications of abnormal renal lab values, change in mental status, abnormal CBC, abnormal sodium values, abnormal CO2 values, decreased skin integrity, incontinence and need for input for mobility technique/safety   Personal factors affecting Pt at time of initial evaluation include: step(s) to enter environment, multi-level environment, past experience, inability to perform current job functions, inability to perform IADLs, inability to perform ADLs, new need for AD, inability to ambulate household distances, inability to navigate community distances, limited insight into impairments and decreased initiation and engagement  Pt will benefit from continued skilled OT services to address deficits as defined above and to maximize level independence/participation during ADLs and functional tasks to facilitate return toward PLOF and improved quality of life  From an occupational therapy standpoint, recommendation at time of d/c would be HHOT  Plan   Treatment Interventions ADL retraining;Functional transfer training;UE strengthening/ROM; Endurance training;Patient/family training;Equipment evaluation/education; Neuromuscular reeducation; Compensatory technique education;Continued evaluation; Energy conservation; Activityengagement   Goal Expiration Date 06/13/22   OT Frequency 2-3x/wk   Recommendation   OT Discharge Recommendation Home with home health rehabilitation   AM-[facility] Daily Activity Inpatient   Lower Body Dressing 3   Bathing 3   Toileting 3   Upper Body Dressing 3   Grooming 3   Eating 4   Daily Activity Raw Score 19   Daily Activity Standardized Score (Calc for Raw Score >=11) 40 22   AM-[facility] Applied Cognition Inpatient   Following a Speech/Presentation 3   Understanding Ordinary Conversation 4   Taking Medications 3   Remembering Where Things Are Placed or Put Away 4   Remembering List of 4-5 Errands 4   Taking Care of Complicated Tasks 3   Applied Cognition Raw Score 21   Applied Cognition Standardized Score 44 3       The patient's raw score on the AM-PAC Daily Activity inpatient short form is 19, standardized score is 40 22, greater than 39 4  Patients at this level are likely to benefit from DC to home  Please refer to the recommendation of the Occupational Therapist for safe DC planning      Pt goals to be met by 6/13/2022    Pt will demonstrate ability to complete LB dressing @ Mod I in order to increase safety and independence during meaningful tasks  Pt will demonstrate ability to shan/doff socks/shoes while sitting EOB @ Mod I in order to increase safety and independence during meaningful tasks  Pt will demonstrate ability to complete toileting tasks including CM and pericare @ Mod I in order to increase safety and independence during meaningful tasks  Pt will demonstrate ability to complete EOB, chair, toilet/commode transfers @ Mod I in order to increase performance and participation during functional tasks  Pt will demonstrate ability to stand for 10+ minutes while maintaining G balance with use of RW for UB support PRN  Pt will demonstrate ability to tolerate 30-35 minute OT session with no vc'ing for deep breathing or use of energy conservation techniques in order to increase activity tolerance during functional tasks  Pt will demonstrate Good carryover of use of energy conservation/compensatory strategies during ADLs and functional tasks in order to increase safety and reduce risk for falls  Pt will demonstrate Good attention and participation in continued evaluation of functional ambulation house hold distances in order to assist with safe d/c planning  Pt will attend to continued cognitive assessments 100% of the time in order to provide most appropriate d/c recommendations  Pt will follow 100% simple 2-step commands and be A&O x4 consistently with environmental cues to increase participation in functional activities  Pt will identify 3 areas of interest/hobbies and 1 intervention on how to incorporate into daily life in order to increase interaction with environment and peers as well as increase participation in meaningful tasks  Pt will demonstrate 100% carryover of BUE HEP in order to increase BUE MS and increase performance during functional tasks upon d/c home      Nicolas Coker OTR/L

## 2022-05-30 NOTE — INCIDENTAL FINDINGS
The following findings require follow up:  Radiographic finding   Finding:     Bilateral nephrolithiasis is present with no evidence of hydronephrosis  Cirrhotic appearance of the liver with mild ascites, splenomegaly and intra-abdominal varices  Cholelithiasis       Follow up required: Yes   Follow up should be done as indicated    Please notify the following clinician to assist with the follow up:  - Your primary care provider

## 2022-05-30 NOTE — CASE MANAGEMENT
Case Management Assessment & Discharge Planning Note    Patient name Kermit [facility]  Location [address] 323/-[id number] MRN [id number]  : 1955 Date 2022       Current Admission Date: 2022  Current Admission Diagnosis:Hepatic encephalopathy [facility])   Patient Active Problem List    Diagnosis Date Noted    Hyperammonemia ([facility] ) 2022    Hepatic encephalopathy ([place] [address] ) 2022    MUNA (acute kidney injury) ([facility] ) 2021    Acute metabolic encephalopathy     SIRS (systemic inflammatory response syndrome) ([place] [address] ) 2021    Acute on chronic anemia 2021    Lab test positive for detection of COVID-19 virus 2021    Bacteriuria 2021    Tachycardia 2021    Morbid obesity ([place] [address] ) 2021    Acute blood loss anemia 2021    Acute upper GI bleed 2021    Hypokalemia 2021    Hyponatremia 2021    Cirrhosis of liver with ascites ([place] [address] ) 2021    Acute cholecystitis 2021    Thrombocytopenia ([place] [address] ) 2021    Acute cystitis without hematuria 2021    Type 2 diabetes mellitus without complication, without long-term current use of insulin ([place] [address] ) 2021    Class 3 severe obesity due to excess calories with serious comorbidity and body mass index (BMI) of 45 0 to 49 9 in adult ([place] [address] ) 2021    MELVIN on CPAP 2021    Kidney stones 2021    Abnormal heart rhythm 2021      LOS (days): 2  Geometric Mean LOS (GMLOS) (days): 3 20  Days to GMLOS:1 3     OBJECTIVE:    Risk of Unplanned Readmission Score: 18 4         Current admission status: Inpatient  Referral Reason:  (ischemic stroke)     Patient is being dc today with recommendations for [facility] for PT/OT    Preferred Pharmacy:   [facility]/pharmacy [facility]  [place] [address]  Phone: [phone] Fax: [phone]    Primary Care Provider: Janet Gan DO    Primary Insurance: MEDICARE  Secondary Insurance: COMMERCIAL MISCELLANEOUS    ASSESSMENT:  Active Health Care Proxies    There are no active Health Care Proxies on file  CM met with patient and her spouse at the bedside,baseline information  was obtained  CM discussed the role of CM in helping the patient develop a discharge plan and assist the patient in carry out their plan  There choice is to have [facility] for PT/OT         Readmission Root Cause  30 Day Readmission: No    Patient Information  Admitted from[de-identified] Home  Mental Status: Alert  During Assessment patient was accompanied by: Spouse  Assessment information provided by[de-identified] Spouse  Primary Caregiver: Self  Support Systems: Spouse/significant other, Son, Lili Bueno Dr of Residence: [address] do you live in?: [address] [facility] entry access options   Select all that apply : Stairs  Number of steps to enter home : 2  Type of Current Residence: 2 story home  Upon entering residence, is there a bedroom on the main floor (no further steps)?: No  A bedroom is located on the following floor levels of residence (select all that apply):: 2nd Floor  Upon entering residence, is there a bathroom on the main floor (no further steps)?: Yes  In the last 12 months, was there a time when you were not able to pay the mortgage or rent on time?: No  In the last 12 months, how many places have you lived?: 1  In the last 12 months, was there a time when you did not have a steady place to sleep or slept in a shelter (including now)?: No  Homeless/housing insecurity resource given?: No  Living Arrangements: Lives w/ Spouse/significant other  Is patient a ?: No    Activities of Daily Living Prior to Admission  Functional Status: Assistance  Completes ADLs independently?: No  Level of ADL dependence: Assistance  Ambulates independently?: No  Level of ambulatory dependence: Assistance  Does patient use assisted devices?: Yes  Assisted Devices (DME) used: John Hernandez, HOLLIE  DME Company Name (respiratory supplies): [facility]  Does patient currently own DME?: Yes  What DME does the patient currently own?: Straight Cane, Walker, CPAP  Does patient have a history of Outpatient Therapy (PT/OT)?: No  Does the patient have a history of Short-Term Rehab?: Yes [employer])  Does patient have a history of HHC?: Yes ([address])  Does patient currently have [facility]?: No         Patient Information Continued  Income Source: Pension/residential  Does patient have prescription coverage?: Yes  Within the past 12 months, you worried that your food would run out before you got the money to buy more : Never true  Within the past 12 months, the food you bought just didn't last and you didn't have money to get more : Never true  Food insecurity resource given?: No  Does patient receive dialysis treatments?: No  Does patient have a history of substance abuse?: No  Does patient have a history of Mental Health Diagnosis?: No         Means of Transportation  Means of Transport to Appts[de-identified] Family transport  In the past 12 months, has lack of transportation kept you from medical appointments or from getting medications?: No  In the past 12 months, has lack of transportation kept you from meetings, work, or from getting things needed for daily living?: No        DISCHARGE DETAILS:    Discharge planning discussed with[de-identified] patient and spouse at the bedside  Freedom of Choice: Yes  Comments - Freedom of Choice: HHC-- choices are #[address] then either [facility] or [address] [place] [address] contacted family/caregiver?: Yes  Were Treatment Team discharge recommendations reviewed with patient/caregiver?: Yes  Did patient/caregiver verbalize understanding of patient care needs?: Yes  Were patient/caregiver advised of the risks associated with not following Treatment Team discharge recommendations?: Yes    Contacts  Patient Contacts: MD  Relationship to Patient[de-identified] Family  Contact Method:  In Person  Reason/Outcome: Discharge [address]         Is the patient interested in [place] at discharge?: Yes  [address] requested[de-identified] Occupational Therapy, Physical [address] Name[de-identified] Other  [address]  Provider[de-identified] PCP  Home Health Services Needed[de-identified] Strengthening/Theraputic Exercises to Improve Function, Evaluate Functional Status and Safety  Homebound Criteria Met[de-identified] Requires the Assistance of Another Person for Safe Ambulation or to Leave the Home  Supporting Clincal Findings[de-identified] Limited Endurance, Fatigues Easliy in [employer]    DME Referral Provided  Referral made for DME?: No    Other Referral/Resources/Interventions Provided:  Interventions: [facility]  Referral Comments: Called [address] and spoke to Inocencio anderson, they have No opening for therapy for the next week- referrals made to [facility] and [facility] at this time Pending acceptance         Treatment Team Recommendation: Home with [address] [facility]  Discharge Destination Plan[de-identified] Home with Emily at Discharge : Family

## 2022-05-30 NOTE — PLAN OF CARE
Problem: Neurological Deficit  Goal: Neurological status is stable or improving  Description: Interventions:  - Monitor and assess patient's level of consciousness, motor function, sensory function, and level of assistance needed for ADLs  - Monitor and report changes from baseline  Collaborate with interdisciplinary team to initiate plan and implement interventions as ordered  - Provide and maintain a safe environment  - Consider seizure precautions  - Consider fall precautions  - Consider aspiration precautions  - Consider bleeding precautions  Outcome: Progressing     Problem: Communication Impairment  Goal: Ability to express needs and understand communication  Description: Assess patient's communication skills and ability to understand information  Patient will demonstrate use of effective communication techniques, alternative methods of communication and understanding even if not able to speak  - Encourage communication and provide alternate methods of communication as needed  - Collaborate with case management/ for discharge needs  - Include patient/family/caregiver in decisions related to communication  Outcome: Progressing     Problem: Potential for Aspiration  Goal: Ventilated patient's risk of aspiration is minimized  Description: Assess and monitor vital signs, respiratory status, airway cuff pressure, and labs (WBC)  Monitor for signs of aspiration (tachypnea, cough, rales, wheezing, cyanosis, fever)  - Elevate head of bed 30 degrees if patient has tube feeding   - Monitor tube feeding    Outcome: Progressing     Problem: PAIN - ADULT  Goal: Verbalizes/displays adequate comfort level or baseline comfort level  Description: Interventions:  - Encourage patient to monitor pain and request assistance  - Assess pain using appropriate pain scale  - Administer analgesics based on type and severity of pain and evaluate response  - Implement non-pharmacological measures as appropriate and evaluate response  - Consider cultural and social influences on pain and pain management  - Notify physician/advanced practitioner if interventions unsuccessful or patient reports new pain  Outcome: Progressing     Problem: INFECTION - ADULT  Goal: Absence or prevention of progression during hospitalization  Description: INTERVENTIONS:  - Assess and monitor for signs and symptoms of infection  - Monitor lab/diagnostic results  - Monitor all insertion sites, i e  indwelling lines, tubes, and drains  - Monitor endotracheal if appropriate and nasal secretions for changes in amount and color  - [place] appropriate cooling/warming therapies per order  - Administer medications as ordered  - Instruct and encourage patient and family to use good hand hygiene technique  - Identify and instruct in appropriate isolation precautions for identified infection/condition  Outcome: Progressing     Problem: SAFETY ADULT  Goal: Patient will remain free of falls  Description: INTERVENTIONS:  - Educate patient/family on patient safety including physical limitations  - Instruct patient to call for assistance with activity   - Consult OT/PT to assist with strengthening/mobility   - Keep Call bell within reach  - Keep bed low and locked with side rails adjusted as appropriate  - Keep care items and personal belongings within reach  - Initiate and maintain comfort rounds  - Make Fall Risk Sign visible to staff  - Offer Toileting every  Hours, in advance of need  - Initiate/Maintain alarm  - Obtain necessary fall risk management equipment:   - Apply yellow socks and bracelet for high fall risk patients  - Consider moving patient to room near nurses station  Outcome: Progressing  Goal: Maintain or return to baseline ADL function  Description: INTERVENTIONS:  -  Assess patient's ability to carry out ADLs; assess patient's baseline for ADL function and identify physical deficits which impact ability to perform ADLs (bathing, care of mouth/teeth, toileting, grooming, dressing, etc )  - Assess/evaluate cause of self-care deficits   - Assess range of motion  - Assess patient's mobility; develop plan if impaired  - Assess patient's need for assistive devices and provide as appropriate  - Encourage maximum independence but intervene and supervise when necessary  - Involve family in performance of ADLs  - Assess for home care needs following discharge   - Consider OT consult to assist with ADL evaluation and planning for discharge  - Provide patient education as appropriate  Outcome: Progressing  Goal: Maintains/Returns to pre admission functional level  Description: INTERVENTIONS:  - Perform BMAT or MOVE assessment daily    - Set and communicate daily mobility goal to care team and patient/family/caregiver  - Collaborate with rehabilitation services on mobility goals if consulted  - Perform Range of Motion  times a day  - Reposition patient every  hours    - Dangle patient  times a day  - Stand patient  times a day  - Ambulate patient  times a day  - Out of bed to chair  times a day   - Out of bed for meals  times a day  - Out of bed for toileting  - Record patient progress and toleration of activity level   Outcome: Progressing     Problem: DISCHARGE PLANNING  Goal: Discharge to home or other facility with appropriate resources  Description: INTERVENTIONS:  - Identify barriers to discharge w/patient and caregiver  - Arrange for needed discharge resources and transportation as appropriate  - Identify discharge learning needs (meds, wound care, etc )  - Arrange for interpretive services to assist at discharge as needed  - Refer to Case Management Department for coordinating discharge planning if the patient needs post-hospital services based on physician/advanced practitioner order or complex needs related to functional status, cognitive ability, or social support system  Outcome: Progressing     Problem: Knowledge Deficit  Goal: Patient/family/caregiver demonstrates understanding of disease process, treatment plan, medications, and discharge instructions  Description: Complete learning assessment and assess knowledge base  Interventions:  - Provide teaching at level of understanding  - Provide teaching via preferred learning methods  Outcome: Progressing     Problem: MOBILITY - ADULT  Goal: Maintain or return to baseline ADL function  Description: INTERVENTIONS:  -  Assess patient's ability to carry out ADLs; assess patient's baseline for ADL function and identify physical deficits which impact ability to perform ADLs (bathing, care of mouth/teeth, toileting, grooming, dressing, etc )  - Assess/evaluate cause of self-care deficits   - Assess range of motion  - Assess patient's mobility; develop plan if impaired  - Assess patient's need for assistive devices and provide as appropriate  - Encourage maximum independence but intervene and supervise when necessary  - Involve family in performance of ADLs  - Assess for home care needs following discharge   - Consider OT consult to assist with ADL evaluation and planning for discharge  - Provide patient education as appropriate  Outcome: Progressing  Goal: Maintains/Returns to pre admission functional level  Description: INTERVENTIONS:  - Perform BMAT or MOVE assessment daily    - Set and communicate daily mobility goal to care team and patient/family/caregiver  - Collaborate with rehabilitation services on mobility goals if consulted  - Perform Range of Motion  times a day  - Reposition patient every  hours    - Dangle patient  times a day  - Stand patient  times a day  - Ambulate patient  times a day  - Out of bed to chair  times a day   - Out of bed for meals times a day  - Out of bed for toileting  - Record patient progress and toleration of activity level   Outcome: Progressing     Problem: Potential for Falls  Goal: Patient will remain free of falls  Description: INTERVENTIONS:  - Educate patient/family on patient safety including physical limitations  - Instruct patient to call for assistance with activity   - Consult OT/PT to assist with strengthening/mobility   - Keep Call bell within reach  - Keep bed low and locked with side rails adjusted as appropriate  - Keep care items and personal belongings within reach  - Initiate and maintain comfort rounds  - Make Fall Risk Sign visible to staff  - Offer Toileting every  Hours, in advance of need  - Initiate/Maintain alarm  - Obtain necessary fall risk management equipment:   - Apply yellow socks and bracelet for high fall risk patients  - Consider moving patient to room near nurses station  Outcome: Progressing     Problem: Prexisting or High Potential for Compromised Skin Integrity  Goal: Skin integrity is maintained or improved  Description: INTERVENTIONS:  - Identify patients at risk for skin breakdown  - Assess and monitor skin integrity  - Assess and monitor nutrition and hydration status  - Monitor labs   - Assess for incontinence   - Turn and reposition patient  - Assist with mobility/ambulation  - Relieve pressure over bony prominences  - Avoid friction and shearing  - Provide appropriate hygiene as needed including keeping skin clean and dry  - Evaluate need for skin moisturizer/barrier cream  - Collaborate with interdisciplinary team   - Patient/family teaching  - Consider wound care consult   Outcome: Progressing

## 2022-05-30 NOTE — PLAN OF CARE
[facility]  [address]  [facility] [address] [phone]      Srinath Daniels :1995 MRN:[id number]    2021 Time Session Began: 10:05 am  Time Session Ended: 10:35 am    Due to COVID-19 precautions, this visit was performed via live interactive two-way Video visit with patient's verbal consent.   Clinician Location:Home.  Patient Location: Home.  Verified patient identity:  [x] Yes    Session Type: 45 Minute Therapy (17357)  Others Present: NA    Suicide/Homicide/Violence Ideation: No  If Yes, explain: NA    Need for Community Resources Assessed: Yes  Resources Needed: Yes  If Yes, what resources: 12 step meetings/sponsor - recommended residential treatment    Current Outpatient Medications   Medication Sig   • amoxicillin-clavulanate (AUGMENTIN) 875-125 MG per tablet Take 1 tablet by mouth 2 times daily. For 10 days   • hydrOXYzine (ATARAX) 50 MG tablet Take 0.5-1 tablets by mouth 3 times daily as needed for Anxiety.   • buprenorphine-naLOXone (SUBOXONE FILM) 8-2 MG sublingual film Place 2 strips under the tongue daily.   • gabapentin (NEURONTIN) 600 MG tablet Take 600 mg by mouth.   • naLOXone (NARCAN) 4 MG/0.1ML nasal spray Call 911. Bussey the content of 1 device into 1 nostril. May repeat with 2nd device in alternate nostril if no response in 2-3 minutes.   • disulfiram (ANTABUSE) 250 MG tablet Take 1 tablet by mouth daily.   • acetaminophen (TYLENOL) 500 MG tablet Take 1,000 mg by mouth every 4 hours as needed.   • albuterol (ProAir HFA) 108 (90 Base) MCG/ACT inhaler INHALE 1 PUFF BY MOUTH EVERY 6 HOURS AS NEEDED   • amitriptyline (ELAVIL) 25 MG tablet Take 25-75 mg by mouth.   • Blood Glucose Monitoring Suppl Device Use to check blood sugar 1 time(s) per day and as needed.   • buPROPion XL (WELLBUTRIN XL) 150 MG 24 hr tablet Take 150 mg by mouth daily.   • citalopram (CeleXA) 40 MG tablet TAKE ONE AND ONE-HALF TABLETS BY  Problem: Neurological Deficit  Goal: Neurological status is stable or improving  Description: Interventions:  - Monitor and assess patient's level of consciousness, motor function, sensory function, and level of assistance needed for ADLs  - Monitor and report changes from baseline  Collaborate with interdisciplinary team to initiate plan and implement interventions as ordered  - Provide and maintain a safe environment  - Consider seizure precautions  - Consider fall precautions  - Consider aspiration precautions  - Consider bleeding precautions  Outcome: Progressing     Problem: Communication Impairment  Goal: Ability to express needs and understand communication  Description: Assess patient's communication skills and ability to understand information  Patient will demonstrate use of effective communication techniques, alternative methods of communication and understanding even if not able to speak  - Encourage communication and provide alternate methods of communication as needed  - Collaborate with case management/ for discharge needs  - Include patient/family/caregiver in decisions related to communication  Outcome: Progressing     Problem: Potential for Aspiration  Goal: Ventilated patient's risk of aspiration is minimized  Description: Assess and monitor vital signs, respiratory status, airway cuff pressure, and labs (WBC)  Monitor for signs of aspiration (tachypnea, cough, rales, wheezing, cyanosis, fever)  - Elevate head of bed 30 degrees if patient has tube feeding   - Monitor tube feeding    Outcome: Progressing     Problem: PAIN - ADULT  Goal: Verbalizes/displays adequate comfort level or baseline comfort level  Description: Interventions:  - Encourage patient to monitor pain and request assistance  - Assess pain using appropriate pain scale  - Administer analgesics based on type and severity of pain and evaluate response  - Implement non-pharmacological measures as MOUTH EVERY DAY   • clindamycin (CLINDAGEL) 1 % gel Apply nightly to face.   • Lancet Devices (Multi-Lancet Device) Misc Use to check blood sugar 1 time(s) per day and as needed..   • Melatonin 10 MG Tab Take 10-20 mg by mouth.   • minocycline (MINOCIN) 100 MG capsule TAKE ONE CAPSULE BY MOUTH TWICE DAILY   • nicotine polacrilex (NICORETTE) 4 MG gum USE AS DIRECTED AS NEEDED, MAX OF 12 DAILY.   • omeprazole (PriLOSEC) 40 MG capsule Take 40 mg by mouth daily.     No current facility-administered medications for this visit.       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 Patient reports he called his cousin to help take him to purchase a moped and was presented with drugs. Patient reports desire to enter IOP again to \"take up my time.\" Patient denies current willingness to enter residential treatment stating, \"I've done like 5 of them.\" Patient identified two residential treatments; [facility] and [facility] noting he was kicked out of [facility] shortly after 28 days for using. Patient reports treatment centers were 1-2 years ago. Patient denied calling the sponsor contacts presented by provider or attending meetings. Patient identified the belief he simply needs to get a job and be busy to better avoid temptation to use substances. Patient agreed to obtain a sponsor, obtain a job, and not have contact with any using people prior to next session. Patient verbalized understanding that provider's recommendation is residential treatment and outpatient sessions will not be available to him if he  appropriate and evaluate response  - Consider cultural and social influences on pain and pain management  - Notify physician/advanced practitioner if interventions unsuccessful or patient reports new pain  Outcome: Progressing     Problem: INFECTION - ADULT  Goal: Absence or prevention of progression during hospitalization  Description: INTERVENTIONS:  - Assess and monitor for signs and symptoms of infection  - Monitor lab/diagnostic results  - Monitor all insertion sites, i e  indwelling lines, tubes, and drains  - Monitor endotracheal if appropriate and nasal secretions for changes in amount and color  - [place] appropriate cooling/warming therapies per order  - Administer medications as ordered  - Instruct and encourage patient and family to use good hand hygiene technique  - Identify and instruct in appropriate isolation precautions for identified infection/condition  Outcome: Progressing     Problem: SAFETY ADULT  Goal: Patient will remain free of falls  Description: INTERVENTIONS:  - Educate patient/family on patient safety including physical limitations  - Instruct patient to call for assistance with activity   - Consult OT/PT to assist with strengthening/mobility   - Keep Call bell within reach  - Keep bed low and locked with side rails adjusted as appropriate  - Keep care items and personal belongings within reach  - Initiate and maintain comfort rounds  - Make Fall Risk Sign visible to staff  - Offer Toileting every 2 Hours, in advance of need  - Initiate/Maintain alarm  - Obtain necessary fall risk management equipment  - Apply yellow socks and bracelet for high fall risk patients  - Consider moving patient to room near nurses station  Outcome: Progressing  Goal: Maintain or return to baseline ADL function  Description: INTERVENTIONS:  -  Assess patient's ability to carry out ADLs; assess patient's baseline for ADL function and identify physical deficits which impact ability to perform ADLs (bathing, care of mouth/teeth, toileting, grooming, dressing, etc )  - Assess/evaluate cause of self-care deficits   - Assess range of motion  - Assess patient's mobility; develop plan if impaired  - Assess patient's need for assistive devices and provide as appropriate  - Encourage maximum independence but intervene and supervise when necessary  - Involve family in performance of ADLs  - Assess for home care needs following discharge   - Consider OT consult to assist with ADL evaluation and planning for discharge  - Provide patient education as appropriate  Outcome: Progressing  Goal: Maintains/Returns to pre admission functional level  Description: INTERVENTIONS:  - Perform BMAT or MOVE assessment daily    - Set and communicate daily mobility goal to care team and patient/family/caregiver  - Collaborate with rehabilitation services on mobility goals if consulted  - Perform Range of Motion - times a day  - Reposition patient every - hours    - Dangle patient - times a day  - Stand patient - times a day  - Ambulate patient - times a day  - Out of bed to chair - times a day   - Out of bed for meals - times a day  - Out of bed for toileting  - Record patient progress and toleration of activity level   Outcome: Progressing     Problem: DISCHARGE PLANNING  Goal: Discharge to home or other facility with appropriate resources  Description: INTERVENTIONS:  - Identify barriers to discharge w/patient and caregiver  - Arrange for needed discharge resources and transportation as appropriate  - Identify discharge learning needs (meds, wound care, etc )  - Arrange for interpretive services to assist at discharge as needed  - Refer to Case Management Department for coordinating discharge planning if the patient needs post-hospital services based on physician/advanced practitioner order or complex needs related to functional status, cognitive ability, or social support system  Outcome: Progressing     Problem: Knowledge Deficit  Goal: does not maintain sobriety and make progress on aforementioned goals.      Action of the provider: Patient was provided with support. Patient's report of relapse was processed and reframed to build insight. Barriers to recovery were identified as lack of healthy support network, lack of engagement in 12 Step community and sponsorship, continued association with using people. Cognitions shared by patient were acknowledged and exploration was encouraged. Provider reviewed expectations (see data) and asserted boundary that outpatient treatment will not continue if he does not make efforts to pursue recovery. Provider recommended patient work on obtaining a sponsor, committing to 5 meetings prior to next session, and applying for jobs daily. Intervention: Behavioral     Response of patient: Srinath was alert and oriented x4. Patient presented motivated. Patient presented as calm and cooperative. Mood appeared euthymic with congruent affect. Eye contact was appropriate. Speech was normal in rate, tone, and volume. Motor activity was unremarkable. Thought process was future oriented and goal directed. Patient denied any suicidal ideation, homicidal ideation, or self-harm ideation.     Plan: Srinath will return in 2 weeks or sooner if needed. Patient will practice behavioral skills discussed in session.    Diagnosis: {Severe opioid dependence on maintenance therapy (CMS/[facility])  (primary encounter diagnosis)  Methamphetamine use (CMS/[facility])  Marijuana use, continuous      Treatment Plan:  Unchanged     Discharge Plan: Strategies Discussed to Maintain Gains     Next Appointment: 04/28/21  Chen Castellon LPC   Patient/family/caregiver demonstrates understanding of disease process, treatment plan, medications, and discharge instructions  Description: Complete learning assessment and assess knowledge base  Interventions:  - Provide teaching at level of understanding  - Provide teaching via preferred learning methods  Outcome: Progressing     Problem: MOBILITY - ADULT  Goal: Maintain or return to baseline ADL function  Description: INTERVENTIONS:  -  Assess patient's ability to carry out ADLs; assess patient's baseline for ADL function and identify physical deficits which impact ability to perform ADLs (bathing, care of mouth/teeth, toileting, grooming, dressing, etc )  - Assess/evaluate cause of self-care deficits   - Assess range of motion  - Assess patient's mobility; develop plan if impaired  - Assess patient's need for assistive devices and provide as appropriate  - Encourage maximum independence but intervene and supervise when necessary  - Involve family in performance of ADLs  - Assess for home care needs following discharge   - Consider OT consult to assist with ADL evaluation and planning for discharge  - Provide patient education as appropriate  Outcome: Progressing  Goal: Maintains/Returns to pre admission functional level  Description: INTERVENTIONS:  - Perform BMAT or MOVE assessment daily    - Set and communicate daily mobility goal to care team and patient/family/caregiver  - Collaborate with rehabilitation services on mobility goals if consulted  - Perform Range of Motion - times a day  - Reposition patient every - hours    - Dangle patient - times a day  - Stand patient - times a day  - Ambulate patient - times a day  - Out of bed to chair - times a day   - Out of bed for meals - times a day  - Out of bed for toileting  - Record patient progress and toleration of activity level   Outcome: Progressing     Problem: Potential for Falls  Goal: Patient will remain free of falls  Description: INTERVENTIONS:  - Educate patient/family on patient safety including physical limitations  - Instruct patient to call for assistance with activity   - Consult OT/PT to assist with strengthening/mobility   - Keep Call bell within reach  - Keep bed low and locked with side rails adjusted as appropriate  - Keep care items and personal belongings within reach  - Initiate and maintain comfort rounds  - Make Fall Risk Sign visible to staff  - Offer Toileting every 2 Hours, in advance of need  - Initiate/Maintain alarm  - Obtain necessary fall risk management equipment  - Apply yellow socks and bracelet for high fall risk patients  - Consider moving patient to room near nurses station  Outcome: Progressing     Problem: Prexisting or High Potential for Compromised Skin Integrity  Goal: Skin integrity is maintained or improved  Description: INTERVENTIONS:  - Identify patients at risk for skin breakdown  - Assess and monitor skin integrity  - Assess and monitor nutrition and hydration status  - Monitor labs   - Assess for incontinence   - Turn and reposition patient  - Assist with mobility/ambulation  - Relieve pressure over bony prominences  - Avoid friction and shearing  - Provide appropriate hygiene as needed including keeping skin clean and dry  - Evaluate need for skin moisturizer/barrier cream  - Collaborate with interdisciplinary team   - Patient/family teaching  - Consider wound care consult   Outcome: Progressing

## 2022-05-30 NOTE — CASE MANAGEMENT
Case Management Discharge Planning Note    Patient name Anthony Singh  Location [address] 323/-01 MRN [id number]  : 1955 Date 2022       Current Admission Date: 2022  Current Admission Diagnosis:Hepatic encephalopathy [facility])   Patient Active Problem List    Diagnosis Date Noted    Hyperammonemia ([facility] ) 2022    Hepatic encephalopathy ([place] [address] ) 2022    MUNA (acute kidney injury) ([facility] ) 2021    Acute metabolic encephalopathy 10/12/3271    SIRS (systemic inflammatory response syndrome) ([place] [address] ) 2021    Acute on chronic anemia 2021    Lab test positive for detection of COVID-19 virus 2021    Bacteriuria 2021    Tachycardia 2021    Morbid obesity ([place] [address] ) 2021    Acute blood loss anemia 2021    Acute upper GI bleed 2021    Hypokalemia 2021    Hyponatremia 2021    Cirrhosis of liver with ascites ([place] [address] ) 2021    Acute cholecystitis 2021    Thrombocytopenia ([place] [address] ) 2021    Acute cystitis without hematuria 2021    Type 2 diabetes mellitus without complication, without long-term current use of insulin ([place] [address] ) 2021    Class 3 severe obesity due to excess calories with serious comorbidity and body mass index (BMI) of 45 0 to 49 9 in adult ([place] [address] ) 2021    MELVIN on CPAP 2021    Kidney stones 2021    Abnormal heart rhythm 2021      LOS (days): 2  Geometric Mean LOS (GMLOS) (days): 3 20  Days to GMLOS:1 3     OBJECTIVE:  Risk of Unplanned Readmission Score: 18 4         Current admission status: Inpatient   Preferred Pharmacy:   [facility]/pharmacy RAIMUNDO Juan [address] [address]  Phone: [phone] Fax: [phone]    Primary Care Provider: Carrie Montano DO    Primary Insurance: MEDICARE  Secondary Insurance: COMMERCIAL MISCELLANEOUS    DISCHARGE DETAILS:    Discharge planning discussed with[de-identified] patient and spouse at the bedside  Freedom of Choice: Yes  Comments - Freedom of Choice: [facility]-- choices are #[facility] then either [facility] or [address] [facility]  CM contacted family/caregiver?: Yes  Were Treatment Team discharge recommendations reviewed with patient/caregiver?: Yes  Did patient/caregiver verbalize understanding of patient care needs?: Yes  Were patient/caregiver advised of the risks associated with not following Treatment Team discharge recommendations?: Yes    Contacts  Patient Contacts: MD  Relationship to Patient[de-identified] Family  Contact Method: In Person  Reason/Outcome: Discharge [address]         Is the patient interested in [facility] at discharge?: Yes  [address] requested[de-identified] Occupational Therapy, Physical [address] Name[de-identified] Other  [address] [place] [address] Provider[de-identified] PCP  Home Health Services Needed[de-identified] Strengthening/Theraputic Exercises to Improve Function, Evaluate Functional Status and Safety  Homebound Criteria Met[de-identified] Requires the Assistance of Another Person for Safe Ambulation or to Leave the Home  Supporting Clincal Findings[de-identified] Limited Endurance, Fatigues Easliy in [employer]    DME Referral Provided  Referral made for DME?: No    Other Referral/Resources/Interventions Provided:  Interventions: [facility]  Referral Comments: Called [address] and spoke to Naima Kauffman, they have No opening for therapy for the next week- referrals made to [facility] and [address] [place] [address] at this time         [facility]  Discharge Destination Plan[de-identified] Home with Emily at Discharge : [facility] and [facility] accepted which were 2nd/3rd choices  Spoke to patient and spouse and there choice is to accept [facility]  Notified [facility] of this and declined [facility]  Face to Face completed   AVS was faxed to [facility]

## 2022-05-30 NOTE — PLAN OF CARE
Problem: Neurological Deficit  Goal: Neurological status is stable or improving  Description: Interventions:  - Monitor and assess patient's level of consciousness, motor function, sensory function, and level of assistance needed for ADLs  - Monitor and report changes from baseline  Collaborate with interdisciplinary team to initiate plan and implement interventions as ordered  - Provide and maintain a safe environment  - Consider seizure precautions  - Consider fall precautions  - Consider aspiration precautions  - Consider bleeding precautions  5/30/2022 0953 by Renzo Potter RN  Outcome: Adequate for Discharge  5/30/2022 0907 by Renzo Potter RN  Outcome: Progressing     Problem: Communication Impairment  Goal: Ability to express needs and understand communication  Description: Assess patient's communication skills and ability to understand information  Patient will demonstrate use of effective communication techniques, alternative methods of communication and understanding even if not able to speak  - Encourage communication and provide alternate methods of communication as needed  - Collaborate with case management/ for discharge needs  - Include patient/family/caregiver in decisions related to communication  5/30/2022 0953 by Renzo Potter RN  Outcome: Adequate for Discharge  5/30/2022 0907 by Renzo Potter RN  Outcome: Progressing     Problem: Potential for Aspiration  Goal: Ventilated patient's risk of aspiration is minimized  Description: Assess and monitor vital signs, respiratory status, airway cuff pressure, and labs (WBC)  Monitor for signs of aspiration (tachypnea, cough, rales, wheezing, cyanosis, fever)  - Elevate head of bed 30 degrees if patient has tube feeding   - Monitor tube feeding    5/30/2022 0953 by Renzo Potter RN  Outcome: Adequate for Discharge  5/30/2022 0907 by Renzo Potter RN  Outcome: Progressing     Problem: PAIN - ADULT  Goal: Verbalizes/displays adequate comfort level or baseline comfort level  Description: Interventions:  - Encourage patient to monitor pain and request assistance  - Assess pain using appropriate pain scale  - Administer analgesics based on type and severity of pain and evaluate response  - Implement non-pharmacological measures as appropriate and evaluate response  - Consider cultural and social influences on pain and pain management  - Notify physician/advanced practitioner if interventions unsuccessful or patient reports new pain  5/30/2022 0953 by Jluis Molina RN  Outcome: Adequate for Discharge  5/30/2022 0907 by Jluis Molina RN  Outcome: Progressing     Problem: INFECTION - ADULT  Goal: Absence or prevention of progression during hospitalization  Description: INTERVENTIONS:  - Assess and monitor for signs and symptoms of infection  - Monitor lab/diagnostic results  - Monitor all insertion sites, i e  indwelling lines, tubes, and drains  - Monitor endotracheal if appropriate and nasal secretions for changes in amount and color  - [place] appropriate cooling/warming therapies per order  - Administer medications as ordered  - Instruct and encourage patient and family to use good hand hygiene technique  - Identify and instruct in appropriate isolation precautions for identified infection/condition  5/30/2022 0953 by Waylon Geiger RN  Outcome: Adequate for Discharge  5/30/2022 0907 by Waylon Geiger RN  Outcome: Progressing     Problem: SAFETY ADULT  Goal: Patient will remain free of falls  Description: INTERVENTIONS:  - Educate patient/family on patient safety including physical limitations  - Instruct patient to call for assistance with activity   - Consult OT/PT to assist with strengthening/mobility   - Keep Call bell within reach  - Keep bed low and locked with side rails adjusted as appropriate  - Keep care items and personal belongings within reach  - Initiate and maintain comfort rounds  - Make Fall Risk Sign visible to staff  - Offer Toileting every  Hours, in advance of need  - Initiate/Maintain alarm  - Obtain necessary fall risk management equipment:   - Apply yellow socks and bracelet for high fall risk patients  - Consider moving patient to room near nurses station  5/30/2022 0953 by Theresa Hooks RN  Outcome: Adequate for Discharge  5/30/2022 0907 by Theresa Hooks RN  Outcome: Progressing  Goal: Maintain or return to baseline ADL function  Description: INTERVENTIONS:  -  Assess patient's ability to carry out ADLs; assess patient's baseline for ADL function and identify physical deficits which impact ability to perform ADLs (bathing, care of mouth/teeth, toileting, grooming, dressing, etc )  - Assess/evaluate cause of self-care deficits   - Assess range of motion  - Assess patient's mobility; develop plan if impaired  - Assess patient's need for assistive devices and provide as appropriate  - Encourage maximum independence but intervene and supervise when necessary  - Involve family in performance of ADLs  - Assess for home care needs following discharge   - Consider OT consult to assist with ADL evaluation and planning for discharge  - Provide patient education as appropriate  5/30/2022 0953 by Theresa Hooks RN  Outcome: Adequate for Discharge  5/30/2022 0907 by Jluis Molina RN  Outcome: Progressing  Goal: Maintains/Returns to pre admission functional level  Description: INTERVENTIONS:  - Perform BMAT or MOVE assessment daily    - Set and communicate daily mobility goal to care team and patient/family/caregiver  - Collaborate with rehabilitation services on mobility goals if consulted  - Perform Range of Motion  times a day  - Reposition patient every  hours    - Dangle patient  times a day  - Stand patient  times a day  - Ambulate patient  times a day  - Out of bed to chair  times a day   - Out of bed for meals  times a day  - Out of bed for toileting  - Record patient progress and toleration of activity level   5/30/2022 0953 by Jluis Molina RN  Outcome: Adequate for Discharge  5/30/2022 0907 by Myrna Silva RN  Outcome: Progressing     Problem: DISCHARGE PLANNING  Goal: Discharge to home or other facility with appropriate resources  Description: INTERVENTIONS:  - Identify barriers to discharge w/patient and caregiver  - Arrange for needed discharge resources and transportation as appropriate  - Identify discharge learning needs (meds, wound care, etc )  - Arrange for interpretive services to assist at discharge as needed  - Refer to Case Management Department for coordinating discharge planning if the patient needs post-hospital services based on physician/advanced practitioner order or complex needs related to functional status, cognitive ability, or social support system  5/30/2022 0953 by Myrna iSlva RN  Outcome: Adequate for Discharge  5/30/2022 0907 by Jluis Molina RN  Outcome: Progressing     Problem: Knowledge Deficit  Goal: Patient/family/caregiver demonstrates understanding of disease process, treatment plan, medications, and discharge instructions  Description: Complete learning assessment and assess knowledge base    Interventions:  - Provide teaching at level of understanding  - Provide teaching via preferred learning methods  5/30/2022 0953 by Myrna Silva RN  Outcome: Adequate for Discharge  5/30/2022 0907 by Myrna Silva RN  Outcome: Progressing     Problem: MOBILITY - ADULT  Goal: Maintain or return to baseline ADL function  Description: INTERVENTIONS:  -  Assess patient's ability to carry out ADLs; assess patient's baseline for ADL function and identify physical deficits which impact ability to perform ADLs (bathing, care of mouth/teeth, toileting, grooming, dressing, etc )  - Assess/evaluate cause of self-care deficits   - Assess range of motion  - Assess patient's mobility; develop plan if impaired  - Assess patient's need for assistive devices and provide as appropriate  - Encourage maximum independence but intervene and supervise when necessary  - Involve family in performance of ADLs  - Assess for home care needs following discharge   - Consider OT consult to assist with ADL evaluation and planning for discharge  - Provide patient education as appropriate  5/30/2022 0953 by Allen Charles RN  Outcome: Adequate for Discharge  5/30/2022 0907 by Allen Charles RN  Outcome: Progressing  Goal: Maintains/Returns to pre admission functional level  Description: INTERVENTIONS:  - Perform BMAT or MOVE assessment daily    - Set and communicate daily mobility goal to care team and patient/family/caregiver  - Collaborate with rehabilitation services on mobility goals if consulted  - Perform Range of Motion  times a day  - Reposition patient every  hours    - Dangle patient  times a day  - Stand patient  times a day  - Ambulate patient  times a day  - Out of bed to chair  times a day   - Out of bed for meals  times a day  - Out of bed for toileting  - Record patient progress and toleration of activity level   5/30/2022 0953 by Jluis Molina RN  Outcome: Adequate for Discharge  5/30/2022 0907 by Allen Charles RN  Outcome: Progressing     Problem: Potential for Falls  Goal: Patient will remain free of falls  Description: INTERVENTIONS:  - Educate patient/family on patient safety including physical limitations  - Instruct patient to call for assistance with activity   - Consult OT/PT to assist with strengthening/mobility   - Keep Call bell within reach  - Keep bed low and locked with side rails adjusted as appropriate  - Keep care items and personal belongings within reach  - Initiate and maintain comfort rounds  - Make Fall Risk Sign visible to staff  - Offer Toileting every  Hours, in advance of need  - Initiate/Maintain the bed alarm  - Obtain necessary fall risk management equipment:   - Apply yellow socks and bracelet for high fall risk patients  - Consider moving patient to room near nurses station  5/30/2022 0953 by Jluis Molina RN  Outcome: Adequate for Discharge  5/30/2022 0907 by Magdi Bowen RN  Outcome: Progressing     Problem: Prexisting or High Potential for Compromised Skin Integrity  Goal: Skin integrity is maintained or improved  Description: INTERVENTIONS:  - Identify patients at risk for skin breakdown  - Assess and monitor skin integrity  - Assess and monitor nutrition and hydration status  - Monitor labs   - Assess for incontinence   - Turn and reposition patient  - Assist with mobility/ambulation  - Relieve pressure over bony prominences  - Avoid friction and shearing  - Provide appropriate hygiene as needed including keeping skin clean and dry  - Evaluate need for skin moisturizer/barrier cream  - Collaborate with interdisciplinary team   - Patient/family teaching  - Consider wound care consult   5/30/2022 0953 by Jluis Molina RN  Outcome: Adequate for Discharge  5/30/2022 0907 by Jluis Molina RN  Outcome: Progressing

## 2022-05-30 NOTE — DISCHARGE SUMMARY
[address]  Discharge- [facility] 1955, 69 y o  female MRN: [id number]  Unit/Bed#: -01 Encounter: [id number]  Primary Care Provider: Janet Gan DO   Date and time admitted to hospital: 5/28/2022 11:23 AM    * Hepatic encephalopathy [facility])  Assessment & Plan  69year old female presented with altered mental status and confusion possibly secondary to hepatic encephalopathy  No evidence of sepsis at this time  Urinalysis showing negative LE / Nitrites  Improved today  Despite the word findings difficulties, her imaging findings do not suggest a stroke  Her presentation is more consistent with hepatic encephalopathy  This resolved after she had two bowel movements overnight  She admits to occasional lactulose non-compliance  She and her family understood the need for it despite the inconvenience  - Discharge today  Outpatient pcp follow-up    Hyperammonemia ([facility] )  Assessment & Plan  Hyperammonemia, POA, due to cirrhosis evidenced by Ammonia 141 with hepatic encephalopathy treated with Lactulose  She also takes rifaximin on an outpatient basis      Morbid obesity ([facility] )  Assessment & Plan  Morbid obesity due to excess calories, POA, evidenced by BMI 42 06 treated with diet and education    Type 2 diabetes mellitus without complication, without long-term current use of insulin [facility])  Assessment & Plan  Lab Results   Component Value Date    HGBA1C 6 1 (H) 05/29/2022       Recent Labs     05/29/22  1059 05/29/22  1617 05/29/22  2111 05/30/22  0702   POCGLU 168* 118 209* 103       Blood Sugar Average: Last 72 hrs:  (P) 147 625     Continue Home regimen: Glipizide      Thrombocytopenia (HCC)  Assessment & Plan  Due to cirrhosis    Recent Labs     05/28/22  1144 05/29/22  0541   * 140*         Cirrhosis of liver with ascites [facility])  Assessment & Plan  Continue lasix / spironolactone      Discharging Physician / Practitioner: Stacy Baron MD  PCP: Janet Gan DO  Admission Date:   Admission Orders (From admission, onward)     Ordered        05/28/22 1413  Inpatient Admission  Once                      Discharge Date: 05/30/22    Medical Problems             Resolved Problems  Date Reviewed: 5/30/2022   None                 Consultations During Hospital Stay:  · neurology    Procedures Performed:   · none    Significant Findings / Test Results:   · Imaging  · CT stroke alert:    1  No acute intracranial CT abnormality      2  Nonspecific white matter change suggesting microangiopathy  · Ct renal stone:    Bilateral nephrolithiasis is present with no evidence of hydronephrosis  Cirrhotic appearance of the liver with mild ascites, splenomegaly and intra-abdominal varices  Cholelithiasis        · xr chest:    Mild interstitial edema  · Mri brain:    No MR evidence of acute ischemia  · cta head and neck:    No evidence of acute intracranial hemorrhage      No evidence of hemodynamic significant stenosis, aneurysm or dissection      Partially visualized cardiomegaly      Incidental Findings:   · As written above     Test Results Pending at Discharge (will require follow up): · None     Outpatient Tests Requested:  · PCP follow-up  · CBC, CMP    Complications:  None    Reason for Admission: hepatic encephalopathy    Hospital Course:     Grant Mcclellan is a 69 y o  female patient who originally presented to the hospital on 5/28/2022 due to altered mental status  Patient is a 69year old female with ahistory of cirrhosis, DM2, and hypertension  She was admitted due to altered mental status and confusion  She was initially managed as a case of rule out stroke  Imaging findings did not show any evidence of a stroke  She was cleared form a neurology standpoint  Further investigation show an elevated ammonia levels in a patient with cirrhosis   She admits occasional lactulose non-compliance due to "accidents " I emphasized the need for compliance to minimize risk of hepatic encephalopathy from happening again  She had two bowel movements yesterday and is now back to baseline mental status  I updated her  over the phone  Patient agrees to follow-up with her providers as scheduled and to take her medications as prescribed  All questions were addressed  she understood the need to seek immediate medical attention should she develop any chest pain, shortness of breath, severe pain, fever, chills, or any other concerning symptoms  Please see above list of diagnoses and related plan for additional information  Condition at Discharge: fair     Discharge Day Visit / Exam:     Subjective:  Patient seen and examined at bedside  Comfortable  No new complaints overnight  Vitals: Blood Pressure: 111/65 (05/30/22 0759)  Pulse: 78 (05/30/22 0759)  Temperature: 98 4 °F (36 9 °C) (05/30/22 0657)  Temp Source: Oral (05/30/22 0330)  Respirations: 18 (05/30/22 0657)  Height: 5' 2" (157 5 cm) (05/28/22 1630)  Weight - Scale: 103 kg (227 lb) (05/30/22 0550)  SpO2: 97 % (05/30/22 0759)  Exam:   Physical Exam  Vitals reviewed  Constitutional:       General: She is not in acute distress  HENT:      Head: Normocephalic  Nose: Nose normal       Mouth/Throat:      Mouth: Mucous membranes are moist    Cardiovascular:      Rate and Rhythm: Normal rate and regular rhythm  Pulmonary:      Effort: Pulmonary effort is normal  No respiratory distress  Breath sounds: No wheezing  Abdominal:      General: There is no distension  Palpations: Abdomen is soft  Tenderness: There is no abdominal tenderness  Musculoskeletal:      Right lower leg: No edema  Left lower leg: No edema  Skin:     General: Skin is warm  Neurological:      Mental Status: She is alert and oriented to person, place, and time  Motor: No weakness     Psychiatric:         Mood and Affect: Mood normal          Behavior: Behavior normal        Discussion with Family:     Discharge instructions/Information to patient and family:   See after visit summary for information provided to patient and family  Provisions for Follow-Up Care:  See after visit summary for information related to follow-up care and any pertinent home health orders  Disposition:     Home    Planned Readmission: No     Discharge Statement:  I spent 38 minutes discharging the patient  This time was spent on the day of discharge  I had direct contact with the patient on the day of discharge  Greater than 50% of the total time was spent examining patient, answering all patient questions, arranging and discussing plan of care with patient as well as directly providing post-discharge instructions  Additional time then spent on discharge activities  Discharge Medications:  See after visit summary for reconciled discharge medications provided to patient and family        ** Please Note: This note has been constructed using a voice recognition system **

## 2022-05-30 NOTE — ASSESSMENT & PLAN NOTE
Lab Results   Component Value Date    HGBA1C 6 1 (H) 05/29/2022       Recent Labs     05/29/22  1059 05/29/22  1617 05/29/22  2111 05/30/22  0702   POCGLU 168* 118 209* 103       Blood Sugar Average: Last 72 hrs:  (P) 147 625     Continue Home regimen: Glipizide

## 2022-05-30 NOTE — ASSESSMENT & PLAN NOTE
Hyperammonemia, POA, due to cirrhosis evidenced by Ammonia 141 with hepatic encephalopathy treated with Lactulose  She also takes rifaximin on an outpatient basis

## 2022-06-02 LAB
BACTERIA BLD CULT: NORMAL
BACTERIA BLD CULT: NORMAL

## 2022-07-02 NOTE — PLAN OF CARE
Problem: Nutrition/Hydration-ADULT  Goal: Nutrient/Hydration intake appropriate for improving, restoring or maintaining nutritional needs  Description: Monitor and assess patient's nutrition/hydration status for malnutrition  Collaborate with interdisciplinary team and initiate plan and interventions as ordered  Monitor patient's weight and dietary intake as ordered or per policy  Utilize nutrition screening tool and intervene as necessary  Determine patient's food preferences and provide high-protein, high-caloric foods as appropriate       INTERVENTIONS:  - Monitor oral intake, urinary output, labs, and treatment plans  - Assess nutrition and hydration status and recommend course of action  - Evaluate amount of meals eaten  - Assist patient with eating if necessary   - Allow adequate time for meals  - Recommend/ encourage appropriate diets, oral nutritional supplements, and vitamin/mineral supplements  - Order, calculate, and assess calorie counts as needed  - Recommend, monitor, and adjust tube feedings and TPN/PPN based on assessed needs  - Assess need for intravenous fluids  - Provide specific nutrition/hydration education as appropriate  - Include patient/family/caregiver in decisions related to nutrition  Outcome: Progressing normal sinus rhythm

## 2022-07-08 ENCOUNTER — DOCTOR'S OFFICE (OUTPATIENT)
Dept: URBAN - NONMETROPOLITAN AREA CLINIC 1 | Facility: CLINIC | Age: 67
Setting detail: OPHTHALMOLOGY
End: 2022-07-08
Payer: COMMERCIAL

## 2022-07-08 DIAGNOSIS — H04.122: ICD-10-CM

## 2022-07-08 DIAGNOSIS — H35.361: ICD-10-CM

## 2022-07-08 DIAGNOSIS — Z96.1: ICD-10-CM

## 2022-07-08 DIAGNOSIS — H40.023: ICD-10-CM

## 2022-07-08 DIAGNOSIS — H26.492: ICD-10-CM

## 2022-07-08 DIAGNOSIS — H35.362: ICD-10-CM

## 2022-07-08 DIAGNOSIS — E11.3293: ICD-10-CM

## 2022-07-08 DIAGNOSIS — E11.9: ICD-10-CM

## 2022-07-08 DIAGNOSIS — H43.813: ICD-10-CM

## 2022-07-08 DIAGNOSIS — Z79.4: ICD-10-CM

## 2022-07-08 DIAGNOSIS — H26.491: ICD-10-CM

## 2022-07-08 DIAGNOSIS — H04.121: ICD-10-CM

## 2022-07-08 PROCEDURE — 99214 OFFICE O/P EST MOD 30 MIN: CPT | Performed by: OPHTHALMOLOGY

## 2022-07-08 ASSESSMENT — REFRACTION_AUTOREFRACTION
OS_SPHERE: +0.75
OS_CYLINDER: -1.25
OD_AXIS: 112
OS_AXIS: 135
OD_CYLINDER: -1.25
OD_SPHERE: +0.25

## 2022-07-08 ASSESSMENT — SPHEQUIV_DERIVED
OS_SPHEQUIV: -0.125
OD_SPHEQUIV: -0.375
OS_SPHEQUIV: 0.125
OD_SPHEQUIV: -2.75

## 2022-07-08 ASSESSMENT — VISUAL ACUITY
OD_BCVA: 20/25-2
OS_BCVA: 20/80

## 2022-07-08 ASSESSMENT — REFRACTION_MANIFEST
OD_VA1: 20/30+2
OD_VA2: 20/30+2
OS_ADD: +2.50
OS_CYLINDER: -0.75
OS_SPHERE: +0.25
OD_AXIS: 090
OS_AXIS: 110
OS_VA2: 20/25-2
OD_CYLINDER: -1.00
OD_ADD: +2.50
OS_VA1: 20/25-2
OD_SPHERE: -2.25

## 2022-07-08 ASSESSMENT — REFRACTION_CURRENTRX
OS_OVR_VA: 20/
OD_CYLINDER: -0.50
OD_AXIS: 101
OS_SPHERE: PLANO
OD_OVR_VA: 20/
OS_AXIS: 099
OS_VPRISM_DIRECTION: SV
OS_CYLINDER: -0.50
OD_SPHERE: -2.00
OD_VPRISM_DIRECTION: SV

## 2022-07-08 ASSESSMENT — TONOMETRY
OD_IOP_MMHG: 15
OS_IOP_MMHG: 14

## 2022-07-08 ASSESSMENT — PACHYMETRY
OD_CT_UM: 536
OS_CT_CORRECTION: 0
OD_CT_CORRECTION: 1
OS_CT_UM: 546

## 2022-07-08 ASSESSMENT — CONFRONTATIONAL VISUAL FIELD TEST (CVF)
OD_FINDINGS: FULL
OS_FINDINGS: FULL

## 2022-10-04 ENCOUNTER — AMBUL SURGICAL CARE (OUTPATIENT)
Dept: URBAN - NONMETROPOLITAN AREA SURGERY 1 | Facility: SURGERY | Age: 67
Setting detail: OPHTHALMOLOGY
End: 2022-10-04
Payer: COMMERCIAL

## 2022-10-04 DIAGNOSIS — H26.491: ICD-10-CM

## 2022-10-04 PROBLEM — H43.813 POSTERIOR VITREOUS DETACHMENT; BOTH EYES: Status: ACTIVE | Noted: 2019-09-18

## 2022-10-04 PROBLEM — H26.492 POSTERIOR CAPSULE OPACIFIED; RIGHT EYE, LEFT EYE: Status: ACTIVE | Noted: 2021-04-06

## 2022-10-04 PROBLEM — H04.122 DRY EYE; RIGHT EYE, LEFT EYE: Status: ACTIVE | Noted: 2019-03-06

## 2022-10-04 PROBLEM — H04.121 DRY EYE; RIGHT EYE, LEFT EYE: Status: ACTIVE | Noted: 2019-03-06

## 2022-10-04 PROBLEM — Z96.1: Status: ACTIVE | Noted: 2018-11-02

## 2022-10-04 PROBLEM — H40.023: Status: ACTIVE | Noted: 2020-09-30

## 2022-10-04 PROBLEM — Z83.511: Status: ACTIVE | Noted: 2018-05-08

## 2022-10-04 PROCEDURE — G8918 PT W/O PREOP ORDER IV AB PRO: HCPCS | Performed by: CLINIC/CENTER

## 2022-10-04 PROCEDURE — 66821 AFTER CATARACT LASER SURGERY: CPT | Performed by: OPHTHALMOLOGY

## 2022-10-04 PROCEDURE — G8907 PT DOC NO EVENTS ON DISCHARG: HCPCS | Performed by: OPHTHALMOLOGY

## 2022-10-04 PROCEDURE — G8907 PT DOC NO EVENTS ON DISCHARG: HCPCS | Performed by: CLINIC/CENTER

## 2022-10-04 PROCEDURE — G8918 PT W/O PREOP ORDER IV AB PRO: HCPCS | Performed by: OPHTHALMOLOGY

## 2022-10-04 PROCEDURE — 66821 AFTER CATARACT LASER SURGERY: CPT | Performed by: CLINIC/CENTER

## 2022-10-12 PROBLEM — N30.00 ACUTE CYSTITIS WITHOUT HEMATURIA: Status: RESOLVED | Noted: 2021-07-14 | Resolved: 2022-10-12

## 2022-10-14 ENCOUNTER — RX ONLY (RX ONLY)
Age: 67
End: 2022-10-14

## 2022-10-14 RX ORDER — LATANOPROST 50 UG/ML
SOLUTION/ DROPS OPHTHALMIC
Qty: 1 | Refills: 6 | Status: ACTIVE | OUTPATIENT

## 2022-10-18 ENCOUNTER — AMBUL SURGICAL CARE (OUTPATIENT)
Dept: URBAN - NONMETROPOLITAN AREA SURGERY 1 | Facility: SURGERY | Age: 67
Setting detail: OPHTHALMOLOGY
End: 2022-10-18
Payer: COMMERCIAL

## 2022-10-18 DIAGNOSIS — H26.492: ICD-10-CM

## 2022-10-18 PROCEDURE — 66821 AFTER CATARACT LASER SURGERY: CPT | Performed by: CLINIC/CENTER

## 2022-10-18 PROCEDURE — G8907 PT DOC NO EVENTS ON DISCHARG: HCPCS | Performed by: OPHTHALMOLOGY

## 2022-10-18 PROCEDURE — G8918 PT W/O PREOP ORDER IV AB PRO: HCPCS | Performed by: OPHTHALMOLOGY

## 2022-10-18 PROCEDURE — 66821 AFTER CATARACT LASER SURGERY: CPT | Performed by: OPHTHALMOLOGY

## 2022-10-18 PROCEDURE — G8918 PT W/O PREOP ORDER IV AB PRO: HCPCS | Performed by: CLINIC/CENTER

## 2022-10-18 PROCEDURE — G8907 PT DOC NO EVENTS ON DISCHARG: HCPCS | Performed by: CLINIC/CENTER

## 2023-04-19 ENCOUNTER — DOCTOR'S OFFICE (OUTPATIENT)
Dept: URBAN - NONMETROPOLITAN AREA CLINIC 1 | Facility: CLINIC | Age: 68
Setting detail: OPHTHALMOLOGY
End: 2023-04-19
Payer: COMMERCIAL

## 2023-04-19 DIAGNOSIS — H43.813: ICD-10-CM

## 2023-04-19 DIAGNOSIS — Z96.1: ICD-10-CM

## 2023-04-19 DIAGNOSIS — H04.122: ICD-10-CM

## 2023-04-19 DIAGNOSIS — H40.023: ICD-10-CM

## 2023-04-19 DIAGNOSIS — E11.9: ICD-10-CM

## 2023-04-19 DIAGNOSIS — H35.361: ICD-10-CM

## 2023-04-19 DIAGNOSIS — H35.362: ICD-10-CM

## 2023-04-19 DIAGNOSIS — H35.3131: ICD-10-CM

## 2023-04-19 DIAGNOSIS — H04.121: ICD-10-CM

## 2023-04-19 PROCEDURE — 92083 EXTENDED VISUAL FIELD XM: CPT | Performed by: OPHTHALMOLOGY

## 2023-04-19 PROCEDURE — 92134 CPTRZ OPH DX IMG PST SGM RTA: CPT | Performed by: OPHTHALMOLOGY

## 2023-04-19 PROCEDURE — 76514 ECHO EXAM OF EYE THICKNESS: CPT | Performed by: OPHTHALMOLOGY

## 2023-04-19 PROCEDURE — 99214 OFFICE O/P EST MOD 30 MIN: CPT | Performed by: OPHTHALMOLOGY

## 2023-04-19 ASSESSMENT — SPHEQUIV_DERIVED
OS_SPHEQUIV: -0.125
OD_SPHEQUIV: -1.875
OD_SPHEQUIV: -2.75
OS_SPHEQUIV: 0.25

## 2023-04-19 ASSESSMENT — PACHYMETRY
OD_CT_CORRECTION: 1
OS_CT_UM: 546
OD_CT_UM: 536
OS_CT_CORRECTION: 0

## 2023-04-19 ASSESSMENT — REFRACTION_MANIFEST
OD_SPHERE: -2.25
OS_VA1: 20/25-2
OD_AXIS: 090
OD_VA1: 20/30+2
OS_AXIS: 110
OD_CYLINDER: -1.00
OS_ADD: +2.50
OD_ADD: +2.50
OS_SPHERE: +0.25
OS_CYLINDER: -0.75
OS_VA2: 20/25-2
OD_VA2: 20/30+2

## 2023-04-19 ASSESSMENT — REFRACTION_AUTOREFRACTION
OD_SPHERE: -1.50
OS_AXIS: 075
OD_CYLINDER: -0.75
OS_SPHERE: +0.50
OD_AXIS: 091
OS_CYLINDER: -0.50

## 2023-04-19 ASSESSMENT — REFRACTION_CURRENTRX
OS_SPHERE: PLANO
OS_OVR_VA: 20/
OD_CYLINDER: -0.50
OS_CYLINDER: -0.50
OS_AXIS: 099
OD_AXIS: 101
OD_VPRISM_DIRECTION: SV
OD_SPHERE: -2.00
OD_OVR_VA: 20/
OS_VPRISM_DIRECTION: SV

## 2023-04-19 ASSESSMENT — VISUAL ACUITY
OS_BCVA: 20/200
OD_BCVA: 20/25

## 2023-04-19 ASSESSMENT — TONOMETRY: OS_IOP_MMHG: 13

## 2023-06-19 ENCOUNTER — HOSPITAL ENCOUNTER (OUTPATIENT)
Dept: MRI IMAGING | Facility: HOSPITAL | Age: 68
Discharge: HOME | End: 2023-06-19
Payer: MEDICARE

## 2023-06-19 DIAGNOSIS — M25.551 RIGHT HIP PAIN: ICD-10-CM

## 2023-06-19 DIAGNOSIS — M25.552 LEFT HIP PAIN: ICD-10-CM

## 2023-06-19 PROCEDURE — 73721 MRI JNT OF LWR EXTRE W/O DYE: CPT

## 2023-11-15 ENCOUNTER — DOCTOR'S OFFICE (OUTPATIENT)
Dept: URBAN - NONMETROPOLITAN AREA CLINIC 1 | Facility: CLINIC | Age: 68
Setting detail: OPHTHALMOLOGY
End: 2023-11-15
Payer: COMMERCIAL

## 2023-11-15 DIAGNOSIS — H35.361: ICD-10-CM

## 2023-11-15 DIAGNOSIS — H35.362: ICD-10-CM

## 2023-11-15 DIAGNOSIS — H43.813: ICD-10-CM

## 2023-11-15 DIAGNOSIS — Z96.1: ICD-10-CM

## 2023-11-15 DIAGNOSIS — H04.121: ICD-10-CM

## 2023-11-15 DIAGNOSIS — H35.3131: ICD-10-CM

## 2023-11-15 DIAGNOSIS — H40.023: ICD-10-CM

## 2023-11-15 DIAGNOSIS — H47.013: ICD-10-CM

## 2023-11-15 DIAGNOSIS — E11.9: ICD-10-CM

## 2023-11-15 DIAGNOSIS — H04.122: ICD-10-CM

## 2023-11-15 PROCEDURE — 92014 COMPRE OPH EXAM EST PT 1/>: CPT | Performed by: OPHTHALMOLOGY

## 2023-11-15 PROCEDURE — 92133 CPTRZD OPH DX IMG PST SGM ON: CPT | Performed by: OPHTHALMOLOGY

## 2023-11-15 ASSESSMENT — PACHYMETRY
OD_CT_UM: 536
OD_CT_CORRECTION: 1
OS_CT_CORRECTION: 0
OS_CT_UM: 546

## 2023-11-15 ASSESSMENT — REFRACTION_MANIFEST
OD_VA2: 20/30+2
OD_SPHERE: -2.25
OD_VA1: 20/30+2
OD_CYLINDER: -1.00
OS_CYLINDER: -0.75
OS_AXIS: 110
OS_VA2: 20/25-2
OS_VA1: 20/25-2
OS_ADD: +2.50
OD_ADD: +2.50
OS_SPHERE: +0.25
OD_AXIS: 090

## 2023-11-15 ASSESSMENT — TONOMETRY
OD_IOP_MMHG: 12
OS_IOP_MMHG: 13

## 2023-11-15 ASSESSMENT — REFRACTION_CURRENTRX
OD_CYLINDER: -0.50
OS_CYLINDER: -0.50
OD_SPHERE: -2.00
OD_AXIS: 101
OD_OVR_VA: 20/
OD_VPRISM_DIRECTION: SV
OS_AXIS: 099
OS_SPHERE: PLANO
OS_OVR_VA: 20/
OS_VPRISM_DIRECTION: SV

## 2023-11-15 ASSESSMENT — REFRACTION_AUTOREFRACTION
OS_SPHERE: +0.25
OD_CYLINDER: -1.00
OS_CYLINDER: -1.00
OD_AXIS: 59
OD_SPHERE: -2.00
OS_AXIS: 88

## 2023-11-15 ASSESSMENT — SPHEQUIV_DERIVED
OS_SPHEQUIV: -0.125
OD_SPHEQUIV: -2.5
OD_SPHEQUIV: -2.75
OS_SPHEQUIV: -0.25

## 2023-11-15 ASSESSMENT — CONFRONTATIONAL VISUAL FIELD TEST (CVF)
OD_FINDINGS: FULL
OS_FINDINGS: FULL

## 2023-11-15 ASSESSMENT — VISUAL ACUITY
OS_BCVA: 20/80+1
OD_BCVA: 20/25-2

## 2024-01-01 ENCOUNTER — HOME CARE VISIT (OUTPATIENT)
Dept: HOME HOSPICE | Facility: HOSPICE | Age: 69
End: 2024-01-01
Payer: MEDICARE

## 2024-01-01 ENCOUNTER — HOME CARE VISIT (OUTPATIENT)
Dept: HOME HEALTH SERVICES | Facility: HOME HEALTHCARE | Age: 69
End: 2024-01-01
Payer: MEDICARE

## 2024-01-01 ENCOUNTER — HOSPICE ADMISSION (OUTPATIENT)
Dept: HOME HOSPICE | Facility: HOSPICE | Age: 69
End: 2024-01-01
Payer: MEDICARE

## 2024-01-01 VITALS — DIASTOLIC BLOOD PRESSURE: 49 MMHG | HEART RATE: 91 BPM | SYSTOLIC BLOOD PRESSURE: 83 MMHG | TEMPERATURE: 96.6 F

## 2024-01-01 VITALS
SYSTOLIC BLOOD PRESSURE: 100 MMHG | WEIGHT: 185.85 LBS | TEMPERATURE: 97.3 F | BODY MASS INDEX: 33.99 KG/M2 | DIASTOLIC BLOOD PRESSURE: 54 MMHG | RESPIRATION RATE: 14 BRPM | HEART RATE: 88 BPM

## 2024-01-01 VITALS
SYSTOLIC BLOOD PRESSURE: 110 MMHG | HEART RATE: 102 BPM | DIASTOLIC BLOOD PRESSURE: 63 MMHG | TEMPERATURE: 96.6 F | RESPIRATION RATE: 22 BRPM

## 2024-01-01 DIAGNOSIS — Z51.5 HOSPICE CARE PATIENT: Primary | ICD-10-CM

## 2024-01-01 PROCEDURE — 10330064 UNDERPAD, REUSE 36X36 1DZ     BECKCL

## 2024-01-01 PROCEDURE — G0156 HHCP-SVS OF AIDE,EA 15 MIN: HCPCS

## 2024-01-01 PROCEDURE — G0299 HHS/HOSPICE OF RN EA 15 MIN: HCPCS

## 2024-01-01 PROCEDURE — 10330087 HSPC SERVICE INTENSITY ADD-ON

## 2024-01-01 PROCEDURE — G0155 HHCP-SVS OF CSW,EA 15 MIN: HCPCS

## 2024-01-01 PROCEDURE — 10330064 BRIEF, TAB CLSR ULTRA XLG 59-64 (15/BG 4

## 2024-01-01 RX ORDER — LORAZEPAM 0.5 MG/1
0.5 TABLET ORAL EVERY 4 HOURS PRN
Qty: 12 TABLET | Refills: 0 | Status: SHIPPED | OUTPATIENT
Start: 2024-01-01 | End: 2024-10-10

## 2024-01-01 RX ORDER — LORAZEPAM 2 MG/ML
0.5 CONCENTRATE ORAL EVERY 4 HOURS PRN
Qty: 30 ML | Refills: 0 | Status: SHIPPED | OUTPATIENT
Start: 2024-01-01 | End: 2024-10-10

## 2024-03-01 RX ORDER — ROSUVASTATIN CALCIUM 10 MG/1
TABLET, COATED ORAL
COMMUNITY

## 2024-03-01 RX ORDER — CYCLOBENZAPRINE HCL 10 MG
10 TABLET ORAL DAILY PRN
COMMUNITY
Start: 2016-12-21

## 2024-03-01 RX ORDER — IRBESARTAN 150 MG/1
TABLET ORAL
COMMUNITY

## 2024-03-01 RX ORDER — SENNOSIDES 8.6 MG
650 CAPSULE ORAL EVERY 8 HOURS PRN
COMMUNITY

## 2024-03-01 RX ORDER — METFORMIN HYDROCHLORIDE 1000 MG/1
1000 TABLET, FILM COATED, EXTENDED RELEASE ORAL 2 TIMES DAILY
COMMUNITY

## 2024-03-01 RX ORDER — HYDROCHLOROTHIAZIDE 12.5 MG/1
12.5 TABLET ORAL DAILY
COMMUNITY

## 2024-03-01 RX ORDER — LINACLOTIDE 145 UG/1
CAPSULE, GELATIN COATED ORAL
COMMUNITY

## 2024-03-01 RX ORDER — DENOSUMAB 60 MG/ML
60 INJECTION SUBCUTANEOUS
COMMUNITY

## 2024-03-01 RX ORDER — B-COMPLEX WITH VITAMIN C
TABLET ORAL 2 TIMES DAILY
COMMUNITY

## 2024-03-01 RX ORDER — SPIRONOLACTONE 25 MG
1 TABLET ORAL DAILY
COMMUNITY

## 2024-03-06 ENCOUNTER — TELEPHONE (OUTPATIENT)
Dept: PULMONOLOGY | Facility: CLINIC | Age: 69
End: 2024-03-06

## 2024-03-06 NOTE — TELEPHONE ENCOUNTER
Lm for pt to contact office to let us know what DME company she uses for CPAP machine to obtain compliance report prior to upcoming apt/.

## 2024-03-06 NOTE — TELEPHONE ENCOUNTER
Eda is calling following up on vm.   [employer] [facility] company is [facility]

## 2024-03-12 ENCOUNTER — OFFICE VISIT (OUTPATIENT)
Dept: PULMONOLOGY | Facility: CLINIC | Age: 69
End: 2024-03-12

## 2024-03-12 VITALS
HEIGHT: 62 IN | OXYGEN SATURATION: 95 % | HEART RATE: 91 BPM | SYSTOLIC BLOOD PRESSURE: 124 MMHG | WEIGHT: 227 LBS | TEMPERATURE: 97.4 F | DIASTOLIC BLOOD PRESSURE: 62 MMHG | BODY MASS INDEX: 41.77 KG/M2

## 2024-03-12 DIAGNOSIS — R13.12 OROPHARYNGEAL DYSPHAGIA: Primary | ICD-10-CM

## 2024-03-12 DIAGNOSIS — R05.3 CHRONIC COUGH: ICD-10-CM

## 2024-03-12 RX ORDER — CODEINE PHOSPHATE AND GUAIFENESIN 10; 100 MG/5ML; MG/5ML
SOLUTION ORAL
COMMUNITY
Start: 2024-01-05

## 2024-03-12 RX ORDER — BENZONATATE 100 MG/1
CAPSULE ORAL
COMMUNITY

## 2024-03-12 RX ORDER — BUDESONIDE AND FORMOTEROL FUMARATE DIHYDRATE 80; 4.5 UG/1; UG/1
2 AEROSOL RESPIRATORY (INHALATION) 2 TIMES DAILY
Qty: 10.2 G | Refills: 0 | Status: SHIPPED | OUTPATIENT
Start: 2024-03-12 | End: 2024-03-12

## 2024-03-12 RX ORDER — DIPHENHYDRAMINE HCL 25 MG
25 TABLET ORAL
COMMUNITY

## 2024-03-12 RX ORDER — ALBUTEROL SULFATE AND BUDESONIDE 90; 80 UG/1; UG/1
2 AEROSOL, METERED RESPIRATORY (INHALATION) EVERY 6 HOURS PRN
Qty: 10.7 G | Refills: 0 | Status: SHIPPED | COMMUNITY
Start: 2024-03-12

## 2024-03-12 RX ORDER — TRAZODONE HYDROCHLORIDE 50 MG/1
TABLET ORAL
COMMUNITY

## 2024-03-12 NOTE — PROGRESS NOTES
Pulmonary Consultation   Eda Johnson 68 y.o. female MRN: [id number]  3/12/2024      Assessment:  Chronic cough  Ongoing since fall 2023  Minimally productive to white sputum, worse with cold p.o. intake/possible dysphagia  Ddx UACS, VCD, aspiration/penetration from dysphagia, chronic bronchitis/reversible airway disease vs and cirrhosis from pulmonary/interstitial edema also in the presence of grade 2 diastolic dysfunction/severe LA dilation on last TTE    Plan:  Ordered a speech/swallow evaluation, referral to speech therapy for evaluation/exercise given the oropharyngeal dysphagia  A trial of albuterol/budesonide PRN  Reviewed the proper inhaler use technique/rinse mouth following each use  If the above is negative, will consider close evaluation with noncontrast CT chest/PFT  Continue volume status management as per PCP/cardiology with diuretics    MELVIN  Using [employer]  Compliance report between 12/8 and 3/6/2024: Usage above 4 hours 96.7% of the time  Residual AHI 2.0  Continue on the same pressure 10.0 cm water    Return in about 3 months (around 6/12/2024).        History of Present Illness     New Consult for: Chronic cough      Background  68 y.o. female with a h/o DM 2, morbid obesity, liver cirrhosis/ascites, MELVIN/CPAP, CAD/SVT, kidney stones, allergic rhinitis, depression, HTN, carotid stenosis.    Presented today for initial evaluation by pulmonary for chronic cough.  Started approximately around the fall 2023.  Minimally productive to white sputum.  Usually worse with cold p.o. intake, feels better with exposure to cold weather/outside cold temperature.  Partially improved with the symptoms/cough drops and narcotics.  No associated PND, or allergic symptoms however has usually seasonal allergic rhinitis symptoms.  Cough is not changed with position, or laying flat, sometimes feels oropharyngeal dysphagia/aspiration but no víctor reflux taking omeprazole.  Sometimes feels during body/fluid buildup  "from cirrhosis when ascites is there but not significant changed after recent paracentesis in 2024 where he removed 4400 cc.  Reports ongoing dental issues/workup in process but no abscess or pain/tenderness.    Lifelong non-smoker, nonalcoholic, no prior significant occupational/environmental hazards exposure.    Social/exposure history  Lived in [place] for the past 30 years  Non-smoker/nonalcoholic  No exposure to mold at the house  She is a retired pharmacist  Pets: 1 dog no exposure to birds      Review of Systems  As per hpi, all other systems reviewed and were negative.        Studies:  Imaging and other studies: I have personally reviewed pertinent films in PACS  CT chest 2021-bibasilar consolidation more on the left suggestive of atelectasis/or pneumonia.  Trace right pleural effusion.    Chest x-ray 2024-cardiomegaly, clear lung fields    Pulmonary function testing:       EKG, Pathology, and Other Studies: I have personally reviewed pertinent reports.    TTE 10/4/2023-mild LV hypertrophy/hyperdynamic EF 65%.  Normal wall motion.  Grade 2 diastolic dysfunction and elevated LA pressures.  LA severely dilated.  IVC less than 21 mm collapsible more than 50%.  Trace TR.      Historical Information   Past Medical History:   Diagnosis Date    Chronic UTI     Cirrhosis, non-alcoholic (HCC)     Diabetes mellitus (HCC)     High cholesterol     Hypertension     Kidney stone     Liver disease     MELVIN (obstructive sleep apnea)     Sepsis (HCC)      Past Surgical History:   Procedure Laterality Date     SECTION      EYE SURGERY      cataract removal    JOINT REPLACEMENT      LITHOTRIPSY       History reviewed. No pertinent family history.      Medications/Allergies: Reviewed    Vitals: Blood pressure 124/62, pulse 91, temperature (!) 97.4 °F (36.3 °C), height 5' 2\" (1.575 m), weight 103 kg (227 lb), SpO2 95%. Body mass index is 41.52 kg/m². Oxygen Therapy  SpO2: 95 %      Physical Exam  Body mass " "index is 41.52 kg/m².   Gen: not in acute distress, obese  Neck/Eyes: supple, no adenopathy, PERRL  Ear: normal appearance, no significant hearing impairment  Nose:  normal nasal mucosa, no drainage  Mouth:  unremarkable/normal appearance of lips, teeth and gums  Oropharynx: mucosa is moist, no focal lesions or erythema  Salivary glands: soft nontender  Chest: normal respiratory efforts, clear breath sounds bilaterally  CV: RRR, loud systolic murmurs appreciated, 2+ pitting lower extremity edema  Abdomen: soft, non tender+, distended  Extremities:  No observed deformity, no digital clubbing  Skin: unremarkable  Neuro: AAO X3, no focal motor deficit         Labs:  Lab Results   Component Value Date    WBC 8.39 05/29/2022    HGB 12.4 05/29/2022    HCT 35.9 05/29/2022    MCV 97 05/29/2022     (L) 05/29/2022     Lab Results   Component Value Date    CALCIUM 9.2 09/13/2022    K 4.6 09/13/2022    CO2 24 09/13/2022     (H) 09/13/2022    BUN 16 09/13/2022    CREATININE 0.97 09/13/2022     No results found for: \"IGE\"  Lab Results   Component Value Date    ALT 27 09/13/2022    AST 36 09/13/2022    ALKPHOS 65 09/13/2022           Portions of the record may have been created with voice recognition software.  Occasional wrong word or \"sound a like\" substitutions may have occurred due to the inherent limitations of voice recognition software.  Read the chart carefully and recognize, using context, where substitutions have occurred    Samina Terrazas M.D.  [facility]  "

## 2024-03-20 ENCOUNTER — HOSPITAL ENCOUNTER (OUTPATIENT)
Dept: RADIOLOGY | Facility: HOSPITAL | Age: 69
Discharge: HOME | End: 2024-03-20
Attending: INTERNAL MEDICINE
Payer: MEDICARE

## 2024-03-20 DIAGNOSIS — R13.12 OROPHARYNGEAL DYSPHAGIA: ICD-10-CM

## 2024-03-20 DIAGNOSIS — R05.3 CHRONIC COUGH: ICD-10-CM

## 2024-03-20 PROCEDURE — 74230 X-RAY XM SWLNG FUNCJ C+: CPT

## 2024-03-20 PROCEDURE — 92611 MOTION FLUOROSCOPY/SWALLOW: CPT

## 2024-03-20 NOTE — PROCEDURES
Video Swallow Study    Speech Pathology Videofluoroscopic Swallow Study (VFSS/VBSS/MBSS)      Patient Name: Eda Johnson    Today's Date: 3/20/2024    General Information;  Pt is a 68 y.o. 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   Current concerns for dysphagia include chronic cough.  A VFSS was recommended by pulmonology to assess oropharyngeal stage swallowing skills at this time. Pt was viewed in lateral position and assessed with thin liquid, puree and solid food (sandwich and cracker). A 13mm pill with thin liquid and puree was attempted but expelled orally.    Additional symptoms from chart review and patient interview- pt worried about chronic cough from long covid. Reports she frequently coughs while eating or when first waking up and frequently expels mucus or vomits. Denies GI issues, recent EGD in the fall. Being seen by Dr Brown through [facility] for neurology for possible myotonic disease. Has hx of cognitive deficits from hepatic encephalopathy but is back driving short distances and is mostly independent.  Pt reports she has difficulty with certain pills (like Potassium and COQ10), ends up chewing the pills.     Oral stage:  Pt presented with mild-moderate oral stage dysphagia.    Mastication was timely and grossly effective with materials administered today. Bolus formation was functional. AP transfer was impaired for thin liquids and with barium pill. Pt demonstrated bolus hold in anterior portion of oral cavity, then demo'd a back and forth (AP) repetitive motion with thins and barium pill with bolus not reaching anterior faucial pillars to initiate pharyngeal swallow. Verbal cues were ineffective.  Oral control was impaired with spillage over the BOT with thins via straw. Of note, this occurred x1 w/ small amt of bolus while majority of bolus was still in oral cavity. Pt w/ +cough  and episode of tension in response to penetration and was unable to coordinate a secondary swallow resulting in expelling remaining bolus from oral cavity.     Pharyngeal stage:  Pt presented with minimal pharyngeal dysphagia.     Velar elevation noted.  Swallowing initiation was delayed w/ +tongue pumping.  Epiglottic inversion was sluggish.  Laryngeal rise and anterior hyoid excursion appeared adequate.  AIrway entrance closure appeared adequate  Tongue base retraction appeared to be of adequate strength.    Pharyngeal constriction appeared adequate.  PES opening was adequate.    Management of food/liquid/barium tablet follows:   No aspiration on study.  Flash penetration x1 w/ thins via straw secondary to poor control/spillage over tongue base. Penetrated material was expelled from laryngeal vestibule during same swallow with reflexive cough present.     Penetration/Aspiration:  Thin: PAS - 2  Puree: PAS- 1  Solid: PAS- 1  Response to Aspiration: N/A           8-Point Penetration-Aspiration Scale   1 Material does not enter the airway   2 Material enters the airway, remains above the vocal folds, and is ejected  from the  airway    3 Material enters the airway, remains above the vocal folds, and is not ejected from the airway   4 Material enters the airway, contacts the vocal folds, and is ejected from the airway   5 Material enters the airway, contacts the vocal folds, and is not ejected from the airway    6 Material enters the airway, passes below the vocal folds and is ejected into the larynx or out of the airway    7 Material enters the airway, passes below the vocal folds, and is not ejected from the trachea despite effort    8 Material enters the airway, passes below the vocal folds, and no effort is made to eject         Strategies and Efficacy: Verbal cues for swallow initiation were ineffective     Aspiration Response and Efficacy:  No aspiration on study    Esophageal stage:  Brief view of esophagus was  completed after administration of the barium tablet.   Appeared WFL    Assessment Summary:    Pt presents with mild-moderate oral dysphagia characterized by impaired/repetitive AP movement w/ thin liquids, worse w/ barium pill, and impaired BOT strength.  No aspiration on study.  Flash penetration w/ reflexive cough as noted above.  No significant pharyngeal residuals.    Pt appears hypersensitive and ?some component of muscle tension vs functional dysphagia. Swallow initiation is also delayed and appears effortful. Pt states she feels like she has to think about initiating a swallow, suspect this results in the repetitive AP movement of bolus. However, pt able to adequate move solid boluses AP and initiate swallow in a timely manner. ?solid bolus easier to coordinate d/t increased viscosity and sensation.   I do not feel pts chronic cough is a result of aspiration. Suspect cognition or neuro involvement a cause of her oral dysphagia.     .  Note: Images are available for review in PACS as desired.      Recommendations:   Recommended Diet:  regular diet and thin liquids   Recommended Form of Medications: crushed with puree - See education   Aspiration precautions and compensatory swallowing strategies: upright posture, only feed when fully alert, small bites/sips, and quiet environment (tv off, limit talking, door closed, etc.)  Consider referral to:  f/u w/ PCP vs neurologist to review med list to identify if any meds could be causing dysphagia    SLP Dysphagia therapy recommended:  Yes- pt has referral for OP SLP services. Provided information on [facility]    Results Reviewed with: patient and results faxed to referring provider/PCP      Pt/Family Education: Completed. Extensive discussion completed w/ pt on possible etiologies of cough/dysphagia. Pt has many contributing factors but none glaring for dysphagia. Encouraged pt to review med list w/ PCP or neuro for medications which could have dysphagia as a side  effect.  Pt reports extensive pill dysphagia and that she ends up chewing her pills. Encouraged pt to crush meds and put them in puree, pt stated she cannot do this will all her meds as some are not allowed to be crushed. Pt confirmed she does still chew those medications. Recommended crushing meds that are able to be crushed and look into liquid meds for others    Brandi Medeiros, MS CCC-SLP  3/20/2024

## 2024-03-21 ENCOUNTER — TELEPHONE (OUTPATIENT)
Age: 69
End: 2024-03-21

## 2024-03-21 NOTE — TELEPHONE ENCOUNTER
Shona from [facility] calling requesting a referral for speech therapy.    (Phone) [phone]    (Fax) [phone]

## 2024-03-22 RX ORDER — ZEAXANTHIN 90 %
POWDER (GRAM) MISCELLANEOUS
COMMUNITY

## 2024-03-28 ENCOUNTER — OFFICE VISIT (OUTPATIENT)
Dept: PULMONOLOGY | Facility: CLINIC | Age: 69
End: 2024-03-28

## 2024-03-28 VITALS
DIASTOLIC BLOOD PRESSURE: 90 MMHG | SYSTOLIC BLOOD PRESSURE: 148 MMHG | OXYGEN SATURATION: 96 % | HEART RATE: 81 BPM | TEMPERATURE: 98.2 F

## 2024-03-28 DIAGNOSIS — R05.3 CHRONIC COUGH: Primary | ICD-10-CM

## 2024-03-28 DIAGNOSIS — R13.12 OROPHARYNGEAL DYSPHAGIA: ICD-10-CM

## 2024-03-28 NOTE — PROGRESS NOTES
Pulmonary Follow Up Note   Eda Johnson 68 y.o. female MRN: [id number]  3/28/2024    Assessment:  Chronic cough  Likely related to pharyngeal dysfunction/dysphagia based on fluoroscopy/swallow eval  Also noted improvement with ongoing speech therapy/exercise started a week ago  No significant changes/improvement with budesonide/albuterol    Plan:  Continue to follow with speech therapy/ongoing exercises given the symptomatic relief  Discontinue albuterol/budesonide PRN  Follow-up with pulmonary as needed, if symptoms worse or no improvement with speech therapy        Return if symptoms worsen or fail to improve.    History of Present Illness     Follow up for: Chronic cough/dysphagia    Background:  68 y.o. female with a h/o DM 2, morbid obesity, liver cirrhosis/ascites, MELVIN/CPAP, CAD/SVT, kidney stones, allergic rhinitis, depression, HTN, carotid stenosis.     Initially seen for a chronic cough.  Started approximately around the fall 2023.  Minimally productive to white sputum.  Usually worse with cold p.o. intake, feels better with exposure to cold weather/outside cold temperature.  Partially improved with cough drops and narcotics.  No associated PND, or allergic symptoms however has usually seasonal allergic rhinitis symptoms.  Cough is not changed with position, or laying flat, sometimes feels oropharyngeal dysphagia/aspiration but no víctor reflux taking omeprazole.  Sometimes feels during body/fluid buildup from cirrhosis when ascites is there but not significant changed after recent paracentesis in 1/2024 where he removed 4400 cc.  Reports ongoing dental issues/workup in process but no abscess or pain/tenderness.     Lifelong non-smoker, nonalcoholic, no prior significant occupational/environmental hazards exposure.     Social/exposure history  Lived in [place] for the past 30 years  Non-smoker/nonalcoholic  No exposure to mold at the house  She is a retired pharmacist  Pets: 1 dog no exposure to  birds    3/12/2024 visit-initial consultation, fluoroscopy swallow eval: Noted flash penetration/poor control pharyngeal phase swallow, expelled bolus with reflex cough.      Interval History  Since last seen, seen by speech therapy 3/25.  Reports improvement of the cough, not completely since the first session with speech exercises.  Less cough frequency, no other changes.  Did not feel any change or improvement with budesonide/albuterol given at last visit.  Otherwise stable no new dyspnea, cough or wheezing.    Review of Systems  As per hpi, all other systems reviewed and were negative    Studies:  Imaging and other studies: I have personally reviewed pertinent films in PACS    CT chest 8/13/2021-bibasilar consolidation more on the left suggestive of atelectasis/or pneumonia.  Trace right pleural effusion.     Fluoroscopy/barium swallow 3/20/2024-bolus hold in the anterior portion of the oral cavity, great 2 penetration/aspiration risk, flash penetration x 1 with thins secondary to poor control/spillage over the tongue base, penetrated materials were expelled from the laryngeal vestibule during same swallow with reflex cough present    Chest x-ray 2/16/2024-cardiomegaly, clear lung fields     Pulmonary function testing:         EKG, Pathology, and Other Studies: I have personally reviewed pertinent reports.    TTE 10/4/2023-mild LV hypertrophy/hyperdynamic EF 65%.  Normal wall motion.  Grade 2 diastolic dysfunction and elevated LA pressures.  LA severely dilated.  IVC less than 21 mm collapsible more than 50%.  Trace TR.            Past medical, surgical, social and family histories reviewed.      Medications/Allergies: Reviewed      Vitals: Blood pressure 148/90, pulse 81, temperature 98.2 °F (36.8 °C), SpO2 96%. There is no height or weight on file to calculate BMI. Oxygen Therapy  SpO2: 96 %      Physical Exam  Gen: not in acute distress,   Neck/Eyes: supple, no adenopathy, PERRL  Ear: normal appearance, no  "significant hearing impairment  Nose:  normal nasal mucosa, no drainage  Salivary glands: soft nontender  Chest: normal respiratory efforts, clear breath sounds bilaterally  CV: RRR, no murmurs appreciated, 2+ pitting lower extremity edema  Abdomen: soft, non tender  Extremities:  No observed deformity   Skin: unremarkable  Neuro: AAO X3, no focal motor deficit        Labs:  Lab Results   Component Value Date    WBC 8.39 05/29/2022    HGB 12.4 05/29/2022    HCT 35.9 05/29/2022    MCV 97 05/29/2022     (L) 05/29/2022     Lab Results   Component Value Date    CALCIUM 9.2 09/13/2022    K 4.6 09/13/2022    CO2 24 09/13/2022     (H) 09/13/2022    BUN 16 09/13/2022    CREATININE 0.97 09/13/2022     No results found for: \"IGE\"  Lab Results   Component Value Date    ALT 27 09/13/2022    AST 36 09/13/2022    ALKPHOS 65 09/13/2022           Portions of the record may have been created with voice recognition software.  Occasional wrong word or \"sound a like\" substitutions may have occurred due to the inherent limitations of voice recognition software.  Read the chart carefully and recognize, using context, where substitutions have occurred    Samina Terrazas M.D.  [facility]  "

## 2024-05-15 ENCOUNTER — DOCTOR'S OFFICE (OUTPATIENT)
Dept: URBAN - NONMETROPOLITAN AREA CLINIC 1 | Facility: CLINIC | Age: 69
Setting detail: OPHTHALMOLOGY
End: 2024-05-15
Payer: MEDICARE

## 2024-05-15 DIAGNOSIS — H40.023: ICD-10-CM

## 2024-05-15 DIAGNOSIS — H35.3131: ICD-10-CM

## 2024-05-15 DIAGNOSIS — H35.361: ICD-10-CM

## 2024-05-15 DIAGNOSIS — H35.362: ICD-10-CM

## 2024-05-15 PROCEDURE — 92134 CPTRZ OPH DX IMG PST SGM RTA: CPT | Performed by: OPHTHALMOLOGY

## 2024-05-15 PROCEDURE — 92014 COMPRE OPH EXAM EST PT 1/>: CPT | Performed by: OPHTHALMOLOGY

## 2024-05-15 PROCEDURE — 92083 EXTENDED VISUAL FIELD XM: CPT | Performed by: OPHTHALMOLOGY

## 2024-05-15 ASSESSMENT — CONFRONTATIONAL VISUAL FIELD TEST (CVF)
OD_FINDINGS: FULL
OS_FINDINGS: FULL

## 2024-07-16 NOTE — ASSESSMENT & PLAN NOTE
Continued Stay SW/ Assessment/Plan of Care Note   Discharge Destination: TBD     Barriers to Discharge: medical status, intubated  Patient's discharge planning goal: POA-[facility] is agreeable to subacute rehab facility  Discharge Services: TBD; subacute rehab   Prior Services: [facility] group home; home oxygen ([facility])  Baseline Level of Care: independent with ADLs; dependent with IADLs  Transportation: facility staff arranges  DME: has cane, walker, shower chair, and wheelchair; home oxygen- tanks, concentrator (5L baseline at all times per group home staff)     Pt does not  have needed DME (TBD) at hospital for discharge                   SDOH Triggers have not been identified.  SDOH Triggers: none.    Resources for SDOH triggers have not been provided.  PCP: Karissa Leblanc APNP   Advanced Directives:  On file- activated 7/4/24; [facility]A-[facility] activation form placed for medical records  IMM/MOON: IMM pending  Important Phone Numbers:      HARDEEP Lamb [facility] P:[phone]      Active Substitute Decision Maker (SDM)       [facility] Agent 1 - Daughter   Primary Phone: [phone] (Mobile)                     Progress note:  Reviewed chart and collaborated with care team.  Patient remains vented.  Plan is for patient to extubate today.      Patient tolerated extubation today and continues on a high flow system.     Case management will continue to follow and will advance discharge plan as patient becomes medically stable to transfer to a lower level of care.     See SW/CM flowsheets for other objective data.    Disposition Recommendations:  Preliminary discharge destination:    / recommendation for discharge: Sub-acute nursing home    Destination Pharmacy:        Discharge Plan/Needs:     Continued Care and Services - Admitted Since 7/4/2024       Destination        Service Provider Request Status Selected Services Address Phone Fax    [facility]  · Hemoglobin is trending down; recent hospitalization for duodenal bleeding   · Transfuse PRBC in case hemoglobin is less than 7 5    Hemoglobin today is 8 4  · No active bleeding noted at this time  Results from last 7 days   Lab Units 08/13/21  0517 08/12/21  0453 08/10/21  0518 08/09/21  0500 08/08/21  0523 08/07/21 2015   HEMOGLOBIN g/dL 8 4* 8 7* 7 9* 7 5* 8 1* 9 2[facility] Pending - Request Sent N/A [address] [facility] [id number] [phone]    [facility] Pending - Request Sent N/A [address] [facility] [id number] [phone]    [facility]-SNF Pending - Request Sent N/A [address] [facility] [id number] [phone]    [facility] - SNF PAN Pending - Request Sent N/A [address] [id number] [phone]    [facility] - SNF Pending - Request Sent N/A [address] [facility] [id number] [phone]    [facility] - SNF PAN Pending - Request Sent N/A [address] [facility] [id number] [phone]    [facility] - SNF Pending - No Request Sent N/A [address] [id number] -- --    [facility] SNF Declined  Patient does not meet the level of care required for admission N/A [address] [facility] [id number] [phone]    [facility] - SNF PAN Declined  No contract with patient's insurance carrier N/A [address] [id number] [phone]    [facility] - SNF Declined  Patient does not meet the level of care required for admission N/A [address] [facility] [id number] [phone]                  Continued Care and Services - Linked Episodes Includes continued care and service providers from the active episodes linked to this encounter, which are listed below      Care Transitions Episode start date: 7/4/2024   There are no active outsourced providers for this episode.                     Devices/ Equipment that need to be arranged for discharge:     Accepted   Pending insurance authorization   Others:    Anticipated date of DME availability:     Prior To Hospitalization:    Living Situation: Other  facility residents and residing at skilled nursing    .  Support Systems: Family members, Friends   Home Devices/Equipment: Elevated toilet seat, Shower chair            Mobility Assist Devices: Cane, Four wheel walker, Wheelchair   Type of Service Prior to Hospitalization: Personal care worker (agency)               Patient/Family discharge goal (s):  Sub-acute nursing home     Resources provided:           Prior Function                Current Function  Last Filed Values         Value Time User    Current Function  significantly below baseline level of function 7/10/2024 12:07 PM Clifton Hudson, PT    Therapy Impairments  activity tolerance; balance; cognition; coordination; strength; skin integrity; safety awareness 7/10/2024 12:07 PM Clifton Hudson, PT    ADLs Requiring Support  bed mobility; transfers; ambulation 7/10/2024 12:07 PM Clifton Hudson, PT            Therapy Recommendations for Discharge:   PT:      Last Filed Values         Value Time User    PT Discharge Needs  therapy 5 or more times per week 7/10/2024 12:07 PM Clifton Hudson, PT          OT:       Last Filed Values         Value Time User    OT Discharge Needs  therapy 5 or more times per week 7/10/2024  1:34 PM Meckelberg, Cierra, OTA          SLP:    Last Filed Values         Value Time User    SLP Discharge Needs  therapy 1-3 times per week 7/11/2024 11:54 AM Dilcia Jorge, SLP            Mobility Equipment Recommended for Discharge: rex lift      Barriers to Discharge  Identified Barriers to Discharge/Transition Planning:

## 2024-10-04 ENCOUNTER — HOME CARE VISIT (OUTPATIENT)
Dept: HOME HEALTH SERVICES | Facility: HOME HEALTHCARE | Age: 69
End: 2024-10-04
Payer: MEDICARE

## 2024-10-05 ENCOUNTER — HOME CARE VISIT (OUTPATIENT)
Dept: HOME HOSPICE | Facility: HOSPICE | Age: 69
End: 2024-10-05
Payer: MEDICARE

## 2024-10-11 ENCOUNTER — HOME CARE VISIT (OUTPATIENT)
Dept: HOME HOSPICE | Facility: HOSPICE | Age: 69
End: 2024-10-11
Payer: MEDICARE

## 2024-10-11 NOTE — CASE COMMUNICATION
"Eda Johnson, Bereavement Final IDG 10/9/24 (1MR, 4LR) Due: 24   : 1955  SOC: 10/5/24  DOD: 10/9/24  Diagnosis: Cirrhosis  Eda is a 69 year old woman.  of 49 years is Sedrick. He is a physician and worked in hospice. 4 children. Sedrick and Lillie are physicians and took over their father's private practice. There are also Joseph and Alison. 12 grandchildren. Children's Bereavement information provided. 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 Sedrick mentioned support from their [facility], [facility] [place].  would visit and provided SOS. Sedrick stated their children became caregivers so he could just be her . Sedrick described Eda as a \"beautiful person\" and is sure she will be in [place] after she dies. He shared this was a long jacob and that Eda was ready for the end.    TOD: Family was pre sent at [facility] [place] and declined support services.    Call Assignments:  ORESTES Mayorga to reassess  Sedrick Johnson at MR. (Due: 10/23/24)  MARK ANTHONY Whitman to reassess daughter Lillie Fritz and assess sons Darien Johnson, Joseph Johnson, and daughter Alison Renteria at LR. (Due: 24)  "